# Patient Record
Sex: MALE | Race: WHITE | NOT HISPANIC OR LATINO | Employment: OTHER | URBAN - METROPOLITAN AREA
[De-identification: names, ages, dates, MRNs, and addresses within clinical notes are randomized per-mention and may not be internally consistent; named-entity substitution may affect disease eponyms.]

---

## 2017-08-18 ENCOUNTER — ANTI-COAG VISIT (OUTPATIENT)
Dept: CARDIOLOGY | Facility: CLINIC | Age: 62
End: 2017-08-18

## 2017-08-18 DIAGNOSIS — Z79.01 ANTICOAGULATED ON COUMADIN: ICD-10-CM

## 2017-08-18 DIAGNOSIS — I48.20 CHRONIC ATRIAL FIBRILLATION: ICD-10-CM

## 2017-08-18 NOTE — PROGRESS NOTES
Spoke with pt gave dosing and next inr check date pt voiced understanding.   pts wife states pt has level checked at Dr mckeon office and will have it done on 09/13 she states they have it done once a month

## 2017-08-18 NOTE — PROGRESS NOTES
It is the recommendation of the CC that the INR been checked as ordered;  The INR increased a significant amount in the last week.

## 2017-08-29 ENCOUNTER — ANTI-COAG VISIT (OUTPATIENT)
Dept: CARDIOLOGY | Facility: CLINIC | Age: 62
End: 2017-08-29

## 2017-08-29 DIAGNOSIS — Z79.01 ANTICOAGULATED ON COUMADIN: ICD-10-CM

## 2017-08-29 DIAGNOSIS — I48.20 CHRONIC ATRIAL FIBRILLATION: ICD-10-CM

## 2017-08-29 NOTE — PROGRESS NOTES
Spoke with pts wife  gave dosing and next inr check date pts wife voiced understanding.   Getting checked again at Drs office on 09/13

## 2017-09-13 ENCOUNTER — ANTI-COAG VISIT (OUTPATIENT)
Dept: CARDIOLOGY | Facility: CLINIC | Age: 62
End: 2017-09-13

## 2017-09-13 DIAGNOSIS — Z79.01 ANTICOAGULATED ON COUMADIN: ICD-10-CM

## 2017-09-13 DIAGNOSIS — I48.20 CHRONIC ATRIAL FIBRILLATION: ICD-10-CM

## 2017-09-27 NOTE — PROGRESS NOTES
THAT'S FINE, I CHANGED THE COMMENTS TO REFLECT THE LAB LOCATION, PT DOES NOT HAVE HOME METER.  I WOULD PREFER THEY GO EARLIER IN THE WEEK, BUT IF [facility] IS BEST, THEN PLEASE GO TO LAB EARLY.

## 2017-10-13 NOTE — PROGRESS NOTES
Pt did not go to lab because he had been holding for endoscopy and just restarted the coumadin on Tuesday. Pt to go to lab on Monday;  Pt is having colonoscopy on 10/30 and is supposed to start holding on 10/25

## 2017-10-16 ENCOUNTER — ANTI-COAG VISIT (OUTPATIENT)
Dept: CARDIOLOGY | Facility: CLINIC | Age: 62
End: 2017-10-16

## 2017-10-16 DIAGNOSIS — I48.20 CHRONIC ATRIAL FIBRILLATION: ICD-10-CM

## 2017-10-16 DIAGNOSIS — Z79.01 ANTICOAGULATED ON COUMADIN: ICD-10-CM

## 2017-10-16 NOTE — PROGRESS NOTES
wife informed of dosing and next inr chk date; also advised of holding instructions; she expressed understanding

## 2017-11-20 ENCOUNTER — ANTI-COAG VISIT (OUTPATIENT)
Dept: CARDIOLOGY | Facility: CLINIC | Age: 62
End: 2017-11-20

## 2017-11-20 DIAGNOSIS — I48.20 CHRONIC ATRIAL FIBRILLATION: ICD-10-CM

## 2017-11-20 DIAGNOSIS — Z79.01 ANTICOAGULATED ON COUMADIN: ICD-10-CM

## 2017-12-15 ENCOUNTER — ANTI-COAG VISIT (OUTPATIENT)
Dept: CARDIOLOGY | Facility: CLINIC | Age: 62
End: 2017-12-15

## 2017-12-15 DIAGNOSIS — Z79.01 ANTICOAGULATED ON COUMADIN: Primary | ICD-10-CM

## 2017-12-15 DIAGNOSIS — I48.20 CHRONIC ATRIAL FIBRILLATION: ICD-10-CM

## 2017-12-15 NOTE — PROGRESS NOTES
CG informed of dosing and next inr chk date; she read back dosing correctly; need new pt/inr standing order put in

## 2017-12-26 ENCOUNTER — ANTI-COAG VISIT (OUTPATIENT)
Dept: CARDIOLOGY | Facility: CLINIC | Age: 62
End: 2017-12-26

## 2017-12-26 DIAGNOSIS — Z79.01 ANTICOAGULATED ON COUMADIN: ICD-10-CM

## 2017-12-26 DIAGNOSIS — I48.20 CHRONIC ATRIAL FIBRILLATION: ICD-10-CM

## 2018-01-16 ENCOUNTER — ANTI-COAG VISIT (OUTPATIENT)
Dept: CARDIOLOGY | Facility: CLINIC | Age: 63
End: 2018-01-16

## 2018-01-16 DIAGNOSIS — Z79.01 ANTICOAGULATED ON COUMADIN: ICD-10-CM

## 2018-01-16 DIAGNOSIS — I48.20 CHRONIC ATRIAL FIBRILLATION: ICD-10-CM

## 2018-01-23 NOTE — PROGRESS NOTES
Maintain dose, INR likely to drop due to DDI-check INR next week to see if INR drops with med change. Will watch close for a few weeks since dose may require an adjustment.

## 2018-01-23 NOTE — PROGRESS NOTES
Mignon called; pt will be starting Harvoni BID on Thursday, also will be changes ranitidine to pepcid

## 2018-01-30 ENCOUNTER — ANTI-COAG VISIT (OUTPATIENT)
Dept: CARDIOLOGY | Facility: CLINIC | Age: 63
End: 2018-01-30

## 2018-01-30 DIAGNOSIS — I48.20 CHRONIC ATRIAL FIBRILLATION: ICD-10-CM

## 2018-01-30 DIAGNOSIS — Z79.01 ANTICOAGULATED ON COUMADIN: ICD-10-CM

## 2018-02-12 ENCOUNTER — ANTI-COAG VISIT (OUTPATIENT)
Dept: CARDIOLOGY | Facility: CLINIC | Age: 63
End: 2018-02-12

## 2018-02-12 DIAGNOSIS — Z79.01 ANTICOAGULATED ON COUMADIN: ICD-10-CM

## 2018-02-12 DIAGNOSIS — I48.20 CHRONIC ATRIAL FIBRILLATION: ICD-10-CM

## 2018-02-12 NOTE — PROGRESS NOTES
Spoke with miguel angel gave dosing and next inr check date miguel angel voiced understanding. She states pt is going back on 03/13 to have level done again

## 2018-03-13 ENCOUNTER — ANTI-COAG VISIT (OUTPATIENT)
Dept: CARDIOLOGY | Facility: CLINIC | Age: 63
End: 2018-03-13

## 2018-03-13 DIAGNOSIS — Z79.01 ANTICOAGULATED ON COUMADIN: ICD-10-CM

## 2018-03-13 DIAGNOSIS — I48.20 CHRONIC ATRIAL FIBRILLATION: ICD-10-CM

## 2018-03-13 PROBLEM — B18.2 HEP C W/O COMA, CHRONIC: Status: ACTIVE | Noted: 2018-03-13

## 2018-04-13 ENCOUNTER — ANTI-COAG VISIT (OUTPATIENT)
Dept: CARDIOLOGY | Facility: CLINIC | Age: 63
End: 2018-04-13

## 2018-04-13 DIAGNOSIS — Z79.01 ANTICOAGULATED ON COUMADIN: ICD-10-CM

## 2018-04-13 DIAGNOSIS — I48.20 CHRONIC ATRIAL FIBRILLATION: ICD-10-CM

## 2018-05-16 ENCOUNTER — ANTI-COAG VISIT (OUTPATIENT)
Dept: CARDIOLOGY | Facility: CLINIC | Age: 63
End: 2018-05-16

## 2018-05-16 DIAGNOSIS — Z79.01 ANTICOAGULATED ON COUMADIN: ICD-10-CM

## 2018-05-16 DIAGNOSIS — I48.20 CHRONIC ATRIAL FIBRILLATION: ICD-10-CM

## 2018-05-16 NOTE — PROGRESS NOTES
5/16/18 - LVM with dosing instruction, recheck date & to call to coumadin clinic to confirm.   5/17/18 - Spoke to wife.  Confirms dose & compliance.  No changes or problems reported. INR slightly low.  Wife request to do extra dose on Friday. Increase dose 5/18/18 and recheck INR in 5 - 6 weeks at [facility] with Dr. Keith. Appt scheduled for 6/18/18 Wife able to restate instructions.

## 2018-06-19 ENCOUNTER — ANTI-COAG VISIT (OUTPATIENT)
Dept: CARDIOLOGY | Facility: CLINIC | Age: 63
End: 2018-06-19

## 2018-06-19 DIAGNOSIS — I48.20 CHRONIC ATRIAL FIBRILLATION: ICD-10-CM

## 2018-06-19 DIAGNOSIS — Z79.01 ANTICOAGULATED ON COUMADIN: ICD-10-CM

## 2018-06-19 NOTE — PROGRESS NOTES
Informed patient wife of dose and next inr check date. She voiced understanding.   Pt also wants to know if sudafed sinus will interact c coumadin?

## 2018-07-23 ENCOUNTER — ANTI-COAG VISIT (OUTPATIENT)
Dept: CARDIOLOGY | Facility: CLINIC | Age: 63
End: 2018-07-23

## 2018-07-23 DIAGNOSIS — Z79.01 ANTICOAGULATED ON COUMADIN: ICD-10-CM

## 2018-07-23 DIAGNOSIS — I48.20 CHRONIC ATRIAL FIBRILLATION: ICD-10-CM

## 2018-07-23 NOTE — PROGRESS NOTES
Attempted to call pt with dosing and next inr check no answer and no vm will attempt at a later time

## 2018-08-23 ENCOUNTER — ANTI-COAG VISIT (OUTPATIENT)
Dept: CARDIOLOGY | Facility: CLINIC | Age: 63
End: 2018-08-23

## 2018-08-23 DIAGNOSIS — Z79.01 ANTICOAGULATED ON COUMADIN: ICD-10-CM

## 2018-08-23 DIAGNOSIS — I48.20 CHRONIC ATRIAL FIBRILLATION: ICD-10-CM

## 2018-08-23 NOTE — PROGRESS NOTES
Dose confirmed & compliance. No changes or problems reported. INR in range and stable. Will maintain dose and recheck INR in 4 weeks.

## 2018-09-12 PROBLEM — K59.03 DRUG-INDUCED CONSTIPATION: Status: ACTIVE | Noted: 2018-09-12

## 2018-09-12 PROBLEM — G56.02 CARPAL TUNNEL SYNDROME OF LEFT WRIST: Status: ACTIVE | Noted: 2018-09-12

## 2019-04-24 PROBLEM — G89.29 CHRONIC LEFT SHOULDER PAIN: Status: ACTIVE | Noted: 2019-04-24

## 2019-04-24 PROBLEM — M25.512 CHRONIC LEFT SHOULDER PAIN: Status: ACTIVE | Noted: 2019-04-24

## 2019-05-14 ENCOUNTER — INITIAL CONSULT (OUTPATIENT)
Dept: NEUROSURGERY | Facility: CLINIC | Age: 64
End: 2019-05-14
Payer: MEDICARE

## 2019-05-14 VITALS
HEIGHT: 70 IN | BODY MASS INDEX: 34.04 KG/M2 | DIASTOLIC BLOOD PRESSURE: 94 MMHG | WEIGHT: 237.75 LBS | SYSTOLIC BLOOD PRESSURE: 149 MMHG | HEART RATE: 96 BPM

## 2019-05-14 DIAGNOSIS — M79.18 MYOFASCIAL PAIN: ICD-10-CM

## 2019-05-14 DIAGNOSIS — Z98.1 S/P CERVICAL SPINAL FUSION: Primary | ICD-10-CM

## 2019-05-14 PROCEDURE — 99999 PR PBB SHADOW E&M-EST. PATIENT-LVL III: CPT | Mod: PBBFAC,,, | Performed by: STUDENT IN AN ORGANIZED HEALTH CARE EDUCATION/TRAINING PROGRAM

## 2019-05-14 PROCEDURE — 99999 PR PBB SHADOW E&M-EST. PATIENT-LVL III: ICD-10-PCS | Mod: PBBFAC,,, | Performed by: STUDENT IN AN ORGANIZED HEALTH CARE EDUCATION/TRAINING PROGRAM

## 2019-05-14 PROCEDURE — 3008F PR BODY MASS INDEX (BMI) DOCUMENTED: ICD-10-PCS | Mod: CPTII,S$GLB,, | Performed by: STUDENT IN AN ORGANIZED HEALTH CARE EDUCATION/TRAINING PROGRAM

## 2019-05-14 PROCEDURE — 99204 PR OFFICE/OUTPT VISIT, NEW, LEVL IV, 45-59 MIN: ICD-10-PCS | Mod: S$GLB,,, | Performed by: STUDENT IN AN ORGANIZED HEALTH CARE EDUCATION/TRAINING PROGRAM

## 2019-05-14 PROCEDURE — 3077F PR MOST RECENT SYSTOLIC BLOOD PRESSURE >= 140 MM HG: ICD-10-PCS | Mod: CPTII,S$GLB,, | Performed by: STUDENT IN AN ORGANIZED HEALTH CARE EDUCATION/TRAINING PROGRAM

## 2019-05-14 PROCEDURE — 99204 OFFICE O/P NEW MOD 45 MIN: CPT | Mod: S$GLB,,, | Performed by: STUDENT IN AN ORGANIZED HEALTH CARE EDUCATION/TRAINING PROGRAM

## 2019-05-14 PROCEDURE — 3080F PR MOST RECENT DIASTOLIC BLOOD PRESSURE >= 90 MM HG: ICD-10-PCS | Mod: CPTII,S$GLB,, | Performed by: STUDENT IN AN ORGANIZED HEALTH CARE EDUCATION/TRAINING PROGRAM

## 2019-05-14 PROCEDURE — 3080F DIAST BP >= 90 MM HG: CPT | Mod: CPTII,S$GLB,, | Performed by: STUDENT IN AN ORGANIZED HEALTH CARE EDUCATION/TRAINING PROGRAM

## 2019-05-14 PROCEDURE — 3077F SYST BP >= 140 MM HG: CPT | Mod: CPTII,S$GLB,, | Performed by: STUDENT IN AN ORGANIZED HEALTH CARE EDUCATION/TRAINING PROGRAM

## 2019-05-14 PROCEDURE — 3008F BODY MASS INDEX DOCD: CPT | Mod: CPTII,S$GLB,, | Performed by: STUDENT IN AN ORGANIZED HEALTH CARE EDUCATION/TRAINING PROGRAM

## 2019-05-14 NOTE — PROGRESS NOTES
[facility] - Neurosurgery  Clinic Consult     Consult Requested By: Jorge Keith MD  PCP: Jorge Keith MD    SUBJECTIVE:     Chief Complaint:   Chief Complaint   Patient presents with    Cervical Spine Pain (C-spine)     patient has a history of cerivcal fusion; patient reports neck pain with left arm pain; arm pain resolved with oral steriods; pain 2/10       History of Present Illness:  Hudson Tavares is a 64 y.o. right handed male who presents for evaluation of neck pain. Patient is s/p C5-7 ACDF with Dr. Lacey in 2004 for disc herniation and left arm pain. Patient reports doing well since surgery. He lifting something heavy approximately 2 weeks ago with acute onset of left sided neck pain. Denies arm pain, numbness, weakness. He has not attended physical therapy or received injections recently with pain management. He reports stiffness in neck with increased pain with range of motion exercises.     VAS 2/10 neck   PHQ 2  Neck Disability Index 34%    Past Medical History:   Diagnosis Date    Adenomatous colon polyp     Alcohol abuse 7/9/2015    Anticoagulant long-term use     coumadin    Atrial fibrillation 7/9/2015    CAD (coronary artery disease), native coronary artery 5/3/2016    Degenerative joint disease 7/9/2015    Encounter for blood transfusion     Enlarged liver     Essential hypertension 7/9/2015    Hep C w/o coma, chronic     Hiatal hernia     Hx of bladder infections     finished cipro 4/29/16    S/P CABG (coronary artery bypass graft) 5/17/2016    LIMA to LAD, SVG to OM, SVG to diagonal, SVG to right PDA, Maze, left atrial appendage resection (5/16)    Spleen enlarged     Thrombocytopenia 5/5/2016    Tobacco abuse 7/9/2015     Past Surgical History:   Procedure Laterality Date    AORTOCORONARY BYPASS-CABG x 4 w/EVH N/A 5/4/2016    Per[facility] OR    CARDIAC SURGERY  05/04/2016    4 vessel CABG    CARDIOVERSION N/A 6/22/2016    [facility]    CARDIOVERSION N/A 11/10/2015    [facility]    CERVICAL FUSION  2004    COLONOSCOPY      HEART CATH-LEFT Left 8/8/2017    [facility] CATH    HEART CATH-LEFT Left 4/27/2016    [facility] CATH    JOINT REPLACEMENT  TOTAL RIGHT KNEE     MAZE PROCEDURE N/A 5/4/2016    [facility] OR    OPEN ANTERIOR SHOULDER RECONSTRUCTION Left     TIBIA FRACTURE SURGERY Right 1971    TRANSESOPHAGEAL ECHOCARDIOGRAM (SHELLIE) N/A 6/22/2016    [facility]     Family History   Problem Relation Age of Onset    Heart disease Mother         congestive heart failure    Diabetes Mother     Hypertension Mother     Heart disease Sister         heart murmur    Atrial fibrillation Sister     Cancer Sister         breast    Cancer Father 60        colon    Chronic back pain Brother     Atrial fibrillation Sister     Heart disease Sister     Collagen disease Neg Hx      Social History     Tobacco Use    Smoking status: Former Smoker     Packs/day: 0.25     Years: 30.00     Pack years: 7.50     Types: Cigarettes     Last attempt to quit: 5/1/2016     Years since quitting: 3.0    Smokeless tobacco: Never Used   Substance Use Topics    Alcohol use: No     Alcohol/week: 0.0 oz     Comment: last drink 8/2015    Drug use: No      Review of patient's allergies indicates:   Allergen Reactions    Cilostazol Other (See Comments)     Colon bleeding    Keflex [cephalexin] Other (See Comments)     Was using 500mg TID oral and had a strong metallic taste in his mouth and could not function.  Can use IV cephalosporin.    Morphine     Stadol [butorphanol tartrate]     Sulfa (sulfonamide antibiotics)        Current Outpatient Medications:     aspirin (ECOTRIN) 81 MG EC tablet, Take 81 mg by mouth once daily. Use prior to arrival for cardioversion, Disp: ,  Rfl:     clotrimazole-betamethasone 1-0.05% (LOTRISONE) cream, APPLY TO AFFECTED AREA TWICE A DAY, Disp: 45 g, Rfl: 3    famotidine (PEPCID) 20 MG tablet, Take 20 mg by mouth once daily., Disp: , Rfl:     methocarbamol (ROBAXIN) 750 MG Tab, Take 2 tablets (1,500 mg total) by mouth 3 (three) times daily., Disp: 60 tablet, Rfl: 2    metoprolol succinate (TOPROL-XL) 50 MG 24 hr tablet, TAKE 1 TABLET (50 MG TOTAL) BY MOUTH ONCE DAILY., Disp: 30 tablet, Rfl: 11    mometasone 0.1% (ELOCON) 0.1 % cream, Apply topically once daily., Disp: 45 g, Rfl: 11    mupirocin calcium 2% (BACTROBAN) 2 % cream, APPLY TO AFFECTED AREA 3 TIMES A DAY, Disp: 15 g, Rfl: 3    POLYETHYLENE GLYCOL 3350 (MIRALAX ORAL), Take by mouth as directed., Disp: , Rfl:     warfarin (COUMADIN) 1 MG tablet, This medication is managed by [facility]. Please contact [phone]. See most recent anticoag visit for current coumadin dosing. Current dose: Take 2mg Wed, 1mg all other days or as instructed by Coumadin Clinic, Disp: 45 tablet, Rfl: 3    warfarin (COUMADIN) 1 MG tablet, TAKE 1 TABLET BY MOUTH EVERY DAY EXCEPT TAKE TAKE 2 TABLETS ON TUESDAYS, Disp: 120 tablet, Rfl: 2    warfarin (COUMADIN) 2 MG tablet, Take 1 tablet (2 mg total) by mouth Daily. (Patient taking differently: Take 1-2 mg by mouth. This medication is managed by [facility]. Please contact [phone]. See most recent anticoag visit for current coumadin dosing. Current dose: Take 2 mg on Wed; 1 mg all other days or as instructed by coumadin clinic), Disp: 90 tablet, Rfl: 3    Review of Systems:   Constitutional: no fever, chills or night sweats. No changes in weight   Eyes: no visual changes   ENT: no nasal congestion or sore throat   Respiratory: no cough or shortness of breath   Cardiovascular: no chest pain or palpitations   Gastrointestinal: no nausea or vomiting   Genitourinary: no hematuria or dysuria   Integument/Breast: no rash or pruritis    Hematologic/Lymphatic: no easy bruising or lymphadenopathy   Musculoskeletal: +myalgias, neck pain   Neurological: no seizures or tremors   Behavioral/Psych: no auditory or visual hallucinations   Endocrine: no heat or cold intolerance         OBJECTIVE:     Vital Signs (Most Recent):  Pulse: 96 (05/14/19 1235)  BP: (!) 149/94 (05/14/19 1235)    Physical Exam:   General: well developed, well nourished, no distress.   Neurologic: Alert and oriented. Thought content appropriate. GCS 15.   Head: normocephalic, atraumatic  Eyes: EOMI.  Neck: trachea midline, no JVD   Cardiovascular: no LE edema  Pulmonary: normal respirations, no signs of respiratory distress  Abdomen: non-distended  Sensory: intact to light touch throughout  Skin: Skin is warm, dry and intact.    Motor Strength: Moves all extremities spontaneously with good tone. No abnormal movements seen.     Strength  Deltoids Triceps Biceps Wrist Extension Wrist Flexion Hand  Interossei   Upper: R 5/5 5/5 5/5 5/5 5/5 5/5 5/5    L 5/5 5/5 5/5 5/5 5/5 5/5 5/5     Iliopsoas Quadriceps Knee  Flexion Tibialis  anterior Gastro- cnemius EHL    Lower: R 5/5 5/5 5/5 5/5 5/5 5/5     L 5/5 5/5 5/5 5/5 5/5 5/5      DTR's: 2 + and symmetric in UE and LE, except 0 right patellar (s/p TKR)  Dunaway: absent  Clonus: absent    Gait: normal    Tandem Gait: No difficulty           Able to walk on heels & toes    Cervical Spine: decreased ROM secondary to previous fusion, TTP left trapezius        Diagnostic Results:  I have independently reviewed the following imaging:  X-Ray: Reviewed  MRI: Reviewed  MRI and x-rays reviewed patient has a previous C5-7 anterior cervical diskectomy and fusion without evidence of pseudoarthrosis; at C4-5 he has a very slight anterolisthesis without cord compression with left-sided foraminal stenosis, multilevel less significant spondylosis with neural foraminal narrowing    ASSESSMENT/PLAN:     S/P cervical spinal fusion  -     Ambulatory  Referral to Physical/Occupational Therapy    Myofascial pain        Hudson Tavares is a 64 y.o. male    Hand acute exacerbation of neck and proximal left arm pain consistent with a C5 radiculopathy  He has had improvement in the arm symptoms with a Medrol Dosepak  His residual neck pain  Is neurologically intact  His imaging correlates with moderate adjacent segment disease a very slight spondylolisthesis, non mobile with some left-sided foraminal stenosis  We discussed his imaging reviewed reviewed his treatment options with and will proceed with conservative care at this time  He be referred to physical therapy, we discussed activity modification  Reviewed treatment options including injections, he wishes to hold off for the moment    He will proceed if he wants to schedule injections will call us and we will coordinate if not, follow-up for 6 weeks    We discussed non-surgical treatment options.  We discussed medical management and referral options including anti-inflammatories, muscle relaxants, narcotics and neuropathic pain medications.  We discussed physical therapy for neck strengthening and flexibility and the possibility of cervical traction.  We talked about injection therapy for both treatment and diagnosis.            Patient verbalized understanding of plan. Encouraged to call with any questions or concerns.     This note was partially dictated using voice recognition software, so please excuse any errors that were not corrected.

## 2019-05-14 NOTE — LETTER
May 14, 2019      Jorge Keith MD  [id number] [address] [address]           [facility]  [address] [facility] [address] [place] [address]  Phone: [phone]  Fax: [phone]          Patient: Hudson Tavares   MR Number: [id number]   YOB: 1955   Date of Visit: 5/14/2019       Dear Dr. Jorge Keith:    Thank you for referring Hudson Tavares to me for evaluation. Attached you will find relevant portions of my assessment and plan of care.    If you have questions, please do not hesitate to call me. I look forward to following Hudson Tavares along with you.    Sincerely,    Matthew Wallace MD    Enclosure  CC:  No Recipients    If you would like to receive this communication electronically, please contact [email or web address] or [phone] to request more information on [employer] Link access.    For providers and/or their staff who would like to refer a patient to [facility], please contact us through our one-stop-shop provider referral line, [facility], at [phone].    If you feel you have received this communication in error or would no longer like to receive these types of communications, please e-mail [email or web address]

## 2019-06-25 ENCOUNTER — OFFICE VISIT (OUTPATIENT)
Dept: NEUROSURGERY | Facility: CLINIC | Age: 64
End: 2019-06-25
Payer: MEDICARE

## 2019-06-25 VITALS — SYSTOLIC BLOOD PRESSURE: 131 MMHG | DIASTOLIC BLOOD PRESSURE: 86 MMHG | HEART RATE: 69 BPM

## 2019-06-25 DIAGNOSIS — Z98.1 S/P CERVICAL SPINAL FUSION: Primary | ICD-10-CM

## 2019-06-25 PROCEDURE — 3079F DIAST BP 80-89 MM HG: CPT | Mod: CPTII,S$GLB,, | Performed by: STUDENT IN AN ORGANIZED HEALTH CARE EDUCATION/TRAINING PROGRAM

## 2019-06-25 PROCEDURE — 99999 PR PBB SHADOW E&M-EST. PATIENT-LVL III: ICD-10-PCS | Mod: PBBFAC,,, | Performed by: STUDENT IN AN ORGANIZED HEALTH CARE EDUCATION/TRAINING PROGRAM

## 2019-06-25 PROCEDURE — 3079F PR MOST RECENT DIASTOLIC BLOOD PRESSURE 80-89 MM HG: ICD-10-PCS | Mod: CPTII,S$GLB,, | Performed by: STUDENT IN AN ORGANIZED HEALTH CARE EDUCATION/TRAINING PROGRAM

## 2019-06-25 PROCEDURE — 99213 OFFICE O/P EST LOW 20 MIN: CPT | Mod: S$GLB,,, | Performed by: STUDENT IN AN ORGANIZED HEALTH CARE EDUCATION/TRAINING PROGRAM

## 2019-06-25 PROCEDURE — 99999 PR PBB SHADOW E&M-EST. PATIENT-LVL III: CPT | Mod: PBBFAC,,, | Performed by: STUDENT IN AN ORGANIZED HEALTH CARE EDUCATION/TRAINING PROGRAM

## 2019-06-25 PROCEDURE — 3075F PR MOST RECENT SYSTOLIC BLOOD PRESS GE 130-139MM HG: ICD-10-PCS | Mod: CPTII,S$GLB,, | Performed by: STUDENT IN AN ORGANIZED HEALTH CARE EDUCATION/TRAINING PROGRAM

## 2019-06-25 PROCEDURE — 99213 PR OFFICE/OUTPT VISIT, EST, LEVL III, 20-29 MIN: ICD-10-PCS | Mod: S$GLB,,, | Performed by: STUDENT IN AN ORGANIZED HEALTH CARE EDUCATION/TRAINING PROGRAM

## 2019-06-25 PROCEDURE — 3075F SYST BP GE 130 - 139MM HG: CPT | Mod: CPTII,S$GLB,, | Performed by: STUDENT IN AN ORGANIZED HEALTH CARE EDUCATION/TRAINING PROGRAM

## 2019-06-25 NOTE — PROGRESS NOTES
"REFERRING PHYSICIAN:    No ref. provider found  N/A    Return visit #2.    /"P cervical spinal fusion  -     Ambulatory Referral to Physical/Occupational Therapy     Myofascial pain           Hudson Tavares is a 64 y.o. male     Hand acute exacerbation of neck and proximal left arm pain consistent with a C5 radiculopathy  He has had improvement in the arm symptoms with a Medrol Dosepak  His residual neck pain  Is neurologically intact  His imaging correlates with moderate adjacent segment disease a very slight spondylolisthesis, non mobile with some left-sided foraminal stenosis  We discussed his imaging reviewed reviewed his treatment options with and will proceed with conservative care at this time  He be referred to physical therapy, we discussed activity modification  Reviewed treatment options including injections, he wishes to hold off for the moment     He will proceed if he wants to schedule injections will call us and we will coordinate if not, follow-up for 6 weeks"      CLINICAL PROGRESS:   Hudson Tavares is here for follow visit  He is doing much better he had improvement with physical therapy initially  He he states he saw 4 different therapist an exacerbation of his pain some.  However, overall he is doing better his return to the right his tractor mobilizing buildup a  He has some intermittent flare-up ended neck pain and left proximal arm, however this is less frequent  Overall is very functional feels better      MEDICATIONS:                   List reviewed, see chart for dosing details.    ALLERGIES:       Review of patient's allergies indicates:   Allergen Reactions    Cilostazol Other (See Comments)     Colon bleeding    Keflex [cephalexin] Other (See Comments)     Was using 500mg TID oral and had a strong metallic taste in his mouth and could not function.  Can use IV cephalosporin.    Morphine     Stadol [butorphanol tartrate]     Sulfa (sulfonamide antibiotics)        PHYSICAL " EXAMINATION:          VITAL SIGNS:   /86   Pulse 69     GENERAL:  Patient is well developed, well nourished, calm, collected, and in no apparent distress.      NEUROLOGICAL:                  No flowsheet data found.      ASSESSMENT/PLAN:  Return to clinic with adjacent segment disease moderate foraminal stenosis at C4-5  He had a accident flare-up of neck and left C5 radiculopathy  He has significant improvement with activity modification, attempted physical therapy some exacerbation  His return to his activities of daily living with only intermittent symptoms of pain without motor sensory deficit  He is well educated on his condition and treatment options  She had a flare-up of pain he referred for to interventional pain management for epidural steroid injection  He will return as needed    Advised to call the office Iif any questions or concerns arise.    This note was partially dictated using voice recognition software, so please excuse any errors that were not corrected.

## 2019-07-26 PROBLEM — D49.0 LIVER TUMOR: Status: ACTIVE | Noted: 2019-07-26

## 2019-08-05 ENCOUNTER — TELEPHONE (OUTPATIENT)
Dept: TRANSPLANT | Facility: CLINIC | Age: 64
End: 2019-08-05

## 2019-08-05 NOTE — TELEPHONE ENCOUNTER
----- Message from Michelle Cheney sent at 8/5/2019 12:49 PM CDT -----    We have the pt records and they are now pending review by the referral nurse. Sp to pt So and she has his Cd with his img on it, but she said that the pt does not to seeking txp, he would like to discuss other treatment options.

## 2019-08-07 ENCOUNTER — TELEPHONE (OUTPATIENT)
Dept: TRANSPLANT | Facility: CLINIC | Age: 64
End: 2019-08-07

## 2019-08-07 PROBLEM — K74.69 COMPENSATED HCV CIRRHOSIS: Status: ACTIVE | Noted: 2018-03-13

## 2019-08-07 PROBLEM — B19.20 COMPENSATED HCV CIRRHOSIS: Status: ACTIVE | Noted: 2018-03-13

## 2019-08-07 NOTE — TELEPHONE ENCOUNTER
Referral received from Dr Carlos Beebe    Patient with HCV cirrhosis with liver mass suspicious for HCC . Meld  8  ICD-10:  CM: D49.0  Referred for liver transplant for CONSULT .    Referral completed and forwarded to [facility].          Insurance: [employer]     ID:[id number]  Contact #

## 2019-08-16 DIAGNOSIS — B19.20 COMPENSATED HCV CIRRHOSIS: Primary | ICD-10-CM

## 2019-08-16 DIAGNOSIS — Z76.82 ORGAN TRANSPLANT CANDIDATE: ICD-10-CM

## 2019-08-16 DIAGNOSIS — K74.69 COMPENSATED HCV CIRRHOSIS: Primary | ICD-10-CM

## 2019-08-28 ENCOUNTER — LAB VISIT (OUTPATIENT)
Dept: LAB | Facility: HOSPITAL | Age: 64
End: 2019-08-28
Attending: INTERNAL MEDICINE
Payer: MEDICARE

## 2019-08-28 ENCOUNTER — OFFICE VISIT (OUTPATIENT)
Dept: TRANSPLANT | Facility: CLINIC | Age: 64
End: 2019-08-28
Payer: MEDICARE

## 2019-08-28 VITALS
WEIGHT: 236.75 LBS | HEART RATE: 81 BPM | BODY MASS INDEX: 33.89 KG/M2 | HEIGHT: 70 IN | TEMPERATURE: 98 F | RESPIRATION RATE: 16 BRPM | SYSTOLIC BLOOD PRESSURE: 157 MMHG | DIASTOLIC BLOOD PRESSURE: 90 MMHG | OXYGEN SATURATION: 97 %

## 2019-08-28 DIAGNOSIS — K74.69 COMPENSATED HCV CIRRHOSIS: ICD-10-CM

## 2019-08-28 DIAGNOSIS — C22.0 HCC (HEPATOCELLULAR CARCINOMA): Primary | ICD-10-CM

## 2019-08-28 DIAGNOSIS — Z76.82 ORGAN TRANSPLANT CANDIDATE: ICD-10-CM

## 2019-08-28 DIAGNOSIS — B19.20 COMPENSATED HCV CIRRHOSIS: ICD-10-CM

## 2019-08-28 LAB
ABO + RH BLD: NORMAL
AFP SERPL-MCNC: 12 NG/ML (ref 0–8.4)
ALBUMIN SERPL BCP-MCNC: 4.2 G/DL (ref 3.5–5.2)
ALP SERPL-CCNC: 91 U/L (ref 55–135)
ALT SERPL W/O P-5'-P-CCNC: 19 U/L (ref 10–44)
AMPHET+METHAMPHET UR QL: NEGATIVE
ANION GAP SERPL CALC-SCNC: 11 MMOL/L (ref 8–16)
AST SERPL-CCNC: 32 U/L (ref 10–40)
BARBITURATES UR QL SCN>200 NG/ML: NEGATIVE
BASOPHILS # BLD AUTO: 0.03 K/UL (ref 0–0.2)
BASOPHILS NFR BLD: 0.7 % (ref 0–1.9)
BENZODIAZ UR QL SCN>200 NG/ML: NEGATIVE
BILIRUB DIRECT SERPL-MCNC: 0.6 MG/DL (ref 0.1–0.3)
BILIRUB SERPL-MCNC: 1.6 MG/DL (ref 0.1–1)
BILIRUB UR QL STRIP: NEGATIVE
BLD GP AB SCN CELLS X3 SERPL QL: NORMAL
BUN SERPL-MCNC: 12 MG/DL (ref 8–23)
BZE UR QL SCN: NEGATIVE
CALCIUM SERPL-MCNC: 9.7 MG/DL (ref 8.7–10.5)
CANNABINOIDS UR QL SCN: NEGATIVE
CHLORIDE SERPL-SCNC: 107 MMOL/L (ref 95–110)
CLARITY UR REFRACT.AUTO: CLEAR
CO2 SERPL-SCNC: 21 MMOL/L (ref 23–29)
COLOR UR AUTO: YELLOW
CREAT SERPL-MCNC: 0.7 MG/DL (ref 0.5–1.4)
CREAT UR-MCNC: 55 MG/DL (ref 23–375)
DIFFERENTIAL METHOD: ABNORMAL
EOSINOPHIL # BLD AUTO: 0.3 K/UL (ref 0–0.5)
EOSINOPHIL NFR BLD: 6.5 % (ref 0–8)
ERYTHROCYTE [DISTWIDTH] IN BLOOD BY AUTOMATED COUNT: 13.1 % (ref 11.5–14.5)
EST. GFR  (AFRICAN AMERICAN): >60 ML/MIN/1.73 M^2
EST. GFR  (NON AFRICAN AMERICAN): >60 ML/MIN/1.73 M^2
ETHANOL UR-MCNC: <10 MG/DL
GGT SERPL-CCNC: 34 U/L (ref 8–55)
GLUCOSE SERPL-MCNC: 107 MG/DL (ref 70–110)
GLUCOSE UR QL STRIP: NEGATIVE
HCT VFR BLD AUTO: 43.9 % (ref 40–54)
HGB BLD-MCNC: 15.3 G/DL (ref 14–18)
HGB UR QL STRIP: NEGATIVE
IMM GRANULOCYTES # BLD AUTO: 0.01 K/UL (ref 0–0.04)
IMM GRANULOCYTES NFR BLD AUTO: 0.2 % (ref 0–0.5)
INR PPP: 2.2 (ref 0.8–1.2)
KETONES UR QL STRIP: NEGATIVE
LEUKOCYTE ESTERASE UR QL STRIP: NEGATIVE
LYMPHOCYTES # BLD AUTO: 1 K/UL (ref 1–4.8)
LYMPHOCYTES NFR BLD: 23.9 % (ref 18–48)
MCH RBC QN AUTO: 33.7 PG (ref 27–31)
MCHC RBC AUTO-ENTMCNC: 34.9 G/DL (ref 32–36)
MCV RBC AUTO: 97 FL (ref 82–98)
METHADONE UR QL SCN>300 NG/ML: NEGATIVE
MONOCYTES # BLD AUTO: 0.6 K/UL (ref 0.3–1)
MONOCYTES NFR BLD: 14 % (ref 4–15)
NEUTROPHILS # BLD AUTO: 2.3 K/UL (ref 1.8–7.7)
NEUTROPHILS NFR BLD: 54.7 % (ref 38–73)
NITRITE UR QL STRIP: NEGATIVE
NRBC BLD-RTO: 0 /100 WBC
OPIATES UR QL SCN: NEGATIVE
PCP UR QL SCN>25 NG/ML: NEGATIVE
PH UR STRIP: 6 [PH] (ref 5–8)
PLATELET # BLD AUTO: 66 K/UL (ref 150–350)
PMV BLD AUTO: 12 FL (ref 9.2–12.9)
POTASSIUM SERPL-SCNC: 4.3 MMOL/L (ref 3.5–5.1)
PROT SERPL-MCNC: 8.1 G/DL (ref 6–8.4)
PROT UR QL STRIP: NEGATIVE
PROTHROMBIN TIME: 21.8 SEC (ref 9–12.5)
RBC # BLD AUTO: 4.54 M/UL (ref 4.6–6.2)
SODIUM SERPL-SCNC: 139 MMOL/L (ref 136–145)
SP GR UR STRIP: 1.01 (ref 1–1.03)
TOXICOLOGY INFORMATION: NORMAL
URN SPEC COLLECT METH UR: NORMAL
WBC # BLD AUTO: 4.14 K/UL (ref 3.9–12.7)

## 2019-08-28 PROCEDURE — 3077F PR MOST RECENT SYSTOLIC BLOOD PRESSURE >= 140 MM HG: ICD-10-PCS | Mod: CPTII,S$GLB,TXP, | Performed by: INTERNAL MEDICINE

## 2019-08-28 PROCEDURE — 99999 PR PBB SHADOW E&M-EST. PATIENT-LVL IV: CPT | Mod: PBBFAC,TXP,, | Performed by: INTERNAL MEDICINE

## 2019-08-28 PROCEDURE — 3008F PR BODY MASS INDEX (BMI) DOCUMENTED: ICD-10-PCS | Mod: CPTII,S$GLB,TXP, | Performed by: INTERNAL MEDICINE

## 2019-08-28 PROCEDURE — 36415 COLL VENOUS BLD VENIPUNCTURE: CPT | Mod: TXP

## 2019-08-28 PROCEDURE — 82977 ASSAY OF GGT: CPT | Mod: TXP

## 2019-08-28 PROCEDURE — 85025 COMPLETE CBC W/AUTO DIFF WBC: CPT | Mod: TXP

## 2019-08-28 PROCEDURE — 82248 BILIRUBIN DIRECT: CPT | Mod: TXP

## 2019-08-28 PROCEDURE — 3080F DIAST BP >= 90 MM HG: CPT | Mod: CPTII,S$GLB,TXP, | Performed by: INTERNAL MEDICINE

## 2019-08-28 PROCEDURE — 99205 PR OFFICE/OUTPT VISIT, NEW, LEVL V, 60-74 MIN: ICD-10-PCS | Mod: S$GLB,TXP,, | Performed by: INTERNAL MEDICINE

## 2019-08-28 PROCEDURE — 82105 ALPHA-FETOPROTEIN SERUM: CPT | Mod: TXP

## 2019-08-28 PROCEDURE — 99205 OFFICE O/P NEW HI 60 MIN: CPT | Mod: S$GLB,TXP,, | Performed by: INTERNAL MEDICINE

## 2019-08-28 PROCEDURE — 81003 URINALYSIS AUTO W/O SCOPE: CPT | Mod: TXP

## 2019-08-28 PROCEDURE — 80321 ALCOHOLS BIOMARKERS 1OR 2: CPT | Mod: TXP

## 2019-08-28 PROCEDURE — 80053 COMPREHEN METABOLIC PANEL: CPT | Mod: TXP

## 2019-08-28 PROCEDURE — 85610 PROTHROMBIN TIME: CPT | Mod: NTX

## 2019-08-28 PROCEDURE — 99999 PR PBB SHADOW E&M-EST. PATIENT-LVL IV: ICD-10-PCS | Mod: PBBFAC,TXP,, | Performed by: INTERNAL MEDICINE

## 2019-08-28 PROCEDURE — 80307 DRUG TEST PRSMV CHEM ANLYZR: CPT | Mod: TXP

## 2019-08-28 PROCEDURE — 86901 BLOOD TYPING SEROLOGIC RH(D): CPT | Mod: NTX

## 2019-08-28 PROCEDURE — 3080F PR MOST RECENT DIASTOLIC BLOOD PRESSURE >= 90 MM HG: ICD-10-PCS | Mod: CPTII,S$GLB,TXP, | Performed by: INTERNAL MEDICINE

## 2019-08-28 PROCEDURE — 3008F BODY MASS INDEX DOCD: CPT | Mod: CPTII,S$GLB,TXP, | Performed by: INTERNAL MEDICINE

## 2019-08-28 PROCEDURE — 3077F SYST BP >= 140 MM HG: CPT | Mod: CPTII,S$GLB,TXP, | Performed by: INTERNAL MEDICINE

## 2019-08-28 NOTE — Clinical Note
Patient has declined to proceed with liver transplant evaluation based on patient preference.  Thanks, CB

## 2019-08-28 NOTE — PROGRESS NOTES
Transplant Hepatology  Liver Transplant Recipient Evaluation      Consultation started: 8/28/2019 at 9:33 AM     Original Referring Provider: STANTON Wilson  Current Corresponding Physician: STANTON GUERRERO Native Liver Diagnosis: Cirrhosis: Type C    Reason for Visit: evaluation for liver transplant     Subjective:     Hudson Tavares is a 64 y.o. 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  He is accompanied by his significant other.    The patient required blood transfusion in the early 1970s after a severe leg injury and was thought to acquire hepatitis C at that time.  He reports being diagnosed with hepatitis C 3-4 years ago on the basis of abnormal liver tests.  He was cured with 12 weeks of Harvoni in 2018 by patient report.  He has not experienced significant symptoms related to his liver disease but does report intermittent ascites for which he has taken diuretic therapy as needed.  He is not currently on fluid pills.  He has been following with Dr. Wilson regularly and during most recent HCC screening, noted to have a 2.2cm lesion seen on cross sectional imaging.  Not definitively diagnosed as HCC at this time but concern based on description.  He has undergone CT and MRI, bringing external imaging to clinic today.    Patient denies significant symptoms of his chronic liver disease.  Outside of intermittent ascites, he denies need for prior LVP or SBP.  He has not experienced jaundice, LE edema, HE or GI bleeding related to portal hypertension.  The patient is on anticoagulation with coumadin for atrial fibrillation.    PMH:   A fib with coumadin  4v CABG - 4/2016; reports daily chest pain but does strenuous labor without exacerbation of chest pain  Hep C cirrhosis     PSH:  Cervical fusion with prior stenosis  L shoulder repair  TKA   Ankle surgery  CABG    FH:  No liver disease    SH:   Resides with significant other  Retired, on disability -  [employer]   Quit tobacco in 2016  Quit alcohol in 2016; remote heavy use  Remote illicit drug use     Review of Systems   Constitutional: Negative for activity change, appetite change, chills, fatigue and unexpected weight change.   HENT: Negative for hearing loss and sore throat.    Eyes: Negative for visual disturbance.   Respiratory: Negative for chest tightness and shortness of breath.    Cardiovascular: Positive for chest pain.   Gastrointestinal: Negative for abdominal distention, abdominal pain, nausea and vomiting.   Musculoskeletal: Negative for gait problem.   Skin: Negative for rash.   Neurological: Negative for weakness and headaches.   Hematological: Negative for adenopathy. Does not bruise/bleed easily.   Psychiatric/Behavioral: Negative for confusion.       Objective:   Physical Exam   Constitutional: He is oriented to person, place, and time. He appears well-developed and well-nourished. No distress.   HENT:   Head: Normocephalic and atraumatic.   Mouth/Throat: Oropharynx is clear and moist. No oropharyngeal exudate.   Eyes: Pupils are equal, round, and reactive to light. Conjunctivae are normal. No scleral icterus.   Neck: Normal range of motion. Neck supple. No thyromegaly present.   Cardiovascular: Normal rate, regular rhythm and normal heart sounds. Exam reveals no gallop and no friction rub.   No murmur heard.  Sternotomy scar well healed   Pulmonary/Chest: Effort normal and breath sounds normal. No respiratory distress. He has no wheezes. He has no rales.   Abdominal: Soft. Bowel sounds are normal. He exhibits no distension. There is no tenderness.   Musculoskeletal: Normal range of motion. He exhibits no edema.   Lymphadenopathy:     He has no cervical adenopathy.   Neurological: He is alert and oriented to person, place, and time. No cranial nerve deficit.   Skin: Skin is warm and dry. No erythema.   Psychiatric: He has a normal mood and affect. His behavior is normal.   Vitals  reviewed.    MELD-Na score: 17 at 8/28/2019  8:40 AM  MELD score: 17 at 8/28/2019  8:40 AM  Calculated from:  Serum Creatinine: 0.7 mg/dL (Rounded to 1 mg/dL) at 8/28/2019  8:40 AM  Serum Sodium: 139 mmol/L (Rounded to 137 mmol/L) at 8/28/2019  8:40 AM  Total Bilirubin: 1.6 mg/dL at 8/28/2019  8:40 AM  INR(ratio): 2.2 at 8/28/2019  8:40 AM  Age: 64 years     Lab Results   Component Value Date    GLU 93 09/13/2017    BUN 12 05/02/2019    CREATININE 0.56 05/02/2019    CREATININE 0.70 10/08/2015    CALCIUM 9.1 09/13/2017     09/13/2017     10/08/2015    K 4.1 09/13/2017    K 4.0 10/08/2015     09/13/2017     10/08/2015    PROT 7.3 09/13/2017    CO2 26 09/13/2017    WBC 4.14 08/28/2019    RBC 4.54 (L) 08/28/2019    HGB 15.3 08/28/2019    HCT 43.9 08/28/2019    HCT 31.0 (L) 05/04/2016    PLT 66 (L) 08/28/2019     Lab Results   Component Value Date    CHOL 154 03/13/2018    TRIG 190 (H) 03/13/2018    HDL 50 03/13/2018    CHOLHDL 32.5 03/13/2018    TOTALCHOLEST 3.1 03/13/2018    ALBUMIN 3.5 09/13/2017    BILITOT 1.4 (H) 09/13/2017     (H) 09/13/2017    AST 99 (H) 05/08/2016     (H) 09/13/2017    ALKPHOS 135 09/13/2017    MG 1.7 06/16/2016    LABPROT 21.8 (H) 08/28/2019    INR 2.2 (H) 08/28/2019    PSA 0.2 07/14/2006       Diagnostics: EMR and external records reviewed    1. Compensated HCV cirrhosis    2. Organ transplant candidate        Transplant Hepatology - Candidacy   Assessment/Plan:     Transplant Candidacy: Hudson Tavares is a 64 y.o. male with ESLD secondary to alcohol abuse and hepatitis C here for evaluation for possible OLT.  In my opinion, he is a good candidate for liver transplant.  He will be presented to selection committee after all tests and evaluations are complete if he is agreeable to proceed with evaluation.    The patient appears to fairly well compensated hepatitis C cirrhosis.  It is unclear if he truly has ascites but not evidence of fluid overload at  this time.  Description of liver lesion is concerning for possible HCC and AFP is elevated at 12.  He provides discs with recent MRI and CT scan.  We will review in IR conference next Tuesday to determine if lesion meets LIRADs criteria for HCC.  If not, will likely recommend liver biopsy.  Patient understands and amenable to biopsy if required.    Discussed treatment options for early, small HCC which include transplant, resection, ablation and TACE.  He understands that he is not likely resection candidate based on presence of portal hypertension.  He has reservations regarding transplant and wants to talk with his family further to determine if he would be willing to proceed with evaluation if recommended.  We will discuss further after IR recommendations next week.    Patient has no contraindication to transplant     Addendum:  Patient contacted regarding IR recommendations.  He does not wish to provide with transplant evaluation at this time.  He will pursue locoregional therapy.    Malika Prescott MD         [facility] Patient Status  Functional Status: 100% - Normal, no complaints, no evidence of disease  Physical Capacity: No Limitations    Outside Records Request: none

## 2019-08-28 NOTE — PATIENT INSTRUCTIONS
You have a spot on the liver that is possible liver cancer.    We will review your recent MRI and CT scan next Tuesday.    I will call you with the recommendations from the conference.    This may include liver transplant and you should consider whether you want to proceed with evaluation.    Return to clinic based on transplant evaluation recommendation

## 2019-08-28 NOTE — LETTER
September 4, 2019        STANTON Wilson  [address]   [address]  [facility] [address]  Phone: [phone]  Fax: [phone]             [facility]  [address]  [facility] [address]  Phone: [phone]   Patient: Hudson Tavares   MR Number: [id number]   YOB: 1955   Date of Visit: 8/28/2019       Dear Dr. STANTON Wilson    Thank you for referring Hudson Tavares to me for evaluation. Attached you will find relevant portions of my assessment and plan of care.    If you have questions, please do not hesitate to call me. I look forward to following Hudson Tavares along with you.    Sincerely,    Malika Prescott MD    Enclosure    If you would like to receive this communication electronically, please contact [email or web address] or [phone] to request [employer] Link access.    [employer] [facility] is a tool which provides read-only access to select patient information with whom you have a relationship. Its easy to use and provides real time access to review your patients record including encounter summaries, notes, results, and demographic information.    If you feel you have received this communication in error or would no longer like to receive these types of communications, please e-mail [email or web address]

## 2019-08-29 ENCOUNTER — TELEPHONE (OUTPATIENT)
Dept: TRANSPLANT | Facility: CLINIC | Age: 64
End: 2019-08-29

## 2019-08-29 NOTE — TELEPHONE ENCOUNTER
Patient: Hudson Tavares       MRN: [id number]      : 1955     Age: 64 y.o.  [address] [facility] [place] [address]    Provider: Hepatologist Dora Prescott    Urgency of review: non-urgent    Patient Transplant Status: Other Indeterminate lesion, concern for HCC    Reason for presentation: Initial staging for transplant    Clinical Summary: 65yo male with compensated hepatitis C cirrhosis s/p SVR.  Noted to have new liver lesion on standard surveillance from 2019.  Has completed CT and MRI without confirmation of HCC at this time.  He is hesitant about transplant although still considering with family.  MELD 17 is driven primarily by coumadin for history of a fib.    Imaging to be reviewed: CT, MRI external 2019    HCC Treatment History: N/A    ABO: O POS    Platelets:   Lab Results   Component Value Date/Time    PLT 66 (L) 2019 08:40 AM     Creatinine:   Lab Results   Component Value Date/Time    CREATININE 0.7 2019 08:40 AM    CREATININE 0.70 10/08/2015 11:17 AM     Bilirubin:   Lab Results   Component Value Date/Time    BILITOT 1.6 (H) 2019 08:40 AM     AFP Last 3 each if available:   Lab Results   Component Value Date/Time    AFP 12 (H) 2019 08:40 AM    AFP 25 (H) 2016 02:52 PM       MELD: MELD-Na score: 17 at 2019  8:40 AM  MELD score: 17 at 2019  8:40 AM  Calculated from:  Serum Creatinine: 0.7 mg/dL (Rounded to 1 mg/dL) at 2019  8:40 AM  Serum Sodium: 139 mmol/L (Rounded to 137 mmol/L) at 2019  8:40 AM  Total Bilirubin: 1.6 mg/dL at 2019  8:40 AM  INR(ratio): 2.2 at 2019  8:40 AM  Age: 64 years    Plan:     Follow-up Provider:

## 2019-09-03 ENCOUNTER — TELEPHONE (OUTPATIENT)
Dept: HEPATOLOGY | Facility: CLINIC | Age: 64
End: 2019-09-03

## 2019-09-03 ENCOUNTER — CONFERENCE (OUTPATIENT)
Dept: TRANSPLANT | Facility: CLINIC | Age: 64
End: 2019-09-03

## 2019-09-03 LAB — PHOSPHATIDYLETHANOL (PETH): NEGATIVE NG/ML

## 2019-09-03 NOTE — TELEPHONE ENCOUNTER
Called patient to discuss IR recommendations.    He is recommended for ablation and he will be contacted with date and time for consultation.      He does not want to pursue transplant evaluation at this time as he feels that the time commitment is too extensive based on his current situation of owning animals and does not have anyone to take care of his property and livestock to undergo evaluation and possible transplant.

## 2019-09-04 ENCOUNTER — TELEPHONE (OUTPATIENT)
Dept: HEPATOLOGY | Facility: CLINIC | Age: 64
End: 2019-09-04

## 2019-09-04 NOTE — TELEPHONE ENCOUNTER
I spoke with patient's wife Mignon. She would like to have IR services on the [facility]. I told Mrs Tavares that we do not have IR service on the [facility]. She said that they will come here.      ----- Message from Nica Malagon MA sent at 9/4/2019  1:53 PM CDT -----  Contact: pts friend Mignon       ----- Message -----  From: Fannie Lomax  Sent: 9/4/2019  12:35 PM  To: [facility] Staff    Type:  Needs Medical Advice    Who Called: Mignon   Symptoms (please be specific): none they are calling to speak with the nurse about scheduling a radiation test of the liver    How long has patient had these symptoms: n/a   Pharmacy name and phone #:  N/a  Would the patient rather a call back or a response via [facility]? Call back   Best Call Back Number: can you please call Mignon at [phone]  Additional Information: pt is wanting to have the test close to home where they live     JASE

## 2019-09-04 NOTE — TELEPHONE ENCOUNTER
Patient: Hudson Tavares       MRN: [id number]      : 1955     Age: 64 y.o.  [address]  [place] [address]    Provider: Hepatologist Dora Prescott    Urgency of review: non-urgent    Patient Transplant Status: Other Indeterminate lesion, concern for HCC    Reason for presentation: Initial staging for transplant    Clinical Summary: 63yo male with compensated hepatitis C cirrhosis s/p SVR.  Noted to have new liver lesion on standard surveillance from 2019.  Has completed CT and MRI without confirmation of HCC at this time.  He is hesitant about transplant although still considering with family.  MELD 17 is driven primarily by coumadin for history of a fib.    Imaging to be reviewed: CT, MRI external 2019    HCC Treatment History: N/A    ABO: O POS    Platelets:   Lab Results   Component Value Date/Time    PLT 66 (L) 2019 08:40 AM     Creatinine:   Lab Results   Component Value Date/Time    CREATININE 0.7 2019 08:40 AM    CREATININE 0.70 10/08/2015 11:17 AM     Bilirubin:   Lab Results   Component Value Date/Time    BILITOT 1.6 (H) 2019 08:40 AM     AFP Last 3 each if available:   Lab Results   Component Value Date/Time    AFP 12 (H) 2019 08:40 AM    AFP 25 (H) 2016 02:52 PM       MELD: MELD-Na score: 17 at 2019  8:40 AM  MELD score: 17 at 2019  8:40 AM  Calculated from:  Serum Creatinine: 0.7 mg/dL (Rounded to 1 mg/dL) at 2019  8:40 AM  Serum Sodium: 139 mmol/L (Rounded to 137 mmol/L) at 2019  8:40 AM  Total Bilirubin: 1.6 mg/dL at 2019  8:40 AM  INR(ratio): 2.2 at 2019  8:40 AM  Age: 64 years    Plan:MRI with limited sequences.  CT demonstrates 2.4cm mas with subtle arterial enhancement as well as portal venous washout c/w HCC.  Would favor MWA as the locoregional treatment option and continued transplant workup.    Committee Discussion---2.4 cm enhancing lesion with washout c/w HCC.  Enlarged spleen.  IR for ablation.      Message forwarded  to IR schedulers requesting consult date     Follow-up Provider: Malika Prescott MD

## 2019-09-05 ENCOUNTER — TELEPHONE (OUTPATIENT)
Dept: HEPATOLOGY | Facility: CLINIC | Age: 64
End: 2019-09-05

## 2019-09-05 NOTE — TELEPHONE ENCOUNTER
Spoke with Mrs Tavares. She had some questions about her  appointment with IR. I answered her questions. She said, she will wait to hear from IR.    ----- Message from Kimberley Turner MA sent at 9/5/2019  1:11 PM CDT -----  Contact: Mignon      ----- Message -----  From: Courtney Jiménez MA  Sent: 9/5/2019   9:58 AM  To: [facility] Staff    Mignon spoke with Carmelina yesterday about Hudson having a procedure done.Mignon has a few more questions.      Mignon# [phone]

## 2019-09-12 ENCOUNTER — OFFICE VISIT (OUTPATIENT)
Dept: INTERVENTIONAL RADIOLOGY/VASCULAR | Facility: CLINIC | Age: 64
End: 2019-09-12
Payer: MEDICARE

## 2019-09-12 VITALS
WEIGHT: 233.44 LBS | SYSTOLIC BLOOD PRESSURE: 151 MMHG | HEIGHT: 70 IN | DIASTOLIC BLOOD PRESSURE: 86 MMHG | BODY MASS INDEX: 33.42 KG/M2 | HEART RATE: 80 BPM

## 2019-09-12 DIAGNOSIS — C22.0 HCC (HEPATOCELLULAR CARCINOMA): ICD-10-CM

## 2019-09-12 DIAGNOSIS — D49.0 LIVER TUMOR: Primary | ICD-10-CM

## 2019-09-12 PROCEDURE — 99204 PR OFFICE/OUTPT VISIT, NEW, LEVL IV, 45-59 MIN: ICD-10-PCS | Mod: S$GLB,,, | Performed by: FAMILY MEDICINE

## 2019-09-12 PROCEDURE — 99999 PR PBB SHADOW E&M-EST. PATIENT-LVL III: ICD-10-PCS | Mod: PBBFAC,,, | Performed by: FAMILY MEDICINE

## 2019-09-12 PROCEDURE — 3079F PR MOST RECENT DIASTOLIC BLOOD PRESSURE 80-89 MM HG: ICD-10-PCS | Mod: CPTII,S$GLB,, | Performed by: FAMILY MEDICINE

## 2019-09-12 PROCEDURE — 3077F SYST BP >= 140 MM HG: CPT | Mod: CPTII,S$GLB,, | Performed by: FAMILY MEDICINE

## 2019-09-12 PROCEDURE — 3079F DIAST BP 80-89 MM HG: CPT | Mod: CPTII,S$GLB,, | Performed by: FAMILY MEDICINE

## 2019-09-12 PROCEDURE — 3077F PR MOST RECENT SYSTOLIC BLOOD PRESSURE >= 140 MM HG: ICD-10-PCS | Mod: CPTII,S$GLB,, | Performed by: FAMILY MEDICINE

## 2019-09-12 PROCEDURE — 99204 OFFICE O/P NEW MOD 45 MIN: CPT | Mod: S$GLB,,, | Performed by: FAMILY MEDICINE

## 2019-09-12 PROCEDURE — 3008F PR BODY MASS INDEX (BMI) DOCUMENTED: ICD-10-PCS | Mod: CPTII,S$GLB,, | Performed by: FAMILY MEDICINE

## 2019-09-12 PROCEDURE — 99999 PR PBB SHADOW E&M-EST. PATIENT-LVL III: CPT | Mod: PBBFAC,,, | Performed by: FAMILY MEDICINE

## 2019-09-12 PROCEDURE — 3008F BODY MASS INDEX DOCD: CPT | Mod: CPTII,S$GLB,, | Performed by: FAMILY MEDICINE

## 2019-09-12 NOTE — PROGRESS NOTES
Subjective:       Patient ID: Hudson Tavares is a 64 y.o. male.    Chief Complaint: Hepatocellular Carcinoma    Patient here with complaints of a newly identified liver lesion. He tells me he was receiving ultrasounds every 6 months for routine hepatitis C surveillance. His last ultrasound led to a CT scan on July 11, 2019 which revealed a 2.4 cm enhancing lesion with washout consistent with hepatocellular carcinoma. He reports feeling well. He denies any abdominal pain or distention. He is accompanied by his significant other. He was reviewed in liver conference.     Review of Systems   Constitutional: Negative for activity change, appetite change, chills, fatigue and fever.   HENT: Negative for congestion, drooling, ear discharge, postnasal drip, sneezing and trouble swallowing.    Eyes: Negative for pain, discharge, redness and itching.   Respiratory: Negative for cough, shortness of breath, wheezing and stridor.    Cardiovascular: Negative for chest pain, palpitations and leg swelling.   Gastrointestinal: Positive for abdominal distention (due to diet). Negative for abdominal pain, constipation, diarrhea, nausea and vomiting.   Genitourinary: Negative for difficulty urinating, dysuria, frequency and urgency.   Musculoskeletal: Positive for arthralgias. Negative for back pain, gait problem, joint swelling, myalgias and neck pain.   Skin: Negative for color change, pallor and rash.   Neurological: Negative for dizziness, weakness and headaches.       Objective:      Physical Exam   Constitutional: He is oriented to person, place, and time. He appears well-developed and well-nourished. No distress.   Cardiovascular: Normal rate, regular rhythm and normal heart sounds. Exam reveals no gallop and no friction rub.   No murmur heard.  Pulmonary/Chest: Effort normal and breath sounds normal. No stridor. No respiratory distress. He has no wheezes. He has no rales.   Abdominal: Soft. Bowel sounds are normal. He  exhibits no distension and no mass. There is no tenderness. There is no guarding.   Neurological: He is alert and oriented to person, place, and time.   Skin: Skin is warm and dry. He is not diaphoretic.   Psychiatric: He has a normal mood and affect.   Vitals reviewed.      ECO  MELD-Na score: 17 at 2019  8:40 AM  MELD score: 17 at 2019  8:40 AM  Calculated from:  Serum Creatinine: 0.7 mg/dL (Rounded to 1 mg/dL) at 2019  8:40 AM  Serum Sodium: 139 mmol/L (Rounded to 137 mmol/L) at 2019  8:40 AM  Total Bilirubin: 1.6 mg/dL at 2019  8:40 AM  INR(ratio): 2.2 at 2019  8:40 AM  Age: 64 years  Child Faria: Class A  Transplant Status: in evaluation    Imaging:      Assessment:       1. Liver tumor    2. HCC (hepatocellular carcinoma)        Plan:         Reviewed images with patient and pointed out target lesion. Explained we can offer microwave ablation. Discussed how the procedure will be performed, risks (including, but not limited to, pain, bleeding, infection, damage to nearby structures, and the need for additional procedures), benefits, possible complications, pre-post procedure expectations, and alternatives. Discussed stopping ASA and coumadin with lovenox bridging, but defer to cardiology and the coumadin clinic for this management. The patient voices understanding and all questions have been answered.  The patient agrees to proceed as planned, and tells me they will contact their cardiologist and coumadin clinic. Dr. Sarah in room. Written informed consent obtained. Patient scheduled for 2019. Pre-procedure handout with clinic phone number provided.

## 2019-09-12 NOTE — LETTER
September 12, 2019    Hudson Tavares  [address] [facility] [place] [address]     [facility] Interventional Rad  [address]  [facility] [address]  Phone: [phone] PRE-PROCEDURE INSTRUCTIONS    Your procedure with Interventional Radiology is scheduled for September 24, 2019. Please arrive by 10:30am.    You must check-in and receive a wristband before going to your procedure. Your [facility].    DO NOT take coumadin for 5 days before your procedure.    **Do not eat or drink anything between midnight and the time of your procedure. This includes gum, mints, and candy lemon drops.    **Do not smoke or drink alcoholic beverages 24 hours prior to your procedure.    **If you wear contact lenses, dentures, hearing aids, or glasses, bring a container to put them in during the procedure and give them to a family member for safekeeping.    **If you have been diagnosed with sleep apnea please bring your CPAP machine.    **If your doctor has scheduled you for an overnight stay, bring a small overnight bag with any personal items that you may need.    **Make arrangements in advance for transportation home by a responsible adult. It is not safe to drive a vehicle during the 24 hours following the procedure.    **All [facility] facilities and properties are tobacco free. Smoking is NOT allowed.    PLEASE NOTE: The procedure schedule has many variables which affect the time of your procedure. Family members should be available if your surgery time changes.    If you have any questions about these instructions call Interventional Radiology at [phone] Monday - Friday between 8:00am and 4:00pm or [phone] for after hours.

## 2019-09-12 NOTE — LETTER
September 12, 2019    Hudson Tavares  [address] [facility] [place] [address]     [facility]  [address]  [facility] [address]  Phone: [phone] PRE-PROCEDURE INSTRUCTIONS    Your procedure with Interventional Radiology is scheduled for September 24, 2019. Please arrive by 10:00am.    You must check-in and receive a wristband before going to your procedure. Your check-in location is [facility] then [facility].    DO NOT take coumadin for 5 days before your procedure.    **Do not eat or drink anything between midnight and the time of your procedure. This includes gum, mints, and candy lemon drops.    **Do not smoke or drink alcoholic beverages 24 hours prior to your procedure.    **If you wear contact lenses, dentures, hearing aids, or glasses, bring a container to put them in during the procedure and give them to a family member for safekeeping.    **If you have been diagnosed with sleep apnea please bring your CPAP machine.    **If your doctor has scheduled you for an overnight stay, bring a small overnight bag with any personal items that you may need.    **Make arrangements in advance for transportation home by a responsible adult. It is not safe to drive a vehicle during the 24 hours following the procedure.    **All [facility] facilities and properties are tobacco free. Smoking is NOT allowed.    PLEASE NOTE: The procedure schedule has many variables which affect the time of your procedure. Family members should be available if your surgery time changes.    If you have any questions about these instructions call Interventional Radiology at [phone] Monday - Friday between 8:00am and 4:00pm or [phone] for after hours.

## 2019-09-13 DIAGNOSIS — D49.0 LIVER TUMOR: Primary | ICD-10-CM

## 2019-09-23 ENCOUNTER — TELEPHONE (OUTPATIENT)
Dept: INTERVENTIONAL RADIOLOGY/VASCULAR | Facility: HOSPITAL | Age: 64
End: 2019-09-23

## 2019-09-23 DIAGNOSIS — C22.0 HCC (HEPATOCELLULAR CARCINOMA): Primary | ICD-10-CM

## 2019-09-23 RX ORDER — FENTANYL CITRATE 50 UG/ML
100 INJECTION, SOLUTION INTRAMUSCULAR; INTRAVENOUS ONCE
Status: CANCELLED | OUTPATIENT
Start: 2019-09-23 | End: 2019-09-23

## 2019-09-23 RX ORDER — MIDAZOLAM HYDROCHLORIDE 1 MG/ML
2 INJECTION INTRAMUSCULAR; INTRAVENOUS ONCE
Status: CANCELLED | OUTPATIENT
Start: 2019-09-23 | End: 2019-09-23

## 2019-09-23 NOTE — PRE-PROCEDURE INSTRUCTIONS
Preop instructions:     NPO solids/ milk products after midnight or clears up to 2 hours before arrival (clear liquids are: water, apple juice, Gatorade & Jell-O, black coffee/no milk, cream or creamer), shower instructions, directions, leave all valuables at home, medication instructions for PM prior & am of procedure explained. Patient stated an understanding.      Patient denies any side effects or issues with anesthesia or sedation.

## 2019-09-24 ENCOUNTER — HOSPITAL ENCOUNTER (OUTPATIENT)
Facility: HOSPITAL | Age: 64
Discharge: HOME | End: 2019-09-24
Attending: RADIOLOGY | Admitting: RADIOLOGY
Payer: MEDICARE

## 2019-09-24 ENCOUNTER — ANESTHESIA EVENT (OUTPATIENT)
Dept: ENDOSCOPY | Facility: HOSPITAL | Age: 64
End: 2019-09-24
Payer: MEDICARE

## 2019-09-24 ENCOUNTER — RESEARCH ENCOUNTER (OUTPATIENT)
Dept: RESEARCH | Facility: HOSPITAL | Age: 64
End: 2019-09-24

## 2019-09-24 ENCOUNTER — ANESTHESIA (OUTPATIENT)
Dept: ENDOSCOPY | Facility: HOSPITAL | Age: 64
End: 2019-09-24
Payer: MEDICARE

## 2019-09-24 VITALS
TEMPERATURE: 97 F | BODY MASS INDEX: 33.36 KG/M2 | SYSTOLIC BLOOD PRESSURE: 164 MMHG | OXYGEN SATURATION: 97 % | HEART RATE: 79 BPM | RESPIRATION RATE: 16 BRPM | HEIGHT: 70 IN | DIASTOLIC BLOOD PRESSURE: 90 MMHG | WEIGHT: 233 LBS

## 2019-09-24 DIAGNOSIS — C22.0 HCC (HEPATOCELLULAR CARCINOMA): ICD-10-CM

## 2019-09-24 DIAGNOSIS — D49.0 LIVER TUMOR: ICD-10-CM

## 2019-09-24 PROCEDURE — D9220A PRA ANESTHESIA: Mod: ANES,,, | Performed by: ANESTHESIOLOGY

## 2019-09-24 PROCEDURE — 25000003 PHARM REV CODE 250: Performed by: NURSE ANESTHETIST, CERTIFIED REGISTERED

## 2019-09-24 PROCEDURE — 63600175 PHARM REV CODE 636 W HCPCS: Performed by: STUDENT IN AN ORGANIZED HEALTH CARE EDUCATION/TRAINING PROGRAM

## 2019-09-24 PROCEDURE — 71000039 HC RECOVERY, EACH ADD'L HOUR

## 2019-09-24 PROCEDURE — D9220A PRA ANESTHESIA: ICD-10-PCS | Mod: ANES,,, | Performed by: ANESTHESIOLOGY

## 2019-09-24 PROCEDURE — 25000003 PHARM REV CODE 250: Performed by: RADIOLOGY

## 2019-09-24 PROCEDURE — S0030 INJECTION, METRONIDAZOLE: HCPCS | Performed by: STUDENT IN AN ORGANIZED HEALTH CARE EDUCATION/TRAINING PROGRAM

## 2019-09-24 PROCEDURE — 63600175 PHARM REV CODE 636 W HCPCS: Performed by: NURSE ANESTHETIST, CERTIFIED REGISTERED

## 2019-09-24 PROCEDURE — 25000003 PHARM REV CODE 250: Performed by: STUDENT IN AN ORGANIZED HEALTH CARE EDUCATION/TRAINING PROGRAM

## 2019-09-24 PROCEDURE — 37000008 HC ANESTHESIA 1ST 15 MINUTES

## 2019-09-24 PROCEDURE — 71000033 HC RECOVERY, INTIAL HOUR

## 2019-09-24 PROCEDURE — D9220A PRA ANESTHESIA: Mod: CRNA,,, | Performed by: NURSE ANESTHETIST, CERTIFIED REGISTERED

## 2019-09-24 PROCEDURE — 37000009 HC ANESTHESIA EA ADD 15 MINS

## 2019-09-24 PROCEDURE — D9220A PRA ANESTHESIA: ICD-10-PCS | Mod: CRNA,,, | Performed by: NURSE ANESTHETIST, CERTIFIED REGISTERED

## 2019-09-24 PROCEDURE — 63600175 PHARM REV CODE 636 W HCPCS: Performed by: ANESTHESIOLOGY

## 2019-09-24 RX ORDER — ONDANSETRON 2 MG/ML
INJECTION INTRAMUSCULAR; INTRAVENOUS
Status: DISCONTINUED | OUTPATIENT
Start: 2019-09-24 | End: 2019-09-24

## 2019-09-24 RX ORDER — LIDOCAINE HCL/PF 100 MG/5ML
SYRINGE (ML) INTRAVENOUS
Status: DISCONTINUED | OUTPATIENT
Start: 2019-09-24 | End: 2019-09-24

## 2019-09-24 RX ORDER — PROPOFOL 10 MG/ML
VIAL (ML) INTRAVENOUS
Status: DISCONTINUED | OUTPATIENT
Start: 2019-09-24 | End: 2019-09-24

## 2019-09-24 RX ORDER — CIPROFLOXACIN 2 MG/ML
400 INJECTION, SOLUTION INTRAVENOUS ONCE
Status: COMPLETED | OUTPATIENT
Start: 2019-09-24 | End: 2019-09-24

## 2019-09-24 RX ORDER — HEPARIN SODIUM 1000 [USP'U]/ML
500 INJECTION, SOLUTION INTRAVENOUS; SUBCUTANEOUS CONTINUOUS
Status: DISCONTINUED | OUTPATIENT
Start: 2019-09-24 | End: 2019-09-24 | Stop reason: HOSPADM

## 2019-09-24 RX ORDER — SODIUM CHLORIDE 0.9 % (FLUSH) 0.9 %
3 SYRINGE (ML) INJECTION
Status: DISCONTINUED | OUTPATIENT
Start: 2019-09-24 | End: 2019-09-24 | Stop reason: HOSPADM

## 2019-09-24 RX ORDER — PHENYLEPHRINE HYDROCHLORIDE 10 MG/ML
INJECTION INTRAVENOUS
Status: DISCONTINUED | OUTPATIENT
Start: 2019-09-24 | End: 2019-09-24

## 2019-09-24 RX ORDER — CIPROFLOXACIN 500 MG/1
500 TABLET ORAL 2 TIMES DAILY
Qty: 14 TABLET | Refills: 0 | OUTPATIENT
Start: 2019-09-24 | End: 2019-09-24 | Stop reason: SDUPTHER

## 2019-09-24 RX ORDER — NEOSTIGMINE METHYLSULFATE 0.5 MG/ML
INJECTION, SOLUTION INTRAVENOUS
Status: DISCONTINUED | OUTPATIENT
Start: 2019-09-24 | End: 2019-09-24

## 2019-09-24 RX ORDER — SUCCINYLCHOLINE CHLORIDE 20 MG/ML
INJECTION INTRAMUSCULAR; INTRAVENOUS
Status: DISCONTINUED | OUTPATIENT
Start: 2019-09-24 | End: 2019-09-24

## 2019-09-24 RX ORDER — LIDOCAINE HYDROCHLORIDE 10 MG/ML
1 INJECTION INFILTRATION; PERINEURAL ONCE
Status: COMPLETED | OUTPATIENT
Start: 2019-09-24 | End: 2019-09-24

## 2019-09-24 RX ORDER — FENTANYL CITRATE 50 UG/ML
25 INJECTION, SOLUTION INTRAMUSCULAR; INTRAVENOUS EVERY 5 MIN PRN
Status: COMPLETED | OUTPATIENT
Start: 2019-09-24 | End: 2019-09-24

## 2019-09-24 RX ORDER — HYDRALAZINE HYDROCHLORIDE 20 MG/ML
5 INJECTION INTRAMUSCULAR; INTRAVENOUS EVERY 10 MIN PRN
Status: DISCONTINUED | OUTPATIENT
Start: 2019-09-24 | End: 2019-09-24 | Stop reason: HOSPADM

## 2019-09-24 RX ORDER — OXYCODONE HYDROCHLORIDE 5 MG/1
TABLET ORAL
Status: COMPLETED
Start: 2019-09-24 | End: 2019-09-24

## 2019-09-24 RX ORDER — WARFARIN 1 MG/1
1 TABLET ORAL DAILY
Status: DISCONTINUED | OUTPATIENT
Start: 2019-09-24 | End: 2019-09-24 | Stop reason: HOSPADM

## 2019-09-24 RX ORDER — ONDANSETRON 4 MG/1
4 TABLET, ORALLY DISINTEGRATING ORAL EVERY 6 HOURS PRN
Qty: 28 TABLET | Refills: 0 | Status: SHIPPED | OUTPATIENT
Start: 2019-09-24 | End: 2020-09-07 | Stop reason: CLARIF

## 2019-09-24 RX ORDER — GLYCOPYRROLATE 0.2 MG/ML
INJECTION INTRAMUSCULAR; INTRAVENOUS
Status: DISCONTINUED | OUTPATIENT
Start: 2019-09-24 | End: 2019-09-24

## 2019-09-24 RX ORDER — HYDRALAZINE HYDROCHLORIDE 20 MG/ML
INJECTION INTRAMUSCULAR; INTRAVENOUS
Status: COMPLETED
Start: 2019-09-24 | End: 2019-09-24

## 2019-09-24 RX ORDER — OXYCODONE HYDROCHLORIDE 5 MG/1
10 CAPSULE ORAL EVERY 6 HOURS PRN
Qty: 20 CAPSULE | Refills: 0 | Status: SHIPPED | OUTPATIENT
Start: 2019-09-24 | End: 2019-09-27 | Stop reason: SDUPTHER

## 2019-09-24 RX ORDER — OXYCODONE HYDROCHLORIDE 5 MG/1
5 TABLET ORAL ONCE
Status: COMPLETED | OUTPATIENT
Start: 2019-09-24 | End: 2019-09-24

## 2019-09-24 RX ORDER — SODIUM CHLORIDE 9 MG/ML
INJECTION, SOLUTION INTRAVENOUS CONTINUOUS
Status: DISCONTINUED | OUTPATIENT
Start: 2019-09-24 | End: 2019-09-24 | Stop reason: HOSPADM

## 2019-09-24 RX ORDER — EPHEDRINE SULFATE 50 MG/ML
INJECTION, SOLUTION INTRAVENOUS
Status: DISCONTINUED | OUTPATIENT
Start: 2019-09-24 | End: 2019-09-24

## 2019-09-24 RX ORDER — MIDAZOLAM HYDROCHLORIDE 1 MG/ML
INJECTION, SOLUTION INTRAMUSCULAR; INTRAVENOUS
Status: DISCONTINUED | OUTPATIENT
Start: 2019-09-24 | End: 2019-09-24

## 2019-09-24 RX ORDER — METRONIDAZOLE 500 MG/100ML
500 INJECTION, SOLUTION INTRAVENOUS ONCE
Status: COMPLETED | OUTPATIENT
Start: 2019-09-24 | End: 2019-09-24

## 2019-09-24 RX ORDER — ONDANSETRON 4 MG/1
4 TABLET, ORALLY DISINTEGRATING ORAL EVERY 6 HOURS PRN
Qty: 28 TABLET | Refills: 0 | OUTPATIENT
Start: 2019-09-24 | End: 2019-09-24 | Stop reason: SDUPTHER

## 2019-09-24 RX ORDER — FENTANYL CITRATE 50 UG/ML
INJECTION, SOLUTION INTRAMUSCULAR; INTRAVENOUS
Status: DISCONTINUED | OUTPATIENT
Start: 2019-09-24 | End: 2019-09-24

## 2019-09-24 RX ORDER — CIPROFLOXACIN 500 MG/1
500 TABLET ORAL 2 TIMES DAILY
Qty: 14 TABLET | Refills: 0 | Status: SHIPPED | OUTPATIENT
Start: 2019-09-24 | End: 2019-09-27 | Stop reason: SDUPTHER

## 2019-09-24 RX ORDER — ROCURONIUM BROMIDE 10 MG/ML
INJECTION, SOLUTION INTRAVENOUS
Status: DISCONTINUED | OUTPATIENT
Start: 2019-09-24 | End: 2019-09-24

## 2019-09-24 RX ADMIN — LIDOCAINE HYDROCHLORIDE 0.1 ML: 10 INJECTION, SOLUTION EPIDURAL; INFILTRATION; INTRACAUDAL; PERINEURAL at 11:09

## 2019-09-24 RX ADMIN — PHENYLEPHRINE HYDROCHLORIDE 100 MCG: 10 INJECTION INTRAVENOUS at 02:09

## 2019-09-24 RX ADMIN — GLYCOPYRROLATE 0.2 MG: 0.2 INJECTION, SOLUTION INTRAMUSCULAR; INTRAVENOUS at 03:09

## 2019-09-24 RX ADMIN — FENTANYL CITRATE 25 MCG: 50 INJECTION INTRAMUSCULAR; INTRAVENOUS at 05:09

## 2019-09-24 RX ADMIN — NEOSTIGMINE METHYLSULFATE 4 MG: 0.5 INJECTION INTRAVENOUS at 03:09

## 2019-09-24 RX ADMIN — SODIUM CHLORIDE, SODIUM GLUCONATE, SODIUM ACETATE, POTASSIUM CHLORIDE, MAGNESIUM CHLORIDE, SODIUM PHOSPHATE, DIBASIC, AND POTASSIUM PHOSPHATE: .53; .5; .37; .037; .03; .012; .00082 INJECTION, SOLUTION INTRAVENOUS at 02:09

## 2019-09-24 RX ADMIN — METRONIDAZOLE 500 MG: 500 SOLUTION INTRAVENOUS at 02:09

## 2019-09-24 RX ADMIN — EPHEDRINE SULFATE 10 MG: 50 INJECTION, SOLUTION INTRAMUSCULAR; INTRAVENOUS; SUBCUTANEOUS at 03:09

## 2019-09-24 RX ADMIN — PROPOFOL 150 MG: 10 INJECTION, EMULSION INTRAVENOUS at 02:09

## 2019-09-24 RX ADMIN — LIDOCAINE HYDROCHLORIDE 100 MG: 20 INJECTION, SOLUTION INTRAVENOUS at 02:09

## 2019-09-24 RX ADMIN — ROCURONIUM BROMIDE 5 MG: 10 INJECTION, SOLUTION INTRAVENOUS at 02:09

## 2019-09-24 RX ADMIN — OXYCODONE HYDROCHLORIDE 5 MG: 5 TABLET ORAL at 05:09

## 2019-09-24 RX ADMIN — ROCURONIUM BROMIDE 30 MG: 10 INJECTION, SOLUTION INTRAVENOUS at 02:09

## 2019-09-24 RX ADMIN — MIDAZOLAM HYDROCHLORIDE 2 MG: 1 INJECTION, SOLUTION INTRAMUSCULAR; INTRAVENOUS at 02:09

## 2019-09-24 RX ADMIN — CIPROFLOXACIN 400 MG: 2 INJECTION, SOLUTION INTRAVENOUS at 12:09

## 2019-09-24 RX ADMIN — FENTANYL CITRATE 100 MCG: 50 INJECTION, SOLUTION INTRAMUSCULAR; INTRAVENOUS at 02:09

## 2019-09-24 RX ADMIN — SODIUM CHLORIDE 500 ML: 0.9 INJECTION, SOLUTION INTRAVENOUS at 11:09

## 2019-09-24 RX ADMIN — ONDANSETRON 4 MG: 2 INJECTION INTRAMUSCULAR; INTRAVENOUS at 03:09

## 2019-09-24 RX ADMIN — WARFARIN SODIUM 1 MG: 1 TABLET ORAL at 07:09

## 2019-09-24 RX ADMIN — SUCCINYLCHOLINE CHLORIDE 120 MG: 20 INJECTION, SOLUTION INTRAMUSCULAR; INTRAVENOUS at 02:09

## 2019-09-24 NOTE — DISCHARGE INSTRUCTIONS
Understanding Radiofrequency Ablation (RFA) of Liver Tumors    Radiofrequency ablation (RFA) is a way to treat cancer that is growing in the liver. The treatment uses heat to kill cancer cells. A type of radio wave is sent into a liver tumor through wires (electrodes). This creates heat that kills the cells of the tumor without harming too many nearby healthy cells.  How to say it  RAY-malorie-oh-FREE-horace-see [id number]   Why RFA of liver tumors is done  Radiofrequency ablation may be done if you cannot have liver surgery. Or it may be done in addition to surgery. Its most often done if you have a small number of tumors in your liver that are not over a certain size.  How RFA of liver tumors is done  The treatment is done in a hospital, and takes 1 to 3 hours. During the procedure:  · You are given medicine to make you relax or sleep through the treatment. The area to be treated may be numbed with medicine.  · The healthcare provider makes a small cut (incision) in your skin. In some cases, the provider makes several cuts and a long, thin tube with a tiny light and camera on the end (laparoscope) is put through one of the cuts. This is to help the provider see the procedure. In other cases, the provider may make one large cut instead.  · The provider puts a metal probe through the cut and into your liver. The probe may have several wires called electrodes. Or the probe may be a single electrode the size of a needle. The provider then uses imaging such as ultrasound or CT to help guide the probe into the tumor.  · The provider connects the probe to a machine that sends radio waves through the electrodes. This creates heat that kills the cells in the tumor. This is done for each tumor that needs to be treated. The tools are then removed, and the cuts in the skin are closed with stitches (sutures).  · After the treatment, you will stay in the hospital for 1 or more nights. Or you may be able to go home the same  day.  Risks of RFA of liver tumors  · Bleeding from the liver  · Leaking of bile from the liver  · Infection  · Damage to nearby organs, such as the gallbladder, intestines, or lungs  · Aches, fever, and upset stomach (nausea) for several days to several weeks (postablation syndrome)  · Need to repeat the procedure  Date Last Reviewed: 6/1/2016  © 6001-9037 [employer]. [address] [place] [address], [place] [address]. All rights reserved. This information is not intended as a substitute for professional medical care. Always follow your healthcare professional's instructions.        PATIENT INSTRUCTIONS  POST-ANESTHESIA    IMMEDIATELY FOLLOWING SURGERY:  Do not drive or operate machinery for the first twenty four hours after surgery.  Do not make any important decisions for twenty four hours after surgery or while taking narcotic pain medications or sedatives.  If you develop intractable nausea and vomiting or a severe headache please notify your doctor immediately.    FOLLOW-UP:  Please make an appointment with your surgeon as instructed. You do not need to follow up with anesthesia unless specifically instructed to do so.    WOUND CARE INSTRUCTIONS (if applicable):  Keep a dry clean dressing on the anesthesia/puncture wound site if there is drainage.  Once the wound has quit draining you may leave it open to air.  Generally you should leave the bandage intact for twenty four hours unless there is drainage.  If the epidural site drains for more than 36-48 hours please call the anesthesia department.    QUESTIONS?:  Please feel free to call your physician or the hospital  if you have any questions, and they will be happy to assist you.       [facility] Anesthesia Department  [address] [facility]  [phone]

## 2019-09-24 NOTE — PROGRESS NOTES
Patient assigned to this writer by charge nurse. The patient is in the waiting room but, will be escorted to [facility] room 11. This writer is completing a chart review and reviewing MD orders.

## 2019-09-24 NOTE — PLAN OF CARE
Liver microwave ablation completed. Dressing applied, CDI.  Anesthesia at bedside caring for patient.  Pt to be transferred to PACU.

## 2019-09-24 NOTE — TRANSFER OF CARE
"Anesthesia Transfer of Care Note    Patient: Hudson Tavares    Procedure(s) Performed: Procedure(s) (LRB):  Radiofrequency Ablation (N/A)    Patient location: PACU    Anesthesia Type: general    Transport from OR: Transported from OR on 6-10 L/min O2 by face mask with adequate spontaneous ventilation    Post pain: adequate analgesia    Post assessment: no apparent anesthetic complications and tolerated procedure well    Post vital signs: stable    Level of consciousness: awake and alert    Nausea/Vomiting: no nausea/vomiting    Complications: none    Transfer of care protocol was followed      Last vitals:   Visit Vitals  BP (!) 184/98 (BP Location: Left arm, Patient Position: Lying)   Pulse 83   Temp 36.6 °C (97.9 °F) (Oral)   Resp 18   Ht 5' 10" (1.778 m)   Wt 105.7 kg (233 lb)   SpO2 98%   BMI 33.43 kg/m²     "

## 2019-09-24 NOTE — ANESTHESIA POSTPROCEDURE EVALUATION
Anesthesia Post Evaluation    Patient: Hudson Tavares    Procedure(s) Performed: Procedure(s) (LRB):  Radiofrequency Ablation (N/A)    Final Anesthesia Type: general  Patient location during evaluation: PACU  Patient participation: Yes- Able to Participate  Level of consciousness: awake and alert and oriented  Post-procedure vital signs: reviewed and stable  Pain management: adequate  Airway patency: patent  PONV status at discharge: No PONV  Anesthetic complications: no      Cardiovascular status: blood pressure returned to baseline  Respiratory status: unassisted, room air and spontaneous ventilation  Hydration status: euvolemic  Follow-up not needed.          Vitals Value Taken Time   /85 9/24/2019  4:16 PM   Temp 36.3 °C (97.3 °F) 9/24/2019  3:50 PM   Pulse 67 9/24/2019  4:17 PM   Resp 17 9/24/2019  4:17 PM   SpO2 97 % 9/24/2019  4:17 PM   Vitals shown include unvalidated device data.      No case tracking events are documented in the log.      Pain/Danis Score: Danis Score: 9 (9/24/2019  3:50 PM)

## 2019-09-24 NOTE — PLAN OF CARE
Pt arrived to CT room. NAD noted. Orders, labs and consents reviewed in chart.  Anesthesia at bedside performing care.

## 2019-09-24 NOTE — ANESTHESIA PREPROCEDURE EVALUATION
09/24/2019  Hudson Tavares is a 64 y.o., male.    Anesthesia Evaluation         Review of Systems  Social:  Former Smoker, No Alcohol Use   Hematology/Oncology:        Hematology Comments: Long-term (current) use of anticoagulants,  Thrombocytopenia Oncology Comments: HCC (hepatocellular carcinoma)    Cardiovascular:   Hypertension CAD  CABG/stent Dysrhythmias atrial fibrillation    Hepatic/GI:   Hiatal Hernia, Liver Disease, Hepatitis, C HCC (hepatocellular carcinoma)   Musculoskeletal:  Spine Disorders: cervical Disc disease        Physical Exam  General:  Obesity    Airway/Jaw/Neck:  Airway Findings: Mouth Opening: Normal Tongue: Normal  General Airway Assessment: Adult      Dental:  Dental Findings: Molar caps        Mental Status:  Mental Status Findings:  Alert and Oriented, Cooperative         Anesthesia Plan  Type of Anesthesia, risks & benefits discussed:  Anesthesia Type:  general  Patient's Preference:   Intra-op Monitoring Plan: standard ASA monitors  Intra-op Monitoring Plan Comments:   Post Op Pain Control Plan:   Post Op Pain Control Plan Comments:   Induction:   IV  Beta Blocker:  Patient is on a Beta-Blocker and has received one dose within the past 24 hours (No further documentation required).       Informed Consent: Patient understands risks and agrees with Anesthesia plan.  Questions answered. Anesthesia consent signed with patient.  ASA Score: 3     Day of Surgery Review of History & Physical:    H&P update referred to the provider.         Ready For Surgery From Anesthesia Perspective.

## 2019-09-24 NOTE — DISCHARGE SUMMARY
Radiology Discharge Summary      Hospital Course: No complications    Admit Date: 9/24/2019  Discharge Date: 09/24/2019     Instructions Given to Patient: Yes  Diet: Resume prior diet  Activity: activity as tolerated    Description of Condition on Discharge: Stable  Vital Signs (Most Recent): Temp: 97.3 °F (36.3 °C) (09/24/19 1745)  Pulse: 98 (09/24/19 1745)  Resp: 17 (09/24/19 1745)  BP: (!) 176/96 (09/24/19 1745)  SpO2: 98 % (09/24/19 1745)    Discharge Disposition: Home    Discharge Diagnosis: HCC s/p MWA     Follow-up: Will get f/u imaging in 1 month to assess treatment response    Wilber Yuen MD  Diagnostic and Interventional Radiologist  Department of Radiology  Pager: [phone]

## 2019-09-24 NOTE — H&P
Radiology History & Physical      SUBJECTIVE:     Chief Complaint: liver lesion    History of Present Illness:  Patient here with complaints of a newly identified liver lesion. He was receiving ultrasounds every 6 months for routine hepatitis C surveillance. His last ultrasound led to a CT scan on July 11, 2019 which revealed a 2.4 cm enhancing lesion with washout consistent with hepatocellular carcinoma. He reports feeling well. He denies any abdominal pain or distention. He is accompanied by his significant other. He was reviewed in liver conference.     Plan for microwave ablation    Past Medical History:   Diagnosis Date    Adenomatous colon polyp     Alcohol abuse 7/9/2015    Anticoagulant long-term use     coumadin    Atrial fibrillation 7/9/2015    CAD (coronary artery disease), native coronary artery 5/3/2016    Degenerative joint disease 7/9/2015    Encounter for blood transfusion     Enlarged liver     Essential hypertension 7/9/2015    Hep C w/o coma, chronic     treated with Harvoni    Hiatal hernia     patient states hernia has resolved    Hx of bladder infections     finished cipro 4/29/16    S/P CABG (coronary artery bypass graft) 5/17/2016    LIMA to LAD, SVG to OM, SVG to diagonal, SVG to right PDA, Maze, left atrial appendage resection (5/16)    Spleen enlarged     Thrombocytopenia 5/5/2016    Tobacco abuse 7/9/2015     Past Surgical History:   Procedure Laterality Date    CARDIAC SURGERY  05/04/2016    4 vessel CABG    CERVICAL FUSION  2004    COLONOSCOPY      JOINT REPLACEMENT  TOTAL RIGHT KNEE     OPEN ANTERIOR SHOULDER RECONSTRUCTION Left     TIBIA FRACTURE SURGERY Right 1971       Home Meds:   Prior to Admission medications    Medication Sig Start Date End Date Taking? Authorizing Provider   clotrimazole-betamethasone 1-0.05% (LOTRISONE) cream APPLY TO AFFECTED AREA TWICE A DAY  Patient taking differently: APPLY TO AFFECTED AREA TWICE A DAY AS NEEDED 2/26/19  Yes Jorge EID  "MD Sagar   famotidine (PEPCID) 20 MG tablet Take 20 mg by mouth nightly.    Yes Historical Provider, MD   metoprolol succinate (TOPROL-XL) 50 MG 24 hr tablet TAKE 1 TABLET (50 MG TOTAL) BY MOUTH ONCE DAILY. 12/14/18  Yes Jroge Keith MD   POLYETHYLENE GLYCOL 3350 (MIRALAX ORAL) Take by mouth every other day.    Yes Historical Provider, MD   aspirin (ECOTRIN) 81 MG EC tablet Take 81 mg by mouth once daily. Use prior to arrival for cardioversion    Historical Provider, MD   mometasone 0.1% (ELOCON) 0.1 % cream Apply topically once daily.  Patient taking differently: Apply topically daily as needed.  6/19/18   Jorge Keith MD   mupirocin calcium 2% (BACTROBAN) 2 % cream APPLY TO AFFECTED AREA 3 TIMES A DAY  Patient taking differently: APPLY TO AFFECTED AREA 3 TIMES A DAY AS NEEDED 12/30/18   Jorge Keith MD   warfarin (COUMADIN) 1 MG tablet This medication is managed by [facility]. Please contact [phone]. See most recent anticoag visit for current coumadin dosing. Current dose: Take 2mg Wed, 1mg all other days or as instructed by [facility] Clinic 6/19/18   Jorge Keith MD   warfarin (COUMADIN) 2 MG tablet Take 1 tablet (2 mg total) by mouth Daily.  Patient taking differently: Take 1-2 mg by mouth. This medication is managed by [facility]. Please contact [phone]. See most recent anticoag visit for current coumadin dosing. Current dose: Take 2 mg on Wed; 1 mg all other days or as instructed by coumadin clinic 6/19/18   Jorge Keith MD     Anticoagulants/Antiplatelets: off warfarin for 3 days, off ASA for 5 days.  No other anticoagulation.    Allergies:   Review of patient's allergies indicates:   Allergen Reactions    Cilostazol Other (See Comments)     Colon bleeding    Stadol [butorphanol tartrate] Hallucinations     "I saw pink elephants"    Keflex [cephalexin] Other (See Comments)     Was using 500mg TID oral and had a strong metallic taste in his mouth and could not " "function.  Can use IV cephalosporin.    Morphine Other (See Comments)     "makes him drunk", Altered Mental Status    Sulfa (sulfonamide antibiotics) Other (See Comments)     Since childhood; does not know reaction     Sedation History:  no adverse reactions    Review of Systems:   Hematological: no known coagulopathies  Respiratory: no shortness of breath  Cardiovascular: no chest pain  Gastrointestinal: no abdominal pain  Genito-Urinary: no dysuria  Musculoskeletal: negative  Neurological: no TIA or stroke symptoms         OBJECTIVE:     Vital Signs (Most Recent)  Temp: 97.9 °F (36.6 °C) (09/24/19 1058)  Pulse: 83 (09/24/19 1058)  Resp: 18 (09/24/19 1058)  BP: (!) 184/98 (09/24/19 1058)  SpO2: 98 % (09/24/19 1058)    Physical Exam:  ASA: 3  Mallampati: 2    General: no acute distress  Mental Status: alert and oriented to person, place and time  HEENT: normocephalic, atraumatic  Chest: unlabored breathing  Heart: regular heart rate  Abdomen: nondistended  Extremity: moves all extremities    Laboratory  Lab Results   Component Value Date    INR 1.6 (H) 09/24/2019       Lab Results   Component Value Date    WBC 4.67 09/24/2019    HGB 15.8 09/24/2019    HCT 47.1 09/24/2019    MCV 98 09/24/2019    PLT 73 (L) 09/24/2019      Lab Results   Component Value Date     09/24/2019     09/24/2019    K 4.4 09/24/2019     09/24/2019    CO2 21 (L) 09/24/2019    BUN 12 09/24/2019    CREATININE 0.7 09/24/2019    CALCIUM 9.4 09/24/2019    MG 1.7 06/16/2016    ALT 20 09/24/2019    AST 31 09/24/2019    ALBUMIN 4.2 09/24/2019    BILITOT 1.7 (H) 09/24/2019    BILIDIR 0.6 (H) 08/28/2019       ASSESSMENT/PLAN:     Sedation Plan: with  Anesthesia, general    Patient will undergo liver microwave ablation.      Zeus Moncada MD, MS  Radiology  PGY-2  Pager: [phone]      "

## 2019-09-24 NOTE — PROGRESS NOTES
RESEARCH STUDY CONSENT ENCOUNTER  ORGAN TRANSPLANT  [facility] [address]    Study: Role of Tumor-Induced Immune Tolerance in the Patient Response to Locoregional Therapy: Implications in Assessment Risk of Hepatocellular Carcinoma Recurrence Following Liver Transplantation    IRB Approval #: [id number]    : Nayan Caldera MD  Sub-investigator: Migel Handy, PhD    This study is an observational study to evaluate patients receiving transarterial chemoembolization (TACE) or transarterial radioembolization (TARE) therapy to define the link between tumor-elicited peripheral cell populations, and the risk of hepatocellular carcinoma (HCC) recurrence before orthotopic liver transplantation (OLT). [IRB 2016.131.B]      The study was explained and the Informed Consent was reviewed and discussed with Hudson Tavares.  All risks, benefits, and procedures were discussed.  Adequate time was given for him to ask questions. The patient confirmed that all the questions had been answered and voluntarily expressed a desire to participate in the study. Patient was provided with contact information for the investigator, physician, and research coordinator for future questions or concerns.  Patient was notified that he can withdraw at any time.  Privacy issues including HIPAA and withdrawal questions were discussed.  Hudson Tavares verbalized that all questions were satisfactorily answered and that he understood the protocol and its requirements.  Patient signed the informed consent document, and a signed copy was provided to him.  Family members were present. Specifically, patient requested that his partner was also present for the discussion as well. I discussed the study with the partner and answered her concerns regarding how samples would be collected and where they would be stored.     Adbias Nunez  Admin Research- Liver Transplant

## 2019-09-25 NOTE — PROGRESS NOTES
Patient states they are ready to be discharged. Instructions andprescription given to patient and family. Both verbalize understanding. Patient tolerating po liquids with no difficulty. Patient states pain is at a tolerable level for them. Anesthesia consent and surgical consent in chart upon patient's discharge from [facility].

## 2019-09-28 DIAGNOSIS — C22.0 HCC (HEPATOCELLULAR CARCINOMA): Primary | ICD-10-CM

## 2019-10-24 ENCOUNTER — OFFICE VISIT (OUTPATIENT)
Dept: INTERVENTIONAL RADIOLOGY/VASCULAR | Facility: CLINIC | Age: 64
End: 2019-10-24
Payer: MEDICARE

## 2019-10-24 ENCOUNTER — TELEPHONE (OUTPATIENT)
Dept: HEPATOLOGY | Facility: CLINIC | Age: 64
End: 2019-10-24

## 2019-10-24 ENCOUNTER — HOSPITAL ENCOUNTER (OUTPATIENT)
Dept: RADIOLOGY | Facility: HOSPITAL | Age: 64
Discharge: HOME | End: 2019-10-24
Attending: FAMILY MEDICINE
Payer: MEDICARE

## 2019-10-24 VITALS
SYSTOLIC BLOOD PRESSURE: 127 MMHG | WEIGHT: 227.31 LBS | HEART RATE: 88 BPM | DIASTOLIC BLOOD PRESSURE: 85 MMHG | BODY MASS INDEX: 32.54 KG/M2 | HEIGHT: 70 IN

## 2019-10-24 DIAGNOSIS — C22.0 HCC (HEPATOCELLULAR CARCINOMA): Primary | ICD-10-CM

## 2019-10-24 DIAGNOSIS — C22.0 HCC (HEPATOCELLULAR CARCINOMA): ICD-10-CM

## 2019-10-24 PROCEDURE — 99213 PR OFFICE/OUTPT VISIT, EST, LEVL III, 20-29 MIN: ICD-10-PCS | Mod: S$GLB,,, | Performed by: FAMILY MEDICINE

## 2019-10-24 PROCEDURE — 3008F BODY MASS INDEX DOCD: CPT | Mod: CPTII,S$GLB,, | Performed by: FAMILY MEDICINE

## 2019-10-24 PROCEDURE — 3079F DIAST BP 80-89 MM HG: CPT | Mod: CPTII,S$GLB,, | Performed by: FAMILY MEDICINE

## 2019-10-24 PROCEDURE — 3008F PR BODY MASS INDEX (BMI) DOCUMENTED: ICD-10-PCS | Mod: CPTII,S$GLB,, | Performed by: FAMILY MEDICINE

## 2019-10-24 PROCEDURE — 3079F PR MOST RECENT DIASTOLIC BLOOD PRESSURE 80-89 MM HG: ICD-10-PCS | Mod: CPTII,S$GLB,, | Performed by: FAMILY MEDICINE

## 2019-10-24 PROCEDURE — 99213 OFFICE O/P EST LOW 20 MIN: CPT | Mod: S$GLB,,, | Performed by: FAMILY MEDICINE

## 2019-10-24 PROCEDURE — 99999 PR PBB SHADOW E&M-EST. PATIENT-LVL III: CPT | Mod: PBBFAC,,, | Performed by: FAMILY MEDICINE

## 2019-10-24 PROCEDURE — 99999 PR PBB SHADOW E&M-EST. PATIENT-LVL III: ICD-10-PCS | Mod: PBBFAC,,, | Performed by: FAMILY MEDICINE

## 2019-10-24 PROCEDURE — 74183 MRI ABD W/O CNTR FLWD CNTR: CPT | Mod: 26,,, | Performed by: RADIOLOGY

## 2019-10-24 PROCEDURE — 74183 MRI ABD W/O CNTR FLWD CNTR: CPT | Mod: TC

## 2019-10-24 PROCEDURE — 3074F PR MOST RECENT SYSTOLIC BLOOD PRESSURE < 130 MM HG: ICD-10-PCS | Mod: CPTII,S$GLB,, | Performed by: FAMILY MEDICINE

## 2019-10-24 PROCEDURE — 3074F SYST BP LT 130 MM HG: CPT | Mod: CPTII,S$GLB,, | Performed by: FAMILY MEDICINE

## 2019-10-24 PROCEDURE — 25500020 PHARM REV CODE 255: Performed by: FAMILY MEDICINE

## 2019-10-24 PROCEDURE — A9585 GADOBUTROL INJECTION: HCPCS | Performed by: FAMILY MEDICINE

## 2019-10-24 PROCEDURE — 74183 MRI ABDOMEN W WO CONTRAST: ICD-10-PCS | Mod: 26,,, | Performed by: RADIOLOGY

## 2019-10-24 RX ORDER — GADOBUTROL 604.72 MG/ML
10 INJECTION INTRAVENOUS
Status: COMPLETED | OUTPATIENT
Start: 2019-10-24 | End: 2019-10-24

## 2019-10-24 RX ADMIN — GADOBUTROL 10 ML: 604.72 INJECTION INTRAVENOUS at 11:10

## 2019-10-24 NOTE — PROGRESS NOTES
Subjective:       Patient ID: Hudson Tavares is a 64 y.o. male.    Chief Complaint: Cancer    Patient here for follow up of hepatocellular carcinoma recently treated with microwave ablation on 2019. He has a history of hepatitis C. He tells me he had a difficult time after his treatment. He tells me he developed pneumonia, dislocated shoulders, and abdominal pain. He endorses much improvement, but still feels he not yet at baseline. He had an MRI this morning. He is accompanied by his wife.     Review of Systems   Constitutional: Negative for activity change, appetite change, chills, fatigue and fever.   Respiratory: Negative for cough, shortness of breath, wheezing and stridor.    Cardiovascular: Negative for chest pain, palpitations and leg swelling.   Gastrointestinal: Negative for abdominal distention, abdominal pain, constipation, diarrhea, nausea and vomiting.       Objective:      Physical Exam   Constitutional: He is oriented to person, place, and time. He appears well-developed and well-nourished. No distress.   Cardiovascular: Normal rate, regular rhythm and normal heart sounds. Exam reveals no gallop and no friction rub.   No murmur heard.  Pulmonary/Chest: Effort normal and breath sounds normal. No stridor. No respiratory distress. He has no wheezes. He has no rales.   Abdominal: Soft. Bowel sounds are normal. He exhibits no distension and no mass. There is no tenderness. There is no guarding.   Neurological: He is alert and oriented to person, place, and time.   Skin: Skin is warm and dry. He is not diaphoretic.   Psychiatric: He has a normal mood and affect.   Vitals reviewed.      ECO  MELD-Na score: 18 at 2019  5:55 PM  MELD score: 16 at 2019  5:55 PM  Calculated from:  Serum Creatinine: 0.56 mg/dL (Rounded to 1 mg/dL) at 2019  5:55 PM  Serum Sodium: 134 mmol/L at 2019  5:55 PM  Total Bilirubin: 3.5 mg/dL at 2019  5:55 PM  INR(ratio): 1.6 at 2019  5:55  "PM  Age: 64 years  Child Faria: Class B  Transplant Status: in evaluation    Imaging:  Impression       1. Segment 5 ablation cavity without evidence to suggest residual or recurrent disease.  2. Indeterminate 1.2 cm focus of enhancement within the periphery of segment 8 without definite washout.  Recommend attention on follow-up.  3. Findings of chronic liver disease and portal hypertension including splenomegaly and upper abdominal varices.      Assessment:       1. HCC (hepatocellular carcinoma)        Plan:         Explained MRI results to patient. Treated area shows good response, but there is an indeterminate lesion. Explained sometimes surveillance is recommended, and sometimes treatment is recommended. I have messaged Dr. Yuen for his recommendations. Patient tells me he would prefer surveillance so that he can continue to recover. Explained Mr. Tavares should have a repeat MRI with and without contrast in 3 months with hepatology. His wife tells me they would like follow up with their GI closer to home because "they are not going to transplant him." Reassured he may return should he require more treatment. Encouraged to forward images and report should more treatment be necessary. Patient and wife verbalize understanding and agreement.   "

## 2019-10-24 NOTE — LETTER
October 24, 2019      Malika Prescott MD  [address]  [facility] [address]           [facility] - Interventional Rad  [address]  [facility] [address]  Phone: [phone]          Patient: Hudson Tavares   MR Number: [id number]   YOB: 1955   Date of Visit: 10/24/2019       Dear Dr. Malika Prescott:    Thank you for referring Hudson Tavares to me for evaluation. Attached you will find relevant portions of my assessment and plan of care.    If you have questions, please do not hesitate to call me. I look forward to following Hudson Tavares along with you.    Sincerely,    Nataliya Ramirez, NP    Enclosure  CC:  No Recipients    If you would like to receive this communication electronically, please contact [email or web address] or [phone] to request more information on [employer] Link access.    For providers and/or their staff who would like to refer a patient to [facility], please contact us through our one-stop-shop provider referral line, Risa Huntley, at [phone].    If you feel you have received this communication in error or would no longer like to receive these types of communications, please e-mail [email or web address]

## 2019-10-24 NOTE — TELEPHONE ENCOUNTER
I called patient to schedule MRI.  His wife said that the GI MD in [place] is going to schedule the MRI.

## 2019-10-30 ENCOUNTER — TELEPHONE (OUTPATIENT)
Dept: HEPATOLOGY | Facility: CLINIC | Age: 64
End: 2019-10-30

## 2019-10-30 NOTE — TELEPHONE ENCOUNTER
I called patient and spoke with his wife Mignon.  She wants to see a provider on the [facility]. I told her Dr Velázquez goes to [place], but she does not want to take her  to [place] [facility]. She said she will keep his GI MD from [place] as his hepatologist. He will let her know when Mr Tavares needs to come back for more IR treatment.      ----- Message from Jenae Zabala sent at 10/29/2019  4:34 PM CDT -----  Contact: Jorge Cosme    This patient needs an appointment with Dr. Prescott is he an HCC patient?    Thanks,  Jenae  ----- Message -----  From: Gabriela Rondon  Sent: 10/29/2019   3:40 PM CDT  To: Hepatology Scheduling    Pt would like to make an appt to be seen on [facility].    Pt# [phone]

## 2020-01-27 ENCOUNTER — TELEPHONE (OUTPATIENT)
Dept: HEPATOLOGY | Facility: CLINIC | Age: 65
End: 2020-01-27

## 2020-01-27 NOTE — TELEPHONE ENCOUNTER
----- Message from Darin Daugherty MD sent at 1/26/2020  8:44 AM CST -----  Is this on for IR conference?

## 2020-01-27 NOTE — TELEPHONE ENCOUNTER
Patient: Hudson Tavares       MRN: [id number]      : 1955     Age: 64 y.o.  [address] [facility] [place] [address]    Provider: Dr JAY Daugherty    Urgency of review: Urgent    Patient Transplant Status: Referral(declined) Patient choice.    Reason for presentation: to review MRI    Clinical Summary: Hudson Tavares is a 64 y.o. 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     Imaging to be reviewed:  MRI 20    HCC Treatment History: Microwave ablation in 2019    ABO: O POS    Platelets:   Lab Results   Component Value Date/Time    PLT 72 (L) 2020 12:43 PM     Creatinine:   Lab Results   Component Value Date/Time    CREATININE 0.62 2020 12:43 PM    CREATININE 0.70 10/08/2015 11:17 AM     Bilirubin:   Lab Results   Component Value Date/Time    BILITOT 1.4 (H) 2020 12:43 PM     AFP Last 3 each if available:   Lab Results   Component Value Date/Time    AFP 6.5 2020 12:43 PM    AFP 12 (H) 2019 08:40 AM    AFP 25 (H) 2016 02:52 PM       MELD: MELD-Na score: 15 at 2020 12:43 PM  MELD score: 15 at 2020 12:43 PM  Calculated from:  Serum Creatinine: 0.62 mg/dL (Rounded to 1 mg/dL) at 2020 12:43 PM  Serum Sodium: 139 mmol/L (Rounded to 137 mmol/L) at 2020 12:43 PM  Total Bilirubin: 1.4 mg/dL at 2020 12:43 PM  INR(ratio): 2 at 2020 12:43 PM  Age: 64 years    Plan:     Follow-up Provider: Dr JAY Daugherty

## 2020-01-30 ENCOUNTER — TELEPHONE (OUTPATIENT)
Dept: HEPATOLOGY | Facility: CLINIC | Age: 65
End: 2020-01-30

## 2020-01-30 NOTE — TELEPHONE ENCOUNTER
Patient wife is calling to make appointment with Dr Velázquez in [place]. She does not want o come back to main campus.      ----- Message from Hilary Tsai RN sent at 1/30/2020  2:14 PM CST -----      ----- Message -----  From: Veronica Smith  Sent: 1/30/2020   1:51 PM CST  To: [facility] Staff    Pt wife is calling to speak to Carmelina regarding pt       Pt contact [phone]

## 2020-02-04 ENCOUNTER — CONFERENCE (OUTPATIENT)
Dept: TRANSPLANT | Facility: CLINIC | Age: 65
End: 2020-02-04

## 2020-02-04 NOTE — TELEPHONE ENCOUNTER
Patient: Hudson Tavares       MRN: [id number]      : 1955     Age: 64 y.o.  [address] [facility] [place] [address]    Provider: Dr JAY Daugherty    Urgency of review: Urgent    Patient Transplant Status: Referral(declined) Patient choice.    Reason for presentation: to review MRI    Clinical Summary: Hudson Tavares is a 64 y.o. 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     Imaging to be reviewed:  MRI 20    HCC Treatment History: Microwave ablation in 2019    ABO: O POS    Platelets:   Lab Results   Component Value Date/Time    PLT 72 (L) 2020 12:43 PM     Creatinine:   Lab Results   Component Value Date/Time    CREATININE 0.62 2020 12:43 PM    CREATININE 0.70 10/08/2015 11:17 AM     Bilirubin:   Lab Results   Component Value Date/Time    BILITOT 1.4 (H) 2020 12:43 PM     AFP Last 3 each if available:   Lab Results   Component Value Date/Time    AFP 6.5 2020 12:43 PM    AFP 12 (H) 2019 08:40 AM    AFP 25 (H) 2016 02:52 PM       MELD: MELD-Na score: 15 at 2020 12:43 PM  MELD score: 15 at 2020 12:43 PM  Calculated from:  Serum Creatinine: 0.62 mg/dL (Rounded to 1 mg/dL) at 2020 12:43 PM  Serum Sodium: 139 mmol/L (Rounded to 137 mmol/L) at 2020 12:43 PM  Total Bilirubin: 1.4 mg/dL at 2020 12:43 PM  INR(ratio): 2 at 2020 12:43 PM  Age: 64 years    Plan: 3 MONTHS F/U WITH MRI      Follow-up Provider: Dr JAY Daugherty

## 2020-02-10 DIAGNOSIS — R16.0 LIVER MASSES: ICD-10-CM

## 2020-02-10 DIAGNOSIS — C22.0 HCC (HEPATOCELLULAR CARCINOMA): Primary | ICD-10-CM

## 2020-02-10 NOTE — TELEPHONE ENCOUNTER
Patient notified of IR Plan: 3 MONTHS F/U WITH MRI.  Patient would like to see Dr Velázquez in [place].  Appointment made for MRI/Lab and Dr Velázquez in April.  Appointment mailed to patient.

## 2020-03-26 ENCOUNTER — TELEPHONE (OUTPATIENT)
Dept: HEPATOLOGY | Facility: CLINIC | Age: 65
End: 2020-03-26

## 2020-03-26 NOTE — TELEPHONE ENCOUNTER
MA called patient to convert clinic appointment to a VIDEO VISIT, for patient safety and less exposure to hospital settings we are now offering VIDEO VISIT. Patient unable to reached left voicemail to please give us a callback.

## 2020-03-27 ENCOUNTER — TELEPHONE (OUTPATIENT)
Dept: HEPATOLOGY | Facility: CLINIC | Age: 65
End: 2020-03-27

## 2020-03-27 NOTE — TELEPHONE ENCOUNTER
----- Message from Nica Malagon MA sent at 3/26/2020  3:29 PM CDT -----  Contact: pt      ----- Message -----  From: Lexy Morgan  Sent: 3/26/2020   3:21 PM CDT  To: [facility] Staff    Returning a call.  Speak with nurse        Call Back : [phone]

## 2020-03-27 NOTE — TELEPHONE ENCOUNTER
MA called patient back, spoke to patient wife, she does not want for patient to do all his test and follow up visit for now. She said that they are willing to push everything back till fall.

## 2020-10-23 DIAGNOSIS — D49.0 LIVER TUMOR: Primary | ICD-10-CM

## 2020-10-27 ENCOUNTER — TELEPHONE (OUTPATIENT)
Dept: HEPATOLOGY | Facility: CLINIC | Age: 65
End: 2020-10-27

## 2020-10-27 NOTE — TELEPHONE ENCOUNTER
----- Message from Jahaira Velázquez MD sent at 10/27/2020  8:47 AM CDT -----  Please inform patient results are OK.

## 2020-10-29 ENCOUNTER — TELEPHONE (OUTPATIENT)
Dept: HEPATOLOGY | Facility: CLINIC | Age: 65
End: 2020-10-29

## 2020-10-29 NOTE — TELEPHONE ENCOUNTER
----- Message from Jaahira Velázquez MD sent at 10/28/2020  7:22 PM CDT -----  Please inform patient results are OK.

## 2020-11-04 ENCOUNTER — TELEPHONE (OUTPATIENT)
Dept: HEPATOLOGY | Facility: CLINIC | Age: 65
End: 2020-11-04

## 2020-11-04 NOTE — TELEPHONE ENCOUNTER
Patient's wife called to get copy of lab result.  Patient will not f/u at [facility] anymore. Per his wife, he does not want a liver transplant.  Mrs Tavares said that patient is followed by his PCP in [place].  They will come back PRN if PCP refer back to Hepatology or IR.        ----- Message from Nica Malagon MA sent at 11/3/2020 12:15 PM CST -----  Regarding: FW: Speak with office  Contact: Mignon    ----- Message -----  From: Gabriela Rondon  Sent: 11/3/2020  11:38 AM CST  To: [facility] Staff  Subject: Speak with office                                Carmelina please give me a call.    [phone]

## 2021-04-26 ENCOUNTER — TELEPHONE (OUTPATIENT)
Dept: ORTHOPEDICS | Facility: CLINIC | Age: 66
End: 2021-04-26

## 2021-04-26 DIAGNOSIS — M25.571 CHRONIC PAIN OF RIGHT ANKLE: Primary | ICD-10-CM

## 2021-04-26 DIAGNOSIS — G89.29 CHRONIC PAIN OF RIGHT ANKLE: Primary | ICD-10-CM

## 2021-04-27 ENCOUNTER — HOSPITAL ENCOUNTER (OUTPATIENT)
Dept: RADIOLOGY | Facility: HOSPITAL | Age: 66
Discharge: HOME | End: 2021-04-27
Attending: ORTHOPAEDIC SURGERY
Payer: MEDICARE

## 2021-04-27 ENCOUNTER — OFFICE VISIT (OUTPATIENT)
Dept: ORTHOPEDICS | Facility: CLINIC | Age: 66
End: 2021-04-27
Payer: MEDICARE

## 2021-04-27 VITALS
BODY MASS INDEX: 33.93 KG/M2 | DIASTOLIC BLOOD PRESSURE: 88 MMHG | WEIGHT: 237 LBS | SYSTOLIC BLOOD PRESSURE: 145 MMHG | HEART RATE: 84 BPM | HEIGHT: 70 IN

## 2021-04-27 DIAGNOSIS — M19.071 ARTHRITIS OF RIGHT SUBTALAR JOINT: Primary | ICD-10-CM

## 2021-04-27 DIAGNOSIS — G89.29 CHRONIC PAIN OF RIGHT ANKLE: ICD-10-CM

## 2021-04-27 DIAGNOSIS — M25.571 CHRONIC PAIN OF RIGHT ANKLE: ICD-10-CM

## 2021-04-27 DIAGNOSIS — M19.071 ARTHRITIS OF RIGHT ANKLE: ICD-10-CM

## 2021-04-27 PROCEDURE — 99999 PR PBB SHADOW E&M-EST. PATIENT-LVL IV: ICD-10-PCS | Mod: PBBFAC,,, | Performed by: ORTHOPAEDIC SURGERY

## 2021-04-27 PROCEDURE — 3288F PR FALLS RISK ASSESSMENT DOCUMENTED: ICD-10-PCS | Mod: CPTII,S$GLB,, | Performed by: ORTHOPAEDIC SURGERY

## 2021-04-27 PROCEDURE — 99999 PR PBB SHADOW E&M-EST. PATIENT-LVL IV: CPT | Mod: PBBFAC,,, | Performed by: ORTHOPAEDIC SURGERY

## 2021-04-27 PROCEDURE — 1159F PR MEDICATION LIST DOCUMENTED IN MEDICAL RECORD: ICD-10-PCS | Mod: S$GLB,,, | Performed by: ORTHOPAEDIC SURGERY

## 2021-04-27 PROCEDURE — 20600 SMALL JOINT ASPIRATION/INJECTION: R SUBTALAR: ICD-10-PCS | Mod: 51,RT,S$GLB, | Performed by: ORTHOPAEDIC SURGERY

## 2021-04-27 PROCEDURE — 20605 DRAIN/INJ JOINT/BURSA W/O US: CPT | Mod: RT,S$GLB,, | Performed by: ORTHOPAEDIC SURGERY

## 2021-04-27 PROCEDURE — 99204 PR OFFICE/OUTPT VISIT, NEW, LEVL IV, 45-59 MIN: ICD-10-PCS | Mod: 25,S$GLB,, | Performed by: ORTHOPAEDIC SURGERY

## 2021-04-27 PROCEDURE — 3008F BODY MASS INDEX DOCD: CPT | Mod: CPTII,S$GLB,, | Performed by: ORTHOPAEDIC SURGERY

## 2021-04-27 PROCEDURE — 20600 DRAIN/INJ JOINT/BURSA W/O US: CPT | Mod: 51,RT,S$GLB, | Performed by: ORTHOPAEDIC SURGERY

## 2021-04-27 PROCEDURE — 1125F PR PAIN SEVERITY QUANTIFIED, PAIN PRESENT: ICD-10-PCS | Mod: S$GLB,,, | Performed by: ORTHOPAEDIC SURGERY

## 2021-04-27 PROCEDURE — 1159F MED LIST DOCD IN RCRD: CPT | Mod: S$GLB,,, | Performed by: ORTHOPAEDIC SURGERY

## 2021-04-27 PROCEDURE — 1101F PT FALLS ASSESS-DOCD LE1/YR: CPT | Mod: CPTII,S$GLB,, | Performed by: ORTHOPAEDIC SURGERY

## 2021-04-27 PROCEDURE — 1101F PR PT FALLS ASSESS DOC 0-1 FALLS W/OUT INJ PAST YR: ICD-10-PCS | Mod: CPTII,S$GLB,, | Performed by: ORTHOPAEDIC SURGERY

## 2021-04-27 PROCEDURE — 3008F PR BODY MASS INDEX (BMI) DOCUMENTED: ICD-10-PCS | Mod: CPTII,S$GLB,, | Performed by: ORTHOPAEDIC SURGERY

## 2021-04-27 PROCEDURE — 73610 X-RAY EXAM OF ANKLE: CPT | Mod: TC,PO,RT

## 2021-04-27 PROCEDURE — 73610 XR ANKLE COMPLETE 3 VIEW RIGHT: ICD-10-PCS | Mod: 26,RT,, | Performed by: RADIOLOGY

## 2021-04-27 PROCEDURE — 3288F FALL RISK ASSESSMENT DOCD: CPT | Mod: CPTII,S$GLB,, | Performed by: ORTHOPAEDIC SURGERY

## 2021-04-27 PROCEDURE — 1125F AMNT PAIN NOTED PAIN PRSNT: CPT | Mod: S$GLB,,, | Performed by: ORTHOPAEDIC SURGERY

## 2021-04-27 PROCEDURE — 73610 X-RAY EXAM OF ANKLE: CPT | Mod: 26,RT,, | Performed by: RADIOLOGY

## 2021-04-27 PROCEDURE — 99204 OFFICE O/P NEW MOD 45 MIN: CPT | Mod: 25,S$GLB,, | Performed by: ORTHOPAEDIC SURGERY

## 2021-04-27 PROCEDURE — 20605 INTERMEDIATE JOINT ASPIRATION/INJECTION: R ANKLE: ICD-10-PCS | Mod: RT,S$GLB,, | Performed by: ORTHOPAEDIC SURGERY

## 2021-04-27 RX ADMIN — TRIAMCINOLONE ACETONIDE 20 MG: 40 INJECTION, SUSPENSION INTRA-ARTICULAR; INTRAMUSCULAR at 10:04

## 2021-05-03 PROBLEM — M19.071 ARTHRITIS OF RIGHT ANKLE: Status: ACTIVE | Noted: 2021-05-03

## 2021-05-03 RX ORDER — TRIAMCINOLONE ACETONIDE 40 MG/ML
20 INJECTION, SUSPENSION INTRA-ARTICULAR; INTRAMUSCULAR
Status: DISCONTINUED | OUTPATIENT
Start: 2021-04-27 | End: 2021-05-03 | Stop reason: HOSPADM

## 2021-11-30 ENCOUNTER — OFFICE VISIT (OUTPATIENT)
Dept: ORTHOPEDICS | Facility: CLINIC | Age: 66
End: 2021-11-30
Payer: MEDICARE

## 2021-11-30 VITALS
DIASTOLIC BLOOD PRESSURE: 92 MMHG | WEIGHT: 240 LBS | SYSTOLIC BLOOD PRESSURE: 173 MMHG | BODY MASS INDEX: 34.36 KG/M2 | HEIGHT: 70 IN | HEART RATE: 81 BPM

## 2021-11-30 DIAGNOSIS — M19.071 ARTHRITIS OF RIGHT ANKLE: Primary | ICD-10-CM

## 2021-11-30 DIAGNOSIS — M19.071 ARTHRITIS OF RIGHT SUBTALAR JOINT: ICD-10-CM

## 2021-11-30 PROCEDURE — 20605 DRAIN/INJ JOINT/BURSA W/O US: CPT | Mod: RT,S$GLB,, | Performed by: ORTHOPAEDIC SURGERY

## 2021-11-30 PROCEDURE — 99999 PR PBB SHADOW E&M-EST. PATIENT-LVL III: CPT | Mod: PBBFAC,,, | Performed by: ORTHOPAEDIC SURGERY

## 2021-11-30 PROCEDURE — 20605 INTERMEDIATE JOINT ASPIRATION/INJECTION: ICD-10-PCS | Mod: RT,S$GLB,, | Performed by: ORTHOPAEDIC SURGERY

## 2021-11-30 PROCEDURE — 99213 PR OFFICE/OUTPT VISIT, EST, LEVL III, 20-29 MIN: ICD-10-PCS | Mod: 25,S$GLB,, | Performed by: ORTHOPAEDIC SURGERY

## 2021-11-30 PROCEDURE — 99213 OFFICE O/P EST LOW 20 MIN: CPT | Mod: 25,S$GLB,, | Performed by: ORTHOPAEDIC SURGERY

## 2021-11-30 PROCEDURE — 99999 PR PBB SHADOW E&M-EST. PATIENT-LVL III: ICD-10-PCS | Mod: PBBFAC,,, | Performed by: ORTHOPAEDIC SURGERY

## 2021-11-30 RX ORDER — TRIAMCINOLONE ACETONIDE 40 MG/ML
40 INJECTION, SUSPENSION INTRA-ARTICULAR; INTRAMUSCULAR
Status: DISCONTINUED | OUTPATIENT
Start: 2021-11-30 | End: 2021-11-30 | Stop reason: HOSPADM

## 2021-11-30 RX ADMIN — TRIAMCINOLONE ACETONIDE 40 MG: 40 INJECTION, SUSPENSION INTRA-ARTICULAR; INTRAMUSCULAR at 09:11

## 2022-05-20 ENCOUNTER — TELEPHONE (OUTPATIENT)
Dept: HEPATOLOGY | Facility: CLINIC | Age: 67
End: 2022-05-20
Payer: MEDICARE

## 2022-05-20 NOTE — TELEPHONE ENCOUNTER
Call placed to pt. pts wife answered    -Saw Dr. Prescott [facility]  -liver tumor ablation 9/27/2019 (she thinks)  -needs form for [employer] insurance 'attending physian   statement' 16 questions  -cant come down bc she has cancer  -mentioned pt refused liver txp  -pt on disability and transportation is an issue  -will be sending form Monday  -I advised that Dr. Velázquez may not be able to fill out the paperwork bc pt has never been seen by her

## 2022-05-20 NOTE — TELEPHONE ENCOUNTER
----- Message from Fannie Lomax sent at 5/20/2022  3:43 PM CDT -----  Regarding: pts wife  Pts wife is calling to speak with the nurse she will further discuss when the nurse calls them back can you please call pts wife at [phone].    JASE

## 2022-05-23 ENCOUNTER — TELEPHONE (OUTPATIENT)
Dept: HEPATOLOGY | Facility: CLINIC | Age: 67
End: 2022-05-23
Payer: MEDICARE

## 2022-05-23 NOTE — TELEPHONE ENCOUNTER
Pt's wife states pt was seen by Dr. Prescott [facility]. Pt had liver tumor ablation 9/27/2019 (she thinks).  Caller is asking for insurance paperwork to be filled out for a cancer policy she has.I advised caller Dr. Velázquez may not be able to fill out the paperwork due to pt has never been seen and he  declined a liver txp. Pt states the call was sent to the wrong provider. Pt given the number to the [facility] hospital to speak with someone from Dr. Merino's office or Dr. Prescott.

## 2022-05-23 NOTE — TELEPHONE ENCOUNTER
----- Message from Divya Patel sent at 5/23/2022  3:18 PM CDT -----  Regarding: pt called  Name of Who is Calling: AMBIKA SERVIN [[id number]]MIRNA ( wife)      What is the request in detail: pt is calling to speak to a nurse about her  paper work. Please advise       Can the clinic reply by [facility]: NO      What Number to [facility] Back [facility]: [phone]

## 2022-06-15 ENCOUNTER — OFFICE VISIT (OUTPATIENT)
Dept: CARDIOLOGY | Facility: CLINIC | Age: 67
End: 2022-06-15
Payer: MEDICARE

## 2022-06-15 VITALS
HEART RATE: 95 BPM | DIASTOLIC BLOOD PRESSURE: 87 MMHG | SYSTOLIC BLOOD PRESSURE: 153 MMHG | WEIGHT: 232.38 LBS | BODY MASS INDEX: 33.27 KG/M2 | HEIGHT: 70 IN

## 2022-06-15 DIAGNOSIS — I10 ESSENTIAL HYPERTENSION: Primary | ICD-10-CM

## 2022-06-15 DIAGNOSIS — I25.10 CORONARY ARTERY DISEASE INVOLVING NATIVE CORONARY ARTERY OF NATIVE HEART WITHOUT ANGINA PECTORIS: ICD-10-CM

## 2022-06-15 DIAGNOSIS — I48.11 LONGSTANDING PERSISTENT ATRIAL FIBRILLATION: ICD-10-CM

## 2022-06-15 DIAGNOSIS — Z95.1 S/P CABG X 4: ICD-10-CM

## 2022-06-15 PROCEDURE — 3288F FALL RISK ASSESSMENT DOCD: CPT | Mod: CPTII,S$GLB,, | Performed by: INTERNAL MEDICINE

## 2022-06-15 PROCEDURE — 93010 ELECTROCARDIOGRAM REPORT: CPT | Mod: S$GLB,,, | Performed by: INTERNAL MEDICINE

## 2022-06-15 PROCEDURE — 1160F RVW MEDS BY RX/DR IN RCRD: CPT | Mod: CPTII,S$GLB,, | Performed by: INTERNAL MEDICINE

## 2022-06-15 PROCEDURE — 99204 OFFICE O/P NEW MOD 45 MIN: CPT | Mod: S$GLB,,, | Performed by: INTERNAL MEDICINE

## 2022-06-15 PROCEDURE — 1126F AMNT PAIN NOTED NONE PRSNT: CPT | Mod: CPTII,S$GLB,, | Performed by: INTERNAL MEDICINE

## 2022-06-15 PROCEDURE — 1159F PR MEDICATION LIST DOCUMENTED IN MEDICAL RECORD: ICD-10-PCS | Mod: CPTII,S$GLB,, | Performed by: INTERNAL MEDICINE

## 2022-06-15 PROCEDURE — 93005 ELECTROCARDIOGRAM TRACING: CPT | Mod: PO

## 2022-06-15 PROCEDURE — 3077F PR MOST RECENT SYSTOLIC BLOOD PRESSURE >= 140 MM HG: ICD-10-PCS | Mod: CPTII,S$GLB,, | Performed by: INTERNAL MEDICINE

## 2022-06-15 PROCEDURE — 1126F PR PAIN SEVERITY QUANTIFIED, NO PAIN PRESENT: ICD-10-PCS | Mod: CPTII,S$GLB,, | Performed by: INTERNAL MEDICINE

## 2022-06-15 PROCEDURE — 93010 EKG 12-LEAD: ICD-10-PCS | Mod: S$GLB,,, | Performed by: INTERNAL MEDICINE

## 2022-06-15 PROCEDURE — 1159F MED LIST DOCD IN RCRD: CPT | Mod: CPTII,S$GLB,, | Performed by: INTERNAL MEDICINE

## 2022-06-15 PROCEDURE — 3288F PR FALLS RISK ASSESSMENT DOCUMENTED: ICD-10-PCS | Mod: CPTII,S$GLB,, | Performed by: INTERNAL MEDICINE

## 2022-06-15 PROCEDURE — 1101F PR PT FALLS ASSESS DOC 0-1 FALLS W/OUT INJ PAST YR: ICD-10-PCS | Mod: CPTII,S$GLB,, | Performed by: INTERNAL MEDICINE

## 2022-06-15 PROCEDURE — 1160F PR REVIEW ALL MEDS BY PRESCRIBER/CLIN PHARMACIST DOCUMENTED: ICD-10-PCS | Mod: CPTII,S$GLB,, | Performed by: INTERNAL MEDICINE

## 2022-06-15 PROCEDURE — 1101F PT FALLS ASSESS-DOCD LE1/YR: CPT | Mod: CPTII,S$GLB,, | Performed by: INTERNAL MEDICINE

## 2022-06-15 PROCEDURE — 99999 PR PBB SHADOW E&M-EST. PATIENT-LVL III: ICD-10-PCS | Mod: PBBFAC,,, | Performed by: INTERNAL MEDICINE

## 2022-06-15 PROCEDURE — 3008F PR BODY MASS INDEX (BMI) DOCUMENTED: ICD-10-PCS | Mod: CPTII,S$GLB,, | Performed by: INTERNAL MEDICINE

## 2022-06-15 PROCEDURE — 99999 PR PBB SHADOW E&M-EST. PATIENT-LVL III: CPT | Mod: PBBFAC,,, | Performed by: INTERNAL MEDICINE

## 2022-06-15 PROCEDURE — 3079F DIAST BP 80-89 MM HG: CPT | Mod: CPTII,S$GLB,, | Performed by: INTERNAL MEDICINE

## 2022-06-15 PROCEDURE — 3079F PR MOST RECENT DIASTOLIC BLOOD PRESSURE 80-89 MM HG: ICD-10-PCS | Mod: CPTII,S$GLB,, | Performed by: INTERNAL MEDICINE

## 2022-06-15 PROCEDURE — 3077F SYST BP >= 140 MM HG: CPT | Mod: CPTII,S$GLB,, | Performed by: INTERNAL MEDICINE

## 2022-06-15 PROCEDURE — 3008F BODY MASS INDEX DOCD: CPT | Mod: CPTII,S$GLB,, | Performed by: INTERNAL MEDICINE

## 2022-06-15 PROCEDURE — 99204 PR OFFICE/OUTPT VISIT, NEW, LEVL IV, 45-59 MIN: ICD-10-PCS | Mod: S$GLB,,, | Performed by: INTERNAL MEDICINE

## 2022-06-15 NOTE — PROGRESS NOTES
Subjective:    Patient ID:  Hudson Tavares is a 67 y.o. male who presents for evaluation of No chief complaint on file.      Problem List Items Addressed This Visit        Cardiac/Vascular    Coronary artery disease involving native coronary artery of native heart without angina pectoris    Essential hypertension - Primary    Longstanding persistent atrial fibrillation    S/P CABG x 4    Overview     LIMA to LAD, SVG to OM, SVG to diagonal, SVG to right PDA, Maze, left atrial appendage resection (5/16)                 HPI    Presents to establish care    The patient states that he feels poorly, just lost his wife 2 weeks ago.    No chest pain.  No shortness of breath.    Reason for taking Coumadin - aFib, had clots in the past  Had clot before that did not resolve with Eliquis, but ended up resolving on coumadin    Frequency of Lasix use - As needed    Wife passed away 2 weeks ago.    Personal history of heart attack or stroke - CABG as above     Past Medical History:   Diagnosis Date    Adenomatous colon polyp     Alcohol abuse 7/9/2015    Anticoagulant long-term use     coumadin    Atrial fibrillation 7/9/2015    CAD (coronary artery disease), native coronary artery 5/3/2016    Degenerative joint disease 7/9/2015    Encounter for blood transfusion     Enlarged liver     Essential hypertension 7/9/2015    Hep C w/o coma, chronic     treated with Harvoni    Hiatal hernia     patient states hernia has resolved    Hx of bladder infections     finished cipro 4/29/16    S/P CABG (coronary artery bypass graft) 5/17/2016    LIMA to LAD, SVG to OM, SVG to diagonal, SVG to right PDA, Maze, left atrial appendage resection (5/16)    Spleen enlarged     Thrombocytopenia 5/5/2016    Tobacco abuse 7/9/2015       Past Surgical History:   Procedure Laterality Date    CARDIAC SURGERY  05/04/2016    4 vessel CABG    CERVICAL FUSION  2004    COLONOSCOPY  12/03/2018    Steve Wells    JOINT REPLACEMENT  TOTAL  "RIGHT KNEE     OPEN ANTERIOR SHOULDER RECONSTRUCTION Left     RADIOFREQUENCY ABLATION N/A 2019    Procedure: Radiofrequency Ablation;  Surgeon: Anayeli Surgeon;  Location: [facility];  Service: Anesthesiology;  Laterality: N/A;  Room 173/[place]    TIBIA FRACTURE SURGERY Right 1971       Family History   Problem Relation Age of Onset    Heart disease Mother         congestive heart failure    Diabetes Mother     Hypertension Mother     Heart disease Sister         heart murmur    Atrial fibrillation Sister     Cancer Sister         breast    Cancer Father 60        colon    Chronic back pain Brother     Atrial fibrillation Sister     Heart disease Sister     Collagen disease Neg Hx        Social History     Socioeconomic History    Marital status:    Tobacco Use    Smoking status: Former Smoker     Packs/day: 0.25     Years: 30.00     Pack years: 7.50     Types: Cigarettes     Quit date: 2016     Years since quittin.1    Smokeless tobacco: Never Used   Substance and Sexual Activity    Alcohol use: No     Alcohol/week: 0.0 standard drinks     Comment: last drink 2015    Drug use: No    Sexual activity: Yes     Partners: Female       Review of patient's allergies indicates:   Allergen Reactions    Cilostazol Other (See Comments)     Colon bleeding    Stadol [butorphanol tartrate] Hallucinations     "I saw pink elephants"    Keflex [cephalexin] Other (See Comments)     Was using 500mg TID oral and had a strong metallic taste in his mouth and could not function.  Can use IV cephalosporin.    Morphine Other (See Comments)     "makes him drunk", Altered Mental Status    Sulfa (sulfonamide antibiotics) Other (See Comments)     Since childhood; does not know reaction       Review of Systems   Constitutional: Negative for decreased appetite, fever and malaise/fatigue.   Eyes: Negative for blurred vision.   Cardiovascular: Negative for chest pain, dyspnea on exertion, irregular " "heartbeat and leg swelling.   Respiratory: Negative for cough, hemoptysis, shortness of breath and wheezing.    Endocrine: Negative for cold intolerance and heat intolerance.   Hematologic/Lymphatic: Negative for bleeding problem.   Musculoskeletal: Negative for muscle weakness and myalgias.   Gastrointestinal: Negative for abdominal pain, constipation and diarrhea.   Genitourinary: Negative for bladder incontinence.   Neurological: Negative for dizziness and weakness.   Psychiatric/Behavioral: Negative for depression.        Objective:     Vitals:    06/15/22 1112   BP: (!) 153/87   BP Location: Left arm   Patient Position: Sitting   BP Method: Large (Automatic)   Pulse: 95   Weight: 105.4 kg (232 lb 5.8 oz)   Height: 5' 10" (1.778 m)        Physical Exam  Constitutional:       Appearance: He is well-developed.   HENT:      Head: Normocephalic and atraumatic.   Neck:      Vascular: No JVD.   Cardiovascular:      Rate and Rhythm: Normal rate. Rhythm irregular.      Heart sounds: Normal heart sounds. No murmur heard.    No friction rub. No gallop.   Pulmonary:      Effort: Pulmonary effort is normal. No respiratory distress.      Breath sounds: Normal breath sounds. No wheezing or rales.   Abdominal:      General: Bowel sounds are normal.      Palpations: Abdomen is soft.      Tenderness: There is no abdominal tenderness. There is no guarding or rebound.   Musculoskeletal:      Cervical back: Normal range of motion and neck supple.   Skin:     General: Skin is warm and dry.   Neurological:      Mental Status: He is alert and oriented to person, place, and time.   Psychiatric:         Behavior: Behavior normal.             Current Outpatient Medications on File Prior to Visit   Medication Sig    aspirin (ECOTRIN) 81 MG EC tablet Take 81 mg by mouth once daily. Use prior to arrival for cardioversion    clotrimazole-betamethasone 1-0.05% (LOTRISONE) cream APPLY TO AFFECTED AREA TWICE A DAY    famotidine (PEPCID) 20 " MG tablet Take 20 mg by mouth nightly.     furosemide (LASIX) 20 MG tablet TAKE 1 TABLET BY MOUTH EVERY DAY    metoprolol succinate (TOPROL-XL) 50 MG 24 hr tablet Take 1 tablet (50 mg total) by mouth once daily.    mometasone 0.1% (ELOCON) 0.1 % cream Apply topically once daily.    mupirocin calcium 2% (BACTROBAN) 2 % cream APPLY TO AFFECTED AREA 3 TIMES A DAY    POLYETHYLENE GLYCOL 3350 (MIRALAX ORAL) Take 17 g by mouth daily as needed (constipation).     pramoxine-hydrocortisone (PROCTOCREAM-HC) 1-1 % rectal cream Place rectally 3 (three) times daily.    warfarin (COUMADIN) 1 MG tablet Take 1 tablet (1 mg total) by mouth Daily.    warfarin (COUMADIN) 2 MG tablet TAKE 1MG-2MG DAILY AS DIRECTED     No current facility-administered medications on file prior to visit.       Lipid Panel:   Lab Results   Component Value Date    CHOL 192 04/08/2021    HDL 59 04/08/2021    LDLCALC 106.8 04/08/2021    TRIG 131 04/08/2021    CHOLHDL 30.7 04/08/2021         The 10-year ASCVD risk score (Hodgenchaya RYAN Jr., et al., 2013) is: 20.4%    Values used to calculate the score:      Age: 67 years      Sex: Male      Is Non- : No      Diabetic: No      Tobacco smoker: No      Systolic Blood Pressure: 153 mmHg      Is BP treated: Yes      HDL Cholesterol: 59 mg/dL      Total Cholesterol: 192 mg/dL    All pertinent labs, imaging, and EKGs reviewed.  Patient's most recent EKG tracing was personally interpreted by this provider.    Assessment:       1. Essential hypertension    2. S/P CABG x 4    3. Coronary artery disease involving native coronary artery of native heart without angina pectoris    4. Longstanding persistent atrial fibrillation         Plan:     Symptoms OK other than stress from loss of his wife  BP borderline, but under a lot of stress from the loss of his wife  Most recent echocardiogram reviewed personally   Rate control strategy for aFib    Continue aspirin 81 mg PO Daily indefinitely  Continue  Lasix 20 mg PO Daily PRN volume  Continue metoprolol succinate 50 mg PO Daily  Continue Coumadin per [facility], has had blood clots in the past that failed Eliquis, would continue coumadin    Continue other cardiac medications  Mediterranean Diet/Cardiovascular Exercise Program    Patient queried and all questions were answered.    F/u in 6-9 months to reassess with echo prior      Signed:    Jonathan Gonzalez MD  6/15/2022 9:20 PM

## 2022-12-15 ENCOUNTER — CLINICAL SUPPORT (OUTPATIENT)
Dept: CARDIOLOGY | Facility: HOSPITAL | Age: 67
End: 2022-12-15
Attending: INTERNAL MEDICINE
Payer: MEDICARE

## 2022-12-15 VITALS — BODY MASS INDEX: 33.21 KG/M2 | HEIGHT: 70 IN | WEIGHT: 232 LBS

## 2022-12-15 DIAGNOSIS — I48.11 LONGSTANDING PERSISTENT ATRIAL FIBRILLATION: ICD-10-CM

## 2022-12-15 DIAGNOSIS — Z95.1 S/P CABG X 4: ICD-10-CM

## 2022-12-15 LAB
ASCENDING AORTA: 2.62 CM
AV INDEX (PROSTH): 0.64
AV MEAN GRADIENT: 8 MMHG
AV PEAK GRADIENT: 12 MMHG
AV VALVE AREA: 2.18 CM2
AV VELOCITY RATIO: 0.69
BSA FOR ECHO PROCEDURE: 2.28 M2
CV ECHO LV RWT: 0.52 CM
DOP CALC AO PEAK VEL: 1.76 M/S
DOP CALC AO VTI: 39.1 CM
DOP CALC LVOT AREA: 3.4 CM2
DOP CALC LVOT DIAMETER: 2.09 CM
DOP CALC LVOT PEAK VEL: 1.22 M/S
DOP CALC LVOT STROKE VOLUME: 85.38 CM3
DOP CALCLVOT PEAK VEL VTI: 24.9 CM
E/E' RATIO: 10.5 M/S
ECHO LV POSTERIOR WALL: 1.05 CM (ref 0.6–1.1)
EJECTION FRACTION: 60 %
FRACTIONAL SHORTENING: 26 % (ref 28–44)
INTERVENTRICULAR SEPTUM: 1.04 CM (ref 0.6–1.1)
LA MAJOR: 6.13 CM
LA MINOR: 6.07 CM
LA WIDTH: 4.7 CM
LEFT ATRIUM SIZE: 4.6 CM
LEFT ATRIUM VOLUME INDEX: 50.5 ML/M2
LEFT ATRIUM VOLUME: 112.1 CM3
LEFT INTERNAL DIMENSION IN SYSTOLE: 2.98 CM (ref 2.1–4)
LEFT VENTRICLE DIASTOLIC VOLUME INDEX: 32.04 ML/M2
LEFT VENTRICLE DIASTOLIC VOLUME: 71.13 ML
LEFT VENTRICLE MASS INDEX: 62 G/M2
LEFT VENTRICLE SYSTOLIC VOLUME INDEX: 15.5 ML/M2
LEFT VENTRICLE SYSTOLIC VOLUME: 34.48 ML
LEFT VENTRICULAR INTERNAL DIMENSION IN DIASTOLE: 4.03 CM (ref 3.5–6)
LEFT VENTRICULAR MASS: 136.86 G
LV LATERAL E/E' RATIO: 9.55 M/S
LV SEPTAL E/E' RATIO: 11.67 M/S
LVOT MG: 3.75 MMHG
LVOT MV: 0.94 CM/S
MV PEAK E VEL: 1.05 M/S
PISA TR MAX VEL: 2.81 M/S
RA MAJOR: 5.71 CM
RA PRESSURE: 3 MMHG
RIGHT VENTRICULAR END-DIASTOLIC DIMENSION: 4.3 CM
RIGHT VENTRICULAR LENGTH IN DIASTOLE (APICAL 4-CHAMBER VIEW): 7.04 CM
RV MID DIAMA: 21.7 CM
RV TISSUE DOPPLER FREE WALL SYSTOLIC VELOCITY 1 (APICAL 4 CHAMBER VIEW): 0.01 CM/S
SINUS: 3.11 CM
STJ: 2.63 CM
TDI LATERAL: 0.11 M/S
TDI SEPTAL: 0.09 M/S
TDI: 0.1 M/S
TR MAX PG: 32 MMHG
TRICUSPID ANNULAR PLANE SYSTOLIC EXCURSION: 2.14 CM
TV REST PULMONARY ARTERY PRESSURE: 35 MMHG

## 2022-12-15 PROCEDURE — 93306 ECHO (CUPID ONLY): ICD-10-PCS | Mod: 26,,, | Performed by: INTERNAL MEDICINE

## 2022-12-15 PROCEDURE — 93306 TTE W/DOPPLER COMPLETE: CPT | Mod: 26,,, | Performed by: INTERNAL MEDICINE

## 2022-12-15 PROCEDURE — 93306 TTE W/DOPPLER COMPLETE: CPT | Mod: PO

## 2022-12-16 ENCOUNTER — TELEPHONE (OUTPATIENT)
Dept: CARDIOLOGY | Facility: CLINIC | Age: 67
End: 2022-12-16
Payer: MEDICARE

## 2022-12-16 NOTE — TELEPHONE ENCOUNTER
Informed patient that Dr. Gonzalez reviewed his Echo and states it is ok. Patient has OV on 12/22/22.

## 2023-01-19 ENCOUNTER — TELEPHONE (OUTPATIENT)
Dept: CARDIOLOGY | Facility: CLINIC | Age: 68
End: 2023-01-19
Payer: MEDICARE

## 2023-01-19 NOTE — TELEPHONE ENCOUNTER
----- Message from Eneida Decker sent at 1/19/2023  9:13 AM CST -----  Contact: self  Type: Needs Medical Advice  Who Called:  Patient     Best Call Back Number: [phone]    Additional Information: Pt states he got a text stating to [place] appt on 1/24 to 2pm need to joseph. Please call back   .

## 2023-01-24 ENCOUNTER — OFFICE VISIT (OUTPATIENT)
Dept: CARDIOLOGY | Facility: CLINIC | Age: 68
End: 2023-01-24
Attending: INTERNAL MEDICINE
Payer: MEDICARE

## 2023-01-24 VITALS
DIASTOLIC BLOOD PRESSURE: 89 MMHG | BODY MASS INDEX: 31.09 KG/M2 | WEIGHT: 217.13 LBS | HEART RATE: 91 BPM | SYSTOLIC BLOOD PRESSURE: 160 MMHG | HEIGHT: 70 IN

## 2023-01-24 DIAGNOSIS — I10 ESSENTIAL HYPERTENSION: ICD-10-CM

## 2023-01-24 DIAGNOSIS — I25.10 CORONARY ARTERY DISEASE INVOLVING NATIVE CORONARY ARTERY OF NATIVE HEART WITHOUT ANGINA PECTORIS: ICD-10-CM

## 2023-01-24 DIAGNOSIS — Z95.1 S/P CABG X 4: Primary | ICD-10-CM

## 2023-01-24 DIAGNOSIS — I48.11 LONGSTANDING PERSISTENT ATRIAL FIBRILLATION: ICD-10-CM

## 2023-01-24 PROCEDURE — 1126F PR PAIN SEVERITY QUANTIFIED, NO PAIN PRESENT: ICD-10-PCS | Mod: CPTII,S$GLB,, | Performed by: INTERNAL MEDICINE

## 2023-01-24 PROCEDURE — 3288F PR FALLS RISK ASSESSMENT DOCUMENTED: ICD-10-PCS | Mod: CPTII,S$GLB,, | Performed by: INTERNAL MEDICINE

## 2023-01-24 PROCEDURE — 99999 PR PBB SHADOW E&M-EST. PATIENT-LVL III: ICD-10-PCS | Mod: PBBFAC,,, | Performed by: INTERNAL MEDICINE

## 2023-01-24 PROCEDURE — 1101F PR PT FALLS ASSESS DOC 0-1 FALLS W/OUT INJ PAST YR: ICD-10-PCS | Mod: CPTII,S$GLB,, | Performed by: INTERNAL MEDICINE

## 2023-01-24 PROCEDURE — 1159F PR MEDICATION LIST DOCUMENTED IN MEDICAL RECORD: ICD-10-PCS | Mod: CPTII,S$GLB,, | Performed by: INTERNAL MEDICINE

## 2023-01-24 PROCEDURE — 99214 PR OFFICE/OUTPT VISIT, EST, LEVL IV, 30-39 MIN: ICD-10-PCS | Mod: S$GLB,,, | Performed by: INTERNAL MEDICINE

## 2023-01-24 PROCEDURE — 3288F FALL RISK ASSESSMENT DOCD: CPT | Mod: CPTII,S$GLB,, | Performed by: INTERNAL MEDICINE

## 2023-01-24 PROCEDURE — 1101F PT FALLS ASSESS-DOCD LE1/YR: CPT | Mod: CPTII,S$GLB,, | Performed by: INTERNAL MEDICINE

## 2023-01-24 PROCEDURE — 99214 OFFICE O/P EST MOD 30 MIN: CPT | Mod: S$GLB,,, | Performed by: INTERNAL MEDICINE

## 2023-01-24 PROCEDURE — 1160F PR REVIEW ALL MEDS BY PRESCRIBER/CLIN PHARMACIST DOCUMENTED: ICD-10-PCS | Mod: CPTII,S$GLB,, | Performed by: INTERNAL MEDICINE

## 2023-01-24 PROCEDURE — 3077F PR MOST RECENT SYSTOLIC BLOOD PRESSURE >= 140 MM HG: ICD-10-PCS | Mod: CPTII,S$GLB,, | Performed by: INTERNAL MEDICINE

## 2023-01-24 PROCEDURE — 99999 PR PBB SHADOW E&M-EST. PATIENT-LVL III: CPT | Mod: PBBFAC,,, | Performed by: INTERNAL MEDICINE

## 2023-01-24 PROCEDURE — 3077F SYST BP >= 140 MM HG: CPT | Mod: CPTII,S$GLB,, | Performed by: INTERNAL MEDICINE

## 2023-01-24 PROCEDURE — 1126F AMNT PAIN NOTED NONE PRSNT: CPT | Mod: CPTII,S$GLB,, | Performed by: INTERNAL MEDICINE

## 2023-01-24 PROCEDURE — 1159F MED LIST DOCD IN RCRD: CPT | Mod: CPTII,S$GLB,, | Performed by: INTERNAL MEDICINE

## 2023-01-24 PROCEDURE — 3008F BODY MASS INDEX DOCD: CPT | Mod: CPTII,S$GLB,, | Performed by: INTERNAL MEDICINE

## 2023-01-24 PROCEDURE — 3079F PR MOST RECENT DIASTOLIC BLOOD PRESSURE 80-89 MM HG: ICD-10-PCS | Mod: CPTII,S$GLB,, | Performed by: INTERNAL MEDICINE

## 2023-01-24 PROCEDURE — 3008F PR BODY MASS INDEX (BMI) DOCUMENTED: ICD-10-PCS | Mod: CPTII,S$GLB,, | Performed by: INTERNAL MEDICINE

## 2023-01-24 PROCEDURE — 1160F RVW MEDS BY RX/DR IN RCRD: CPT | Mod: CPTII,S$GLB,, | Performed by: INTERNAL MEDICINE

## 2023-01-24 PROCEDURE — 3079F DIAST BP 80-89 MM HG: CPT | Mod: CPTII,S$GLB,, | Performed by: INTERNAL MEDICINE

## 2023-01-24 NOTE — PROGRESS NOTES
"Subjective:    Patient ID:  Hudson Tavares is a 67 y.o. male who presents for follow-up of Hypertension      Problem List Items Addressed This Visit          Cardiac/Vascular    Coronary artery disease involving native coronary artery of native heart without angina pectoris    Essential hypertension    Longstanding persistent atrial fibrillation    S/P CABG x 4 - Primary    Overview     LIMA to LAD, SVG to OM, SVG to diagonal, SVG to right PDA, Maze, left atrial appendage resection (5/16)            HPI    Patient was last seen on 06/15/2022 at which time he was dealing with the recent loss of his wife.  Echocardiogram performed prior to this visit shows preserved ejection fraction without acute abnormalities.    On assessment today, the patient states that he feel reasonable    No angina    Frequency of Lasix use - Not needed        Objective:     Vitals:    01/24/23 1111   BP: (!) 160/89   BP Location: Left arm   Patient Position: Sitting   BP Method: Large (Automatic)   Pulse: 91   Weight: 98.5 kg (217 lb 2.5 oz)   Height: 5' 10" (1.778 m)        Physical Exam  Constitutional:       Appearance: He is well-developed.   HENT:      Head: Normocephalic and atraumatic.   Neck:      Vascular: No JVD.   Cardiovascular:      Rate and Rhythm: Normal rate and regular rhythm.      Heart sounds: Normal heart sounds. No murmur heard.    No friction rub. No gallop.   Pulmonary:      Effort: Pulmonary effort is normal. No respiratory distress.      Breath sounds: Normal breath sounds. No wheezing or rales.   Abdominal:      General: Bowel sounds are normal.      Palpations: Abdomen is soft.      Tenderness: There is no abdominal tenderness. There is no guarding or rebound.   Musculoskeletal:      Cervical back: Normal range of motion and neck supple.   Skin:     General: Skin is warm and dry.   Neurological:      Mental Status: He is alert and oriented to person, place, and time.   Psychiatric:         Behavior: Behavior " normal.           Current Outpatient Medications   Medication Instructions    amLODIPine (NORVASC) 5 mg, Oral, Daily    aspirin (ECOTRIN) 81 mg, Oral, Daily, Use prior to arrival for cardioversion    clotrimazole-betamethasone 1-0.05% (LOTRISONE) cream APPLY TO AFFECTED AREA TWICE A DAY    famotidine (PEPCID) 20 mg, Oral, Nightly    furosemide (LASIX) 20 MG tablet TAKE 1 TABLET BY MOUTH EVERY DAY    metoprolol succinate (TOPROL-XL) 50 mg, Oral, Daily    mometasone 0.1% (ELOCON) 0.1 % cream Topical (Top), Daily    mupirocin calcium 2% (BACTROBAN) 2 % cream APPLY TO AFFECTED AREA 3 TIMES A DAY    POLYETHYLENE GLYCOL 3350 (MIRALAX ORAL) 17 g, Oral, Daily PRN    pramoxine-hydrocortisone (PROCTOCREAM-HC) 1-1 % rectal cream Rectal, 3 times daily    warfarin (COUMADIN) 2 MG tablet TAKE 1MG-2MG DAILY AS DIRECTED    warfarin (COUMADIN) 1 mg, Oral, Daily       Lipid Panel:   Lab Results   Component Value Date    CHOL 192 04/08/2021    HDL 59 04/08/2021    LDLCALC 106.8 04/08/2021    TRIG 131 04/08/2021    CHOLHDL 30.7 04/08/2021       The 10-year ASCVD risk score (Jaren HOFFMAN, et al., 2019) is: 22%    Values used to calculate the score:      Age: 67 years      Sex: Male      Is Non- : No      Diabetic: No      Tobacco smoker: No      Systolic Blood Pressure: 160 mmHg      Is BP treated: Yes      HDL Cholesterol: 59 mg/dL      Total Cholesterol: 192 mg/dL    All pertinent labs, imaging, and EKGs reviewed.  Patient's most recent EKG tracing was personally interpreted by this provider.    Most Recent Echocardiogram Results  Results for orders placed in visit on 12/15/22    Echo    Interpretation Summary  · Atrial fibrillation observed.  · The left ventricle is normal in size with concentric remodeling and  · The estimated ejection fraction is 60%.  · There is abnormal septal wall motion consistent with post-operative status.  · Normal right ventricular size with normal right ventricular systolic  function.  · Severe left atrial enlargement.  · Moderate right atrial enlargement.  · Mild tricuspid regurgitation.  · Normal central venous pressure (3 mmHg).  · The estimated PA systolic pressure is 35 mmHg.        Most Recent EKG  Results for orders placed or performed in visit on 06/15/22   IN OFFICE EKG 12-LEAD (to Tracy)    Collection Time: 06/15/22 10:19 AM    Narrative    Test Reason : I10,Z95.1,I25.10,I48.11,    Vent. Rate : 091 BPM     Atrial Rate : 163 BPM     P-R Int : 000 ms          QRS Dur : 088 ms      QT Int : 382 ms       P-R-T Axes : 000 009 031 degrees     QTc Int : 469 ms    Atrial fibrillation with premature ventricular or aberrantly conducted  complexes  Abnormal ECG  When compared with ECG of 25-AUG-2004 14:04,  Atrial fibrillation has replaced Sinus rhythm  Confirmed by LISBET EMERSON MD ([id number]) on 6/16/2022 7:58:57 AM    Referred By: [email or web address]   SELF           Confirmed By:LISBET EMERSON MD       No valid procedures specified.   No results found for this or any previous visit.    No valid procedures specified.      Assessment:       1. S/P CABG x 4    2. Longstanding persistent atrial fibrillation    3. Essential hypertension    4. Coronary artery disease involving native coronary artery of native heart without angina pectoris         Plan:     Symptoms OK today  BP elevated today, under a lot of stress  Most recent echocardiogram reviewed personally     Continue aspirin 81 mg PO Daily indefinitely  Continue Lasix 20 mg PO Daily PRN volume  Continue metoprolol succinate 50 mg PO Daily  Continue Coumadin per [facility], has had blood clots in the past that failed Eliquis, would continue coumadin  Following for blood pressure with PCP    Continue other cardiac medications  Mediterranean Diet/Cardiovascular Exercise Program    Patient queried and all questions were answered.    F/u in 6-9 months to reassess      Signed:    Jonathan Gonzalez MD  1/24/2023 7:58 AM

## 2023-05-19 ENCOUNTER — TELEPHONE (OUTPATIENT)
Dept: ORTHOPEDICS | Facility: CLINIC | Age: 68
End: 2023-05-19
Payer: MEDICARE

## 2023-05-19 ENCOUNTER — OFFICE VISIT (OUTPATIENT)
Dept: CARDIOLOGY | Facility: CLINIC | Age: 68
End: 2023-05-19
Payer: MEDICARE

## 2023-05-19 VITALS
BODY MASS INDEX: 32.07 KG/M2 | WEIGHT: 224 LBS | DIASTOLIC BLOOD PRESSURE: 80 MMHG | HEIGHT: 70 IN | HEART RATE: 85 BPM | SYSTOLIC BLOOD PRESSURE: 147 MMHG

## 2023-05-19 DIAGNOSIS — Z95.1 S/P CABG X 4: ICD-10-CM

## 2023-05-19 DIAGNOSIS — I48.11 LONGSTANDING PERSISTENT ATRIAL FIBRILLATION: ICD-10-CM

## 2023-05-19 DIAGNOSIS — M25.471 RIGHT ANKLE SWELLING: ICD-10-CM

## 2023-05-19 DIAGNOSIS — I10 ESSENTIAL HYPERTENSION: ICD-10-CM

## 2023-05-19 DIAGNOSIS — M79.89 LOCALIZED SWELLING OF LOWER EXTREMITY: Primary | ICD-10-CM

## 2023-05-19 PROCEDURE — 93010 ELECTROCARDIOGRAM REPORT: CPT | Mod: ,,, | Performed by: INTERNAL MEDICINE

## 2023-05-19 PROCEDURE — 1125F AMNT PAIN NOTED PAIN PRSNT: CPT | Mod: CPTII,,,

## 2023-05-19 PROCEDURE — 3288F PR FALLS RISK ASSESSMENT DOCUMENTED: ICD-10-PCS | Mod: CPTII,,,

## 2023-05-19 PROCEDURE — 1101F PT FALLS ASSESS-DOCD LE1/YR: CPT | Mod: CPTII,,,

## 2023-05-19 PROCEDURE — 99214 OFFICE O/P EST MOD 30 MIN: CPT | Mod: ,,,

## 2023-05-19 PROCEDURE — 1159F MED LIST DOCD IN RCRD: CPT | Mod: CPTII,,,

## 2023-05-19 PROCEDURE — 3288F FALL RISK ASSESSMENT DOCD: CPT | Mod: CPTII,,,

## 2023-05-19 PROCEDURE — 99214 PR OFFICE/OUTPT VISIT, EST, LEVL IV, 30-39 MIN: ICD-10-PCS | Mod: ,,,

## 2023-05-19 PROCEDURE — 99213 OFFICE O/P EST LOW 20 MIN: CPT | Mod: PO

## 2023-05-19 PROCEDURE — 3077F SYST BP >= 140 MM HG: CPT | Mod: CPTII,,,

## 2023-05-19 PROCEDURE — 3079F PR MOST RECENT DIASTOLIC BLOOD PRESSURE 80-89 MM HG: ICD-10-PCS | Mod: CPTII,,,

## 2023-05-19 PROCEDURE — 3008F PR BODY MASS INDEX (BMI) DOCUMENTED: ICD-10-PCS | Mod: CPTII,,,

## 2023-05-19 PROCEDURE — 1125F PR PAIN SEVERITY QUANTIFIED, PAIN PRESENT: ICD-10-PCS | Mod: CPTII,,,

## 2023-05-19 PROCEDURE — 3077F PR MOST RECENT SYSTOLIC BLOOD PRESSURE >= 140 MM HG: ICD-10-PCS | Mod: CPTII,,,

## 2023-05-19 PROCEDURE — 99999 PR PBB SHADOW E&M-EST. PATIENT-LVL III: CPT | Mod: PBBFAC,,,

## 2023-05-19 PROCEDURE — 99999 PR PBB SHADOW E&M-EST. PATIENT-LVL III: ICD-10-PCS | Mod: PBBFAC,,,

## 2023-05-19 PROCEDURE — 93005 ELECTROCARDIOGRAM TRACING: CPT | Mod: PO

## 2023-05-19 PROCEDURE — 93010 EKG 12-LEAD: ICD-10-PCS | Mod: ,,, | Performed by: INTERNAL MEDICINE

## 2023-05-19 PROCEDURE — 3008F BODY MASS INDEX DOCD: CPT | Mod: CPTII,,,

## 2023-05-19 PROCEDURE — 3079F DIAST BP 80-89 MM HG: CPT | Mod: CPTII,,,

## 2023-05-19 PROCEDURE — 1159F PR MEDICATION LIST DOCUMENTED IN MEDICAL RECORD: ICD-10-PCS | Mod: CPTII,,,

## 2023-05-19 PROCEDURE — 1101F PR PT FALLS ASSESS DOC 0-1 FALLS W/OUT INJ PAST YR: ICD-10-PCS | Mod: CPTII,,,

## 2023-05-19 RX ORDER — AMOXICILLIN 500 MG/1
CAPSULE ORAL
Status: ON HOLD | COMMUNITY
End: 2024-02-27 | Stop reason: HOSPADM

## 2023-05-19 NOTE — PROGRESS NOTES
Subjective:    Patient ID:  Hudson Tavares is a 68 y.o. male patient here for evaluation Foot Swelling    History of Present Illness:     Mr. Treviño is a 68 year old M who follows with Dr. Gonzalez here today because his right ankle has been swelling on and off the past several months.   In March he had a right venous and arterial ultrasound done-- negative for DVT, venous insuff, questionable insig stenosis of R SFA. No ABIs obtained. No claudication. He did have a pretty traumatic right ankle injury, was stepped on by a bull, several years ago that required plates and screws.   No other complaints today.       Lasix use - not often, feels like it dehydrates him when he does use it         Most Recent Echocardiogram Results  Results for orders placed in visit on 12/15/22    Echo    Interpretation Summary  · Atrial fibrillation observed.  · The left ventricle is normal in size with concentric remodeling and  · The estimated ejection fraction is 60%.  · There is abnormal septal wall motion consistent with post-operative status.  · Normal right ventricular size with normal right ventricular systolic function.  · Severe left atrial enlargement.  · Moderate right atrial enlargement.  · Mild tricuspid regurgitation.  · Normal central venous pressure (3 mmHg).  · The estimated PA systolic pressure is 35 mmHg.    REVIEW OF SYSTEMS: As noted in HPI   CARDIOVASCULAR: No recent chest pain, palpitations, arm, neck, or jaw pain.  RESPIRATORY: No recent fever, cough chills, SOB.  : No blood in the urine  GI: No nausea, vomiting, or blood in stool.   MUSCULOSKELETAL: + right ankle edema. No myalgias or falls.   NEURO: No syncope, lightheadedness, or dizziness.  EYES: No sudden changes in vision.     Past Medical History:   Diagnosis Date    Adenomatous colon polyp     Alcohol abuse 7/9/2015    Anticoagulant long-term use     coumadin    Atrial fibrillation 7/9/2015    CAD (coronary artery disease), native coronary artery  5/3/2016    Degenerative joint disease 2015    Encounter for blood transfusion     Enlarged liver     Essential hypertension 2015    Hep C w/o coma, chronic     treated with Harvoni    Hiatal hernia     patient states hernia has resolved    Hx of bladder infections     finished cipro 16    S/P CABG (coronary artery bypass graft) 2016    LIMA to LAD, SVG to OM, SVG to diagonal, SVG to right PDA, Maze, left atrial appendage resection ()    Spleen enlarged     Thrombocytopenia 2016    Tobacco abuse 2015     Past Surgical History:   Procedure Laterality Date    CARDIAC SURGERY  2016    4 vessel CABG    CERVICAL FUSION  2004    COLONOSCOPY  2018    [facility]    JOINT REPLACEMENT  TOTAL RIGHT KNEE     OPEN ANTERIOR SHOULDER RECONSTRUCTION Left     RADIOFREQUENCY ABLATION N/A 2019    Procedure: Radiofrequency Ablation;  Surgeon: Anayeli Surgeon;  Location: [facility];  Service: Anesthesiology;  Laterality: N/A;  Room 173/[facility]    TIBIA FRACTURE SURGERY Right      Social History     Tobacco Use    Smoking status: Former     Packs/day: 0.25     Years: 30.00     Pack years: 7.50     Types: Cigarettes     Quit date: 2016     Years since quittin.0    Smokeless tobacco: Never   Substance Use Topics    Alcohol use: No     Alcohol/week: 0.0 standard drinks     Comment: last drink 2015    Drug use: No         Objective      Vitals:    23 1312   BP: (!) 147/80   Pulse: 85       LAST EKG  Results for orders placed or performed in visit on 06/15/22   IN OFFICE EKG 12-LEAD (to Nicolaus)    Collection Time: 06/15/22 10:19 AM    Narrative    Test Reason : I10,Z95.1,I25.10,I48.11,    Vent. Rate : 091 BPM     Atrial Rate : 163 BPM     P-R Int : 000 ms          QRS Dur : 088 ms      QT Int : 382 ms       P-R-T Axes : 000 009 031 degrees     QTc Int : 469 ms    Atrial fibrillation with premature ventricular or aberrantly conducted  complexes  Abnormal ECG  When compared with ECG  of 25-AUG-2004 14:04,  Atrial fibrillation has replaced Sinus rhythm  Confirmed by LISBET EMERSON MD ([id number]) on 6/16/2022 7:58:57 AM    Referred By: [email or web address]   SELF           Confirmed By:LISBET EMERSON MD     LIPIDS - LAST 2   Lab Results   Component Value Date    CHOL 192 04/08/2021    CHOL 154 03/13/2018    HDL 59 04/08/2021    HDL 50 03/13/2018    LDLCALC 106.8 04/08/2021    LDLCALC 66.0 03/13/2018    TRIG 131 04/08/2021    TRIG 190 (H) 03/13/2018    CHOLHDL 30.7 04/08/2021    CHOLHDL 32.5 03/13/2018     CARDIAC PROFILE - LAST 2  No results found for: BNP, CPK, CPKMB, LDH, TROPONINI   CBC - LAST 2  Lab Results   Component Value Date    WBC 5.29 04/08/2021    WBC 4.13 10/26/2020    RBC 4.80 04/08/2021    RBC 4.58 (L) 10/26/2020    HGB 15.4 04/08/2021    HGB 14.8 10/26/2020    HCT 45.9 04/08/2021    HCT 43.7 10/26/2020    PLT 79 (L) 04/08/2021    PLT 76 (L) 10/26/2020     Lab Results   Component Value Date    LABPT 20.5 (H) 11/16/2021    LABPT 20.9 (H) 09/23/2021    INR 2.1 (A) 04/26/2023    INR 1.6 (A) 04/05/2023    APTT 42.6 (H) 09/26/2019    APTT 47.8 (H) 04/20/2016     CHEMISTRY - LAST 2  Lab Results   Component Value Date     12/07/2021     10/01/2021    K 3.8 12/07/2021    K 3.9 10/01/2021     12/07/2021     10/01/2021    CO2 26 12/07/2021    CO2 26 10/01/2021    ANIONGAP 11 12/07/2021    ANIONGAP 9 10/01/2021    BUN 15 12/07/2021    BUN 12 10/01/2021    CREATININE 0.65 12/07/2021    CREATININE 0.68 10/01/2021    GLU 99 12/07/2021    GLU 95 10/01/2021    CALCIUM 9.7 12/07/2021    CALCIUM 9.1 10/01/2021    PH 7.39 05/04/2016    PH 7.37 05/04/2016    MG 1.7 06/16/2016    MG 1.7 05/04/2016    ALBUMIN 4.3 10/01/2021    ALBUMIN 4.4 04/08/2021    PROT 7.6 10/01/2021    PROT 7.6 04/08/2021    ALKPHOS 105 10/01/2021    ALKPHOS 88 04/08/2021    ALT 27 10/01/2021    ALT 27 04/08/2021    AST 45 10/01/2021    AST 44 04/08/2021    BILITOT 1.3 10/01/2021    BILITOT 1.8 (H) 04/08/2021       ENDOCRINE - LAST 2  Lab Results   Component Value Date    HGBA1C 5.3 08/15/2016    TSH 1.330 03/13/2018    TSH 2.400 07/09/2015        PHYSICAL EXAM  CONSTITUTIONAL: Well built, well nourished in no apparent distress  NECK: no carotid bruit, no JVD  LUNGS: CTA  CHEST WALL: no tenderness  HEART: regular rate and rhythm, S1, S2 normal, no murmur, click, rub or gallop   ABDOMEN: soft, non-tender; bowel sounds normal; no masses,  no organomegaly  EXTREMITIES: Extremities normal, no edema, no calf tenderness noted  NEURO: AAO X 3    I HAVE REVIEWED :    The vital signs, most recent cardiac testing, and most recent pertinent non-cardiology provider notes.    Current Outpatient Medications   Medication Instructions    amLODIPine (NORVASC) 5 mg, Oral, Daily    amoxicillin (AMOXIL) 500 MG capsule amoxicillin 500 mg capsule   TAKE 1 CAPSULE BY MOUTH THREE TIMES DAILY UNTIL GONE    aspirin (ECOTRIN) 81 mg, Oral, Daily, Use prior to arrival for cardioversion    clotrimazole-betamethasone 1-0.05% (LOTRISONE) cream APPLY TO AFFECTED AREA TWICE A DAY    famotidine (PEPCID) 20 mg, Oral, Nightly    furosemide (LASIX) 20 MG tablet TAKE 1 TABLET BY MOUTH EVERY DAY    metoprolol succinate (TOPROL-XL) 50 mg, Oral, Daily    mometasone 0.1% (ELOCON) 0.1 % cream Topical (Top), Daily    mupirocin calcium 2% (BACTROBAN) 2 % cream APPLY TO AFFECTED AREA 3 TIMES A DAY    POLYETHYLENE GLYCOL 3350 (MIRALAX ORAL) 17 g, Oral, Daily PRN    pramoxine-hydrocortisone (PROCTOCREAM-HC) 1-1 % rectal cream Rectal, 3 times daily    warfarin (COUMADIN) 2 MG tablet TAKE 1MG-2MG DAILY AS DIRECTED    warfarin (COUMADIN) 1 mg, Oral, Daily      Assessment & Plan     Essential hypertension  BP ok   Continue amlodipine 5 mg in the AM   Continue lasix 20 mg daily   Low Na diet     Longstanding persistent atrial fibrillation  Remains in a fib today   Continue toprol 50 mg daily   Continue coumadin clinic care     S/P CABG x 4  No anginal equivalents noted    Continue asa     Right ankle swelling  Will obtain JAMMIE as external report unclear   Likely due to only injury that required plates and screws, but concerning his foot is turning blue from time to time   Compression socks   Conservative PRN lasix use           Follow up in about 6 months (around 11/19/2023).     [facility]   Office: [phone]

## 2023-05-19 NOTE — TELEPHONE ENCOUNTER
Per Pt his foot turned black and blue a month ago. He stated he saw his cardiologist and he has a small blockage. Pt states his foot is normal color now. Pt calling to get in today to be seen.   Informed pt Dr Stuart is not in today. Also to call his cardiologist if his foot becomes discolored again.

## 2023-05-19 NOTE — ASSESSMENT & PLAN NOTE
Will obtain JAMMIE as external report unclear   Likely due to only injury that required plates and screws, but concerning his foot is turning blue from time to time   Compression socks   Conservative PRN lasix use

## 2023-05-19 NOTE — TELEPHONE ENCOUNTER
----- Message from Leonardo Nath sent at 5/19/2023  8:17 AM CDT -----  Regarding: Curtis Cuellar pt , foot is turning blue, call pt   Contact: pt   Curtis Cuellar pt , foot is turning blue, call pt

## 2023-05-24 DIAGNOSIS — M79.671 RIGHT FOOT PAIN: Primary | ICD-10-CM

## 2023-05-29 ENCOUNTER — OFFICE VISIT (OUTPATIENT)
Dept: UROLOGY | Facility: CLINIC | Age: 68
End: 2023-05-29
Payer: MEDICARE

## 2023-05-29 VITALS — WEIGHT: 220 LBS | HEIGHT: 69 IN | BODY MASS INDEX: 32.58 KG/M2

## 2023-05-29 DIAGNOSIS — N40.1 BENIGN PROSTATIC HYPERPLASIA WITH NOCTURIA: Primary | ICD-10-CM

## 2023-05-29 DIAGNOSIS — N41.0 ACUTE PROSTATITIS: ICD-10-CM

## 2023-05-29 DIAGNOSIS — R35.1 BENIGN PROSTATIC HYPERPLASIA WITH NOCTURIA: Primary | ICD-10-CM

## 2023-05-29 DIAGNOSIS — Z12.5 PROSTATE CANCER SCREENING: ICD-10-CM

## 2023-05-29 LAB
BILIRUBIN, UA POC OHS: NEGATIVE
BLOOD, UA POC OHS: NEGATIVE
CLARITY, UA POC OHS: CLEAR
COLOR, UA POC OHS: YELLOW
GLUCOSE, UA POC OHS: NEGATIVE
KETONES, UA POC OHS: NEGATIVE
LEUKOCYTES, UA POC OHS: NEGATIVE
NITRITE, UA POC OHS: NEGATIVE
PH, UA POC OHS: 6
PROTEIN, UA POC OHS: NEGATIVE
SPECIFIC GRAVITY, UA POC OHS: 1.01
UROBILINOGEN, UA POC OHS: 0.2

## 2023-05-29 PROCEDURE — 1101F PR PT FALLS ASSESS DOC 0-1 FALLS W/OUT INJ PAST YR: ICD-10-PCS | Mod: CPTII,S$GLB,,

## 2023-05-29 PROCEDURE — 3288F FALL RISK ASSESSMENT DOCD: CPT | Mod: CPTII,S$GLB,,

## 2023-05-29 PROCEDURE — 1159F PR MEDICATION LIST DOCUMENTED IN MEDICAL RECORD: ICD-10-PCS | Mod: CPTII,S$GLB,,

## 2023-05-29 PROCEDURE — 1101F PT FALLS ASSESS-DOCD LE1/YR: CPT | Mod: CPTII,S$GLB,,

## 2023-05-29 PROCEDURE — 1160F PR REVIEW ALL MEDS BY PRESCRIBER/CLIN PHARMACIST DOCUMENTED: ICD-10-PCS | Mod: CPTII,S$GLB,,

## 2023-05-29 PROCEDURE — 1126F PR PAIN SEVERITY QUANTIFIED, NO PAIN PRESENT: ICD-10-PCS | Mod: CPTII,S$GLB,,

## 2023-05-29 PROCEDURE — 1160F RVW MEDS BY RX/DR IN RCRD: CPT | Mod: CPTII,S$GLB,,

## 2023-05-29 PROCEDURE — 1159F MED LIST DOCD IN RCRD: CPT | Mod: CPTII,S$GLB,,

## 2023-05-29 PROCEDURE — 81003 URINALYSIS AUTO W/O SCOPE: CPT | Mod: QW,S$GLB,,

## 2023-05-29 PROCEDURE — 3008F BODY MASS INDEX DOCD: CPT | Mod: CPTII,S$GLB,,

## 2023-05-29 PROCEDURE — 99204 OFFICE O/P NEW MOD 45 MIN: CPT | Mod: S$GLB,,,

## 2023-05-29 PROCEDURE — 99999 PR PBB SHADOW E&M-EST. PATIENT-LVL IV: CPT | Mod: PBBFAC,,,

## 2023-05-29 PROCEDURE — 3288F PR FALLS RISK ASSESSMENT DOCUMENTED: ICD-10-PCS | Mod: CPTII,S$GLB,,

## 2023-05-29 PROCEDURE — 81003 POCT URINALYSIS(INSTRUMENT): ICD-10-PCS | Mod: QW,S$GLB,,

## 2023-05-29 PROCEDURE — 99999 PR PBB SHADOW E&M-EST. PATIENT-LVL IV: ICD-10-PCS | Mod: PBBFAC,,,

## 2023-05-29 PROCEDURE — 1126F AMNT PAIN NOTED NONE PRSNT: CPT | Mod: CPTII,S$GLB,,

## 2023-05-29 PROCEDURE — 3008F PR BODY MASS INDEX (BMI) DOCUMENTED: ICD-10-PCS | Mod: CPTII,S$GLB,,

## 2023-05-29 PROCEDURE — 99204 PR OFFICE/OUTPT VISIT, NEW, LEVL IV, 45-59 MIN: ICD-10-PCS | Mod: S$GLB,,,

## 2023-05-29 RX ORDER — TRIAMCINOLONE ACETONIDE 1 MG/G
CREAM TOPICAL 2 TIMES DAILY
Status: ON HOLD | COMMUNITY
Start: 2023-04-17 | End: 2024-02-27 | Stop reason: HOSPADM

## 2023-05-29 RX ORDER — TALC
POWDER (GRAM) TOPICAL
Status: ON HOLD | COMMUNITY
End: 2024-02-27 | Stop reason: HOSPADM

## 2023-05-29 RX ORDER — LACTULOSE 10 G/15ML
20 SOLUTION ORAL; RECTAL
Status: ON HOLD | COMMUNITY
Start: 2023-01-31 | End: 2024-02-27 | Stop reason: HOSPADM

## 2023-05-29 RX ORDER — CETIRIZINE HYDROCHLORIDE 10 MG/1
TABLET ORAL
Status: ON HOLD | COMMUNITY
End: 2024-02-27 | Stop reason: HOSPADM

## 2023-05-29 RX ORDER — FINASTERIDE 5 MG/1
5 TABLET, FILM COATED ORAL DAILY
Qty: 30 TABLET | Refills: 11 | Status: SHIPPED | OUTPATIENT
Start: 2023-05-29 | End: 2024-01-29

## 2023-05-29 RX ORDER — TRIAMCINOLONE ACETONIDE 1 MG/G
CREAM TOPICAL
Status: ON HOLD | COMMUNITY
Start: 2023-04-17 | End: 2024-02-27 | Stop reason: HOSPADM

## 2023-05-29 RX ORDER — DOXYCYCLINE HYCLATE 100 MG
100 TABLET ORAL 2 TIMES DAILY
Status: ON HOLD | COMMUNITY
Start: 2023-05-17 | End: 2024-02-27 | Stop reason: HOSPADM

## 2023-05-29 RX ORDER — ASCORBIC ACID 500 MG
TABLET ORAL
Status: ON HOLD | COMMUNITY
End: 2024-02-27 | Stop reason: HOSPADM

## 2023-05-29 RX ORDER — DOXAZOSIN 1 MG/1
TABLET ORAL
Status: ON HOLD | COMMUNITY
Start: 2023-02-27 | End: 2024-02-27 | Stop reason: HOSPADM

## 2023-05-29 RX ORDER — UREA 10 %
LOTION (ML) TOPICAL
Status: ON HOLD | COMMUNITY
End: 2024-02-27 | Stop reason: HOSPADM

## 2023-05-29 RX ORDER — CIPROFLOXACIN 500 MG/1
500 TABLET ORAL 2 TIMES DAILY
Qty: 42 TABLET | Refills: 1 | Status: SHIPPED | OUTPATIENT
Start: 2023-05-29 | End: 2023-07-10

## 2023-05-29 NOTE — PROGRESS NOTES
[facility] Note  Staff: GENA Mack    PCP: MD Shara    Chief Complaint: BPH with LUTS    Subjective:        HPI: Hudson Tavares is a 68 y.o. male NEW PATIENT presents today for evaluation of BPH with LUTS. He states he has seen urology in the past for BPH and prostatitis. He is currently taking a 10 day course of doxycycline. He states he has dysuria. Last week he was experiencing gross hematuria but states this has since resolved. He states he has taken flomax and similar medications in the past but suffered from lightheadedness and dizziness.He is unsure if he has taken finasteride and he is interested in taking this. He denies a history of kidney stones.He denies a family history of prostate cancer.His most recent PSA from 4/8/2021 was <0.06. He is currently not taking any bladder or prostate medications.     Questions asked the pt during ov today:  Urgency: No, urge incontinence? No  NTF: 3x night  Dysuria: Yes   Gross Hematuria:No  Straining:No, Hesistancy:No, Intermittency:No, Weak stream:No    Last PSA Screening:   Lab Results   Component Value Date    PSA <0.06 04/08/2021    PSA 0.22 01/24/2020    PSA 0.2 07/14/2006       History of Kidney Stones?:  No    Constipation issues?:  No    REVIEW OF SYSTEMS:  Review of Systems   Constitutional: Negative.  Negative for chills and fever.   HENT: Negative.     Eyes: Negative.    Respiratory: Negative.     Cardiovascular: Negative.    Gastrointestinal: Negative.  Negative for abdominal pain, constipation, diarrhea, nausea and vomiting.   Genitourinary:  Positive for dysuria. Negative for flank pain, frequency, hematuria and urgency.   Musculoskeletal: Negative.  Negative for back pain.   Skin: Negative.    Neurological: Negative.    Endo/Heme/Allergies: Negative.    Psychiatric/Behavioral: Negative.       PMHx:  Past Medical History:   Diagnosis Date    Adenomatous colon polyp     Alcohol abuse 7/9/2015    Anticoagulant  long-term use     coumadin    Atrial fibrillation 7/9/2015    CAD (coronary artery disease), native coronary artery 5/3/2016    Degenerative joint disease 7/9/2015    Encounter for blood transfusion     Enlarged liver     Essential hypertension 7/9/2015    Hep C w/o coma, chronic     treated with Harvoni    Hiatal hernia     patient states hernia has resolved    Hx of bladder infections     finished cipro 4/29/16    S/P CABG (coronary artery bypass graft) 5/17/2016    LIMA to LAD, SVG to OM, SVG to diagonal, SVG to right PDA, Maze, left atrial appendage resection (5/16)    Spleen enlarged     Thrombocytopenia 5/5/2016    Tobacco abuse 7/9/2015       PSHx:  Past Surgical History:   Procedure Laterality Date    CARDIAC SURGERY  05/04/2016    4 vessel CABG    CERVICAL FUSION  2004    COLONOSCOPY  12/03/2018    [facility]    JOINT REPLACEMENT  TOTAL RIGHT KNEE     OPEN ANTERIOR SHOULDER RECONSTRUCTION Left     RADIOFREQUENCY ABLATION N/A 9/24/2019    Procedure: Radiofrequency Ablation;  Surgeon: Anayeli Surgeon;  Location: [facility];  Service: Anesthesiology;  Laterality: N/A;  Room 173/[facility]    TIBIA FRACTURE SURGERY Right 1971       Fam Hx:   malignancies: No    kidney stones: Yes - father     Soc Hx:  Lives in [place], [place]    Allergies:  Cilostazol, Stadol [butorphanol tartrate], Keflex [cephalexin], Morphine, and Sulfa (sulfonamide antibiotics)    Medications: reviewed     Objective:   There were no vitals filed for this visit.    Physical Exam  Constitutional:       Appearance: Normal appearance.   HENT:      Head: Normocephalic.      Mouth/Throat:      Mouth: Mucous membranes are moist.   Eyes:      Conjunctiva/sclera: Conjunctivae normal.   Pulmonary:      Effort: Pulmonary effort is normal.   Abdominal:      General: There is no distension.      Palpations: Abdomen is soft.      Tenderness: There is no abdominal tenderness. There is no right CVA tenderness or left CVA tenderness.   Musculoskeletal:          General: Normal range of motion.      Cervical back: Normal range of motion.   Skin:     General: Skin is warm.   Neurological:      Mental Status: He is alert and oriented to person, place, and time.   Psychiatric:         Mood and Affect: Mood normal.         Behavior: Behavior normal.       LABS REVIEW:  UA today:  Color:Clear, Yellow  Spec. Grav.  1.010  PH  6.0  Negative for leukocytes, nitrates, protein, glucose, ketones, urobili, bili, and blood.    Assessment:       1. Benign prostatic hyperplasia with nocturia    2. Acute prostatitis    3. Prostate cancer screening          Plan:     Finasteride 5mg prescribed to pt today as trial to see if med improves pt's current LUTS.  Benefits, risks and side affects were thoroughly explained to pt today in office with all questions answered.  Cipro 500mg BID x 21 days prescribed    F/u 6 months    [facility]: Pending    Lashawn Tapia, LISAP-C

## 2023-05-30 ENCOUNTER — HOSPITAL ENCOUNTER (OUTPATIENT)
Dept: RADIOLOGY | Facility: HOSPITAL | Age: 68
Discharge: HOME | End: 2023-05-30
Attending: ORTHOPAEDIC SURGERY
Payer: MEDICARE

## 2023-05-30 ENCOUNTER — TELEPHONE (OUTPATIENT)
Dept: CARDIOLOGY | Facility: CLINIC | Age: 68
End: 2023-05-30
Payer: MEDICARE

## 2023-05-30 ENCOUNTER — CLINICAL SUPPORT (OUTPATIENT)
Dept: CARDIOLOGY | Facility: HOSPITAL | Age: 68
End: 2023-05-30
Payer: MEDICARE

## 2023-05-30 ENCOUNTER — OFFICE VISIT (OUTPATIENT)
Dept: ORTHOPEDICS | Facility: CLINIC | Age: 68
End: 2023-05-30
Payer: MEDICARE

## 2023-05-30 VITALS — HEIGHT: 69 IN | BODY MASS INDEX: 32.58 KG/M2 | WEIGHT: 220 LBS

## 2023-05-30 DIAGNOSIS — M79.671 RIGHT FOOT PAIN: ICD-10-CM

## 2023-05-30 DIAGNOSIS — M19.071 LOCALIZED OSTEOARTHRITIS OF RIGHT ANKLE: Primary | ICD-10-CM

## 2023-05-30 DIAGNOSIS — M79.89 LOCALIZED SWELLING OF LOWER EXTREMITY: ICD-10-CM

## 2023-05-30 LAB
LEFT ABI: 1.14
LEFT ARM BP: 162 MMHG
LEFT DORSALIS PEDIS: 184 MMHG
LEFT POSTERIOR TIBIAL: 168 MMHG
RIGHT ABI: 1.09
RIGHT ARM BP: 160 MMHG
RIGHT DORSALIS PEDIS: 176 MMHG
RIGHT POSTERIOR TIBIAL: 150 MMHG

## 2023-05-30 PROCEDURE — 1159F MED LIST DOCD IN RCRD: CPT | Mod: CPTII,S$GLB,, | Performed by: ORTHOPAEDIC SURGERY

## 2023-05-30 PROCEDURE — 3008F BODY MASS INDEX DOCD: CPT | Mod: CPTII,S$GLB,, | Performed by: ORTHOPAEDIC SURGERY

## 2023-05-30 PROCEDURE — 73630 XR FOOT COMPLETE 3 VIEW RIGHT: ICD-10-PCS | Mod: 26,RT,, | Performed by: RADIOLOGY

## 2023-05-30 PROCEDURE — 73610 X-RAY EXAM OF ANKLE: CPT | Mod: TC,PO,RT

## 2023-05-30 PROCEDURE — 99999 PR PBB SHADOW E&M-EST. PATIENT-LVL III: CPT | Mod: PBBFAC,,, | Performed by: ORTHOPAEDIC SURGERY

## 2023-05-30 PROCEDURE — 1101F PR PT FALLS ASSESS DOC 0-1 FALLS W/OUT INJ PAST YR: ICD-10-PCS | Mod: CPTII,S$GLB,, | Performed by: ORTHOPAEDIC SURGERY

## 2023-05-30 PROCEDURE — 1126F AMNT PAIN NOTED NONE PRSNT: CPT | Mod: CPTII,S$GLB,, | Performed by: ORTHOPAEDIC SURGERY

## 2023-05-30 PROCEDURE — 99214 OFFICE O/P EST MOD 30 MIN: CPT | Mod: 25,S$GLB,, | Performed by: ORTHOPAEDIC SURGERY

## 2023-05-30 PROCEDURE — 99214 PR OFFICE/OUTPT VISIT, EST, LEVL IV, 30-39 MIN: ICD-10-PCS | Mod: 25,S$GLB,, | Performed by: ORTHOPAEDIC SURGERY

## 2023-05-30 PROCEDURE — 1101F PT FALLS ASSESS-DOCD LE1/YR: CPT | Mod: CPTII,S$GLB,, | Performed by: ORTHOPAEDIC SURGERY

## 2023-05-30 PROCEDURE — 3288F FALL RISK ASSESSMENT DOCD: CPT | Mod: CPTII,S$GLB,, | Performed by: ORTHOPAEDIC SURGERY

## 2023-05-30 PROCEDURE — 20605 INTERMEDIATE JOINT ASPIRATION/INJECTION: R ANKLE: ICD-10-PCS | Mod: RT,S$GLB,, | Performed by: ORTHOPAEDIC SURGERY

## 2023-05-30 PROCEDURE — 93922 UPR/L XTREMITY ART 2 LEVELS: CPT | Mod: 26,,, | Performed by: INTERNAL MEDICINE

## 2023-05-30 PROCEDURE — 73610 XR ANKLE COMPLETE 3 VIEW RIGHT: ICD-10-PCS | Mod: 26,RT,, | Performed by: RADIOLOGY

## 2023-05-30 PROCEDURE — 73630 X-RAY EXAM OF FOOT: CPT | Mod: TC,PO,RT

## 2023-05-30 PROCEDURE — 93922 ANKLE BRACHIAL INDICES (ABI): ICD-10-PCS | Mod: 26,,, | Performed by: INTERNAL MEDICINE

## 2023-05-30 PROCEDURE — 3288F PR FALLS RISK ASSESSMENT DOCUMENTED: ICD-10-PCS | Mod: CPTII,S$GLB,, | Performed by: ORTHOPAEDIC SURGERY

## 2023-05-30 PROCEDURE — 1159F PR MEDICATION LIST DOCUMENTED IN MEDICAL RECORD: ICD-10-PCS | Mod: CPTII,S$GLB,, | Performed by: ORTHOPAEDIC SURGERY

## 2023-05-30 PROCEDURE — 93922 UPR/L XTREMITY ART 2 LEVELS: CPT | Mod: PO

## 2023-05-30 PROCEDURE — 3008F PR BODY MASS INDEX (BMI) DOCUMENTED: ICD-10-PCS | Mod: CPTII,S$GLB,, | Performed by: ORTHOPAEDIC SURGERY

## 2023-05-30 PROCEDURE — 1160F RVW MEDS BY RX/DR IN RCRD: CPT | Mod: CPTII,S$GLB,, | Performed by: ORTHOPAEDIC SURGERY

## 2023-05-30 PROCEDURE — 20605 DRAIN/INJ JOINT/BURSA W/O US: CPT | Mod: RT,S$GLB,, | Performed by: ORTHOPAEDIC SURGERY

## 2023-05-30 PROCEDURE — 1126F PR PAIN SEVERITY QUANTIFIED, NO PAIN PRESENT: ICD-10-PCS | Mod: CPTII,S$GLB,, | Performed by: ORTHOPAEDIC SURGERY

## 2023-05-30 PROCEDURE — 99999 PR PBB SHADOW E&M-EST. PATIENT-LVL III: ICD-10-PCS | Mod: PBBFAC,,, | Performed by: ORTHOPAEDIC SURGERY

## 2023-05-30 PROCEDURE — 1160F PR REVIEW ALL MEDS BY PRESCRIBER/CLIN PHARMACIST DOCUMENTED: ICD-10-PCS | Mod: CPTII,S$GLB,, | Performed by: ORTHOPAEDIC SURGERY

## 2023-05-30 PROCEDURE — 73610 X-RAY EXAM OF ANKLE: CPT | Mod: 26,RT,, | Performed by: RADIOLOGY

## 2023-05-30 PROCEDURE — 73630 X-RAY EXAM OF FOOT: CPT | Mod: 26,RT,, | Performed by: RADIOLOGY

## 2023-05-30 RX ADMIN — TRIAMCINOLONE ACETONIDE 40 MG: 40 INJECTION, SUSPENSION INTRA-ARTICULAR; INTRAMUSCULAR at 09:05

## 2023-05-30 NOTE — PROGRESS NOTES
Status/Diagnosis: Right distal tib-fib malunion; Right ankle osteoarthritis.  Date of Surgery: none  Date of Injury: 1971  Return visit: PRN  X-rays on Return: pending patient complaint    Chief Complaint:   Chief Complaint   Patient presents with    Right Foot - Injury, Pain     Previous Crush injury - 1971     Present History:  Hudson Tavares is a 68 y.o. male who presents today for new patient evaluation.  Of note, patient sustained a severe right lower leg crush injury after a bull landed on the affected extremity.  This was treated nonoperatively.  Healed uneventfully.  Documented malunion navigation has done well over the last 50 years.  Most recently seen by Dr. Edmond in November 2021.  Underwent right ankle intra-articular corticosteroid injection at that time.  Patient endorses significant symptomatic relief for 6+ months.  Currently with several day history of new onset calf swelling and tenderness to palpation.  Reports he does have an appointment with his cardiologist checked for DVT today.  Denies any numbness or tingling.  No recent history of injury or other inciting event.  Minimal pain at rest, increased with weight-bearing.  Past medical history significant for atrial fibrillation on Coumadin; hypertension; hepatitis-C; and CAD status post CABG.      Past Medical History:   Diagnosis Date    Adenomatous colon polyp     Alcohol abuse 7/9/2015    Anticoagulant long-term use     coumadin    Atrial fibrillation 7/9/2015    CAD (coronary artery disease), native coronary artery 5/3/2016    Degenerative joint disease 7/9/2015    Encounter for blood transfusion     Enlarged liver     Essential hypertension 7/9/2015    Hep C w/o coma, chronic     treated with Shaggy    Hiatal hernia     patient states hernia has resolved    Hx of bladder infections     finished cipro 4/29/16    S/P CABG (coronary artery bypass graft) 5/17/2016    LIMA to LAD, SVG to OM, SVG to diagonal, SVG to right PDA, Maze, left  atrial appendage resection (5/16)    Spleen enlarged     Thrombocytopenia 5/5/2016    Tobacco abuse 7/9/2015       Past Surgical History:   Procedure Laterality Date    CARDIAC SURGERY  05/04/2016    4 vessel CABG    CERVICAL FUSION  2004    COLONOSCOPY  12/03/2018    [facility]    JOINT REPLACEMENT  TOTAL RIGHT KNEE     OPEN ANTERIOR SHOULDER RECONSTRUCTION Left     RADIOFREQUENCY ABLATION N/A 9/24/2019    Procedure: Radiofrequency Ablation;  Surgeon: Anayeli Surgeon;  Location: [facility];  Service: Anesthesiology;  Laterality: N/A;  Room 173/[facility]    TIBIA FRACTURE SURGERY Right 1971       Current Outpatient Medications   Medication Sig    amLODIPine (NORVASC) 5 MG tablet Take 5 mg by mouth Daily.    amoxicillin (AMOXIL) 500 MG capsule amoxicillin 500 mg capsule   TAKE 1 CAPSULE BY MOUTH THREE TIMES DAILY UNTIL GONE    ascorbic acid, vitamin C, (VITAMIN C) 500 MG tablet Vitamin C 500 mg tablet   Take 1 tablet twice a day by oral route for 14 days.    aspirin (ECOTRIN) 81 MG EC tablet Take 81 mg by mouth once daily. Use prior to arrival for cardioversion    brompheniramin-phenylephrin-DM 1-2.5-5 mg/5 mL Soln 15 mLs.    cetirizine (ZYRTEC) 10 MG tablet Zyrtec 10 mg tablet   Take 1 tablet every day by oral route for 10 days.    cholecalciferol, vitamin D3, 100 mcg (4,000 unit) Cap capsule Vitamin D3 100 mcg (4,000 unit) capsule   Take 1 capsule every day by oral route for 7 days.    ciprofloxacin HCl (CIPRO) 500 MG tablet Take 1 tablet (500 mg total) by mouth 2 (two) times daily.    clotrimazole-betamethasone 1-0.05% (LOTRISONE) cream APPLY TO AFFECTED AREA TWICE A DAY    doxazosin (CARDURA) 1 MG tablet     doxycycline (VIBRA-TABS) 100 MG tablet Take 100 mg by mouth 2 (two) times daily.    famotidine (PEPCID) 20 MG tablet Take 20 mg by mouth nightly.     finasteride (PROSCAR) 5 mg tablet Take 1 tablet (5 mg total) by mouth once daily.    furosemide (LASIX) 20 MG tablet TAKE 1 TABLET BY MOUTH EVERY DAY    lactulose  "(CHRONULAC) 10 gram/15 mL solution Take 20 g by mouth.    melatonin (MELATIN) 3 mg tablet melatonin 3 mg tablet   Take 1 tablet every day by oral route at bedtime for 14 days.    metoprolol succinate (TOPROL-XL) 50 MG 24 hr tablet Take 1 tablet (50 mg total) by mouth once daily.    mometasone 0.1% (ELOCON) 0.1 % cream Apply topically once daily.    mupirocin calcium 2% (BACTROBAN) 2 % cream APPLY TO AFFECTED AREA 3 TIMES A DAY    POLYETHYLENE GLYCOL 3350 (MIRALAX ORAL) Take 17 g by mouth daily as needed (constipation).     pramoxine-hydrocortisone (PROCTOCREAM-HC) 1-1 % rectal cream Place rectally 3 (three) times daily.    triamcinolone acetonide 0.1% (KENALOG) 0.1 % cream Apply topically 2 (two) times daily.    triamcinolone acetonide 0.1% (KENALOG) 0.1 % cream     warfarin (COUMADIN) 1 MG tablet Take 1 tablet (1 mg total) by mouth Daily.    warfarin (COUMADIN) 2 MG tablet TAKE 1MG-2MG DAILY AS DIRECTED    zinc sulfate 50 mg zinc (220 mg) Tab tablet zinc sulfate 50 mg zinc (220 mg) tablet   Take 1 tablet every day by oral route.     No current facility-administered medications for this visit.       Review of patient's allergies indicates:   Allergen Reactions    Cilostazol Other (See Comments)     Colon bleeding    Stadol [butorphanol tartrate] Hallucinations     "I saw pink elephants"    Keflex [cephalexin] Other (See Comments)     Was using 500mg TID oral and had a strong metallic taste in his mouth and could not function.  Can use IV cephalosporin.    Morphine Other (See Comments)     "makes him drunk", Altered Mental Status    Sulfa (sulfonamide antibiotics) Other (See Comments), Hives and Nausea And Vomiting     Since childhood; does not know reaction  Since childhood; does not know reaction       Family History   Problem Relation Age of Onset    Heart disease Mother         congestive heart failure    Diabetes Mother     Hypertension Mother     Heart disease Sister         heart murmur    Atrial fibrillation " Sister     Cancer Sister         breast    Cancer Father 60        colon    Chronic back pain Brother     Atrial fibrillation Sister     Heart disease Sister     Collagen disease Neg Hx        Social History     Socioeconomic History    Marital status:    Tobacco Use    Smoking status: Former     Packs/day: 0.25     Years: 30.00     Pack years: 7.50     Types: Cigarettes     Quit date: 2016     Years since quittin.0    Smokeless tobacco: Never   Substance and Sexual Activity    Alcohol use: No     Alcohol/week: 0.0 standard drinks     Comment: last drink 2015    Drug use: No    Sexual activity: Yes     Partners: Female       Physical exam:  There were no vitals filed for this visit.  Body mass index is 32.49 kg/m².  General: In no apparent distress; well developed and well nourished.  HEENT: normocephalic; atraumatic.  Cardiovascular: regular rate.  Respiratory: no increased work of breathing.  Musculoskeletal:   Gait:  Mild antalgic.  Inspection:  Not assess leg lengths today.  Patient localizes pain along the anteromedial greater than anterolateral joint line.  Moderate pes planovalgus with associated hindfoot valgus.  No significant forefoot abduction.  Did not attempt single limb heel rise today.    Also with pain localized to the posterior calf musculature.  No significant swelling but moderate to severe tenderness on deep palpation with positive Homans sign.  No pain referable to the foot.  No laxity with anterior drawer testing.  Silfverskiold: negative  Alignment:  Knee: neutral               Ankle: neutral              Hindfoot: valgus              Forefoot: neutral   Strength:              Dorsiflexion 5/5  Plantar flexion 5/5  Inversion 5/5   Eversion 5/5   Sensation:              SILT distally   ROM:              Ankle: Full with pain at extremes              Subtalar: Painless inversion and eversion   Pulses: 2+ DP/PT pulses.                   Imaging Studies/Outside documentation:  I  have ordered/reviewed/interpreted the following images/outside documentation:  1. Weight bearing 3-views of Right foot and ankle:  No acute bony abnormality noted.  Patient with history of distal tibia and fibula shaft fractures, now well healed.  There is moderate valgus and recurvatum deformity with malunion.  Ankle mortise remains congruent with moderate joint space narrowing.  Unable to visualize the proximal tibia to assess mechanical axis.  Calcaneal pitch and talonavicular uncoverage both within normal limits.  Moderate 1st MTP and subtalar joint DJD.  Lateral tilt of the calcaneus relative to the talus involving the posterior facet seen on the ankle mortise view.  Calcific changes involving the posterior tibial artery consistent with peripheral arterial disease.        Assessment:  Hudson Tavares is a 68 y.o. male with Right distal tib-fib malunion; Right ankle osteoarthritis.     Plan:   Clinical and radiographic findings were discussed.  Recommend conservative management to include right ankle intra-articular corticosteroid injection as this provided significant symptomatic relief in the past.  This was performed without complication.  See procedure note for details.  Patient tolerated well.    Reinforced importance of keeping cardiology appointment to rule out DVT of the right lower extremity.  Patient voiced understanding.  All questions were answered.  May repeat injection every 3-6 months as needed. He will call regarding future follow-up.    This note was created using voice recognition software and may contain grammatical errors.

## 2023-05-31 RX ORDER — TRIAMCINOLONE ACETONIDE 40 MG/ML
40 INJECTION, SUSPENSION INTRA-ARTICULAR; INTRAMUSCULAR
Status: DISCONTINUED | OUTPATIENT
Start: 2023-05-30 | End: 2023-05-31 | Stop reason: HOSPADM

## 2023-06-01 NOTE — PROCEDURES
Intermediate Joint Aspiration/Injection: R ankle    Date/Time: 5/30/2023 9:00 AM  Performed by: Vinicio Stuart MD  Authorized by: Vinicio Stuart MD     Consent Done?:  Yes (Verbal)  Indications:  Arthritis  Site marked: The procedure site was marked    Timeout: Prior to procedure the correct patient, procedure, and site was verified      Location:  Ankle  Site:  R ankle  Prep: Patient was prepped and draped in usual sterile fashion    Ultrasonic Guidance for needle placement: No  Needle size:  25 G  Approach:  Anteromedial  Medications:  40 mg triamcinolone acetonide 40 mg/mL  Patient tolerance:  Patient tolerated the procedure well with no immediate complications

## 2023-11-29 ENCOUNTER — OFFICE VISIT (OUTPATIENT)
Dept: UROLOGY | Facility: CLINIC | Age: 68
End: 2023-11-29
Payer: MEDICARE

## 2023-11-29 ENCOUNTER — HOSPITAL ENCOUNTER (OUTPATIENT)
Dept: RADIOLOGY | Facility: HOSPITAL | Age: 68
Discharge: HOME | End: 2023-11-29
Payer: MEDICARE

## 2023-11-29 ENCOUNTER — TELEPHONE (OUTPATIENT)
Dept: UROLOGY | Facility: CLINIC | Age: 68
End: 2023-11-29
Payer: MEDICARE

## 2023-11-29 VITALS — HEIGHT: 69 IN | BODY MASS INDEX: 33.67 KG/M2 | WEIGHT: 227.31 LBS

## 2023-11-29 DIAGNOSIS — N50.812 PAIN IN BOTH TESTICLES: Primary | ICD-10-CM

## 2023-11-29 DIAGNOSIS — N40.1 BENIGN PROSTATIC HYPERPLASIA WITH WEAK URINARY STREAM: ICD-10-CM

## 2023-11-29 DIAGNOSIS — R39.12 BENIGN PROSTATIC HYPERPLASIA WITH WEAK URINARY STREAM: ICD-10-CM

## 2023-11-29 DIAGNOSIS — N50.811 PAIN IN BOTH TESTICLES: Primary | ICD-10-CM

## 2023-11-29 DIAGNOSIS — N50.812 PAIN IN BOTH TESTICLES: ICD-10-CM

## 2023-11-29 DIAGNOSIS — N50.811 PAIN IN BOTH TESTICLES: ICD-10-CM

## 2023-11-29 PROCEDURE — 1126F PR PAIN SEVERITY QUANTIFIED, NO PAIN PRESENT: ICD-10-PCS | Mod: CPTII,S$GLB,,

## 2023-11-29 PROCEDURE — 76870 US EXAM SCROTUM: CPT | Mod: 26,,, | Performed by: RADIOLOGY

## 2023-11-29 PROCEDURE — 1160F PR REVIEW ALL MEDS BY PRESCRIBER/CLIN PHARMACIST DOCUMENTED: ICD-10-PCS | Mod: CPTII,S$GLB,,

## 2023-11-29 PROCEDURE — 3288F PR FALLS RISK ASSESSMENT DOCUMENTED: ICD-10-PCS | Mod: CPTII,S$GLB,,

## 2023-11-29 PROCEDURE — 1159F PR MEDICATION LIST DOCUMENTED IN MEDICAL RECORD: ICD-10-PCS | Mod: CPTII,S$GLB,,

## 2023-11-29 PROCEDURE — 1101F PR PT FALLS ASSESS DOC 0-1 FALLS W/OUT INJ PAST YR: ICD-10-PCS | Mod: CPTII,S$GLB,,

## 2023-11-29 PROCEDURE — 99999 PR PBB SHADOW E&M-EST. PATIENT-LVL II: ICD-10-PCS | Mod: PBBFAC,,,

## 2023-11-29 PROCEDURE — 3008F PR BODY MASS INDEX (BMI) DOCUMENTED: ICD-10-PCS | Mod: CPTII,S$GLB,,

## 2023-11-29 PROCEDURE — 99214 PR OFFICE/OUTPT VISIT, EST, LEVL IV, 30-39 MIN: ICD-10-PCS | Mod: S$GLB,,,

## 2023-11-29 PROCEDURE — 99999 PR PBB SHADOW E&M-EST. PATIENT-LVL II: CPT | Mod: PBBFAC,,,

## 2023-11-29 PROCEDURE — 3008F BODY MASS INDEX DOCD: CPT | Mod: CPTII,S$GLB,,

## 2023-11-29 PROCEDURE — 76870 US SCROTUM AND TESTICLES: ICD-10-PCS | Mod: 26,,, | Performed by: RADIOLOGY

## 2023-11-29 PROCEDURE — 1159F MED LIST DOCD IN RCRD: CPT | Mod: CPTII,S$GLB,,

## 2023-11-29 PROCEDURE — 1101F PT FALLS ASSESS-DOCD LE1/YR: CPT | Mod: CPTII,S$GLB,,

## 2023-11-29 PROCEDURE — 76870 US EXAM SCROTUM: CPT | Mod: TC,PO

## 2023-11-29 PROCEDURE — 99214 OFFICE O/P EST MOD 30 MIN: CPT | Mod: S$GLB,,,

## 2023-11-29 PROCEDURE — 1126F AMNT PAIN NOTED NONE PRSNT: CPT | Mod: CPTII,S$GLB,,

## 2023-11-29 PROCEDURE — 3288F FALL RISK ASSESSMENT DOCD: CPT | Mod: CPTII,S$GLB,,

## 2023-11-29 PROCEDURE — 1160F RVW MEDS BY RX/DR IN RCRD: CPT | Mod: CPTII,S$GLB,,

## 2023-11-29 RX ORDER — DOXYCYCLINE HYCLATE 100 MG
100 TABLET ORAL 2 TIMES DAILY
Qty: 42 TABLET | Refills: 0 | Status: SHIPPED | OUTPATIENT
Start: 2023-11-29 | End: 2023-12-20

## 2023-11-29 RX ORDER — ALBUTEROL SULFATE 90 UG/1
AEROSOL, METERED RESPIRATORY (INHALATION)
COMMUNITY
Start: 2023-10-24

## 2023-11-29 RX ORDER — AZELASTINE 1 MG/ML
SPRAY, METERED NASAL
COMMUNITY
Start: 2023-11-28

## 2023-11-29 RX ORDER — ALPRAZOLAM 0.5 MG/1
TABLET ORAL
COMMUNITY
Start: 2023-09-28

## 2023-11-29 RX ORDER — SPIRONOLACTONE 25 MG/1
25 TABLET ORAL
COMMUNITY
Start: 2023-11-27

## 2023-11-29 NOTE — PROGRESS NOTES
[facility] Note  Staff: GENA Mack    PCP: MD Shara    Chief Complaint: Testicular Pain    Subjective:        HPI: Hudson Tavares is a 68 y.o. male presents today for evaluation of bilateral testicular pain. He is established to our office. He has a history of BPH and prostatitis. He states he has taken flomax and similar medications in the past but suffered from lightheadedness and dizziness. He denies a history of kidney stones.He denies a family history of prostate cancer.His most recent PSA from 11/16/2021 was 0.03. He was to update his PSA at last OV but he has not. He is currently taking finasteride 5mg daily and cardura 1mg daily. He denies hematuria, fever, flank pain, abd pain, incontinence, and difficulty urinating.      Questions asked the pt during ov today:  Urgency: No, urge incontinence? No  NTF: 1-2x night  Dysuria: No  Gross Hematuria:No  Straining:No, Hesistancy:No, Intermittency:No}, Weak stream:No    Last PSA Screening:   Lab Results   Component Value Date    PSA <0.06 04/08/2021    PSA 0.22 01/24/2020    PSA 0.2 07/14/2006       History of Kidney Stones?:  No    Constipation issues?:  No    REVIEW OF SYSTEMS:  Review of Systems   Constitutional: Negative.  Negative for chills and fever.   HENT: Negative.     Eyes: Negative.    Respiratory: Negative.     Cardiovascular: Negative.    Gastrointestinal: Negative.  Negative for abdominal pain, constipation, diarrhea, nausea and vomiting.   Genitourinary: Negative.  Negative for dysuria, flank pain, frequency, hematuria and urgency.   Musculoskeletal: Negative.  Negative for back pain.   Skin: Negative.    Neurological: Negative.    Endo/Heme/Allergies: Negative.    Psychiatric/Behavioral: Negative.         PMHx:  Past Medical History:   Diagnosis Date    Adenomatous colon polyp     Alcohol abuse 7/9/2015    Anticoagulant long-term use     coumadin    Atrial fibrillation 7/9/2015    CAD (coronary artery  disease), native coronary artery 5/3/2016    Degenerative joint disease 7/9/2015    Encounter for blood transfusion     Enlarged liver     Essential hypertension 7/9/2015    Hep C w/o coma, chronic     treated with Harvoni    Hiatal hernia     patient states hernia has resolved    Hx of bladder infections     finished cipro 4/29/16    S/P CABG (coronary artery bypass graft) 5/17/2016    LIMA to LAD, SVG to OM, SVG to diagonal, SVG to right PDA, Maze, left atrial appendage resection (5/16)    Spleen enlarged     Thrombocytopenia 5/5/2016    Tobacco abuse 7/9/2015       PSHx:  Past Surgical History:   Procedure Laterality Date    CARDIAC SURGERY  05/04/2016    4 vessel CABG    CERVICAL FUSION  2004    COLONOSCOPY  12/03/2018    Steve Wells    JOINT REPLACEMENT  TOTAL RIGHT KNEE     OPEN ANTERIOR SHOULDER RECONSTRUCTION Left     RADIOFREQUENCY ABLATION N/A 9/24/2019    Procedure: Radiofrequency Ablation;  Surgeon: Anayeli Surgeon;  Location: [facility];  Service: Anesthesiology;  Laterality: N/A;  Room 173/Sandow    TIBIA FRACTURE SURGERY Right 1971       Fam Hx:   malignancies: No    kidney stones: Yes - father     Soc Hx:  Lives in [place], [place]    Allergies:  Cilostazol, Stadol [butorphanol tartrate], Keflex [cephalexin], Morphine, and Sulfa (sulfonamide antibiotics)    Medications: reviewed     Objective:   There were no vitals filed for this visit.    Physical Exam  Constitutional:       Appearance: Normal appearance.   HENT:      Head: Normocephalic.      Mouth/Throat:      Mouth: Mucous membranes are moist.   Eyes:      Conjunctiva/sclera: Conjunctivae normal.   Pulmonary:      Effort: Pulmonary effort is normal.   Abdominal:      General: There is no distension.      Palpations: Abdomen is soft.      Tenderness: There is no abdominal tenderness. There is no right CVA tenderness or left CVA tenderness.   Musculoskeletal:         General: Normal range of motion.      Cervical back: Normal range of motion.   Skin:      General: Skin is warm.   Neurological:      Mental Status: He is alert and oriented to person, place, and time.   Psychiatric:         Mood and Affect: Mood normal.         Behavior: Behavior normal.          EXAM performed by me in office today:  debby testes tender to palpation  No scrotal rashes, cysts or lesions  Epididymis normal in size, no tenderness  Testes normal and size, equal size bilaterally, no masses  Urethral meatus normal without discharge      Assessment:       1. Pain in both testicles    2. Benign prostatic hyperplasia with weak urinary stream          Plan:     Doxycycline 100mg BID x 21 days prescribed empirically  US Scrotum and Testicles ordered and scheduled  Continue finasteride 5mg and cardura 1mg daily as prescribed    F/u As Needed    [facility]: Pending    GENA Mack

## 2023-11-29 NOTE — TELEPHONE ENCOUNTER
----- Message from Lashawn Tapia NP sent at 11/29/2023  2:50 PM CST -----  No abnormalities seen on ultrasound- continue all abx as prescribed

## 2023-12-26 ENCOUNTER — HOSPITAL ENCOUNTER (OUTPATIENT)
Dept: RADIOLOGY | Facility: HOSPITAL | Age: 68
Discharge: HOME | End: 2023-12-26
Attending: ORTHOPAEDIC SURGERY
Payer: MEDICARE

## 2023-12-26 ENCOUNTER — OFFICE VISIT (OUTPATIENT)
Dept: ORTHOPEDICS | Facility: CLINIC | Age: 68
End: 2023-12-26
Payer: MEDICARE

## 2023-12-26 ENCOUNTER — TELEPHONE (OUTPATIENT)
Dept: CARDIOLOGY | Facility: CLINIC | Age: 68
End: 2023-12-26
Payer: MEDICARE

## 2023-12-26 DIAGNOSIS — I25.10 CORONARY ARTERY DISEASE INVOLVING NATIVE CORONARY ARTERY OF NATIVE HEART WITHOUT ANGINA PECTORIS: Primary | ICD-10-CM

## 2023-12-26 DIAGNOSIS — M19.071 LOCALIZED OSTEOARTHRITIS OF RIGHT ANKLE: Primary | ICD-10-CM

## 2023-12-26 DIAGNOSIS — Z51.81 ENCOUNTER FOR THERAPEUTIC DRUG MONITORING: ICD-10-CM

## 2023-12-26 DIAGNOSIS — M19.071 LOCALIZED OSTEOARTHRITIS OF RIGHT ANKLE: ICD-10-CM

## 2023-12-26 PROCEDURE — 99213 OFFICE O/P EST LOW 20 MIN: CPT | Mod: 25,S$GLB,, | Performed by: ORTHOPAEDIC SURGERY

## 2023-12-26 PROCEDURE — 1101F PT FALLS ASSESS-DOCD LE1/YR: CPT | Mod: CPTII,S$GLB,, | Performed by: ORTHOPAEDIC SURGERY

## 2023-12-26 PROCEDURE — 1160F RVW MEDS BY RX/DR IN RCRD: CPT | Mod: CPTII,S$GLB,, | Performed by: ORTHOPAEDIC SURGERY

## 2023-12-26 PROCEDURE — 20605 INTERMEDIATE JOINT ASPIRATION/INJECTION: R ANKLE: ICD-10-PCS | Mod: RT,S$GLB,, | Performed by: ORTHOPAEDIC SURGERY

## 2023-12-26 PROCEDURE — 99999 PR PBB SHADOW E&M-EST. PATIENT-LVL I: CPT | Mod: PBBFAC,,, | Performed by: ORTHOPAEDIC SURGERY

## 2023-12-26 PROCEDURE — 73610 X-RAY EXAM OF ANKLE: CPT | Mod: TC,PO,RT

## 2023-12-26 PROCEDURE — 3288F PR FALLS RISK ASSESSMENT DOCUMENTED: ICD-10-PCS | Mod: CPTII,S$GLB,, | Performed by: ORTHOPAEDIC SURGERY

## 2023-12-26 PROCEDURE — 1125F AMNT PAIN NOTED PAIN PRSNT: CPT | Mod: CPTII,S$GLB,, | Performed by: ORTHOPAEDIC SURGERY

## 2023-12-26 PROCEDURE — 73630 X-RAY EXAM OF FOOT: CPT | Mod: 26,RT,, | Performed by: RADIOLOGY

## 2023-12-26 PROCEDURE — 20605 DRAIN/INJ JOINT/BURSA W/O US: CPT | Mod: RT,S$GLB,, | Performed by: ORTHOPAEDIC SURGERY

## 2023-12-26 PROCEDURE — 99999 PR PBB SHADOW E&M-EST. PATIENT-LVL I: ICD-10-PCS | Mod: PBBFAC,,, | Performed by: ORTHOPAEDIC SURGERY

## 2023-12-26 PROCEDURE — 99213 PR OFFICE/OUTPT VISIT, EST, LEVL III, 20-29 MIN: ICD-10-PCS | Mod: 25,S$GLB,, | Performed by: ORTHOPAEDIC SURGERY

## 2023-12-26 PROCEDURE — 73610 XR ANKLE COMPLETE 3 VIEW RIGHT: ICD-10-PCS | Mod: 26,RT,, | Performed by: RADIOLOGY

## 2023-12-26 PROCEDURE — 1159F PR MEDICATION LIST DOCUMENTED IN MEDICAL RECORD: ICD-10-PCS | Mod: CPTII,S$GLB,, | Performed by: ORTHOPAEDIC SURGERY

## 2023-12-26 PROCEDURE — 1125F PR PAIN SEVERITY QUANTIFIED, PAIN PRESENT: ICD-10-PCS | Mod: CPTII,S$GLB,, | Performed by: ORTHOPAEDIC SURGERY

## 2023-12-26 PROCEDURE — 1160F PR REVIEW ALL MEDS BY PRESCRIBER/CLIN PHARMACIST DOCUMENTED: ICD-10-PCS | Mod: CPTII,S$GLB,, | Performed by: ORTHOPAEDIC SURGERY

## 2023-12-26 PROCEDURE — 3288F FALL RISK ASSESSMENT DOCD: CPT | Mod: CPTII,S$GLB,, | Performed by: ORTHOPAEDIC SURGERY

## 2023-12-26 PROCEDURE — 1159F MED LIST DOCD IN RCRD: CPT | Mod: CPTII,S$GLB,, | Performed by: ORTHOPAEDIC SURGERY

## 2023-12-26 PROCEDURE — 73610 X-RAY EXAM OF ANKLE: CPT | Mod: 26,RT,, | Performed by: RADIOLOGY

## 2023-12-26 PROCEDURE — 73630 XR FOOT COMPLETE 3 VIEW RIGHT: ICD-10-PCS | Mod: 26,RT,, | Performed by: RADIOLOGY

## 2023-12-26 PROCEDURE — 73630 X-RAY EXAM OF FOOT: CPT | Mod: TC,PO,RT

## 2023-12-26 PROCEDURE — 1101F PR PT FALLS ASSESS DOC 0-1 FALLS W/OUT INJ PAST YR: ICD-10-PCS | Mod: CPTII,S$GLB,, | Performed by: ORTHOPAEDIC SURGERY

## 2023-12-26 RX ORDER — TRIAMCINOLONE ACETONIDE 40 MG/ML
40 INJECTION, SUSPENSION INTRA-ARTICULAR; INTRAMUSCULAR
Status: DISCONTINUED | OUTPATIENT
Start: 2023-12-26 | End: 2023-12-26 | Stop reason: HOSPADM

## 2023-12-26 RX ADMIN — TRIAMCINOLONE ACETONIDE 40 MG: 40 INJECTION, SUSPENSION INTRA-ARTICULAR; INTRAMUSCULAR at 11:12

## 2023-12-26 NOTE — PROGRESS NOTES
Status/Diagnosis: Right distal tib-fib malunion; Right ankle osteoarthritis.  Date of Surgery: none  Date of Injury: 1971  Return visit: PRN  X-rays on Return: pending patient complaint    Chief Complaint:   Chief Complaint   Patient presents with    Right Ankle - Pain     Present History:  Hudson Tavares is a 68 y.o. male who presents today for new patient evaluation.  Of note, patient sustained a severe right lower leg crush injury after a bull landed on the affected extremity.  This was treated nonoperatively.  Healed uneventfully.  Documented malunion navigation has done well over the last 50 years.  Most recently seen by Dr. Edmond in November 2021.  Underwent right ankle intra-articular corticosteroid injection at that time.  Patient endorses significant symptomatic relief for 6+ months.  Currently with several day history of new onset calf swelling and tenderness to palpation.  Reports he does have an appointment with his cardiologist checked for DVT today.  Denies any numbness or tingling.  No recent history of injury or other inciting event.  Minimal pain at rest, increased with weight-bearing.  Past medical history significant for atrial fibrillation on Coumadin; hypertension; hepatitis-C; and CAD status post CABG.    12/26/2023:  Patient returns today for repeat clinical evaluation and possible repeat injection.    Patient last seen in May 2023.  At that time underwent right ankle intra-articular corticosteroid injection.  Patient endorses near-complete symptomatic relief over the last 5-6 months.  The injection only recently began to wear off.  Currently endorses 10/10 pain.  No new injuries.  Does endorse some persistent nerve pain, at baseline.  Patient has tried boot and brace immobilization in the past but with minimal symptomatic relief.    Past Medical History:   Diagnosis Date    Adenomatous colon polyp     Alcohol abuse 7/9/2015    Anticoagulant long-term use     coumadin    Atrial  fibrillation 7/9/2015    CAD (coronary artery disease), native coronary artery 5/3/2016    Degenerative joint disease 7/9/2015    Encounter for blood transfusion     Enlarged liver     Essential hypertension 7/9/2015    Hep C w/o coma, chronic     treated with Harvoni    Hiatal hernia     patient states hernia has resolved    Hx of bladder infections     finished cipro 4/29/16    S/P CABG (coronary artery bypass graft) 5/17/2016    LIMA to LAD, SVG to OM, SVG to diagonal, SVG to right PDA, Maze, left atrial appendage resection (5/16)    Spleen enlarged     Thrombocytopenia 5/5/2016    Tobacco abuse 7/9/2015       Past Surgical History:   Procedure Laterality Date    CARDIAC SURGERY  05/04/2016    4 vessel CABG    CERVICAL FUSION  2004    COLONOSCOPY  12/03/2018    Steve Wells    JOINT REPLACEMENT  TOTAL RIGHT KNEE     OPEN ANTERIOR SHOULDER RECONSTRUCTION Left     RADIOFREQUENCY ABLATION N/A 9/24/2019    Procedure: Radiofrequency Ablation;  Surgeon: Anayeli Surgeon;  Location: [facility];  Service: Anesthesiology;  Laterality: N/A;  Room 173/Sandow    TIBIA FRACTURE SURGERY Right 1971       Current Outpatient Medications   Medication Sig    albuterol (PROVENTIL/VENTOLIN HFA) 90 mcg/actuation inhaler     ALPRAZolam (XANAX) 0.5 MG tablet     amLODIPine (NORVASC) 5 MG tablet Take 5 mg by mouth Daily.    amoxicillin (AMOXIL) 500 MG capsule amoxicillin 500 mg capsule   TAKE 1 CAPSULE BY MOUTH THREE TIMES DAILY UNTIL GONE    ascorbic acid, vitamin C, (VITAMIN C) 500 MG tablet Vitamin C 500 mg tablet   Take 1 tablet twice a day by oral route for 14 days.    aspirin (ECOTRIN) 81 MG EC tablet Take 81 mg by mouth once daily. Use prior to arrival for cardioversion    azelastine (ASTELIN) 137 mcg (0.1 %) nasal spray SMARTSIG:Both Nares    brompheniramin-phenylephrin-DM 1-2.5-5 mg/5 mL Soln 15 mLs.    cetirizine (ZYRTEC) 10 MG tablet Zyrtec 10 mg tablet   Take 1 tablet every day by oral route for 10 days.    cholecalciferol, vitamin  "D3, 100 mcg (4,000 unit) Cap capsule Vitamin D3 100 mcg (4,000 unit) capsule   Take 1 capsule every day by oral route for 7 days.    clotrimazole-betamethasone 1-0.05% (LOTRISONE) cream APPLY TO AFFECTED AREA TWICE A DAY    doxazosin (CARDURA) 1 MG tablet     doxycycline (VIBRA-TABS) 100 MG tablet Take 100 mg by mouth 2 (two) times daily.    famotidine (PEPCID) 20 MG tablet Take 20 mg by mouth nightly.     finasteride (PROSCAR) 5 mg tablet Take 1 tablet (5 mg total) by mouth once daily.    lactulose (CHRONULAC) 10 gram/15 mL solution Take 20 g by mouth.    melatonin (MELATIN) 3 mg tablet melatonin 3 mg tablet   Take 1 tablet every day by oral route at bedtime for 14 days.    metoprolol succinate (TOPROL-XL) 50 MG 24 hr tablet Take 1 tablet (50 mg total) by mouth once daily.    mometasone 0.1% (ELOCON) 0.1 % cream Apply topically once daily.    mupirocin calcium 2% (BACTROBAN) 2 % cream APPLY TO AFFECTED AREA 3 TIMES A DAY    POLYETHYLENE GLYCOL 3350 (MIRALAX ORAL) Take 17 g by mouth daily as needed (constipation).     pramoxine-hydrocortisone (PROCTOCREAM-HC) 1-1 % rectal cream Place rectally 3 (three) times daily.    spironolactone (ALDACTONE) 25 MG tablet Take 25 mg by mouth.    triamcinolone acetonide 0.1% (KENALOG) 0.1 % cream Apply topically 2 (two) times daily.    triamcinolone acetonide 0.1% (KENALOG) 0.1 % cream     warfarin (COUMADIN) 1 MG tablet Take 1 tablet (1 mg total) by mouth Daily.    warfarin (COUMADIN) 2 MG tablet TAKE 1MG-2MG DAILY AS DIRECTED    zinc sulfate 50 mg zinc (220 mg) Tab tablet zinc sulfate 50 mg zinc (220 mg) tablet   Take 1 tablet every day by oral route.     No current facility-administered medications for this visit.       Review of patient's allergies indicates:   Allergen Reactions    Cilostazol Other (See Comments)     Colon bleeding    Stadol [butorphanol tartrate] Hallucinations     "I saw pink elephants"    Keflex [cephalexin] Other (See Comments)     Was using 500mg TID oral " "and had a strong metallic taste in his mouth and could not function.  Can use IV cephalosporin.    Morphine Other (See Comments)     "makes him drunk", Altered Mental Status    Sulfa (sulfonamide antibiotics) Other (See Comments), Hives and Nausea And Vomiting     Since childhood; does not know reaction  Since childhood; does not know reaction       Family History   Problem Relation Age of Onset    Heart disease Mother         congestive heart failure    Diabetes Mother     Hypertension Mother     Heart disease Sister         heart murmur    Atrial fibrillation Sister     Cancer Sister         breast    Cancer Father 60        colon    Chronic back pain Brother     Atrial fibrillation Sister     Heart disease Sister     Collagen disease Neg Hx        Social History     Socioeconomic History    Marital status:    Tobacco Use    Smoking status: Former     Current packs/day: 0.00     Average packs/day: 0.3 packs/day for 30.0 years (7.5 ttl pk-yrs)     Types: Cigarettes     Start date: 1986     Quit date: 2016     Years since quittin.6    Smokeless tobacco: Never   Substance and Sexual Activity    Alcohol use: No     Alcohol/week: 0.0 standard drinks of alcohol     Comment: last drink 2015    Drug use: No    Sexual activity: Yes     Partners: Female       Physical exam:  There were no vitals filed for this visit.  There is no height or weight on file to calculate BMI.  General: In no apparent distress; well developed and well nourished.  HEENT: normocephalic; atraumatic.  Cardiovascular: regular rate.  Respiratory: no increased work of breathing.  Musculoskeletal:   Gait:  Mild antalgic.  Inspection:  Approximately 1.5cm right shorter than left leg length discrepancy.  Patient localizes pain along the anteromedial greater than anterolateral joint line.  Moderate pes planovalgus with associated hindfoot valgus.  No significant forefoot abduction.  Did not attempt single limb heel rise today.    No " longer with posterior calf pain.  No pain referable to the foot.  No laxity with anterior drawer testing.  Silfverskiold: negative  Alignment:  Knee: neutral               Ankle: neutral              Hindfoot: valgus              Forefoot: neutral   Strength:              Dorsiflexion 5/5  Plantar flexion 5/5  Inversion 5/5   Eversion 5/5   Sensation:              SILT distally   ROM:              Ankle: Full with pain at extremes              Subtalar: Painless inversion and eversion   Pulses: 2+ DP/PT pulses.                   Imaging Studies/Outside documentation:  I have ordered/reviewed/interpreted the following images/outside documentation:  1. Weight bearing 3-views of Right foot and ankle:  No acute bony abnormality noted.  As previously noted, patient with history of distal tibia and fibula shaft fractures, now well healed.  There is moderate valgus and recurvatum deformity with malunion.  Ankle mortise remains congruent with moderate joint space narrowing.  Unable to visualize the proximal tibia to assess mechanical axis.  Calcaneal pitch and talonavicular uncoverage both within normal limits.  Moderate 1st MTP and subtalar joint DJD.  Lateral tilt of the calcaneus relative to the talus involving the posterior facet seen on the ankle mortise view.  Calcific changes involving the posterior tibial artery consistent with peripheral arterial disease.        Assessment:  Hudson Tavares is a 68 y.o. male with Right distal tib-fib malunion; Right ankle osteoarthritis.     Plan:   Clinical and radiographic findings were discussed.    Patient reports previous concern for right lower extremity DVT was found to be negative.    We did perform another right ankle intra-articular corticosteroid injection today.  Patient tolerated this well.  See procedure note for details.  As noted in HPI, patient has tried boot and brace immobilization in the past but with little to no symptomatic relief.  We will continue with  conservative management going forward.    Patient voiced understanding.  All questions were answered.  Return to clinic in 4-6 months as needed for possible repeat injection.      This note was created using voice recognition software and may contain grammatical errors.

## 2023-12-26 NOTE — PROCEDURES
Intermediate Joint Aspiration/Injection: R ankle    Date/Time: 12/26/2023 11:15 AM    Performed by: Vinicio Stuart MD  Authorized by: Vinicio Stuart MD    Consent Done?:  Yes (Verbal)  Indications:  Arthritis  Site marked: The procedure site was marked    Timeout: Prior to procedure the correct patient, procedure, and site was verified      Location:  Ankle  Site:  R ankle  Prep: Patient was prepped and draped in usual sterile fashion    Ultrasonic Guidance for needle placement: No  Needle size:  25 G  Approach:  Anteromedial  Medications:  40 mg triamcinolone acetonide 40 mg/mL  Patient tolerance:  Patient tolerated the procedure well with no immediate complications

## 2023-12-26 NOTE — TELEPHONE ENCOUNTER
----- Message from Jonathan Gonzalez MD sent at 12/26/2023 11:38 AM CST -----  CBC, CMP, magnesium, lipid panel.    -Jonathan  ----- Message -----  From: Blu Joy LPN  Sent: 12/26/2023   9:23 AM CST  To: Jonathan Gonzalez MD    Any blood work before his next appt?

## 2024-01-04 ENCOUNTER — TELEPHONE (OUTPATIENT)
Dept: CARDIOLOGY | Facility: CLINIC | Age: 69
End: 2024-01-04
Payer: MEDICARE

## 2024-01-04 NOTE — TELEPHONE ENCOUNTER
----- Message from Maisha Meyers sent at 1/4/2024  8:08 AM CST -----  Regarding: lab order  Contact: pt  Type:  Needs Medical Advice    Who Called: pt  Would the patient rather a call back or a response via [facility]? Call back  Best Call Back Number: [phone]    Additional Information: sts he would like to speak to the nurse regarding his lab order

## 2024-01-16 ENCOUNTER — TELEPHONE (OUTPATIENT)
Dept: CARDIOLOGY | Facility: CLINIC | Age: 69
End: 2024-01-16
Payer: MEDICARE

## 2024-01-16 NOTE — TELEPHONE ENCOUNTER
Talked with pt about inability to push time back for appt. Pt verbalized understanding and wants to keep her appt.

## 2024-01-16 NOTE — TELEPHONE ENCOUNTER
----- Message from Angy Nguyen sent at 1/16/2024  4:23 PM CST -----  Contact: Self  Patient is calling in regards to his appt on 01/29, he is calling in regards to the appt time. Stated it is for 8:30 am and is calling to see if we can push it back to a later time, since he has to come from [place]. Can we please check on this and call pt back to advise at [phone]. Thank You.

## 2024-01-28 PROBLEM — I50.32 CHRONIC DIASTOLIC CONGESTIVE HEART FAILURE: Status: ACTIVE | Noted: 2024-01-28

## 2024-01-28 PROBLEM — I73.9 PERIPHERAL ARTERY DISEASE: Status: ACTIVE | Noted: 2024-01-28

## 2024-01-28 NOTE — PROGRESS NOTES
"Subjective:    Patient ID:  Hudson Tavares is a 68 y.o. male who presents for follow-up of Hypertension and Coronary Artery Disease      Problem List Items Addressed This Visit          Cardiac/Vascular    Chronic diastolic congestive heart failure - Primary    Coronary artery disease involving native coronary artery of native heart without angina pectoris    Essential hypertension    Longstanding persistent atrial fibrillation    Peripheral artery disease    S/P CABG x 4    Overview     LIMA to LAD, SVG to OM, SVG to diagonal, SVG to right PDA, Maze, left atrial appendage resection (5/16)            HPI    Patient was last seen on 01/24/2023 at which time he was doing okay from a cardiac standpoint.    On assessment today, the patient states that he is doing OK today, just got over COVID.    Started finasteride, but had reaction to it, raised BP interestingly.    No chest pain.  No shortness of breath.  Home BP - 130s to low 140s systolic       Objective:     Vitals:    01/29/24 0818   BP: (!) 147/74   BP Location: Right arm   Patient Position: Sitting   BP Method: Large (Automatic)   Pulse: 91   Weight: 106.1 kg (233 lb 14.5 oz)   Height: 5' 9" (1.753 m)       BP Readings from Last 5 Encounters:   01/29/24 (!) 147/74   05/21/23 (!) 170/106   05/19/23 (!) 147/80   01/24/23 (!) 160/89   06/15/22 (!) 153/87        Physical Exam  Constitutional:       Appearance: He is well-developed.   HENT:      Head: Normocephalic and atraumatic.   Neck:      Vascular: No JVD.   Cardiovascular:      Rate and Rhythm: Normal rate and regular rhythm.      Heart sounds: Normal heart sounds. No murmur heard.     No friction rub. No gallop.   Pulmonary:      Effort: Pulmonary effort is normal. No respiratory distress.      Breath sounds: Normal breath sounds. No wheezing or rales.   Abdominal:      General: Bowel sounds are normal.      Palpations: Abdomen is soft.      Tenderness: There is no abdominal tenderness. There is no " guarding or rebound.   Musculoskeletal:      Cervical back: Normal range of motion and neck supple.   Skin:     General: Skin is warm and dry.   Neurological:      Mental Status: He is alert and oriented to person, place, and time.   Psychiatric:         Behavior: Behavior normal.             Current Outpatient Medications   Medication Instructions    albuterol (PROVENTIL/VENTOLIN HFA) 90 mcg/actuation inhaler     ALPRAZolam (XANAX) 0.5 MG tablet     amLODIPine (NORVASC) 5 mg, Oral, 2 times daily    amoxicillin (AMOXIL) 500 MG capsule amoxicillin 500 mg capsule   TAKE 1 CAPSULE BY MOUTH THREE TIMES DAILY UNTIL GONE    ascorbic acid, vitamin C, (VITAMIN C) 500 MG tablet Vitamin C 500 mg tablet   Take 1 tablet twice a day by oral route for 14 days.    aspirin (ECOTRIN) 81 mg, Oral, Daily, Use prior to arrival for cardioversion    azelastine (ASTELIN) 137 mcg (0.1 %) nasal spray SMARTSIG:Both Nares    brompheniramin-phenylephrin-DM 1-2.5-5 mg/5 mL Soln 15 mLs    cetirizine (ZYRTEC) 10 MG tablet Zyrtec 10 mg tablet   Take 1 tablet every day by oral route for 10 days.    cholecalciferol, vitamin D3, 100 mcg (4,000 unit) Cap capsule Vitamin D3 100 mcg (4,000 unit) capsule   Take 1 capsule every day by oral route for 7 days.    clotrimazole-betamethasone 1-0.05% (LOTRISONE) cream APPLY TO AFFECTED AREA TWICE A DAY    doxazosin (CARDURA) 1 MG tablet No dose, route, or frequency recorded.    doxycycline (VIBRA-TABS) 100 mg, Oral, 2 times daily    famotidine (PEPCID) 20 mg, Oral, Nightly    finasteride (PROSCAR) 5 mg, Oral, Daily    lactulose (CHRONULAC) 20 g, Oral    melatonin (MELATIN) 3 mg tablet melatonin 3 mg tablet   Take 1 tablet every day by oral route at bedtime for 14 days.    metoprolol succinate (TOPROL-XL) 50 mg, Oral, Daily    mometasone 0.1% (ELOCON) 0.1 % cream Topical (Top), Daily    mupirocin calcium 2% (BACTROBAN) 2 % cream APPLY TO AFFECTED AREA 3 TIMES A DAY    POLYETHYLENE GLYCOL 3350 (MIRALAX ORAL) 17  g, Oral, Daily PRN    pramoxine-hydrocortisone (PROCTOCREAM-HC) 1-1 % rectal cream Rectal, 3 times daily    spironolactone (ALDACTONE) 25 mg, Oral    triamcinolone acetonide 0.1% (KENALOG) 0.1 % cream Topical, 2 times daily    triamcinolone acetonide 0.1% (KENALOG) 0.1 % cream No dose, route, or frequency recorded.    warfarin (COUMADIN) 2 MG tablet TAKE 1MG-2MG DAILY AS DIRECTED    warfarin (COUMADIN) 1 mg, Oral, Daily    zinc sulfate 50 mg zinc (220 mg) Tab tablet zinc sulfate 50 mg zinc (220 mg) tablet   Take 1 tablet every day by oral route.       Lipid Panel:   Lab Results   Component Value Date    CHOL 192 04/08/2021    HDL 59 04/08/2021    LDLCALC 106.8 04/08/2021    TRIG 131 04/08/2021    CHOLHDL 30.7 04/08/2021       The 10-year ASCVD risk score (Jaren HOFFMAN, et al., 2019) is: 20.6%    Values used to calculate the score:      Age: 68 years      Sex: Male      Is Non- : No      Diabetic: No      Tobacco smoker: No      Systolic Blood Pressure: 147 mmHg      Is BP treated: Yes      HDL Cholesterol: 59 mg/dL      Total Cholesterol: 192 mg/dL    All pertinent labs, imaging, and EKGs reviewed.  Patient's most recent EKG tracing was personally interpreted by this provider.    Most Recent EKG Results  Results for orders placed or performed during the hospital encounter of 05/21/23   EKG 12-lead    Collection Time: 05/21/23 12:42 PM    Narrative    Test Reason : R07.9,    Vent. Rate : 093 BPM     Atrial Rate : 094 BPM     P-R Int : 000 ms          QRS Dur : 082 ms      QT Int : 382 ms       P-R-T Axes : 000 056 052 degrees     QTc Int : 474 ms    Atrial fibrillation  Nonspecific ST abnormality  Abnormal ECG      Confirmed by Gerald RHOADES, Estelle FIGUEROA ([id number]) on 5/25/2023 3:17:56 PM    Referred By: CONCHA   SELF           Confirmed By:Estelle Duarte MD       Most Recent Echocardiogram Results  Results for orders placed in visit on 12/15/22    Echo    Interpretation Summary  · Atrial fibrillation  observed.  · The left ventricle is normal in size with concentric remodeling and  · The estimated ejection fraction is 60%.  · There is abnormal septal wall motion consistent with post-operative status.  · Normal right ventricular size with normal right ventricular systolic function.  · Severe left atrial enlargement.  · Moderate right atrial enlargement.  · Mild tricuspid regurgitation.  · Normal central venous pressure (3 mmHg).  · The estimated PA systolic pressure is 35 mmHg.      Most Recent Nuclear Stress Test Results  No results found for this or any previous visit.      Most Recent Cardiac PET Stress Test Results  No results found for this or any previous visit.      Most Recent Cardiovascular Angiogram results  No results found for this or any previous visit.      Other Most Recent Cardiology Results  Results for orders placed in visit on 05/30/23    Ankle Brachial Indices (JAMMIE)    Narrative  · Normal resting ABIs bilaterally.        Assessment:       1. Chronic diastolic congestive heart failure    2. Peripheral artery disease    3. Longstanding persistent atrial fibrillation    4. Coronary artery disease involving native coronary artery of native heart without angina pectoris    5. Essential hypertension    6. S/P CABG x 4         Plan:     Symptoms OK today  BP elevated today  Most recent echocardiogram reviewed personally     Continue aspirin 81 mg PO Daily indefinitely  Continue amlodipine 5mg PO BID  Continue Lasix 20 mg PO Daily PRN volume  Continue metoprolol succinate 50 mg PO Daily  Continue Coumadin per [facility], has had blood clots in the past that failed Eliquis, would continue coumadin  Following for blood pressure with PCP  Minimize sodium    Continue other cardiac medications  Mediterranean Diet/Cardiovascular Exercise Program    Patient queried and all questions were answered.    F/u in 9-12 months to reassess      Signed:    Jonathan Gonzalez MD  1/29/2024 10:48 AM

## 2024-01-29 ENCOUNTER — OFFICE VISIT (OUTPATIENT)
Dept: CARDIOLOGY | Facility: CLINIC | Age: 69
End: 2024-01-29
Payer: MEDICARE

## 2024-01-29 VITALS
WEIGHT: 233.94 LBS | SYSTOLIC BLOOD PRESSURE: 147 MMHG | HEIGHT: 69 IN | DIASTOLIC BLOOD PRESSURE: 74 MMHG | HEART RATE: 91 BPM | BODY MASS INDEX: 34.65 KG/M2

## 2024-01-29 DIAGNOSIS — I10 ESSENTIAL HYPERTENSION: ICD-10-CM

## 2024-01-29 DIAGNOSIS — I48.11 LONGSTANDING PERSISTENT ATRIAL FIBRILLATION: ICD-10-CM

## 2024-01-29 DIAGNOSIS — I25.10 CORONARY ARTERY DISEASE INVOLVING NATIVE CORONARY ARTERY OF NATIVE HEART WITHOUT ANGINA PECTORIS: ICD-10-CM

## 2024-01-29 DIAGNOSIS — I73.9 PERIPHERAL ARTERY DISEASE: ICD-10-CM

## 2024-01-29 DIAGNOSIS — Z95.1 S/P CABG X 4: ICD-10-CM

## 2024-01-29 DIAGNOSIS — I50.32 CHRONIC DIASTOLIC CONGESTIVE HEART FAILURE: Primary | ICD-10-CM

## 2024-01-29 PROCEDURE — 99214 OFFICE O/P EST MOD 30 MIN: CPT | Mod: S$GLB,,, | Performed by: INTERNAL MEDICINE

## 2024-01-29 PROCEDURE — 99999 PR PBB SHADOW E&M-EST. PATIENT-LVL IV: CPT | Mod: PBBFAC,,, | Performed by: INTERNAL MEDICINE

## 2024-01-29 PROCEDURE — 3288F FALL RISK ASSESSMENT DOCD: CPT | Mod: CPTII,S$GLB,, | Performed by: INTERNAL MEDICINE

## 2024-01-29 PROCEDURE — 1126F AMNT PAIN NOTED NONE PRSNT: CPT | Mod: CPTII,S$GLB,, | Performed by: INTERNAL MEDICINE

## 2024-01-29 PROCEDURE — 1159F MED LIST DOCD IN RCRD: CPT | Mod: CPTII,S$GLB,, | Performed by: INTERNAL MEDICINE

## 2024-01-29 PROCEDURE — 1160F RVW MEDS BY RX/DR IN RCRD: CPT | Mod: CPTII,S$GLB,, | Performed by: INTERNAL MEDICINE

## 2024-01-29 PROCEDURE — 3008F BODY MASS INDEX DOCD: CPT | Mod: CPTII,S$GLB,, | Performed by: INTERNAL MEDICINE

## 2024-01-29 PROCEDURE — 1101F PT FALLS ASSESS-DOCD LE1/YR: CPT | Mod: CPTII,S$GLB,, | Performed by: INTERNAL MEDICINE

## 2024-01-29 PROCEDURE — 3078F DIAST BP <80 MM HG: CPT | Mod: CPTII,S$GLB,, | Performed by: INTERNAL MEDICINE

## 2024-01-29 PROCEDURE — 3077F SYST BP >= 140 MM HG: CPT | Mod: CPTII,S$GLB,, | Performed by: INTERNAL MEDICINE

## 2024-02-19 ENCOUNTER — E-CONSULT (OUTPATIENT)
Dept: CARDIOLOGY | Facility: CLINIC | Age: 69
End: 2024-02-19
Payer: MEDICARE

## 2024-02-19 ENCOUNTER — OFFICE VISIT (OUTPATIENT)
Dept: UROLOGY | Facility: CLINIC | Age: 69
End: 2024-02-19
Payer: MEDICARE

## 2024-02-19 VITALS — HEIGHT: 69 IN | WEIGHT: 226 LBS | BODY MASS INDEX: 33.47 KG/M2

## 2024-02-19 DIAGNOSIS — R31.0 GROSS HEMATURIA: Primary | ICD-10-CM

## 2024-02-19 DIAGNOSIS — Z01.810 PRE-OPERATIVE CARDIOVASCULAR EXAMINATION: Primary | ICD-10-CM

## 2024-02-19 DIAGNOSIS — R35.1 BENIGN PROSTATIC HYPERPLASIA WITH NOCTURIA: ICD-10-CM

## 2024-02-19 DIAGNOSIS — I48.11 LONGSTANDING PERSISTENT ATRIAL FIBRILLATION: ICD-10-CM

## 2024-02-19 DIAGNOSIS — Z87.442 HISTORY OF KIDNEY STONES: ICD-10-CM

## 2024-02-19 DIAGNOSIS — N40.1 BENIGN PROSTATIC HYPERPLASIA WITH NOCTURIA: ICD-10-CM

## 2024-02-19 LAB
AMORPH CRY URNS QL MICRO: ABNORMAL
BACTERIA #/AREA URNS HPF: ABNORMAL /HPF
BILIRUBIN, UA POC OHS: NEGATIVE
BLOOD, UA POC OHS: ABNORMAL
CLARITY, UA POC OHS: ABNORMAL
COLOR, UA POC OHS: YELLOW
GLUCOSE, UA POC OHS: NEGATIVE
KETONES, UA POC OHS: NEGATIVE
LEUKOCYTES, UA POC OHS: NEGATIVE
MICROSCOPIC COMMENT: ABNORMAL
NITRITE, UA POC OHS: NEGATIVE
PH, UA POC OHS: 5.5
PROTEIN, UA POC OHS: 100
RBC #/AREA URNS HPF: 30 /HPF (ref 0–4)
SPECIFIC GRAVITY, UA POC OHS: >=1.03
SQUAMOUS #/AREA URNS HPF: 1 /HPF
UROBILINOGEN, UA POC OHS: 2

## 2024-02-19 PROCEDURE — 99999 PR PBB SHADOW E&M-EST. PATIENT-LVL V: CPT | Mod: PBBFAC,,,

## 2024-02-19 PROCEDURE — 3288F FALL RISK ASSESSMENT DOCD: CPT | Mod: CPTII,S$GLB,,

## 2024-02-19 PROCEDURE — 1160F RVW MEDS BY RX/DR IN RCRD: CPT | Mod: CPTII,S$GLB,,

## 2024-02-19 PROCEDURE — 3008F BODY MASS INDEX DOCD: CPT | Mod: CPTII,S$GLB,,

## 2024-02-19 PROCEDURE — 99451 NTRPROF PH1/NTRNET/EHR 5/>: CPT | Mod: S$GLB,,, | Performed by: INTERNAL MEDICINE

## 2024-02-19 PROCEDURE — 99214 OFFICE O/P EST MOD 30 MIN: CPT | Mod: S$GLB,,,

## 2024-02-19 PROCEDURE — 1126F AMNT PAIN NOTED NONE PRSNT: CPT | Mod: CPTII,S$GLB,,

## 2024-02-19 PROCEDURE — 81003 URINALYSIS AUTO W/O SCOPE: CPT | Mod: QW,S$GLB,,

## 2024-02-19 PROCEDURE — 81000 URINALYSIS NONAUTO W/SCOPE: CPT | Mod: PO

## 2024-02-19 PROCEDURE — 87086 URINE CULTURE/COLONY COUNT: CPT

## 2024-02-19 PROCEDURE — 1159F MED LIST DOCD IN RCRD: CPT | Mod: CPTII,S$GLB,,

## 2024-02-19 PROCEDURE — 1101F PT FALLS ASSESS-DOCD LE1/YR: CPT | Mod: CPTII,S$GLB,,

## 2024-02-19 NOTE — PROGRESS NOTES
[facility] Note  Staff: GENA Mack    PCP: MD Shara    Chief Complaint:  Gross hematuria    Subjective:        HPI: Hudson Tavares is a 68 y.o. male presents today for evaluation of gross hematuria.  He states this started over the weekend and he presented to [place] ED. he states he was passing blood and clots on Saturday.  He had a CT abdomen and pelvis with contrast which showed a nonspecific filling defect in the left side of the lumen of the urinary bladder.  He brought the report with him as well as the disc from the ED. No stones for other masses were seen.  He states the blood has resolved at this time.  He denies dysuria, fever, flank pain, abdominal pain, incontinence, and difficulty urinating.  He is currently taking doxazosin 1 mg daily.  He refuses in office cystoscopy and prefers to have this done under anesthesia.  He does have a history of kidney stones.  We discussed ED precautions.    Questions asked the pt during ov today:  Urgency:  No, urge incontinence?  No  Dysuria: No  Gross Hematuria:Yes - has resolved  Straining:No, Hesistancy:No, Intermittency:No}, Weak stream:No    Last PSA Screening:   Lab Results   Component Value Date    PSA <0.06 04/08/2021    PSA 0.22 01/24/2020    PSA 0.2 07/14/2006       History of Kidney Stones?:  Yes    Constipation issues?:  No      REVIEW OF SYSTEMS:  Review of Systems   Constitutional: Negative.  Negative for chills and fever.   HENT: Negative.     Eyes: Negative.    Respiratory: Negative.     Cardiovascular: Negative.    Gastrointestinal: Negative.  Negative for abdominal pain, constipation, diarrhea, nausea and vomiting.   Genitourinary:  Positive for hematuria. Negative for dysuria, flank pain, frequency and urgency.   Musculoskeletal: Negative.  Negative for back pain.   Skin: Negative.    Neurological: Negative.    Endo/Heme/Allergies: Negative.    Psychiatric/Behavioral: Negative.         PMHx:  Past Medical  History:   Diagnosis Date    Adenomatous colon polyp     Alcohol abuse 7/9/2015    Anticoagulant long-term use     coumadin    Atrial fibrillation 7/9/2015    CAD (coronary artery disease), native coronary artery 5/3/2016    Degenerative joint disease 7/9/2015    Encounter for blood transfusion     Enlarged liver     Essential hypertension 7/9/2015    Hep C w/o coma, chronic     treated with Harvoni    Hiatal hernia     patient states hernia has resolved    Hx of bladder infections     finished cipro 4/29/16    S/P CABG (coronary artery bypass graft) 5/17/2016    LIMA to LAD, SVG to OM, SVG to diagonal, SVG to right PDA, Maze, left atrial appendage resection (5/16)    Spleen enlarged     Thrombocytopenia 5/5/2016    Tobacco abuse 7/9/2015       PSHx:  Past Surgical History:   Procedure Laterality Date    CARDIAC SURGERY  05/04/2016    4 vessel CABG    CERVICAL FUSION  2004    COLONOSCOPY  12/03/2018    [facility]    JOINT REPLACEMENT  TOTAL RIGHT KNEE     OPEN ANTERIOR SHOULDER RECONSTRUCTION Left     RADIOFREQUENCY ABLATION N/A 9/24/2019    Procedure: Radiofrequency Ablation;  Surgeon: Anayeli Surgeon;  Location: [facility];  Service: Anesthesiology;  Laterality: N/A;  Room 173/Sandow    TIBIA FRACTURE SURGERY Right 1971       Fam Hx:   malignancies: No    kidney stones: No     Soc Hx:  Lives in [place]    Allergies:  Cilostazol, Stadol [butorphanol tartrate], Keflex [cephalexin], Morphine, Sulfa (sulfonamide antibiotics), and Finasteride    Medications: reviewed     Objective:   There were no vitals filed for this visit.    Physical Exam  Constitutional:       Appearance: Normal appearance.   HENT:      Head: Normocephalic.      Mouth/Throat:      Mouth: Mucous membranes are moist.   Eyes:      Conjunctiva/sclera: Conjunctivae normal.   Pulmonary:      Effort: Pulmonary effort is normal.   Abdominal:      General: There is no distension.      Palpations: Abdomen is soft.      Tenderness: There is no abdominal  tenderness. There is no right CVA tenderness or left CVA tenderness.   Musculoskeletal:         General: Normal range of motion.      Cervical back: Normal range of motion.   Skin:     General: Skin is warm.   Neurological:      Mental Status: He is alert and oriented to person, place, and time.   Psychiatric:         Mood and Affect: Mood normal.         Behavior: Behavior normal.           LABS REVIEW:  UA today:  Color:Clear, Yellow  Spec. Grav.  >1.030  PH  5.5  Negative for leukocytes, nitrates, glucose, ketones, bili  Positive protein and urobili  Large blood    Assessment:       1. Gross hematuria    2. History of kidney stones    3. Benign prostatic hyperplasia with nocturia          Plan:     Urine sent for micro UA and culture  Patient will be scheduled for cystoscopy with possible clot evacuation, possible fulguration, possible bladder biopsy with Dr. Bray- we will need cardiac clearance and hold Coumadin and aspirin    F/u as needed per treatment plan    [facility]:  Active    GENA Mack

## 2024-02-19 NOTE — CONSULTS
[facility] - Cardiology  Response for E-Consult     Patient Name: Hudson Tavares  MRN: [id number]  Primary Care Provider: Juan Olmedo MD   Requesting Provider: Lashawn Tapia NP  E-Consult to Cardiology  Consult performed by: Jonathan Gonzalez MD  Consult ordered by: Lashawn Tapia NP          Chart reviewed personally.    As long as no significant chest pain or shortness of breath with exertion, patient is at acceptable risk to proceed from a Cardiology standpoint.    Okay to hold aspirin 5-7 days prior to procedure, restart as soon as safe postprocedure.    Okay to hold Coumadin preprocedure without bridge from an AFib standpoint, restart as soon as safe postprocedure, instructions per [facility].    Total time of Consultation: 10 minute    I did not speak to the requesting provider verbally about this.     *This eConsult is based on the clinical data available to me and is furnished without benefit of a physical examination. The eConsult will need to be interpreted in light of any clinical issues or changes in patient status not available to me at the time of filing this eConsults. Significant changes in patient condition or level of acuity should result in immediate formal consultation and reevaluation. Please alert me if you have further questions.    Thank you for this eConsult referral.     Jonathan Gonzalez MD  [facility] - [facility]iology

## 2024-02-19 NOTE — H&P (VIEW-ONLY)
[facility] Note  Staff: GENA Mack    PCP: MD Shara    Chief Complaint:  Gross hematuria    Subjective:        HPI: Hudson Tavares is a 68 y.o. male presents today for evaluation of gross hematuria.  He states this started over the weekend and he presented to [place] ED. he states he was passing blood and clots on Saturday.  He had a CT abdomen and pelvis with contrast which showed a nonspecific filling defect in the left side of the lumen of the urinary bladder.  He brought the report with him as well as the disc from the ED. No stones for other masses were seen.  He states the blood has resolved at this time.  He denies dysuria, fever, flank pain, abdominal pain, incontinence, and difficulty urinating.  He is currently taking doxazosin 1 mg daily.  He refuses in office cystoscopy and prefers to have this done under anesthesia.  He does have a history of kidney stones.  We discussed ED precautions.    Questions asked the pt during ov today:  Urgency:  No, urge incontinence?  No  Dysuria: No  Gross Hematuria:Yes - has resolved  Straining:No, Hesistancy:No, Intermittency:No}, Weak stream:No    Last PSA Screening:   Lab Results   Component Value Date    PSA <0.06 04/08/2021    PSA 0.22 01/24/2020    PSA 0.2 07/14/2006       History of Kidney Stones?:  Yes    Constipation issues?:  No      REVIEW OF SYSTEMS:  Review of Systems   Constitutional: Negative.  Negative for chills and fever.   HENT: Negative.     Eyes: Negative.    Respiratory: Negative.     Cardiovascular: Negative.    Gastrointestinal: Negative.  Negative for abdominal pain, constipation, diarrhea, nausea and vomiting.   Genitourinary:  Positive for hematuria. Negative for dysuria, flank pain, frequency and urgency.   Musculoskeletal: Negative.  Negative for back pain.   Skin: Negative.    Neurological: Negative.    Endo/Heme/Allergies: Negative.    Psychiatric/Behavioral: Negative.         PMHx:  Past Medical  History:   Diagnosis Date    Adenomatous colon polyp     Alcohol abuse 7/9/2015    Anticoagulant long-term use     coumadin    Atrial fibrillation 7/9/2015    CAD (coronary artery disease), native coronary artery 5/3/2016    Degenerative joint disease 7/9/2015    Encounter for blood transfusion     Enlarged liver     Essential hypertension 7/9/2015    Hep C w/o coma, chronic     treated with Harvoni    Hiatal hernia     patient states hernia has resolved    Hx of bladder infections     finished cipro 4/29/16    S/P CABG (coronary artery bypass graft) 5/17/2016    LIMA to LAD, SVG to OM, SVG to diagonal, SVG to right PDA, Maze, left atrial appendage resection (5/16)    Spleen enlarged     Thrombocytopenia 5/5/2016    Tobacco abuse 7/9/2015       PSHx:  Past Surgical History:   Procedure Laterality Date    CARDIAC SURGERY  05/04/2016    4 vessel CABG    CERVICAL FUSION  2004    COLONOSCOPY  12/03/2018    [facility]    JOINT REPLACEMENT  TOTAL RIGHT KNEE     OPEN ANTERIOR SHOULDER RECONSTRUCTION Left     RADIOFREQUENCY ABLATION N/A 9/24/2019    Procedure: Radiofrequency Ablation;  Surgeon: Anayeli Surgeon;  Location: [facility];  Service: Anesthesiology;  Laterality: N/A;  Room 173/[facility]ow    TIBIA FRACTURE SURGERY Right 1971       Fam Hx:   malignancies: No    kidney stones: No     Soc Hx:  Lives in [place]    Allergies:  Cilostazol, Stadol [butorphanol tartrate], Keflex [cephalexin], Morphine, Sulfa (sulfonamide antibiotics), and Finasteride    Medications: reviewed     Objective:   There were no vitals filed for this visit.    Physical Exam  Constitutional:       Appearance: Normal appearance.   HENT:      Head: Normocephalic.      Mouth/Throat:      Mouth: Mucous membranes are moist.   Eyes:      Conjunctiva/sclera: Conjunctivae normal.   Pulmonary:      Effort: Pulmonary effort is normal.   Abdominal:      General: There is no distension.      Palpations: Abdomen is soft.      Tenderness: There is no abdominal  tenderness. There is no right CVA tenderness or left CVA tenderness.   Musculoskeletal:         General: Normal range of motion.      Cervical back: Normal range of motion.   Skin:     General: Skin is warm.   Neurological:      Mental Status: He is alert and oriented to person, place, and time.   Psychiatric:         Mood and Affect: Mood normal.         Behavior: Behavior normal.           LABS REVIEW:  UA today:  Color:Clear, Yellow  Spec. Grav.  >1.030  PH  5.5  Negative for leukocytes, nitrates, glucose, ketones, bili  Positive protein and urobili  Large blood    Assessment:       1. Gross hematuria    2. History of kidney stones    3. Benign prostatic hyperplasia with nocturia          Plan:     Urine sent for micro UA and culture  Patient will be scheduled for cystoscopy with possible clot evacuation, possible fulguration, possible bladder biopsy with Dr. Bray- we will need cardiac clearance and hold Coumadin and aspirin    F/u as needed per treatment plan    [facility]:  Active    GENA Mack       handoff received from KATEY Park for shift change

## 2024-02-20 LAB — BACTERIA UR CULT: NO GROWTH

## 2024-02-23 ENCOUNTER — ANESTHESIA EVENT (OUTPATIENT)
Dept: SURGERY | Facility: HOSPITAL | Age: 69
End: 2024-02-23
Payer: MEDICARE

## 2024-02-23 RX ORDER — TADALAFIL 5 MG/1
5 TABLET ORAL DAILY
COMMUNITY
End: 2024-05-09

## 2024-02-27 ENCOUNTER — HOSPITAL ENCOUNTER (OUTPATIENT)
Dept: RADIOLOGY | Facility: HOSPITAL | Age: 69
Discharge: HOME | End: 2024-02-27
Attending: UROLOGY | Admitting: UROLOGY
Payer: MEDICARE

## 2024-02-27 ENCOUNTER — ANESTHESIA (OUTPATIENT)
Dept: SURGERY | Facility: HOSPITAL | Age: 69
End: 2024-02-27
Payer: MEDICARE

## 2024-02-27 ENCOUNTER — HOSPITAL ENCOUNTER (OUTPATIENT)
Facility: HOSPITAL | Age: 69
Discharge: HOME | End: 2024-02-27
Attending: UROLOGY | Admitting: UROLOGY
Payer: MEDICARE

## 2024-02-27 VITALS
DIASTOLIC BLOOD PRESSURE: 65 MMHG | RESPIRATION RATE: 16 BRPM | HEIGHT: 69 IN | WEIGHT: 225 LBS | HEART RATE: 82 BPM | SYSTOLIC BLOOD PRESSURE: 131 MMHG | BODY MASS INDEX: 33.33 KG/M2 | OXYGEN SATURATION: 98 % | TEMPERATURE: 98 F

## 2024-02-27 DIAGNOSIS — R31.0 GROSS HEMATURIA: Primary | ICD-10-CM

## 2024-02-27 DIAGNOSIS — Z98.890 HX OF CYSTOSCOPY: ICD-10-CM

## 2024-02-27 PROCEDURE — 25500020 PHARM REV CODE 255: Mod: PO | Performed by: UROLOGY

## 2024-02-27 PROCEDURE — D9220A PRA ANESTHESIA: Mod: ANES,,, | Performed by: ANESTHESIOLOGY

## 2024-02-27 PROCEDURE — 27200651 HC AIRWAY, LMA: Mod: PO | Performed by: NURSE ANESTHETIST, CERTIFIED REGISTERED

## 2024-02-27 PROCEDURE — 71000015 HC POSTOP RECOV 1ST HR: Mod: PO | Performed by: UROLOGY

## 2024-02-27 PROCEDURE — 63600175 PHARM REV CODE 636 W HCPCS: Mod: PO | Performed by: UROLOGY

## 2024-02-27 PROCEDURE — 25000003 PHARM REV CODE 250: Mod: PO | Performed by: NURSE ANESTHETIST, CERTIFIED REGISTERED

## 2024-02-27 PROCEDURE — 63600175 PHARM REV CODE 636 W HCPCS: Mod: PO | Performed by: ANESTHESIOLOGY

## 2024-02-27 PROCEDURE — D9220A PRA ANESTHESIA: Mod: CRNA,,, | Performed by: NURSE ANESTHETIST, CERTIFIED REGISTERED

## 2024-02-27 PROCEDURE — 88307 TISSUE EXAM BY PATHOLOGIST: CPT | Mod: 26,,, | Performed by: PATHOLOGY

## 2024-02-27 PROCEDURE — 37000009 HC ANESTHESIA EA ADD 15 MINS: Mod: PO | Performed by: UROLOGY

## 2024-02-27 PROCEDURE — 52234 CYSTOSCOPY AND TREATMENT: CPT | Mod: ,,, | Performed by: UROLOGY

## 2024-02-27 PROCEDURE — 25000003 PHARM REV CODE 250: Mod: PO | Performed by: ANESTHESIOLOGY

## 2024-02-27 PROCEDURE — 74420 UROGRAPHY RTRGR +-KUB: CPT | Mod: 26,,, | Performed by: UROLOGY

## 2024-02-27 PROCEDURE — 71000033 HC RECOVERY, INTIAL HOUR: Mod: PO | Performed by: UROLOGY

## 2024-02-27 PROCEDURE — 88307 TISSUE EXAM BY PATHOLOGIST: CPT | Mod: PO | Performed by: PATHOLOGY

## 2024-02-27 PROCEDURE — C1758 CATHETER, URETERAL: HCPCS | Mod: PO | Performed by: UROLOGY

## 2024-02-27 PROCEDURE — 76000 FLUOROSCOPY <1 HR PHYS/QHP: CPT | Mod: TC,PO

## 2024-02-27 PROCEDURE — 36000706: Mod: PO | Performed by: UROLOGY

## 2024-02-27 PROCEDURE — 63600175 PHARM REV CODE 636 W HCPCS: Mod: PO | Performed by: NURSE ANESTHETIST, CERTIFIED REGISTERED

## 2024-02-27 PROCEDURE — 36000707: Mod: PO | Performed by: UROLOGY

## 2024-02-27 PROCEDURE — 52005 CYSTO W/URTRL CATHJ: CPT | Mod: 59,51,, | Performed by: UROLOGY

## 2024-02-27 PROCEDURE — 37000008 HC ANESTHESIA 1ST 15 MINUTES: Mod: PO | Performed by: UROLOGY

## 2024-02-27 RX ORDER — DEXAMETHASONE SODIUM PHOSPHATE 4 MG/ML
INJECTION, SOLUTION INTRA-ARTICULAR; INTRALESIONAL; INTRAMUSCULAR; INTRAVENOUS; SOFT TISSUE
Status: DISCONTINUED | OUTPATIENT
Start: 2024-02-27 | End: 2024-02-27

## 2024-02-27 RX ORDER — ONDANSETRON HYDROCHLORIDE 2 MG/ML
4 INJECTION, SOLUTION INTRAVENOUS DAILY PRN
Status: ACTIVE | OUTPATIENT
Start: 2024-02-27

## 2024-02-27 RX ORDER — DIPHENHYDRAMINE HYDROCHLORIDE 50 MG/ML
12.5 INJECTION INTRAMUSCULAR; INTRAVENOUS EVERY 6 HOURS PRN
Status: ACTIVE | OUTPATIENT
Start: 2024-02-27

## 2024-02-27 RX ORDER — SODIUM CHLORIDE, SODIUM LACTATE, POTASSIUM CHLORIDE, CALCIUM CHLORIDE 600; 310; 30; 20 MG/100ML; MG/100ML; MG/100ML; MG/100ML
INJECTION, SOLUTION INTRAVENOUS CONTINUOUS
Status: DISPENSED | OUTPATIENT
Start: 2024-02-27

## 2024-02-27 RX ORDER — CEFAZOLIN SODIUM 2 G/50ML
2 SOLUTION INTRAVENOUS
Status: COMPLETED | OUTPATIENT
Start: 2024-02-27 | End: 2024-02-27

## 2024-02-27 RX ORDER — PROPOFOL 10 MG/ML
VIAL (ML) INTRAVENOUS
Status: DISCONTINUED | OUTPATIENT
Start: 2024-02-27 | End: 2024-02-27

## 2024-02-27 RX ORDER — SODIUM CHLORIDE 9 MG/ML
INJECTION, SOLUTION INTRAVENOUS CONTINUOUS
Status: DISCONTINUED | OUTPATIENT
Start: 2024-02-27 | End: 2024-02-27 | Stop reason: HOSPADM

## 2024-02-27 RX ORDER — ONDANSETRON HYDROCHLORIDE 2 MG/ML
INJECTION, SOLUTION INTRAVENOUS
Status: DISCONTINUED | OUTPATIENT
Start: 2024-02-27 | End: 2024-02-27

## 2024-02-27 RX ORDER — ACETAMINOPHEN 10 MG/ML
INJECTION, SOLUTION INTRAVENOUS
Status: DISCONTINUED | OUTPATIENT
Start: 2024-02-27 | End: 2024-02-27

## 2024-02-27 RX ORDER — MIDAZOLAM HYDROCHLORIDE 1 MG/ML
INJECTION, SOLUTION INTRAMUSCULAR; INTRAVENOUS
Status: DISCONTINUED | OUTPATIENT
Start: 2024-02-27 | End: 2024-02-27

## 2024-02-27 RX ORDER — LIDOCAINE HYDROCHLORIDE 10 MG/ML
1 INJECTION, SOLUTION EPIDURAL; INFILTRATION; INTRACAUDAL; PERINEURAL ONCE
Status: ACTIVE | OUTPATIENT
Start: 2024-02-27

## 2024-02-27 RX ORDER — FENTANYL CITRATE 50 UG/ML
25 INJECTION, SOLUTION INTRAMUSCULAR; INTRAVENOUS EVERY 5 MIN PRN
Status: ACTIVE | OUTPATIENT
Start: 2024-02-27

## 2024-02-27 RX ORDER — MEPERIDINE HYDROCHLORIDE 50 MG/ML
12.5 INJECTION INTRAMUSCULAR; INTRAVENOUS; SUBCUTANEOUS ONCE AS NEEDED
Status: ACTIVE | OUTPATIENT
Start: 2024-02-27 | End: 2024-02-28

## 2024-02-27 RX ORDER — LIDOCAINE HYDROCHLORIDE 20 MG/ML
INJECTION INTRAVENOUS
Status: DISCONTINUED | OUTPATIENT
Start: 2024-02-27 | End: 2024-02-27

## 2024-02-27 RX ORDER — HYDROMORPHONE HYDROCHLORIDE 2 MG/ML
0.2 INJECTION, SOLUTION INTRAMUSCULAR; INTRAVENOUS; SUBCUTANEOUS EVERY 5 MIN PRN
Status: ACTIVE | OUTPATIENT
Start: 2024-02-27

## 2024-02-27 RX ORDER — OXYCODONE HYDROCHLORIDE 5 MG/1
5 TABLET ORAL
Status: ACTIVE | OUTPATIENT
Start: 2024-02-27

## 2024-02-27 RX ADMIN — OXYCODONE HYDROCHLORIDE 5 MG: 5 TABLET ORAL at 10:02

## 2024-02-27 RX ADMIN — ONDANSETRON 4 MG: 2 INJECTION, SOLUTION INTRAMUSCULAR; INTRAVENOUS at 09:02

## 2024-02-27 RX ADMIN — SODIUM CHLORIDE, POTASSIUM CHLORIDE, SODIUM LACTATE AND CALCIUM CHLORIDE: 600; 310; 30; 20 INJECTION, SOLUTION INTRAVENOUS at 08:02

## 2024-02-27 RX ADMIN — DEXAMETHASONE SODIUM PHOSPHATE 4 MG: 4 INJECTION, SOLUTION INTRAMUSCULAR; INTRAVENOUS at 09:02

## 2024-02-27 RX ADMIN — CEFAZOLIN SODIUM 2 G: 2 SOLUTION INTRAVENOUS at 09:02

## 2024-02-27 RX ADMIN — PROPOFOL 200 MG: 10 INJECTION, EMULSION INTRAVENOUS at 09:02

## 2024-02-27 RX ADMIN — LIDOCAINE HYDROCHLORIDE 100 MG: 20 INJECTION INTRAVENOUS at 09:02

## 2024-02-27 RX ADMIN — ACETAMINOPHEN 1000 MG: 10 INJECTION, SOLUTION INTRAVENOUS at 09:02

## 2024-02-27 RX ADMIN — MIDAZOLAM HYDROCHLORIDE 2 MG: 1 INJECTION, SOLUTION INTRAMUSCULAR; INTRAVENOUS at 09:02

## 2024-02-27 NOTE — TRANSFER OF CARE
"Anesthesia Transfer of Care Note    Patient: Hudson Tavares    Procedure(s) Performed: Procedure(s) (LRB):  CYSTOSCOPY, WITH BLADDER BIOPSY, WITH FULGURATION IF INDICATED (N/A)  CYSTOURETHROSCOPY, WITH THROMBUS REMOVAL (N/A)  CYSTOSCOPY, WITH RETROGRADE PYELOGRAM (N/A)    Patient location: PACU    Anesthesia Type: general    Transport from OR: Transported from OR on room air with adequate spontaneous ventilation    Post pain: adequate analgesia    Post assessment: no apparent anesthetic complications and tolerated procedure well    Post vital signs: stable    Level of consciousness: responds to stimulation    Nausea/Vomiting: no nausea/vomiting    Complications: none    Transfer of care protocol was followed      Last vitals: Visit Vitals  BP (!) 143/67 (BP Location: Right arm, Patient Position: Sitting)   Pulse 86   Temp 36.9 °C (98.4 °F) (Skin)   Resp 18   Ht 5' 9" (1.753 m)   Wt 102.1 kg (225 lb)   SpO2 95%   BMI 33.23 kg/m²     "

## 2024-02-27 NOTE — DISCHARGE INSTRUCTIONS
CYSTOSCOPE    DO:   Minimal activity for first 24 hours.   Eat light meals for the first 24 hours.   Drinks plenty of fluids for 7 days.   Burning and blood tinged urine is normal day of procedure.     DO NOT:   Do not drive for next 24 hours or while taking narcotic medication.   Do not soak in a tub or pool. Showers only for 7 days.  Do not take additional tylenol/acetaminophen while taking narcotic pain medication that contains tylenol/acetaminophen.       CALL PHYSICIAN FOR:   Inability to urinate within 6 hours after procedure.   Fever greater than 101.   Persistent pain not relieved by pain meds.   Blood in urine with clots lasting greater than 24 hours.    RETURN APPOINTMENT AS INSTRUCTED  CALL [phone] for doctor.

## 2024-02-27 NOTE — BRIEF OP NOTE
[facility] - Surgery  Brief Operative Note    Surgery Date: 2/27/2024     Surgeon(s) and Role:     * STORMY Bray MD - Primary    Assisting Surgeon: None    Pre-op Diagnosis:  Gross hematuria [R31.0]  History of kidney stones [Z87.442]  Benign prostatic hyperplasia with nocturia [N40.1, R35.1]    Post-op Diagnosis:  Post-Op Diagnosis Codes:     * Gross hematuria [R31.0]     * History of kidney stones [Z87.442]     * Benign prostatic hyperplasia with nocturia [N40.1, R35.1]    Procedure(s) (LRB):  CYSTOSCOPY, WITH BLADDER BIOPSY, WITH FULGURATION IF INDICATED (N/A)  CYSTOURETHROSCOPY, WITH THROMBUS REMOVAL (N/A)  CYSTOSCOPY, WITH RETROGRADE PYELOGRAM (N/A)    Anesthesia: General    Operative Findings:  Multiple small papillary tumors on the left lateral wall proximal to the UO    Estimated Blood Loss: * No values recorded between 2/27/2024  9:31 AM and 2/27/2024  9:56 AM *         Specimens:   Specimen (24h ago, onward)       Start     Ordered    02/27/24 0943  Specimen to Pathology, Surgery Urology  Once        Comments: Pre-op Diagnosis: Gross hematuria [R31.0]History of kidney stones [Z87.442]Benign prostatic hyperplasia with nocturia [N40.1, R35.1]Procedure(s):CYSTOSCOPY, WITH BLADDER BIOPSY, WITH FULGURATION IF INDICATEDCYSTOURETHROSCOPY, WITH THROMBUS REMOVALCYSTOSCOPY, WITH RETROGRADE PYELOGRAM Number of specimens: 1Name of specimens: 1. Bladder tumor     References:    Click here for ordering Quick Tip   Question Answer Comment   Procedure Type: Urology    Which provider would you like to cc? STORMY BRAY    Release to patient Immediate        02/27/24 0943                      Discharge Note    OUTCOME: Patient tolerated treatment/procedure well without complication and is now ready for discharge.    DISPOSITION: Home or Self Care    FINAL DIAGNOSIS:  Gross hematuria    FOLLOWUP: In clinic    DISCHARGE INSTRUCTIONS:    Discharge Procedure Orders   Diet Adult Regular     Activity as tolerated    Scheduling Instructions: Keep hydrated.  Take Tylenol or ibuprofen for pain.  Resume Coumadin and aspirin in 48 hours  Follow-up in 2 weeks

## 2024-02-27 NOTE — ANESTHESIA PROCEDURE NOTES
Intubation    Date/Time: 2/27/2024 9:23 AM    Performed by: Rach Palacio CRNA  Authorized by: Rach Palacio CRNA    Intubation:     Induction:  Intravenous    Intubated:  Postinduction    Mask Ventilation:  Easy mask    Attempts:  1    Attempted By:  CRNA    Difficult Airway Encountered?: No      Complications:  None    Airway Device:  Supraglottic airway/LMA    Airway Device Size:  4.0    Style/Cuff Inflation:  Cuffed (inflated to minimal occlusive pressure)    Secured at:  The lips    Placement Verified By:  Capnometry    Complicating Factors:  None    Findings Post-Intubation:  BS equal bilateral and atraumatic/condition of teeth unchanged

## 2024-02-27 NOTE — OP NOTE
Date of Procedure:  February 27, 2024    Procedure:  Cystoscopy, retrograde pyelogram, TURBT    Surgeon:   STORMY Bray MD     Assisting Surgeon: None    Pre-Operative Diagnosis:  Gross hematuria    Post-Operative Diagnosis:  Bladder tumor    Anesthesia: General    Technical Procedures Used: Endoscopy    Description of the Findings of the Procedure:  Multiple small papillary tumors on the left lateral wall    Complications: No    Estimated Blood Loss (EBL):  None      Specimens:  Bladder tumor    Indications:  68-year-old with gross hematuria.  He is scheduled for cystoscopy in the operating room.    Procedure in Detail:  After informed consent was obtained was brought to the operating room.  He was given preoperative antibiotics.  After adequate general anesthesia was achieved was placed in lithotomy position.  The genitals were prepped and draped sterilely.  Rigid cystoscopy was then performed.  The entire bladder mucosa was carefully visualized.  On the left lateral wall just proximal the UO there were multiple papillary tumors.  The largest tumor was approximately 2 cm in diameter.  Both UOs were visualized and both appeared to have clear efflux of urine.  I turned my attention to the left UO.  I intubated the UO.  On  images there were no calcification seen.  I injected contrast and opacify the entire ureter and collecting system.  The ureter was of normal course and caliber without filling defects.  The renal pelvis unremarkable without filling defects.  There was normal caliceal anatomy.  The system drained promptly.  I then turned my attention to the right UO.  I intubated the right UO for a retrograde pyelogram.  The ureter was of normal course and caliber without filling defects.  The renal pelvis was normal with normal caliceal anatomy and the system drained promptly.  I then placed cold cup forceps into the bladder and was able to completely resect the papillary tumors.  I took several biopsies  deep at the base of the tumors.  I then used cautery current completely fulgurate the biopsy sites and any remaining suspicious looking mucosa.  I completion of the procedure hemostasis appeared to be adequate.  There were no visible tumors remaining.  I then drained the bladder removed all instruments.  He was repositioned supine he was awakened transported to the PACU in stable condition.

## 2024-02-27 NOTE — ANESTHESIA PREPROCEDURE EVALUATION
02/27/2024  Hudson Tavares is a 68 y.o., male.    Anesthesia Evaluation    I have reviewed the Patient Summary Reports.     I have reviewed the Nursing Notes. I have reviewed the NPO Status.   I have reviewed the Medications.     Review of Systems  Social:  Former Smoker, No Alcohol Use       Hematology/Oncology:                   Hematology Comments: Long-term (current) use of anticoagulants,  Thrombocytopenia - 80k                Oncology Comments: HCC (hepatocellular carcinoma)     Cardiovascular:     Hypertension   CAD   CABG/stent Dysrhythmias atrial fibrillation  CHF       ECG has been reviewed. · Atrial fibrillation observed.  · The left ventricle is normal in size with concentric remodeling and  · The estimated ejection fraction is 60%.  · There is abnormal septal wall motion consistent with post-operative status.  · Normal right ventricular size with normal right ventricular systolic function.  · Severe left atrial enlargement.  · Moderate right atrial enlargement.  · Mild tricuspid regurgitation.  · Normal central venous pressure (3 mmHg).  · The estimated PA systolic pressure is 35 mmHg.                           Hepatic/GI:    Hiatal Hernia,  Liver Disease, Hepatitis, C HCC (hepatocellular carcinoma)          Musculoskeletal:         Spine Disorders: cervical Disc disease               Physical Exam  General:  Obesity       Airway/Jaw/Neck:  Airway Findings: Mouth Opening: Normal     Tongue: Normal      General Airway Assessment: Adult                     Dental:  Dental Findings: Molar caps           Mental Status:  Mental Status Findings:  Alert and Oriented, Cooperative         Anesthesia Plan  Type of Anesthesia, risks & benefits discussed:  Anesthesia Type:  general, MAC    Patient's Preference:   Plan Factors:          Intra-op Monitoring Plan: standard ASA monitors  Intra-op Monitoring  Plan Comments:   Post Op Pain Control Plan: multimodal analgesia and IV/PO Opioids PRN  Post Op Pain Control Plan Comments:     Induction:   IV  Beta Blocker:  Patient is on a Beta-Blocker and has received one dose within the past 24 hours (No further documentation required).       Informed Consent: Informed consent signed with the Patient and all parties understand the risks and agree with anesthesia plan.  All questions answered.  Anesthesia consent signed with patient.  ASA Score: 3     Day of Surgery Review of History & Physical: I have interviewed and examined the patient. I have reviewed the patient's H&P dated:              Ready For Surgery From Anesthesia Perspective.             Physical Exam  General: Obesity    Airway:  Mouth Opening: Normal  Tongue: Normal    Dental:  Molar caps        Anesthesia Plan  Type of Anesthesia, risks & benefits discussed:    Anesthesia Type: general, MAC  Intra-op Monitoring Plan: standard ASA monitors  Post Op Pain Control Plan: multimodal analgesia and IV/PO Opioids PRN  Induction:  IV  Informed Consent: Informed consent signed with the Patient and all parties understand the risks and agree with anesthesia plan.  All questions answered.   ASA Score: 3  Day of Surgery Review of History & Physical: I have interviewed and examined the patient. I have reviewed the patient's H&P dated:     Ready For Surgery From Anesthesia Perspective.     .

## 2024-02-27 NOTE — ANESTHESIA POSTPROCEDURE EVALUATION
Anesthesia Post Evaluation    Patient: Hudson Tavares    Procedure(s) Performed: Procedure(s) (LRB):  CYSTOSCOPY, WITH BLADDER BIOPSY, WITH FULGURATION IF INDICATED (N/A)  CYSTOURETHROSCOPY, WITH THROMBUS REMOVAL (N/A)  CYSTOSCOPY, WITH RETROGRADE PYELOGRAM (N/A)    Final Anesthesia Type: general      Patient location during evaluation: PACU  Patient participation: Yes- Able to Participate  Level of consciousness: sedated and awake  Post-procedure vital signs: reviewed and stable  Pain management: adequate  Airway patency: patent    PONV status at discharge: No PONV  Anesthetic complications: no      Cardiovascular status: blood pressure returned to baseline and hemodynamically stable  Respiratory status: spontaneous ventilation  Hydration status: euvolemic  Follow-up not needed.              Vitals Value Taken Time   /69 02/27/24 1012   Temp 36.8 °C (98.2 °F) 02/27/24 0959   Pulse 84 02/27/24 1012   Resp 15 02/27/24 1030   SpO2 98 % 02/27/24 1012         No case tracking events are documented in the log.      Pain/Danis Score: Pain Rating Prior to Med Admin: 10 (2/27/2024 10:30 AM)  Danis Score: 10 (2/27/2024 10:26 AM)

## 2024-02-29 ENCOUNTER — PATIENT MESSAGE (OUTPATIENT)
Dept: UROLOGY | Facility: CLINIC | Age: 69
End: 2024-02-29

## 2024-02-29 ENCOUNTER — CLINICAL SUPPORT (OUTPATIENT)
Dept: UROLOGY | Facility: CLINIC | Age: 69
End: 2024-02-29
Payer: MEDICARE

## 2024-02-29 DIAGNOSIS — R31.0 GROSS HEMATURIA: Primary | ICD-10-CM

## 2024-02-29 PROCEDURE — 51701 INSERT BLADDER CATHETER: CPT | Mod: S$GLB,,, | Performed by: UROLOGY

## 2024-02-29 PROCEDURE — 99999 PR PBB SHADOW E&M-EST. PATIENT-LVL II: CPT | Mod: PBBFAC,,,

## 2024-02-29 NOTE — PROGRESS NOTES
Patient to clinic due to reports of urinary retention following procedure on 2/27. In office bladder scanned performed and showed- 560mL in the bladder.   Consulted with provider and orders were placed to reinsert london catheter  Patient privacy provided, hand hygiene performed. 16FR catheter placed using sterile technique. Clear and yellow urine return. Met minimal resistance.   Approximately 700mL drained from the bladder.

## 2024-03-01 LAB
FINAL PATHOLOGIC DIAGNOSIS: NORMAL
GROSS: NORMAL
Lab: NORMAL

## 2024-03-07 ENCOUNTER — CLINICAL SUPPORT (OUTPATIENT)
Dept: UROLOGY | Facility: CLINIC | Age: 69
End: 2024-03-07
Payer: MEDICARE

## 2024-03-07 DIAGNOSIS — Z87.442 HISTORY OF KIDNEY STONES: Primary | ICD-10-CM

## 2024-03-07 DIAGNOSIS — R33.9 URINARY RETENTION: ICD-10-CM

## 2024-03-07 PROCEDURE — 99999 PR PBB SHADOW E&M-EST. PATIENT-LVL II: CPT | Mod: PBBFAC,,,

## 2024-03-07 NOTE — PROGRESS NOTES
Pt coming in for removal of london catheter. Withdrew 10  Cc from balloon and removed 16 Fr london catheter intact. Provided voiding trial instruction. Pt expressed understanding and tolerated well.

## 2024-03-14 ENCOUNTER — OFFICE VISIT (OUTPATIENT)
Dept: UROLOGY | Facility: CLINIC | Age: 69
End: 2024-03-14
Payer: MEDICARE

## 2024-03-14 VITALS — WEIGHT: 224 LBS | HEIGHT: 69 IN | BODY MASS INDEX: 33.18 KG/M2

## 2024-03-14 DIAGNOSIS — C67.9 MALIGNANT NEOPLASM OF URINARY BLADDER, UNSPECIFIED SITE: Primary | ICD-10-CM

## 2024-03-14 PROCEDURE — 99214 OFFICE O/P EST MOD 30 MIN: CPT | Mod: S$GLB,,, | Performed by: UROLOGY

## 2024-03-14 PROCEDURE — 3008F BODY MASS INDEX DOCD: CPT | Mod: CPTII,S$GLB,, | Performed by: UROLOGY

## 2024-03-14 PROCEDURE — 1159F MED LIST DOCD IN RCRD: CPT | Mod: CPTII,S$GLB,, | Performed by: UROLOGY

## 2024-03-14 PROCEDURE — 3288F FALL RISK ASSESSMENT DOCD: CPT | Mod: CPTII,S$GLB,, | Performed by: UROLOGY

## 2024-03-14 PROCEDURE — 1101F PT FALLS ASSESS-DOCD LE1/YR: CPT | Mod: CPTII,S$GLB,, | Performed by: UROLOGY

## 2024-03-14 PROCEDURE — 99999 PR PBB SHADOW E&M-EST. PATIENT-LVL III: CPT | Mod: PBBFAC,,, | Performed by: UROLOGY

## 2024-03-14 PROCEDURE — 1126F AMNT PAIN NOTED NONE PRSNT: CPT | Mod: CPTII,S$GLB,, | Performed by: UROLOGY

## 2024-03-14 RX ORDER — CYANOCOBALAMIN 1000 UG/ML
1000 INJECTION, SOLUTION INTRAMUSCULAR; SUBCUTANEOUS
COMMUNITY
Start: 2024-01-18

## 2024-03-14 NOTE — PROGRESS NOTES
Subjective:       Patient ID: Hudson Tavares is a 68 y.o. male.    Chief Complaint: Follow-up (F/u from surgery)    HPI    68-year-old with new diagnosis of bladder cancer.  He underwent TURBT several weeks ago.  He did have urinary retention postop.  He is seen today with his family.  We discussed the pathology results.  TA urothelial carcinoma.    BLADDER TUMOR, TRANSURETHRAL RESECTION:   Noninvasive low-grade papillary urothelial carcinoma.   The lamina propria is uninvolved.    Muscularis propria is present for evaluation.     Review of Systems   Constitutional:  Negative for fever.   Genitourinary:  Negative for dysuria and hematuria.       Objective:      Physical Exam  Vitals reviewed.   Constitutional:       Appearance: He is well-developed.   Pulmonary:      Effort: Pulmonary effort is normal.   Skin:     Findings: No rash.   Neurological:      Mental Status: He is alert and oriented to person, place, and time.         Assessment:       1. Malignant neoplasm of urinary bladder, unspecified site        Plan:       Malignant neoplasm of urinary bladder, unspecified site  -     Cystoscopy; Future      Follow-up 3 months for cystoscopy.

## 2024-04-03 ENCOUNTER — PATIENT MESSAGE (OUTPATIENT)
Dept: CARDIOLOGY | Facility: CLINIC | Age: 69
End: 2024-04-03
Payer: MEDICARE

## 2024-04-04 ENCOUNTER — CLINICAL SUPPORT (OUTPATIENT)
Dept: PULMONOLOGY | Facility: CLINIC | Age: 69
End: 2024-04-04
Payer: MEDICARE

## 2024-04-04 ENCOUNTER — OFFICE VISIT (OUTPATIENT)
Dept: PULMONOLOGY | Facility: CLINIC | Age: 69
End: 2024-04-04
Payer: MEDICARE

## 2024-04-04 ENCOUNTER — HOSPITAL ENCOUNTER (OUTPATIENT)
Dept: RADIOLOGY | Facility: HOSPITAL | Age: 69
Discharge: HOME | End: 2024-04-04
Attending: PHYSICIAN ASSISTANT
Payer: MEDICARE

## 2024-04-04 ENCOUNTER — TELEPHONE (OUTPATIENT)
Dept: HEMATOLOGY/ONCOLOGY | Facility: CLINIC | Age: 69
End: 2024-04-04
Payer: MEDICARE

## 2024-04-04 VITALS
SYSTOLIC BLOOD PRESSURE: 130 MMHG | HEART RATE: 97 BPM | DIASTOLIC BLOOD PRESSURE: 68 MMHG | OXYGEN SATURATION: 96 % | WEIGHT: 222.88 LBS | HEIGHT: 69 IN | RESPIRATION RATE: 18 BRPM | BODY MASS INDEX: 33.01 KG/M2

## 2024-04-04 VITALS — BODY MASS INDEX: 33.01 KG/M2 | HEIGHT: 69 IN | WEIGHT: 222.88 LBS

## 2024-04-04 DIAGNOSIS — R06.02 SOB (SHORTNESS OF BREATH): ICD-10-CM

## 2024-04-04 DIAGNOSIS — R06.02 SOB (SHORTNESS OF BREATH): Primary | ICD-10-CM

## 2024-04-04 DIAGNOSIS — J41.0 SIMPLE CHRONIC BRONCHITIS: ICD-10-CM

## 2024-04-04 DIAGNOSIS — I25.10 CORONARY ARTERY DISEASE INVOLVING NATIVE CORONARY ARTERY OF NATIVE HEART WITHOUT ANGINA PECTORIS: ICD-10-CM

## 2024-04-04 DIAGNOSIS — D61.818 PANCYTOPENIA: Primary | ICD-10-CM

## 2024-04-04 DIAGNOSIS — Z79.01 ANTICOAGULATED ON COUMADIN: ICD-10-CM

## 2024-04-04 DIAGNOSIS — I50.32 CHRONIC DIASTOLIC CONGESTIVE HEART FAILURE: ICD-10-CM

## 2024-04-04 LAB
BRPFT: NORMAL
DLCO ADJ PRE: 15.29 ML/(MIN*MMHG)
DLCO SINGLE BREATH LLN: 19.52
DLCO SINGLE BREATH PRE REF: 52.8 %
DLCO SINGLE BREATH REF: 26.45
DLCOC SBVA LLN: 2.64
DLCOC SBVA PRE REF: 109 %
DLCOC SBVA REF: 3.83
DLCOC SINGLE BREATH LLN: 19.52
DLCOC SINGLE BREATH PRE REF: 57.8 %
DLCOC SINGLE BREATH REF: 26.45
DLCOVA LLN: 2.64
DLCOVA PRE REF: 99.7 %
DLCOVA PRE: 3.82 ML/(MIN*MMHG*L)
DLCOVA REF: 3.83
DLVAADJ PRE: 4.18 ML/(MIN*MMHG*L)
ERV LLN: -16448.95
ERV PRE REF: 66.5 %
ERV REF: 1.05
FEF 25 75 LLN: 1.07
FEF 25 75 PRE REF: 117.4 %
FEF 25 75 REF: 2.43
FEV1 FVC LLN: 63
FEV1 FVC PRE REF: 105.5 %
FEV1 FVC REF: 76
FEV1 LLN: 2.28
FEV1 PRE REF: 74.5 %
FEV1 REF: 3.14
FRCPLETH LLN: 2.64
FRCPLETH PREREF: 103.1 %
FRCPLETH REF: 3.63
FVC LLN: 3.07
FVC PRE REF: 70.4 %
FVC REF: 4.13
IVC PRE: 2.38 L
IVC SINGLE BREATH LLN: 3.07
IVC SINGLE BREATH PRE REF: 57.6 %
IVC SINGLE BREATH REF: 4.13
MVV LLN: 103
MVV PRE REF: 82.4 %
MVV REF: 121
PEF LLN: 5.97
PEF PRE REF: 88.2 %
PEF REF: 8.21
PRE DLCO: 13.98 ML/(MIN*MMHG)
PRE ERV: 0.69 L
PRE FEF 25 75: 2.85 L/S
PRE FET 100: 9.65 SEC
PRE FEV1 FVC: 80.48 %
PRE FEV1: 2.34 L
PRE FRC PL: 3.74 L
PRE FVC: 2.9 L
PRE MVV: 100 L/MIN
PRE PEF: 7.24 L/S
PRE RV: 2.74 L
PRE TLC: 5.65 L
RAW LLN: 3.06
RAW PRE REF: 121 %
RAW PRE: 3.7 CMH2O*S/L
RAW REF: 3.06
RV LLN: 1.91
RV PRE REF: 106.3 %
RV REF: 2.58
RVTLC LLN: 32
RVTLC PRE REF: 118.9 %
RVTLC PRE: 48.57 %
RVTLC REF: 41
TLC LLN: 5.75
TLC PRE REF: 81.8 %
TLC REF: 6.9
VA PRE: 3.66 L
VA SINGLE BREATH LLN: 6.75
VA SINGLE BREATH PRE REF: 54.2 %
VA SINGLE BREATH REF: 6.75
VC LLN: 3.07
VC PRE REF: 70.4 %
VC PRE: 2.9 L
VC REF: 4.13
VTGRAWPRE: 2.87 L

## 2024-04-04 PROCEDURE — 3075F SYST BP GE 130 - 139MM HG: CPT | Mod: CPTII,S$GLB,, | Performed by: PHYSICIAN ASSISTANT

## 2024-04-04 PROCEDURE — 99999 PR PBB SHADOW E&M-EST. PATIENT-LVL I: CPT | Mod: PBBFAC,,,

## 2024-04-04 PROCEDURE — 99215 OFFICE O/P EST HI 40 MIN: CPT | Mod: 25,S$GLB,, | Performed by: PHYSICIAN ASSISTANT

## 2024-04-04 PROCEDURE — 71046 X-RAY EXAM CHEST 2 VIEWS: CPT | Mod: TC

## 2024-04-04 PROCEDURE — 99999 PR PBB SHADOW E&M-EST. PATIENT-LVL V: CPT | Mod: PBBFAC,,, | Performed by: PHYSICIAN ASSISTANT

## 2024-04-04 PROCEDURE — 94729 DIFFUSING CAPACITY: CPT | Mod: S$GLB,,, | Performed by: INTERNAL MEDICINE

## 2024-04-04 PROCEDURE — 3078F DIAST BP <80 MM HG: CPT | Mod: CPTII,S$GLB,, | Performed by: PHYSICIAN ASSISTANT

## 2024-04-04 PROCEDURE — 94618 PULMONARY STRESS TESTING: CPT | Mod: S$GLB,,, | Performed by: INTERNAL MEDICINE

## 2024-04-04 PROCEDURE — 3288F FALL RISK ASSESSMENT DOCD: CPT | Mod: CPTII,S$GLB,, | Performed by: PHYSICIAN ASSISTANT

## 2024-04-04 PROCEDURE — 94726 PLETHYSMOGRAPHY LUNG VOLUMES: CPT | Mod: S$GLB,,, | Performed by: INTERNAL MEDICINE

## 2024-04-04 PROCEDURE — 71046 X-RAY EXAM CHEST 2 VIEWS: CPT | Mod: 26,,, | Performed by: RADIOLOGY

## 2024-04-04 PROCEDURE — 3008F BODY MASS INDEX DOCD: CPT | Mod: CPTII,S$GLB,, | Performed by: PHYSICIAN ASSISTANT

## 2024-04-04 PROCEDURE — 1159F MED LIST DOCD IN RCRD: CPT | Mod: CPTII,S$GLB,, | Performed by: PHYSICIAN ASSISTANT

## 2024-04-04 PROCEDURE — 1101F PT FALLS ASSESS-DOCD LE1/YR: CPT | Mod: CPTII,S$GLB,, | Performed by: PHYSICIAN ASSISTANT

## 2024-04-04 PROCEDURE — 94010 BREATHING CAPACITY TEST: CPT | Mod: 59,S$GLB,, | Performed by: INTERNAL MEDICINE

## 2024-04-04 PROCEDURE — 1160F RVW MEDS BY RX/DR IN RCRD: CPT | Mod: CPTII,S$GLB,, | Performed by: PHYSICIAN ASSISTANT

## 2024-04-04 RX ORDER — IPRATROPIUM BROMIDE 0.5 MG/2.5ML
SOLUTION RESPIRATORY (INHALATION)
COMMUNITY
Start: 2024-04-03 | End: 2024-05-09

## 2024-04-04 RX ORDER — DEXAMETHASONE 4 MG/1
TABLET ORAL
COMMUNITY
Start: 2024-04-03 | End: 2024-05-09

## 2024-04-04 RX ORDER — DOXYCYCLINE HYCLATE 100 MG
100 TABLET ORAL 2 TIMES DAILY
COMMUNITY
Start: 2024-03-25 | End: 2024-04-04

## 2024-04-04 RX ORDER — LEVOFLOXACIN 500 MG/1
TABLET, FILM COATED ORAL
COMMUNITY
Start: 2024-04-03 | End: 2024-05-09

## 2024-04-04 NOTE — TELEPHONE ENCOUNTER
"LVM for daughter, Mara. Contact information given - asked to return call.      Chart reviewed. Added self to care team. Mr. Tavares has been under the care of Dr. Bray for dx noninvasive low-grade papillary urothelial carcinoma. S/p cystoscopy with bladder biopsy/fulguration on 2/27. Has f/u with Dr. Bray in June.     Spoke with Mr. Tavares. He states he was admitted at [facility] in [place] with a dx dyspnea. He states he has a f/u apt with a pulmonologist today.     Inquired about hematology referral. He states: "My iron and red blood cells are low." He asked for me to speak with daughter, Mara, regarding this. Informed him that I awaiting a return call from her.     Will schedule next new pt apt with benign heme upon return call.     ----- Message from Stacia Nguyen, Patient Care Assistant sent at 4/3/2024  2:18 PM CDT -----  Type: Needs Medical Advice  Who Called:  Mara ( daughter )   Best Call Back Number: [phone]   Additional Information:  Mara states her dad dustin is being discharged  from hospital and they say he needs as hematologist appointment ,  Mara states they will be in [place]  area 4/4 if available please call to further discuss , thank you .         "

## 2024-04-04 NOTE — NURSING
Oncology Navigation   Intake  Date of Diagnosis: 02/27/24  Cancer Type:   Initial Nurse Navigator Contact: 04/04/24  Date Worked: 04/04/24     Treatment  Current Status: Staging work-up    Surgical Oncologist: Dr. Modesto Bray  Type of Surgery: Cystoscopy, retrograde pyelogram, TURBT  Surgery Schedule Date: 02/27/24    Procedures: Biopsy; CT  Biopsy Schedule Date: 02/27/24  CT Schedule Date: 02/26/24 (A/P)    Support Systems: Children; Family members  Barriers of Care: Comorbidities  Comorbidities: Recent hospitalization d/t dyspnea, hx bladder cancer     Acuity  Surgical Procedure Complexity: 2  Treatment Tolerability: Has not started treatment yet/treatment fully completed and side effects resolved  Comorbidities in Medical History: 2  Hospitalization Within the Past Month: 1   Needed: 0  Support: 0  Verbalizes Financial Concerns: 0  Transportation: 0  History of noncompliance/frequent no shows and cancellations: 0  Verbalizes the need for more education: 1  Navigation Acuity: 8     Follow Up  No follow-ups on file.     Spoke with daughter, Faina. She is requesting a new patient apt for Mr. Tavares d/t recent dx anemia in the ER. She would like to self-refer.     Scheduled first new pt benign heme apt with Dr. Kirkland on 6/26. Informed Faina that Dr. Kirkland can follow Mr. Tavares for any future oncology needs r/t dx bladder cancer. Apt date/time/location reviewed. Encouraged her to contact me with any further needs.

## 2024-04-04 NOTE — PROCEDURES
"[facility] - Pulmonary Function  Six Minute Walk     SUMMARY     Ordering Provider: SHEKHAR Gaitan   Interpreting Provider: Dr. Jaeger  Performing nurse/tech/RT: ELINA Lake RRT  Diagnosis: Shortness of Breath  Height: 5' 9" (175.3 cm)  Weight: 101.1 kg (222 lb 14.2 oz)  BMI (Calculated): 32.9   Patient Race:             Phase Oxygen Assessment Supplemental O2 Heart   Rate Blood Pressure Flaco Dyspnea Scale Rating   Resting 96 % Room Air 100 bpm 136/64 0   Exercise        Minute        1 96 % Room Air 123 bpm     2 93 % Room Air 164 bpm     3 93 % Room Air 153 bpm     4 92 % Room Air 163 bpm     5 92 % Room Air 158 bpm     6  91 % Room Air 154 bpm 153/70 5-6   Recovery        Minute        1 92 % Room Air 115 bpm     2 97 % Room Air 111 bpm     3 98 % Room Air 107 bpm     4 99 % Room Air 99 bpm 145/67 3     Six Minute Walk Summary  6MWT Status: completed without stopping  Patient Reported: Dyspnea, Lightheadedness     Interpretation:  Did the patient stop or pause?: No            Total Time Walked (Calculated): 360 seconds  Final Partial Lap Distance (feet): 0 feet  Total Distance Meters (Calculated): 426.72 meters  Predicted Distance Meters (Calculated): 493.71 meters  Percentage of Predicted (Calculated): 86.43  Peak VO2 (Calculated): 16.78  Mets: 4.79  Has The Patient Had a Previous Six Minute Walk Test?: No       Previous 6MWT Results  Has The Patient Had a Previous Six Minute Walk Test?: No         CLINICAL INTERPRETATION:  Six minute walk distance is 426.72m (86.43 % predicted) with somewhat heavy dyspnea.  During exercise, there was no significant desaturation while breathing room air.  Blood pressure remained stable and Heart rate increased significantly with walking.  The patient reported non-pulmonary symptoms during exercise.  The patient did complete the study, walking 360 seconds of the 360 second test.  Based upon age and body mass index, exercise capacity is normal.      [] Mild exercise-induced " hypoxemia described as an arterial oxygen saturation of 93-95% (or 3-4% less than at rest)  [x]  Moderate exercise-induced hypoxemia as 89-93%  []  Severe exercise induced hypoxemia as < 89% O2 saturation.  Medicare Criteria for oxygen prescription comments:   []  When arterial oxygen saturation is at or below 88% during exercise (severe exercise induced hypoxemia) then the patient falls under Medicare Group 1 criteria for supplemental oxygen

## 2024-04-04 NOTE — PROGRESS NOTES
Subjective:       Patient ID: Hudson Tavares is a 69 y.o. male.    Chief Complaint: SOB      4/4/2024  New patient here for SOB  Went to ER yesterday for SOB in [place] where he lives  Brought discharge papers but no lab or imaging results  Diagnosed with plural effusion, pancytopenia  Has history of CAD, s/p CABG 12 years ago, Afib on coumadin  Follows with coumadin clinic  Started cialis 2 months ago; he says SOB and leg edema started after this-  SOB worsened over the past month  Had chest tightness, SOB with his usual exertion (walking 100 yards); PCP told him to go to ER  Started nebulizer machine, prednisone, albuterol - relief with nebulizer; feeling better today  No cough    Taking iron supplements  He says Iron and platelets were low in ER and told he needs to see hematology  Also discharged on levaquin in ER as his WBC was low    Has seasonal allgeries  Takes Trelegy daily  Last PFT was many years ago, no history of COPD  Quit smoking 2016  Smoked 30 years 2 packs a day    History of bladder cancer, follows every 3 months with urology  S/p TURBT a couple weeks ago  Noninvasive low-grade papillary urothelial carcinoma     Had CT at ER - says there was fluid in lungs, no nodules or pneumonia  Takes lasix 20mg once in morning  He has called his cardiologist to set up follow up    Occupational history of Construction, carpentry       Immunization History   Administered Date(s) Administered    COVID-19, MRNA, LN-S, PF (Pfizer) (Purple Cap) 03/05/2021, 03/26/2021, 10/19/2021    Influenza 11/23/2015    Influenza (FLUAD) - Quadrivalent - Adjuvanted - PF *Preferred* (65+) 10/26/2020, 10/07/2021    Influenza - Quadrivalent 10/27/2016    Influenza - Quadrivalent - PF *Preferred* (6 months and older) 09/13/2017, 10/24/2018, 10/28/2019    Pneumococcal Conjugate - 13 Valent 10/28/2019    Tdap 09/07/2020      Tobacco Use: Medium Risk (4/4/2024)    Patient History     Smoking Tobacco Use: Former     Smokeless Tobacco Use:  Never     Passive Exposure: Not on file      Past Medical History:   Diagnosis Date    Adenomatous colon polyp     Alcohol abuse 7/9/2015    Anticoagulant long-term use     coumadin    Atrial fibrillation 7/9/2015    CAD (coronary artery disease), native coronary artery 5/3/2016    Degenerative joint disease 7/9/2015    Encounter for blood transfusion     Enlarged liver     Essential hypertension 7/9/2015    Hep C w/o coma, chronic     treated with Shaggy    Hiatal hernia     patient states hernia has resolved    Hx of bladder infections     finished cipro 4/29/16    S/P CABG (coronary artery bypass graft) 5/17/2016    LIMA to LAD, SVG to OM, SVG to diagonal, SVG to right PDA, Maze, left atrial appendage resection (5/16)    Spleen enlarged     Thrombocytopenia 5/5/2016    Tobacco abuse 7/9/2015      Current Outpatient Medications on File Prior to Visit   Medication Sig Dispense Refill    albuterol (PROVENTIL/VENTOLIN HFA) 90 mcg/actuation inhaler       ALPRAZolam (XANAX) 0.5 MG tablet       amLODIPine (NORVASC) 5 MG tablet Take 5 mg by mouth 2 (two) times daily.      aspirin (ECOTRIN) 81 MG EC tablet Take 81 mg by mouth once daily. Use prior to arrival for cardioversion      cyanocobalamin 1,000 mcg/mL injection Inject 1,000 mcg into the muscle.      dexAMETHasone (DECADRON) 4 MG Tab       famotidine (PEPCID) 20 MG tablet Take 20 mg by mouth nightly.       ipratropium (ATROVENT) 0.02 % nebulizer solution Take by nebulization.      levoFLOXacin (LEVAQUIN) 500 MG tablet       metoprolol succinate (TOPROL-XL) 50 MG 24 hr tablet Take 1 tablet (50 mg total) by mouth once daily. 90 tablet 3    POLYETHYLENE GLYCOL 3350 (MIRALAX ORAL) Take 17 g by mouth daily as needed (constipation).       spironolactone (ALDACTONE) 25 MG tablet Take 25 mg by mouth.      tadalafiL (CIALIS) 5 MG tablet Take 5 mg by mouth once daily.      warfarin (COUMADIN) 1 MG tablet Take 1 tablet (1 mg total) by mouth Daily. 90 tablet 1    warfarin  "(COUMADIN) 2 MG tablet TAKE 1MG-2MG DAILY AS DIRECTED 90 tablet 3    azelastine (ASTELIN) 137 mcg (0.1 %) nasal spray SMARTSIG:Both Nares      doxycycline (VIBRA-TABS) 100 MG tablet Take 100 mg by mouth 2 (two) times daily.       Current Facility-Administered Medications on File Prior to Visit   Medication Dose Route Frequency Provider Last Rate Last Admin    diphenhydrAMINE injection 12.5 mg  12.5 mg Intravenous Q6H PRN Stefano Sherwood MD        electrolyte-S (ISOLYTE)   Intravenous Continuous Stefano Sherwood MD        fentaNYL 50 mcg/mL injection 25 mcg  25 mcg Intravenous Q5 Min PRN Stefano Sherwood MD        HYDROmorphone (PF) injection 0.2 mg  0.2 mg Intravenous Q5 Min PRN Stefano Sherwood MD        lactated ringers infusion   Intravenous Continuous Stefano Sherwood MD 10 mL/hr at 02/27/24 0824 New Bag at 02/27/24 0824    LIDOcaine (PF) 10 mg/ml (1%) injection 10 mg  1 mL Intradermal Once Stefano Sherwood MD        ondansetron injection 4 mg  4 mg Intravenous Daily PRN Stefano Sherwood MD        oxyCODONE immediate release tablet 5 mg  5 mg Oral Q3H PRN Stefano Sherwood MD   5 mg at 02/27/24 1030        Review of Systems   Constitutional:  Negative for fever, weight loss, appetite change, fatigue and weakness.   HENT:  Negative for postnasal drip, rhinorrhea, sinus pressure, trouble swallowing and congestion.    Respiratory:  Positive for dyspnea on extertion. Negative for cough, sputum production, choking, chest tightness, shortness of breath and wheezing.    Cardiovascular:  Positive for leg swelling. Negative for chest pain.   Musculoskeletal:  Negative for joint swelling.   Gastrointestinal:  Negative for nausea, vomiting and abdominal pain.   Neurological:  Negative for dizziness, weakness and headaches.   All other systems reviewed and are negative.      Objective:       Vitals:    04/04/24 1250   BP: 130/68   Pulse: 97   Resp: 18   SpO2: 96%   Weight: 101.1 kg (222 lb 14.2 oz)   Height: 5' 9" (1.753 m) "       Physical Exam   Constitutional: He is oriented to person, place, and time. He appears well-developed and well-nourished. No distress.   HENT:   Head: Normocephalic.   Mouth/Throat: Oropharynx is clear and moist.   Cardiovascular: Normal rate and regular rhythm.   Pulmonary/Chest: Effort normal. No respiratory distress. He has no wheezes. He has no rhonchi. He has no rales.   Musculoskeletal:         General: Edema (trace bilateral) present.      Cervical back: Normal range of motion and neck supple.   Neurological: He is alert and oriented to person, place, and time. Gait normal.   Skin: Skin is warm and dry.   Psychiatric: He has a normal mood and affect.   Vitals reviewed.    Personal Diagnostic Review    X-Ray Chest PA And Lateral  Narrative: EXAM:  XR CHEST PA AND LATERAL    CLINICAL HISTORY:  [R06.02]-Shortness of breath.    TECHNIQUE: PA and lateral chest x-ray performed.    FINDINGS: Comparison is made with the most recent prior chest x-ray available. Prior sternotomy is noted.  Stable aortic atherosclerosis.  The cardiomediastinal silhouette is within normal limits. The lungs appear clear of active disease. No acute infiltrate, lung mass, pleural effusion, pneumothorax or other acute pulmonary disease identified. No acute osseous abnormality is identified.  Stable ACDF and left shoulder fixation screw.  Impression:  No acute cardiopulmonary disease or interval change.    Finalized on: 4/4/2024 2:09 PM By:  Sagar Mendoza MD  [facility]# [id number]      2024-04-04 14:11:[id number]    [facility]    Chest X Ray stable today  PFT normal spirometry, mild restriction  6mwd No significant desaturations requiring oxygen        Assessment/Plan:       Chest X Ray stable today  PFT normal spirometry, mild restriction  6mwd No significant desaturations requiring oxygen  Continue prednisone and levaquin as prescribed  Albuterol neb prn  Continue Trelegy daily  Follow up hematology for pancytopenia  We will request records from ER in [place]  for lab work and imaging reports  Patient will also bring copy of CT to next visit  Also follow up cardiology outpatient this week    Problem List Items Addressed This Visit          Pulmonary    Simple chronic bronchitis       Cardiac/Vascular    Coronary artery disease involving native coronary artery of native heart without angina pectoris    Chronic diastolic congestive heart failure       Hematology    Anticoagulated on Coumadin     Other Visit Diagnoses       Pancytopenia    -  Primary    Relevant Orders    Ambulatory referral/consult to Hematology / Oncology    SOB (shortness of breath)        Relevant Orders    Complete PFT with bronchodilator (Completed)    Stress test, pulmonary (Completed)    Alpha-1-Antitrypsin    ALPHA 1 ANTITRYPSIN PHENOTYPE          Follow up 3 months.    Discussed diagnosis, its evaluation, treatment and usual course. All questions answered.    Patient verbalized understanding of plan and left in no acute distress    Thank you for the courtesy of participating in the care of this patient    Elizabeth G Blough, PA-C [facility]    Total time spent in the care of this patient     New  3  [] 30-44 min  4  [] 45-59 min  5  [x] 60-74 min     Established  3  [] 20-29 min  4  [] 30-39 min  5  [] 40-54 min        [x] preparing to see the patient (eg, review of tests)  [x] obtaining and/or reviewing separately obtained history  [x] performing a medically appropriate examination and/or evaluation  [x] counseling and educating the patient/family/caregiver  [x] ordering medications, tests, or procedures  [] referring and communicating with other health care professionals (when not separately reported)  [x] documenting clinical information in the electronic or other health record  [x] independently interpreting results (not separately reported) and communicating results to the  patient/ family/caregiver  [x] care coordination (not separately reported)

## 2024-04-08 ENCOUNTER — TELEPHONE (OUTPATIENT)
Dept: HEMATOLOGY/ONCOLOGY | Facility: CLINIC | Age: 69
End: 2024-04-08
Payer: MEDICARE

## 2024-06-20 ENCOUNTER — PROCEDURE VISIT (OUTPATIENT)
Dept: UROLOGY | Facility: CLINIC | Age: 69
End: 2024-06-20
Payer: MEDICARE

## 2024-06-20 VITALS — BODY MASS INDEX: 33.01 KG/M2 | HEIGHT: 69 IN | WEIGHT: 222.88 LBS

## 2024-06-20 DIAGNOSIS — Z85.51 HISTORY OF BLADDER CANCER: ICD-10-CM

## 2024-06-20 PROCEDURE — 52000 CYSTOURETHROSCOPY: CPT | Mod: S$GLB,,, | Performed by: UROLOGY

## 2024-06-20 NOTE — PROCEDURES
Cystoscopy    Date/Time: 6/20/2024 8:30 AM    Performed by: STORMY Bray MD  Authorized by: STORMY Bray MD    Consent Done?:  Yes (Written)  Timeout: prior to procedure the correct patient, procedure, and site was verified    Prep: patient was prepped and draped in usual sterile fashion    Anesthesia:  Lidocaine jelly  Indications: history bladder cancer    Position:  Supine  Anesthesia:  Lidocaine jelly  Patient sedated?: No    Preparation: Patient was prepped and draped in usual sterile fashion    Scope type:  Flexible cystoscope   patient tolerated the procedure well with no immediate complications    Blood Loss:  None    Indications:  69-year-old with a history of noninvasive low-grade urothelial carcinoma.  He underwent TURBT in February 20, 2024.  He is here for cystoscopy.    The flexible cystoscope was placed into the urethra and carefully advanced into the bladder.  A careful cystoscopic exam was then performed.  The entire bladder mucosa was systematically visualized.  Findings include moderate bladder wall trabeculation.  TUR scar was noted on the left side of the bladder wall.  Otherwise,  there were no lesions, masses foreign bodies or stones.   Each ureteral orifices were visualized and both had clear efflux of urine.  On retroflexion there was a small intravesical gland.  The cystoscope was then removed and I examined the entire length of the urethra.  There was moderate trilobar enlargement of the prostate otherwise the urethra appeared normal.  He tolerated the procedure well.  There were no complications    Impression:  Normal cystoscopy.  No evidence of recurrence.    Plan:  Follow-up 3 months for repeat cystoscopy

## 2024-06-24 NOTE — PROGRESS NOTES
[facility] - NEW PATIENT VISIT    Reason for visit: Establish Care     Best Contact Phone Number(s): There are no phone numbers on file.     HPI: Hudson Tavares is a 69 y.o. male with pancytopenia who presents to establish care. Patient has history of HCC sp microwave ablation and Hep C cirrhosis. Lost to follow up after wife's illness and recent death due to cancer. No abdominal pain or GI bleeding, does have epistaxis. [place]     Patient presents alone, children Mara and Roberto on the phone.  ECOG PS is 0.  History has been obtained by chart review and discussion with the patient.    ROS:   A complete 12-point review of systems was reviewed and is negative except as mentioned above.     Past Medical History:   Past Medical History:   Diagnosis Date    Adenomatous colon polyp     Alcohol abuse 7/9/2015    Anticoagulant long-term use     coumadin    Atrial fibrillation 7/9/2015    CAD (coronary artery disease), native coronary artery 5/3/2016    Degenerative joint disease 7/9/2015    Encounter for blood transfusion     Enlarged liver     Essential hypertension 7/9/2015    Hep C w/o coma, chronic     treated with Harvoni    Hiatal hernia     patient states hernia has resolved    Hx of bladder infections     finished cipro 4/29/16    S/P CABG (coronary artery bypass graft) 5/17/2016    LIMA to LAD, SVG to OM, SVG to diagonal, SVG to right PDA, Maze, left atrial appendage resection (5/16)    Spleen enlarged     Thrombocytopenia 5/5/2016    Tobacco abuse 7/9/2015        Past Surgical History:   Past Surgical History:   Procedure Laterality Date    CARDIAC SURGERY  05/04/2016    4 vessel CABG    CERVICAL FUSION  2004    COLONOSCOPY  12/03/2018    [facility]    CYSTOSCOPY W/ RETROGRADES N/A 2/27/2024    Procedure: CYSTOSCOPY, WITH RETROGRADE PYELOGRAM;  Surgeon: STORMY Bray MD;  Location: [facility] OR;  Service: Urology;  Laterality: N/A;    CYSTOSCOPY WITH BIOPSY  "OF BLADDER N/A 2024    Procedure: CYSTOSCOPY, WITH BLADDER BIOPSY, WITH FULGURATION IF INDICATED;  Surgeon: STORMY Bray MD;  Location: [facility] OR;  Service: Urology;  Laterality: N/A;    CYSTOURETHROSCOPY, WITH THROMBUS REMOVAL N/A 2024    Procedure: CYSTOURETHROSCOPY, WITH THROMBUS REMOVAL;  Surgeon: STORMY Bray MD;  Location: [facility] OR;  Service: Urology;  Laterality: N/A;    JOINT REPLACEMENT  TOTAL RIGHT KNEE     OPEN ANTERIOR SHOULDER RECONSTRUCTION Left     RADIOFREQUENCY ABLATION N/A 2019    Procedure: Radiofrequency Ablation;  Surgeon: Anayeli Surgeon;  Location: [facility];  Service: Anesthesiology;  Laterality: N/A;  Room 173/[place]    TIBIA FRACTURE SURGERY Right 1971        Family History:   Family History   Problem Relation Name Age of Onset    Heart disease Mother          congestive heart failure    Diabetes Mother      Hypertension Mother      Heart disease Sister          heart murmur    Atrial fibrillation Sister      Cancer Sister          breast    Cancer Father  60        colon    Chronic back pain Brother      Atrial fibrillation Sister      Heart disease Sister      Collagen disease Neg Hx          Social History:   Social History     Tobacco Use    Smoking status: Former     Current packs/day: 0.00     Average packs/day: 0.3 packs/day for 30.0 years (7.5 ttl pk-yrs)     Types: Cigarettes     Start date: 1986     Quit date: 2016     Years since quittin.1    Smokeless tobacco: Never   Substance Use Topics    Alcohol use: No     Alcohol/week: 0.0 standard drinks of alcohol     Comment: last drink 2015        I have reviewed and updated the patient's past medical, surgical, family and social histories.    Allergies:   Review of patient's allergies indicates:   Allergen Reactions    Cilostazol Other (See Comments)     Colon bleeding    Stadol [butorphanol tartrate] Hallucinations     "I saw pink elephants"    Keflex [cephalexin] Other (See Comments)     Was using " "500mg TID oral and had a strong metallic taste in his mouth and could not function.  Can use IV cephalosporin.    Morphine Other (See Comments)     "makes him drunk", Altered Mental Status    Sulfa (sulfonamide antibiotics) Other (See Comments), Hives and Nausea And Vomiting     Since childhood; does not know reaction  Since childhood; does not know reaction    Finasteride Other (See Comments)        Medications:   Current Outpatient Medications   Medication Sig Dispense Refill    ALPRAZolam (XANAX) 0.5 MG tablet       aspirin (ECOTRIN) 81 MG EC tablet Take 81 mg by mouth once daily. Use prior to arrival for cardioversion      famotidine (PEPCID) 20 MG tablet Take 20 mg by mouth nightly.       losartan (COZAAR) 50 MG tablet       metoprolol succinate (TOPROL-XL) 50 MG 24 hr tablet Take 1 tablet (50 mg total) by mouth once daily. 90 tablet 3    warfarin (COUMADIN) 1 MG tablet Take 1 tablet (1 mg total) by mouth Daily. 90 tablet 1    warfarin (COUMADIN) 2 MG tablet TAKE 1MG-2MG DAILY AS DIRECTED 90 tablet 3     No current facility-administered medications for this visit.     Facility-Administered Medications Ordered in Other Visits   Medication Dose Route Frequency Provider Last Rate Last Admin    diphenhydrAMINE injection 12.5 mg  12.5 mg Intravenous Q6H PRN Stefano Sherwood MD        electrolyte-S (ISOLYTE)   Intravenous Continuous Stefano Sherwood MD        fentaNYL 50 mcg/mL injection 25 mcg  25 mcg Intravenous Q5 Min PRN Stefano Sherwood MD        HYDROmorphone (PF) injection 0.2 mg  0.2 mg Intravenous Q5 Min PRN Stefano Sherwood MD        lactated ringers infusion   Intravenous Continuous Stefano Sherwood MD 10 mL/hr at 02/27/24 0824 New Bag at 02/27/24 0824    LIDOcaine (PF) 10 mg/ml (1%) injection 10 mg  1 mL Intradermal Once Stefano Sherwood MD        ondansetron injection 4 mg  4 mg Intravenous Daily PRN Stefano Sherwood MD        oxyCODONE immediate release tablet 5 mg  5 mg Oral Q3H PRN Stefano Sherwood MD " "  5 mg at 02/27/24 1030        Physical Exam:   BP (!) 156/76 (BP Location: Left arm, Patient Position: Sitting, BP Method: Medium (Automatic))   Pulse 87   Temp 97.8 °F (36.6 °C) (Temporal)   Resp 17   Ht 5' 9" (1.753 m)   Wt 98.8 kg (217 lb 13 oz)   SpO2 99%   BMI 32.17 kg/m²                Physical Exam  Constitutional:       Appearance: Normal appearance.   HENT:      Head: Normocephalic and atraumatic.      Mouth/Throat:      Mouth: Mucous membranes are moist.   Eyes:      Extraocular Movements: Extraocular movements intact.      Pupils: Pupils are equal, round, and reactive to light.   Cardiovascular:      Rate and Rhythm: Normal rate. Rhythm irregular.      Pulses: Normal pulses.      Heart sounds: No murmur heard.  Pulmonary:      Effort: Pulmonary effort is normal. No respiratory distress.      Breath sounds: Normal breath sounds.   Abdominal:      General: Abdomen is flat. There is no distension.      Palpations: Abdomen is soft.      Tenderness: There is no abdominal tenderness.   Musculoskeletal:         General: No swelling or tenderness. Normal range of motion.      Cervical back: Normal range of motion. No rigidity.   Skin:     General: Skin is warm and dry.      Coloration: Skin is not jaundiced.   Neurological:      General: No focal deficit present.      Mental Status: He is alert and oriented to person, place, and time.   Psychiatric:         Mood and Affect: Mood normal.         Behavior: Behavior normal.           Labs:   Lab Results   Component Value Date    WBC 4.99 05/21/2023    HGB 14.6 05/21/2023    HCT 42.5 05/21/2023    MCV 94 05/21/2023    PLT 80 (L) 05/21/2023       Lab Results   Component Value Date     05/21/2023    K 3.9 05/21/2023     05/21/2023    CO2 27 05/21/2023    BUN 11 05/21/2023    CREATININE 0.65 05/21/2023    ALBUMIN 5.0 05/21/2023    BILITOT 2.2 (H) 05/21/2023    ALKPHOS 75 05/21/2023    AST 43 05/21/2023    ALT 26 05/21/2023       Imaging:   X-Ray " Chest PA And Lateral  Narrative: EXAM:  XR CHEST PA AND LATERAL    CLINICAL HISTORY:  [R06.02]-Shortness of breath.    TECHNIQUE: PA and lateral chest x-ray performed.    FINDINGS: Comparison is made with the most recent prior chest x-ray available. Prior sternotomy is noted.  Stable aortic atherosclerosis.  The cardiomediastinal silhouette is within normal limits. The lungs appear clear of active disease. No acute infiltrate, lung mass, pleural effusion, pneumothorax or other acute pulmonary disease identified. No acute osseous abnormality is identified.  Stable ACDF and left shoulder fixation screw.  Impression:  No acute cardiopulmonary disease or interval change.    Finalized on: 4/4/2024 2:09 PM By:  Sagar Mendoza MD  [facility]# [id number]      2024-04-04 14:11:27.213    [facility]              Assessment:       1. Thrombocytopenia    2. Pancytopenia    3. Other abnormal tumor markers    4. HCC (hepatocellular carcinoma)    5. Malignant neoplasm of overlapping sites of bladder    6. Nutritional anemia    7. Compensated HCV cirrhosis    8. Longstanding persistent atrial fibrillation          Plan:             # Thrombocytopenia - Suspect due to cirrhosis. Work-up HCC as below    # Anemia - reticulocytes, iron, ferritin. Epistaxis on Coumadin. Patient has never had colonoscopy due to adverse reaction to prep    # HCC - Microwave ablation 9/2019. Patient needs MRI abdomen and follow-up with hepatology   - labs today: cbc, cmp, afp, inr    # Non-Muscle Invasive Bladder Cancer, Ta - Follows with Dr. Bray, TURBT 2/2024. Cystoscopy 6/20/24 negative for recurrence.     # Atrial fibrillation - On Coumadin, message cards to consider DOAC     # Hep C Cirrhosis - Treated with SVR, patient declines liver transplant in the past. Hepatology referral     Follow up: after MRI      The above information has been reviewed with the patient and all questions have been answered to their apparent satisfaction.  They understand that they can  call the clinic with any questions.    Aleyda Kirkland MD  Hematology/Oncology  [facility]      Med Onc Chart Routing      Follow up with physician . After MRI   Follow up with DARLING    Infusion scheduling note    Injection scheduling note    Labs   Scheduling:  Preferred lab:  Lab interval:  All labs ordered today   Imaging MRI   MRI abdomen next available   Pharmacy appointment    Other referrals         Hepatology in  or [facility]             Answers submitted by the patient for this visit:  Review of Systems Questionnaire (Submitted on 6/20/2024)  appetite change : No  unexpected weight change: No  mouth sores: No  visual disturbance: Yes  cough: No  shortness of breath: No  chest pain: No  abdominal pain: No  diarrhea: No  frequency: Yes  back pain: No  rash: No  headaches: No  adenopathy: No  nervous/ anxious: No

## 2024-06-25 ENCOUNTER — OFFICE VISIT (OUTPATIENT)
Dept: HEMATOLOGY/ONCOLOGY | Facility: CLINIC | Age: 69
End: 2024-06-25
Payer: MEDICARE

## 2024-06-25 ENCOUNTER — PATIENT MESSAGE (OUTPATIENT)
Dept: FAMILY MEDICINE | Facility: CLINIC | Age: 69
End: 2024-06-25
Payer: MEDICARE

## 2024-06-25 ENCOUNTER — TELEPHONE (OUTPATIENT)
Dept: NEPHROLOGY | Facility: CLINIC | Age: 69
End: 2024-06-25
Payer: MEDICARE

## 2024-06-25 ENCOUNTER — LAB VISIT (OUTPATIENT)
Dept: LAB | Facility: HOSPITAL | Age: 69
End: 2024-06-25
Payer: MEDICARE

## 2024-06-25 ENCOUNTER — TELEPHONE (OUTPATIENT)
Dept: HEMATOLOGY/ONCOLOGY | Facility: CLINIC | Age: 69
End: 2024-06-25
Payer: MEDICARE

## 2024-06-25 VITALS
WEIGHT: 217.81 LBS | TEMPERATURE: 98 F | HEART RATE: 87 BPM | DIASTOLIC BLOOD PRESSURE: 76 MMHG | RESPIRATION RATE: 17 BRPM | HEIGHT: 69 IN | BODY MASS INDEX: 32.26 KG/M2 | SYSTOLIC BLOOD PRESSURE: 156 MMHG | OXYGEN SATURATION: 99 %

## 2024-06-25 DIAGNOSIS — D61.818 PANCYTOPENIA: ICD-10-CM

## 2024-06-25 DIAGNOSIS — D53.9 NUTRITIONAL ANEMIA: ICD-10-CM

## 2024-06-25 DIAGNOSIS — D69.6 THROMBOCYTOPENIA: ICD-10-CM

## 2024-06-25 DIAGNOSIS — R97.8 OTHER ABNORMAL TUMOR MARKERS: ICD-10-CM

## 2024-06-25 DIAGNOSIS — K74.69 COMPENSATED HCV CIRRHOSIS: ICD-10-CM

## 2024-06-25 DIAGNOSIS — D69.6 THROMBOCYTOPENIA: Primary | ICD-10-CM

## 2024-06-25 DIAGNOSIS — C67.8 MALIGNANT NEOPLASM OF OVERLAPPING SITES OF BLADDER: ICD-10-CM

## 2024-06-25 DIAGNOSIS — I48.11 LONGSTANDING PERSISTENT ATRIAL FIBRILLATION: ICD-10-CM

## 2024-06-25 DIAGNOSIS — C22.0 HCC (HEPATOCELLULAR CARCINOMA): ICD-10-CM

## 2024-06-25 DIAGNOSIS — B19.20 COMPENSATED HCV CIRRHOSIS: ICD-10-CM

## 2024-06-25 PROBLEM — D49.0 LIVER TUMOR: Status: RESOLVED | Noted: 2019-07-26 | Resolved: 2024-06-25

## 2024-06-25 LAB
AFP SERPL-MCNC: 21 NG/ML (ref 0–8.4)
ALBUMIN SERPL BCP-MCNC: 3.9 G/DL (ref 3.5–5.2)
ALP SERPL-CCNC: 91 U/L (ref 55–135)
ALT SERPL W/O P-5'-P-CCNC: 26 U/L (ref 10–44)
ANION GAP SERPL CALC-SCNC: 11 MMOL/L (ref 8–16)
AST SERPL-CCNC: 81 U/L (ref 10–40)
BASOPHILS # BLD AUTO: 0.04 K/UL (ref 0–0.2)
BASOPHILS NFR BLD: 0.8 % (ref 0–1.9)
BILIRUB SERPL-MCNC: 0.9 MG/DL (ref 0.1–1)
BUN SERPL-MCNC: 11 MG/DL (ref 8–23)
CALCIUM SERPL-MCNC: 9.7 MG/DL (ref 8.7–10.5)
CHLORIDE SERPL-SCNC: 105 MMOL/L (ref 95–110)
CO2 SERPL-SCNC: 25 MMOL/L (ref 23–29)
CREAT SERPL-MCNC: 0.8 MG/DL (ref 0.5–1.4)
DIFFERENTIAL METHOD BLD: ABNORMAL
EOSINOPHIL # BLD AUTO: 0.6 K/UL (ref 0–0.5)
EOSINOPHIL NFR BLD: 12.2 % (ref 0–8)
ERYTHROCYTE [DISTWIDTH] IN BLOOD BY AUTOMATED COUNT: 14.5 % (ref 11.5–14.5)
EST. GFR  (NO RACE VARIABLE): >60 ML/MIN/1.73 M^2
FERRITIN SERPL-MCNC: 1442 NG/ML (ref 20–300)
GLUCOSE SERPL-MCNC: 88 MG/DL (ref 70–110)
HCT VFR BLD AUTO: 39.6 % (ref 40–54)
HGB BLD-MCNC: 13.5 G/DL (ref 14–18)
HIV 1+2 AB+HIV1 P24 AG SERPL QL IA: NORMAL
HIV 1+2 AB+HIV1 P24 AG SERPL QL IA: NORMAL
IMM GRANULOCYTES # BLD AUTO: 0.01 K/UL (ref 0–0.04)
IMM GRANULOCYTES NFR BLD AUTO: 0.2 % (ref 0–0.5)
INR PPP: 1.9 (ref 0.8–1.2)
IRON SERPL-MCNC: 63 UG/DL (ref 45–160)
LYMPHOCYTES # BLD AUTO: 0.8 K/UL (ref 1–4.8)
LYMPHOCYTES NFR BLD: 15 % (ref 18–48)
MCH RBC QN AUTO: 30.5 PG (ref 27–31)
MCHC RBC AUTO-ENTMCNC: 34.1 G/DL (ref 32–36)
MCV RBC AUTO: 89 FL (ref 82–98)
MONOCYTES # BLD AUTO: 0.5 K/UL (ref 0.3–1)
MONOCYTES NFR BLD: 9.4 % (ref 4–15)
NEUTROPHILS # BLD AUTO: 3.1 K/UL (ref 1.8–7.7)
NEUTROPHILS NFR BLD: 62.4 % (ref 38–73)
NRBC BLD-RTO: 0 /100 WBC
PLATELET # BLD AUTO: 104 K/UL (ref 150–450)
PMV BLD AUTO: 11.6 FL (ref 9.2–12.9)
POTASSIUM SERPL-SCNC: 4.2 MMOL/L (ref 3.5–5.1)
PROT SERPL-MCNC: 7.7 G/DL (ref 6–8.4)
PROTHROMBIN TIME: 20.4 SEC (ref 9–12.5)
RBC # BLD AUTO: 4.43 M/UL (ref 4.6–6.2)
RETICS/RBC NFR AUTO: 1.4 % (ref 0.4–2)
SATURATED IRON: 16 % (ref 20–50)
SODIUM SERPL-SCNC: 141 MMOL/L (ref 136–145)
TOTAL IRON BINDING CAPACITY: 403 UG/DL (ref 250–450)
TRANSFERRIN SERPL-MCNC: 272 MG/DL (ref 200–375)
WBC # BLD AUTO: 4.99 K/UL (ref 3.9–12.7)

## 2024-06-25 PROCEDURE — 3077F SYST BP >= 140 MM HG: CPT | Mod: CPTII,S$GLB,, | Performed by: STUDENT IN AN ORGANIZED HEALTH CARE EDUCATION/TRAINING PROGRAM

## 2024-06-25 PROCEDURE — 87389 HIV-1 AG W/HIV-1&-2 AB AG IA: CPT | Performed by: STUDENT IN AN ORGANIZED HEALTH CARE EDUCATION/TRAINING PROGRAM

## 2024-06-25 PROCEDURE — 85045 AUTOMATED RETICULOCYTE COUNT: CPT | Mod: PN | Performed by: STUDENT IN AN ORGANIZED HEALTH CARE EDUCATION/TRAINING PROGRAM

## 2024-06-25 PROCEDURE — 3288F FALL RISK ASSESSMENT DOCD: CPT | Mod: CPTII,S$GLB,, | Performed by: STUDENT IN AN ORGANIZED HEALTH CARE EDUCATION/TRAINING PROGRAM

## 2024-06-25 PROCEDURE — 99205 OFFICE O/P NEW HI 60 MIN: CPT | Mod: S$GLB,,, | Performed by: STUDENT IN AN ORGANIZED HEALTH CARE EDUCATION/TRAINING PROGRAM

## 2024-06-25 PROCEDURE — G2211 COMPLEX E/M VISIT ADD ON: HCPCS | Mod: S$GLB,,, | Performed by: STUDENT IN AN ORGANIZED HEALTH CARE EDUCATION/TRAINING PROGRAM

## 2024-06-25 PROCEDURE — 82105 ALPHA-FETOPROTEIN SERUM: CPT | Performed by: STUDENT IN AN ORGANIZED HEALTH CARE EDUCATION/TRAINING PROGRAM

## 2024-06-25 PROCEDURE — 4010F ACE/ARB THERAPY RXD/TAKEN: CPT | Mod: CPTII,S$GLB,, | Performed by: STUDENT IN AN ORGANIZED HEALTH CARE EDUCATION/TRAINING PROGRAM

## 2024-06-25 PROCEDURE — 1101F PT FALLS ASSESS-DOCD LE1/YR: CPT | Mod: CPTII,S$GLB,, | Performed by: STUDENT IN AN ORGANIZED HEALTH CARE EDUCATION/TRAINING PROGRAM

## 2024-06-25 PROCEDURE — 82728 ASSAY OF FERRITIN: CPT | Performed by: STUDENT IN AN ORGANIZED HEALTH CARE EDUCATION/TRAINING PROGRAM

## 2024-06-25 PROCEDURE — 85610 PROTHROMBIN TIME: CPT | Performed by: STUDENT IN AN ORGANIZED HEALTH CARE EDUCATION/TRAINING PROGRAM

## 2024-06-25 PROCEDURE — 80053 COMPREHEN METABOLIC PANEL: CPT | Mod: PN | Performed by: STUDENT IN AN ORGANIZED HEALTH CARE EDUCATION/TRAINING PROGRAM

## 2024-06-25 PROCEDURE — 36415 COLL VENOUS BLD VENIPUNCTURE: CPT | Mod: PN | Performed by: STUDENT IN AN ORGANIZED HEALTH CARE EDUCATION/TRAINING PROGRAM

## 2024-06-25 PROCEDURE — 99999 PR PBB SHADOW E&M-EST. PATIENT-LVL V: CPT | Mod: PBBFAC,,, | Performed by: STUDENT IN AN ORGANIZED HEALTH CARE EDUCATION/TRAINING PROGRAM

## 2024-06-25 PROCEDURE — 1159F MED LIST DOCD IN RCRD: CPT | Mod: CPTII,S$GLB,, | Performed by: STUDENT IN AN ORGANIZED HEALTH CARE EDUCATION/TRAINING PROGRAM

## 2024-06-25 PROCEDURE — 83540 ASSAY OF IRON: CPT | Performed by: STUDENT IN AN ORGANIZED HEALTH CARE EDUCATION/TRAINING PROGRAM

## 2024-06-25 PROCEDURE — 85025 COMPLETE CBC W/AUTO DIFF WBC: CPT | Mod: PN | Performed by: STUDENT IN AN ORGANIZED HEALTH CARE EDUCATION/TRAINING PROGRAM

## 2024-06-25 PROCEDURE — 3078F DIAST BP <80 MM HG: CPT | Mod: CPTII,S$GLB,, | Performed by: STUDENT IN AN ORGANIZED HEALTH CARE EDUCATION/TRAINING PROGRAM

## 2024-06-25 PROCEDURE — 3008F BODY MASS INDEX DOCD: CPT | Mod: CPTII,S$GLB,, | Performed by: STUDENT IN AN ORGANIZED HEALTH CARE EDUCATION/TRAINING PROGRAM

## 2024-06-25 PROCEDURE — 1125F AMNT PAIN NOTED PAIN PRSNT: CPT | Mod: CPTII,S$GLB,, | Performed by: STUDENT IN AN ORGANIZED HEALTH CARE EDUCATION/TRAINING PROGRAM

## 2024-06-25 NOTE — Clinical Note
Hi - I saw Mr. Tavares today, wondering if we would consider switching off of Warfarin -> Coumadin for a fib? I am working up for HCC now, hopefully we do not see recurrence on MRI

## 2024-06-25 NOTE — TELEPHONE ENCOUNTER
----- Message from Fannie An sent at 6/25/2024  9:46 AM CDT -----  Contact: self  Type:  Sooner Appointment Request    Caller is requesting a sooner appointment.  Caller declined first available appointment listed below.  Caller will not accept being placed on the waitlist and is requesting a message be sent to doctor.    Name of Caller:  pt  When is the first available appointment?  N/a  Symptoms: [id number].,[id number]  Would the patient rather a call back or a response via [facility]? call  Best Call Back Number:  [phone]   Additional Information:  please call pt has has a referral and it states urgent

## 2024-06-25 NOTE — TELEPHONE ENCOUNTER
Sent to neph by mistake.  Cannot schedule from referral.  Prefers [facility] but will go to [facility] or [facility] if necessary.  Lives in [place].     Sending to hepatology for scheduling.

## 2024-06-26 ENCOUNTER — TELEPHONE (OUTPATIENT)
Dept: HEMATOLOGY/ONCOLOGY | Facility: CLINIC | Age: 69
End: 2024-06-26
Payer: MEDICARE

## 2024-06-26 ENCOUNTER — HOSPITAL ENCOUNTER (OUTPATIENT)
Dept: RADIOLOGY | Facility: HOSPITAL | Age: 69
Discharge: HOME | End: 2024-06-26
Attending: STUDENT IN AN ORGANIZED HEALTH CARE EDUCATION/TRAINING PROGRAM
Payer: MEDICARE

## 2024-06-26 ENCOUNTER — TELEPHONE (OUTPATIENT)
Dept: INTERVENTIONAL RADIOLOGY/VASCULAR | Facility: CLINIC | Age: 69
End: 2024-06-26
Payer: MEDICARE

## 2024-06-26 DIAGNOSIS — I48.11 LONGSTANDING PERSISTENT ATRIAL FIBRILLATION: ICD-10-CM

## 2024-06-26 DIAGNOSIS — C22.0 HCC (HEPATOCELLULAR CARCINOMA): Primary | ICD-10-CM

## 2024-06-26 DIAGNOSIS — D61.818 PANCYTOPENIA: ICD-10-CM

## 2024-06-26 DIAGNOSIS — I48.11 LONGSTANDING PERSISTENT ATRIAL FIBRILLATION: Primary | ICD-10-CM

## 2024-06-26 DIAGNOSIS — D69.6 THROMBOCYTOPENIA: ICD-10-CM

## 2024-06-26 PROCEDURE — 74183 MRI ABD W/O CNTR FLWD CNTR: CPT | Mod: 26,,, | Performed by: STUDENT IN AN ORGANIZED HEALTH CARE EDUCATION/TRAINING PROGRAM

## 2024-06-26 PROCEDURE — 74183 MRI ABD W/O CNTR FLWD CNTR: CPT | Mod: TC,PO

## 2024-06-26 PROCEDURE — 25500020 PHARM REV CODE 255: Mod: PO | Performed by: STUDENT IN AN ORGANIZED HEALTH CARE EDUCATION/TRAINING PROGRAM

## 2024-06-26 PROCEDURE — A9585 GADOBUTROL INJECTION: HCPCS | Mod: PO | Performed by: STUDENT IN AN ORGANIZED HEALTH CARE EDUCATION/TRAINING PROGRAM

## 2024-06-26 RX ORDER — GADOBUTROL 604.72 MG/ML
10 INJECTION INTRAVENOUS
Status: COMPLETED | OUTPATIENT
Start: 2024-06-26 | End: 2024-06-26

## 2024-06-26 RX ORDER — RIVAROXABAN 15 MG-20MG
KIT ORAL
Qty: 51 TABLET | Refills: 0 | Status: SHIPPED | OUTPATIENT
Start: 2024-06-26

## 2024-06-26 RX ORDER — RIVAROXABAN 15 MG-20MG
KIT ORAL
Qty: 1 EACH | Refills: 0 | Status: SHIPPED | OUTPATIENT
Start: 2024-06-26 | End: 2024-06-26 | Stop reason: SDUPTHER

## 2024-06-26 RX ADMIN — GADOBUTROL 10 ML: 604.72 INJECTION INTRAVENOUS at 11:06

## 2024-06-26 NOTE — TELEPHONE ENCOUNTER
----- Message from Wilber Yuen MD sent at 6/26/2024  1:59 PM CDT -----  I would recommend Y90 and immunotherapy.  Are you ok with doing both simultaneously?  If only one, I'd do Y90 first.  We used to have these delayed recurrences with vascular invasion show up after ablation, which is why we switched away from ablation in a lot of patients.    But Y90 and immuno is the way to go here.  ----- Message -----  From: Aleyda Kirkland MD  Sent: 6/26/2024   1:49 PM CDT  To: Blayne Toscano MD; Wilber Yuen MD    Hi - I saw a consult yesterday in [place] for thrombocytopenia. Notably he had Hep C treated remotely and HCC in 2019 treated with microwave ablation and lost to follow-up.     I got an urgent MRI today, his Child Faria score isn't reliable since currently on Warfarin. AFP 21, t bili 0.9. Wondering your thoughts on next steps from HCC treatment standpoint?     Happy to discuss on phone - [phone].    Thanks,  Aleyda  ----- Message -----  From: Interface, Rad Results In  Sent: 6/26/2024   1:42 PM CDT  To: Aleyda Kirkland MD

## 2024-06-26 NOTE — TELEPHONE ENCOUNTER
Called the patient to schedule a hepatology consult from referral.  Refuse the offer appt in [place] location with Dr. Donato.  He stated that somebody called and offer sooner appt.  He is going to call us, if needed.  Left call back # [phone].

## 2024-06-27 ENCOUNTER — TELEPHONE (OUTPATIENT)
Dept: HEMATOLOGY/ONCOLOGY | Facility: CLINIC | Age: 69
End: 2024-06-27
Payer: MEDICARE

## 2024-06-27 NOTE — TELEPHONE ENCOUNTER
----- Message from Wilber Yuen MD sent at 6/26/2024  4:52 PM CDT -----  Tried to call him and expedite setting it up since there is vascular invasion that looks worse from CT in February.  He said that he has too much going on and he will talk to his doctor on the 10th.  Can you please call him and encourage him to let us get this ball rolling?  Thank you!    Wilber  ----- Message -----  From: Aleyda Kirkland MD  Sent: 6/26/2024   2:11 PM CDT  To: Blayne Toscano MD; Wilber Yuen MD    We can do them all together. Is he a [facility] candidate?     I will discuss immunotherapy with him in clinic next week and tell him to look out for a call from your team. Need me to place a consult?     Aleyda  ----- Message -----  From: Wilber Yuen MD  Sent: 6/26/2024   2:01 PM CDT  To: Blayne Toscano MD; Aleyda Kirkland MD    I would recommend Y90 and immunotherapy.  Are you ok with doing both simultaneously?  If only one, I'd do Y90 first.  We used to have these delayed recurrences with vascular invasion show up after ablation, which is why we switched away from ablation in a lot of patients.    But Y90 and immuno is the way to go here.  ----- Message -----  From: Aleyda Kirkland MD  Sent: 6/26/2024   1:49 PM CDT  To: Blayne Toscano MD; Wilber Yuen MD    Hi - I saw a consult yesterday in [place] for thrombocytopenia. Notably he had Hep C treated remotely and HCC in 2019 treated with microwave ablation and lost to follow-up.     I got an urgent MRI today, his Child Faria score isn't reliable since currently on Warfarin. AFP 21, t bili 0.9. Wondering your thoughts on next steps from HCC treatment standpoint?     Happy to discuss on phone - [phone].    Thanks,  Aleyda  ----- Message -----  From: Interface, Rad Results In  Sent: 6/26/2024   1:42 PM CDT  To: Aleyda Kirkland MD

## 2024-06-28 ENCOUNTER — PATIENT MESSAGE (OUTPATIENT)
Dept: HEMATOLOGY/ONCOLOGY | Facility: CLINIC | Age: 69
End: 2024-06-28
Payer: MEDICARE

## 2024-07-02 ENCOUNTER — OFFICE VISIT (OUTPATIENT)
Dept: HEPATOLOGY | Facility: CLINIC | Age: 69
End: 2024-07-02
Payer: MEDICARE

## 2024-07-02 ENCOUNTER — TELEPHONE (OUTPATIENT)
Dept: HEMATOLOGY/ONCOLOGY | Facility: CLINIC | Age: 69
End: 2024-07-02
Payer: MEDICARE

## 2024-07-02 ENCOUNTER — TELEPHONE (OUTPATIENT)
Dept: INTERVENTIONAL RADIOLOGY/VASCULAR | Facility: CLINIC | Age: 69
End: 2024-07-02
Payer: MEDICARE

## 2024-07-02 VITALS — BODY MASS INDEX: 31.06 KG/M2 | WEIGHT: 216.94 LBS | HEIGHT: 70 IN

## 2024-07-02 DIAGNOSIS — B19.20 COMPENSATED HCV CIRRHOSIS: ICD-10-CM

## 2024-07-02 DIAGNOSIS — K74.69 COMPENSATED HCV CIRRHOSIS: ICD-10-CM

## 2024-07-02 DIAGNOSIS — K76.6 PORTAL VENOUS HYPERTENSION: Primary | ICD-10-CM

## 2024-07-02 DIAGNOSIS — C22.0 HCC (HEPATOCELLULAR CARCINOMA): ICD-10-CM

## 2024-07-02 PROCEDURE — 4010F ACE/ARB THERAPY RXD/TAKEN: CPT | Mod: CPTII,S$GLB,, | Performed by: PHYSICIAN ASSISTANT

## 2024-07-02 PROCEDURE — 3008F BODY MASS INDEX DOCD: CPT | Mod: CPTII,S$GLB,, | Performed by: PHYSICIAN ASSISTANT

## 2024-07-02 PROCEDURE — 1160F RVW MEDS BY RX/DR IN RCRD: CPT | Mod: CPTII,S$GLB,, | Performed by: PHYSICIAN ASSISTANT

## 2024-07-02 PROCEDURE — 99204 OFFICE O/P NEW MOD 45 MIN: CPT | Mod: S$GLB,,, | Performed by: PHYSICIAN ASSISTANT

## 2024-07-02 PROCEDURE — 3288F FALL RISK ASSESSMENT DOCD: CPT | Mod: CPTII,S$GLB,, | Performed by: PHYSICIAN ASSISTANT

## 2024-07-02 PROCEDURE — 1101F PT FALLS ASSESS-DOCD LE1/YR: CPT | Mod: CPTII,S$GLB,, | Performed by: PHYSICIAN ASSISTANT

## 2024-07-02 PROCEDURE — 99999 PR PBB SHADOW E&M-EST. PATIENT-LVL III: CPT | Mod: PBBFAC,,, | Performed by: PHYSICIAN ASSISTANT

## 2024-07-02 PROCEDURE — 1159F MED LIST DOCD IN RCRD: CPT | Mod: CPTII,S$GLB,, | Performed by: PHYSICIAN ASSISTANT

## 2024-07-02 RX ORDER — TRAMADOL HYDROCHLORIDE 50 MG/1
50 TABLET ORAL EVERY 4 HOURS PRN
COMMUNITY
Start: 2024-05-06

## 2024-07-02 NOTE — PROGRESS NOTES
HEPATOLOGY CLINIC VISIT NOTE - HCV clinic  REFERRING PROVIDER: Emir Alanis NP  CHIEF COMPLAINT: Hepatitis C   (accompanied by: daughter)    HISTORY       This is a 69 y.o. White male w/ cirrhosis due to HCV (cured) who was found to have HCC in 2019. He was seen in 2019 by Dr Dr Malika Prescott in the Liver Transplant clinic. He underwent MWA w/ IR once, but ultimately declined transplant evaluation and was lost to Hepatology f/u. More recently he saw Dr Aleyda Kirkland (Hem/Onc) for pancytopenia; he was then referred here for hepatology care.     Recent w/u reveals HCC recurrence w/ 6/2024 AFP of 21 & MRI revealing  8cm x 4.2cm liver lesion c/w infiltrative HCC, add'l 1cm lesion c/w HCC, & tumor thrombus in RPV. Scan has already been reviewed by Dr Yuen who recommends Y90 (pt contacted about this but had just learned about HCC dx and was not ready to schedule) + immunotherapy (Onc appt scheduled tomorrow).        Cirrhosis history:  Well compensated  (+) portal HTN: SM 16.3cm, low plt 70s-100s  Varices: ?    Ascites - no  Diuretic use - no  TBili elevation - no  HE / confusion / memory problems - no  EV bleed / hematemesis / melena - no  Albumin - normal  Platelets - low  Coagulopathy - on coumadin for AFib. Recently switched to xarelto.    MELD 3.0: 12 at 6/25/2024 10:17 AM  MELD-Na: 14 at 6/25/2024 10:17 AM  Calculated from:  Serum Creatinine: 0.8 mg/dL (Using min of 1 mg/dL) at 6/25/2024 10:17 AM  Serum Sodium: 141 mmol/L (Using max of 137 mmol/L) at 6/25/2024 10:17 AM  Total Bilirubin: 0.9 mg/dL (Using min of 1 mg/dL) at 6/25/2024 10:17 AM  Serum Albumin: 3.9 g/dL (Using max of 3.5 g/dL) at 6/25/2024 10:17 AM  INR(ratio): 1.9 at 6/25/2024 10:17 AM  Age at listing (hypothetical): 69 years  Sex: Male at 6/25/2024 10:17 AM  *on coumadin still for these labs    Cirrhosis health maintenance:  - Varices screening:  EGD needed  - HAV status: Unknown  - HBV status: Unknown    HCV history:  Likely acquired through  blood transfusion in teens,  S/p Harvoni 2018 - cured    PMH, PSH, SOCIAL HX, FAMILY HX      Reviewed in Epic  Pertinent findings:  FAMILY HX: neg for liver diease  SOCIAL HX: Resides in [place], [place].  (wife  of cancer 2022)  Alcohol - no  Drugs - no      ROS: as per HPI, in addition:      PHYSICAL EXAM:  Friendly White male, in no acute distress; alert and oriented to person, place and time  HEENT: Sclerae anicteric.   NECK: Supple  LUNGS: Normal respiratory effort.   ABDOMEN: Flat, soft, nontender. No organomegaly or masses. No ascites.  SKIN: Warm and dry. No jaundice, No obvious rashes.   EXTREMITIES: trace bilat pretibial edema  NEURO/PSYCH: Normal gate. Memory intact. Thought and speech pattern appropriate. Behavior normal. No depression or anxiety noted.    PERTINENT DIAGNOSTIC RESULTS      Lab Results   Component Value Date    WBC 4.99 2024    HGB 13.5 (L) 2024     (L) 2024     Lab Results   Component Value Date    INR 1.9 (H) 2024     Lab Results   Component Value Date    AST 81 (H) 2024    ALT 26 2024    BILITOT 0.9 2024    ALBUMIN 3.9 2024    ALKPHOS 91 2024    CREATININE 0.8 2024    BUN 11 2024     2024    K 4.2 2024    AFP 21 (H) 2024     Results for orders placed during the hospital encounter of 24    MRI Abdomen W WO Contrast    Narrative  EXAMINATION:  MRI ABDOMEN W WO CONTRAST    CLINICAL HISTORY:  Metastatic disease evaluation;liver protocol;  Other pancytopenia    TECHNIQUE:  Multiplanar multisequence images of the abdomen before and after administration of 10 mL Gadavist intravenous contrast.  3D MRCP sequences and MIP images were obtained.    COMPARISON:  Outside hospital CT 2024, MRI abdomen 2020    FINDINGS:  Inferior Thorax: Unremarkable.    Liver: Cirrhotic morphology.  Post treatment changes of right hepatic lobe ablation.  Ill-defined heterogeneous arterial  enhancing lesion centered in hepatic segments 8 and 5 demonstrating washout compatible with infiltrative HCC, new compared to prior MRI.  Regions of the infiltrative lesion demonstrates pseudo capsule.  There is corresponding diffusion restriction.  Lesion measures approximately 8 x 4.2 cm in maximum transverse dimension (series 1303 image 46 and extends approximately 10 cm in cranial caudal dimension.  Lesion abuts the prior ablation cavity.  There is prominent associated tumor thrombus involving the right portal vein (series 1303, image 49; series 14, image 66).  Additional arterial enhancing lesion with washout in the posterior right hepatic lobe segment 6 measuring 1 cm (series 1303, image 44) consistent with HCC.  Main and left portal veins are patent.  Hepatic veins appear patent.    Gallbladder: Unremarkable.    Bile Ducts: No dilatation.    Pancreas: No mass or ductal dilatation.    Spleen: Enlarged measuring 16.3 cm.    Adrenals: Unremarkable.    Kidneys/Ureters: Simple cyst in the right superior renal pole measuring 4.1 cm.  No hydronephrosis.    GI Tract/Mesentery: No evidence of bowel obstruction or inflammation.    Peritoneal Space: No ascites.    Retroperitoneum: No pathologically enlarged nodes.    Abdominal wall: Unremarkable.    Vasculature: Abdominal aorta is normal caliber.  Infrarenal abdominal aortic ectasia measuring 2.4 cm.  Aortic atherosclerosis.    Bones: No suspicious marrow replacing lesion.  Susceptibility artifact related to sternotomy wires..    Impression  1. Large ill-defined arterial enhancing lesion centered in hepatic segments 8 and 5 demonstrating washout compatible with infiltrative HCC.  Prominent associated tumor thrombus of the right portal vein.  1 cm arterial enhancing lesion in the posterior right hepatic lobe concerning for additional HCC.  2. Cirrhotic liver.  Post treatment changes of right hepatic lobe ablation.  3. Splenomegaly.  4. Additional findings as above.  This  report was flagged in Epic as abnormal.      Electronically signed by: David Rodriguez  Date:    06/26/2024  Time:    13:39      ASSESSMENT        69 y.o. White male with:  1. Cirrhosis, well compensated  -- MELD score 12, skewed by coumadin    2. HCC, dx 2019 w/ recent recurrence  -- declined liver transplant eval  -- MWA 2019  -- current imaging w/ recurrence and RPV tumor thrombus, s/p review w/ Dr Yuen: needs Y90 + immunotherapy    3. Portal hypertension  -- EGD needed  -- SM  -- low plt 70s-100s    4. History of HCV, cured    5. AFib  -- recently switched from coumadin to xarelto    PLAN        Msg sent to IR clinic that pt ready to schedule appt so he can proceed w/ Y90  Keep oncology appt tomorrow to discuss immunotherapy  EGD for EV screen, orders entered. Pt prefers [facility] if possible  F/U in clinic, likely 6 months    __________________________________________________________________    Duration of encounter: 51min  This includes face-to-face time and non face-to-face time preparing to see the patient (eg, review of tests), obtaining and/or reviewing separately obtained history, documenting clinical information in the electronic or other health record, independently interpreting resultsand communicating results to the patient/family/caregiver, or care coordination.

## 2024-07-02 NOTE — Clinical Note
Hello - this pt was recently reviewed (outside of IR conference) and Y90 was recommended. When he was contacted to schedule appt in IR clinic he had only recently found out about HCC recurrence and was quite overwhelmed, declining to schedule at that time. I saw him today and he is now ready to schedule an appt. Please reach out to him. He prefers in person over virtual appts.  Thanks!

## 2024-07-02 NOTE — TELEPHONE ENCOUNTER
Appointment rescheduled back to original appt time.    ----- Message from Deja Gamboa sent at 7/2/2024 10:40 AM CDT -----  Regarding: appointment  Contact: patient  Type: Needs Medical Advice  Who Called:  patient  Symptoms (please be specific):    How long has patient had these symptoms:    Pharmacy name and phone #:    Best Call Back Number: [phone]    Additional Information: Patient accidentally canceled his 4 pm appointment.  He would like to be seen at 4.  I am unable to schedule that one and put him down for 2 pm.  Patient has family coming to appointment at 4 pm.  Please call patient to advise.  Thanks!

## 2024-07-03 ENCOUNTER — TELEPHONE (OUTPATIENT)
Dept: INTERVENTIONAL RADIOLOGY/VASCULAR | Facility: CLINIC | Age: 69
End: 2024-07-03
Payer: MEDICARE

## 2024-07-03 ENCOUNTER — OFFICE VISIT (OUTPATIENT)
Dept: HEMATOLOGY/ONCOLOGY | Facility: CLINIC | Age: 69
End: 2024-07-03
Payer: MEDICARE

## 2024-07-03 VITALS
HEIGHT: 70 IN | SYSTOLIC BLOOD PRESSURE: 156 MMHG | BODY MASS INDEX: 30.99 KG/M2 | RESPIRATION RATE: 18 BRPM | DIASTOLIC BLOOD PRESSURE: 76 MMHG | WEIGHT: 216.5 LBS | OXYGEN SATURATION: 98 % | TEMPERATURE: 98 F | HEART RATE: 91 BPM

## 2024-07-03 DIAGNOSIS — Z79.899 ON ANTINEOPLASTIC CHEMOTHERAPY: ICD-10-CM

## 2024-07-03 DIAGNOSIS — I48.11 LONGSTANDING PERSISTENT ATRIAL FIBRILLATION: ICD-10-CM

## 2024-07-03 DIAGNOSIS — C67.8 MALIGNANT NEOPLASM OF OVERLAPPING SITES OF BLADDER: ICD-10-CM

## 2024-07-03 DIAGNOSIS — B19.20 COMPENSATED HCV CIRRHOSIS: ICD-10-CM

## 2024-07-03 DIAGNOSIS — D69.6 THROMBOCYTOPENIA: ICD-10-CM

## 2024-07-03 DIAGNOSIS — K74.69 COMPENSATED HCV CIRRHOSIS: ICD-10-CM

## 2024-07-03 DIAGNOSIS — C22.0 HCC (HEPATOCELLULAR CARCINOMA): Primary | ICD-10-CM

## 2024-07-03 DIAGNOSIS — D61.818 PANCYTOPENIA: ICD-10-CM

## 2024-07-03 PROCEDURE — 99215 OFFICE O/P EST HI 40 MIN: CPT | Mod: S$GLB,,, | Performed by: STUDENT IN AN ORGANIZED HEALTH CARE EDUCATION/TRAINING PROGRAM

## 2024-07-03 PROCEDURE — 4010F ACE/ARB THERAPY RXD/TAKEN: CPT | Mod: CPTII,S$GLB,, | Performed by: STUDENT IN AN ORGANIZED HEALTH CARE EDUCATION/TRAINING PROGRAM

## 2024-07-03 PROCEDURE — 3077F SYST BP >= 140 MM HG: CPT | Mod: CPTII,S$GLB,, | Performed by: STUDENT IN AN ORGANIZED HEALTH CARE EDUCATION/TRAINING PROGRAM

## 2024-07-03 PROCEDURE — 3008F BODY MASS INDEX DOCD: CPT | Mod: CPTII,S$GLB,, | Performed by: STUDENT IN AN ORGANIZED HEALTH CARE EDUCATION/TRAINING PROGRAM

## 2024-07-03 PROCEDURE — G2211 COMPLEX E/M VISIT ADD ON: HCPCS | Mod: S$GLB,,, | Performed by: STUDENT IN AN ORGANIZED HEALTH CARE EDUCATION/TRAINING PROGRAM

## 2024-07-03 PROCEDURE — 1101F PT FALLS ASSESS-DOCD LE1/YR: CPT | Mod: CPTII,S$GLB,, | Performed by: STUDENT IN AN ORGANIZED HEALTH CARE EDUCATION/TRAINING PROGRAM

## 2024-07-03 PROCEDURE — 3078F DIAST BP <80 MM HG: CPT | Mod: CPTII,S$GLB,, | Performed by: STUDENT IN AN ORGANIZED HEALTH CARE EDUCATION/TRAINING PROGRAM

## 2024-07-03 PROCEDURE — 3288F FALL RISK ASSESSMENT DOCD: CPT | Mod: CPTII,S$GLB,, | Performed by: STUDENT IN AN ORGANIZED HEALTH CARE EDUCATION/TRAINING PROGRAM

## 2024-07-03 PROCEDURE — 1126F AMNT PAIN NOTED NONE PRSNT: CPT | Mod: CPTII,S$GLB,, | Performed by: STUDENT IN AN ORGANIZED HEALTH CARE EDUCATION/TRAINING PROGRAM

## 2024-07-03 PROCEDURE — 1159F MED LIST DOCD IN RCRD: CPT | Mod: CPTII,S$GLB,, | Performed by: STUDENT IN AN ORGANIZED HEALTH CARE EDUCATION/TRAINING PROGRAM

## 2024-07-03 PROCEDURE — 99999 PR PBB SHADOW E&M-EST. PATIENT-LVL IV: CPT | Mod: PBBFAC,,, | Performed by: STUDENT IN AN ORGANIZED HEALTH CARE EDUCATION/TRAINING PROGRAM

## 2024-07-03 NOTE — PROGRESS NOTES
[facility]     Reason for visit: Follow Up     Best Contact Phone Number(s): There are no phone numbers on file.     Oncology History   HCC (hepatocellular carcinoma)   9/24/2019 Initial Diagnosis    HCC (hepatocellular carcinoma) - treated with microwave ablation     6/26/2024 Relapse    Patient established with hematology for thrombocytopenia, HCC and cirrhosis history prompted further GI work-up    6/26/24: MRI Abdomen shows large lesions in segments 8 and 5 with washout compatible with HCC, measuring 8 x4.2 cm transverse and 10 cm caudal, prominent tumor thrombus in right portal vein, 1 cm lesion in posterior right hepatic lobe concerning for additional focus     Malignant neoplasm of overlapping sites of bladder   6/25/2024 Initial Diagnosis    Malignant neoplasm of overlapping sites of bladder     6/25/2024 Cancer Staged    Staging form: Urinary Bladder, AJCC 8th Edition  - Clinical: Stage 0a (cTa, cN0, cM0)       HPI: Hudson Tavares is a 69 y.o. male with HCC who presents for follow-up. Since last visit patient has undergone MRI abdomen confirming disease recurrence. Has established with hepatology and pending scheduling IR. Overall patient feels well with no systemic complaints. Has switched to Xarelto, and notes one episode of epistaxis attributed to anticoagulation.     Patient presents with daughter Mara and granddaughter, Anselmo. Ms. Venegas (Anselmo's mom) and son Roberto on the phone. ECOG PS is 0.  History has been obtained by chart review and discussion with the patient.    ROS:   A complete 12-point review of systems was reviewed and is negative except as mentioned above.     Past Medical History:   Past Medical History:   Diagnosis Date    Adenomatous colon polyp     Alcohol abuse 7/9/2015    Anticoagulant long-term use     coumadin    Atrial fibrillation 7/9/2015    CAD (coronary artery disease), native coronary artery 5/3/2016    Degenerative joint disease 7/9/2015     Encounter for blood transfusion     Enlarged liver     Essential hypertension 7/9/2015    Hep C w/o coma, chronic     treated with Harvoni    Hiatal hernia     patient states hernia has resolved    Hx of bladder infections     finished cipro 4/29/16    S/P CABG (coronary artery bypass graft) 5/17/2016    LIMA to LAD, SVG to OM, SVG to diagonal, SVG to right PDA, Maze, left atrial appendage resection (5/16)    Spleen enlarged     Thrombocytopenia 5/5/2016    Tobacco abuse 7/9/2015        Past Surgical History:   Past Surgical History:   Procedure Laterality Date    CARDIAC SURGERY  05/04/2016    4 vessel CABG    CERVICAL FUSION  2004    COLONOSCOPY  12/03/2018    Steve Wells    CYSTOSCOPY W/ RETROGRADES N/A 2/27/2024    Procedure: CYSTOSCOPY, WITH RETROGRADE PYELOGRAM;  Surgeon: STORMY Bray MD;  Location: [facility] OR;  Service: Urology;  Laterality: N/A;    CYSTOSCOPY WITH BIOPSY OF BLADDER N/A 2/27/2024    Procedure: CYSTOSCOPY, WITH BLADDER BIOPSY, WITH FULGURATION IF INDICATED;  Surgeon: STORMY Bray MD;  Location: [facility] OR;  Service: Urology;  Laterality: N/A;    CYSTOURETHROSCOPY, WITH THROMBUS REMOVAL N/A 2/27/2024    Procedure: CYSTOURETHROSCOPY, WITH THROMBUS REMOVAL;  Surgeon: STORMY Bray MD;  Location: [facility] OR;  Service: Urology;  Laterality: N/A;    JOINT REPLACEMENT  TOTAL RIGHT KNEE     OPEN ANTERIOR SHOULDER RECONSTRUCTION Left     RADIOFREQUENCY ABLATION N/A 9/24/2019    Procedure: Radiofrequency Ablation;  Surgeon: Anayeli Surgeon;  Location: [facility];  Service: Anesthesiology;  Laterality: N/A;  Room 173/[facility]    TIBIA FRACTURE SURGERY Right 1971        Family History:   Family History   Problem Relation Name Age of Onset    Heart disease Mother          congestive heart failure    Diabetes Mother      Hypertension Mother      Heart disease Sister          heart murmur    Atrial fibrillation Sister      Cancer Sister          breast    Cancer Father  60         "colon    Chronic back pain Brother      Atrial fibrillation Sister      Heart disease Sister      Collagen disease Neg Hx          Social History:   Social History     Tobacco Use    Smoking status: Former     Current packs/day: 0.00     Average packs/day: 0.3 packs/day for 30.0 years (7.5 ttl pk-yrs)     Types: Cigarettes     Start date: 1986     Quit date: 2016     Years since quittin.1    Smokeless tobacco: Never   Substance Use Topics    Alcohol use: No     Alcohol/week: 0.0 standard drinks of alcohol     Comment: last drink 2015        I have reviewed and updated the patient's past medical, surgical, family and social histories.    Allergies:   Review of patient's allergies indicates:   Allergen Reactions    Cilostazol Other (See Comments)     Colon bleeding    Stadol [butorphanol tartrate] Hallucinations     "I saw pink elephants"    Keflex [cephalexin] Other (See Comments)     Was using 500mg TID oral and had a strong metallic taste in his mouth and could not function.  Can use IV cephalosporin.    Morphine Other (See Comments)     "makes him drunk", Altered Mental Status    Sulfa (sulfonamide antibiotics) Other (See Comments), Hives and Nausea And Vomiting     Since childhood; does not know reaction  Since childhood; does not know reaction    Finasteride Other (See Comments)        Medications:   Current Outpatient Medications   Medication Sig Dispense Refill    ALPRAZolam (XANAX) 0.5 MG tablet Take 0.5 mg by mouth every evening.      aspirin (ECOTRIN) 81 MG EC tablet Take 81 mg by mouth once daily. Use prior to arrival for cardioversion      famotidine (PEPCID) 20 MG tablet Take 20 mg by mouth nightly.       losartan (COZAAR) 50 MG tablet Take 50 mg by mouth once daily.      metoprolol succinate (TOPROL-XL) 50 MG 24 hr tablet Take 1 tablet (50 mg total) by mouth once daily. 90 tablet 3    rivaroxaban (XARELTO DVT-PE TREAT 30D START) 15 mg (42)- 20 mg (9) tablet dose pack " "Take 1 tablet (15 mg) by mouth twice daily with food for 21 days followed by 1 tablet (20 mg) by mouth once daily with food. Follow package directions. 51 tablet 0    traMADoL (ULTRAM) 50 mg tablet Take 50 mg by mouth every 4 (four) hours as needed.       No current facility-administered medications for this visit.     Facility-Administered Medications Ordered in Other Visits   Medication Dose Route Frequency Provider Last Rate Last Admin    diphenhydrAMINE injection 12.5 mg  12.5 mg Intravenous Q6H PRN Stefano Sherwood MD        electrolyte-S (ISOLYTE)   Intravenous Continuous Stefano Sherwood MD        fentaNYL 50 mcg/mL injection 25 mcg  25 mcg Intravenous Q5 Min PRN Stefano Sherwood MD        HYDROmorphone (PF) injection 0.2 mg  0.2 mg Intravenous Q5 Min PRN Stefano Sherwood MD        lactated ringers infusion   Intravenous Continuous Stefano Sherwood MD 10 mL/hr at 02/27/24 0824 New Bag at 02/27/24 0824    LIDOcaine (PF) 10 mg/ml (1%) injection 10 mg  1 mL Intradermal Once Stefano Sherwood MD        ondansetron injection 4 mg  4 mg Intravenous Daily PRN Stefano Sherwood MD        oxyCODONE immediate release tablet 5 mg  5 mg Oral Q3H PRN Stefano Sherwood MD   5 mg at 02/27/24 1030        Physical Exam:   BP (!) 156/76 (BP Location: Left arm, Patient Position: Sitting, BP Method: Medium (Automatic))   Pulse 91   Temp 98.2 °F (36.8 °C) (Temporal)   Resp 18   Ht 5' 10" (1.778 m)   Wt 98.2 kg (216 lb 7.9 oz)   SpO2 98%   BMI 31.06 kg/m²                Physical Exam  Constitutional:       Appearance: Normal appearance.   HENT:      Head: Normocephalic and atraumatic.      Mouth/Throat:      Mouth: Mucous membranes are moist.   Eyes:      Extraocular Movements: Extraocular movements intact.      Pupils: Pupils are equal, round, and reactive to light.   Cardiovascular:      Rate and Rhythm: Normal rate. Rhythm irregular.      Pulses: Normal pulses.      Heart sounds: No murmur heard.  Pulmonary:      " Effort: Pulmonary effort is normal. No respiratory distress.      Breath sounds: Normal breath sounds.   Abdominal:      General: Abdomen is flat. There is no distension.      Palpations: Abdomen is soft.      Tenderness: There is no abdominal tenderness.   Musculoskeletal:         General: No swelling or tenderness. Normal range of motion.      Cervical back: Normal range of motion. No rigidity.   Skin:     General: Skin is warm and dry.      Coloration: Skin is not jaundiced.   Neurological:      General: No focal deficit present.      Mental Status: He is alert and oriented to person, place, and time.   Psychiatric:         Mood and Affect: Mood normal.         Behavior: Behavior normal.         Labs:   Lab Results   Component Value Date    WBC 4.99 06/25/2024    HGB 13.5 (L) 06/25/2024    HCT 39.6 (L) 06/25/2024    MCV 89 06/25/2024     (L) 06/25/2024       Lab Results   Component Value Date     06/25/2024    K 4.2 06/25/2024     06/25/2024    CO2 25 06/25/2024    BUN 11 06/25/2024    CREATININE 0.8 06/25/2024    ALBUMIN 3.9 06/25/2024    BILITOT 0.9 06/25/2024    ALKPHOS 91 06/25/2024    AST 81 (H) 06/25/2024    ALT 26 06/25/2024       Imaging:   MRI Abdomen W WO Contrast  Narrative: EXAMINATION:  MRI ABDOMEN W WO CONTRAST    CLINICAL HISTORY:  Metastatic disease evaluation;liver protocol;  Other pancytopenia    TECHNIQUE:  Multiplanar multisequence images of the abdomen before and after administration of 10 mL Gadavist intravenous contrast.  3D MRCP sequences and MIP images were obtained.    COMPARISON:  Outside hospital CT 02/17/2024, MRI abdomen 01/24/2020    FINDINGS:  Inferior Thorax: Unremarkable.    Liver: Cirrhotic morphology.  Post treatment changes of right hepatic lobe ablation.  Ill-defined heterogeneous arterial enhancing lesion centered in hepatic segments 8 and 5 demonstrating washout compatible with infiltrative HCC, new compared to prior MRI.  Regions of the infiltrative  lesion demonstrates pseudo capsule.  There is corresponding diffusion restriction.  Lesion measures approximately 8 x 4.2 cm in maximum transverse dimension (series 1303 image 46 and extends approximately 10 cm in cranial caudal dimension.  Lesion abuts the prior ablation cavity.  There is prominent associated tumor thrombus involving the right portal vein (series 1303, image 49; series 14, image 66).  Additional arterial enhancing lesion with washout in the posterior right hepatic lobe segment 6 measuring 1 cm (series 1303, image 44) consistent with HCC.  Main and left portal veins are patent.  Hepatic veins appear patent.    Gallbladder: Unremarkable.    Bile Ducts: No dilatation.    Pancreas: No mass or ductal dilatation.    Spleen: Enlarged measuring 16.3 cm.    Adrenals: Unremarkable.    Kidneys/Ureters: Simple cyst in the right superior renal pole measuring 4.1 cm.  No hydronephrosis.    GI Tract/Mesentery: No evidence of bowel obstruction or inflammation.    Peritoneal Space: No ascites.    Retroperitoneum: No pathologically enlarged nodes.    Abdominal wall: Unremarkable.    Vasculature: Abdominal aorta is normal caliber.  Infrarenal abdominal aortic ectasia measuring 2.4 cm.  Aortic atherosclerosis.    Bones: No suspicious marrow replacing lesion.  Susceptibility artifact related to sternotomy wires..  Impression: 1. Large ill-defined arterial enhancing lesion centered in hepatic segments 8 and 5 demonstrating washout compatible with infiltrative HCC.  Prominent associated tumor thrombus of the right portal vein.  1 cm arterial enhancing lesion in the posterior right hepatic lobe concerning for additional HCC.  2. Cirrhotic liver.  Post treatment changes of right hepatic lobe ablation.  3. Splenomegaly.  4. Additional findings as above.  This report was flagged in Epic as abnormal.    Electronically signed by: David Rodriguez  Date:    06/26/2024  Time:    13:39              Assessment:       1.  Thrombocytopenia    2. Pancytopenia    3. HCC (hepatocellular carcinoma)    4. Malignant neoplasm of overlapping sites of bladder    5. Longstanding persistent atrial fibrillation    6. Compensated HCV cirrhosis           Plan:           # HCC, Stage III - Microwave ablation 9/2019, MRI Abdomen shows recurrent disease. Discussed case with IR, recommend Y90 + immunotherapy for best disease control. We discussed the results of the [place] study and superiority of Durvalumab/Tremlimumab to Sorafenib for improved overall survival. Alternative therapy with be Bevacizumab/Atezolizumab per Impower 150 but would impact timing of Y90. Would recommend either approach over Sorafenib, patient and family understanding. AFP 21. We discussed alternative of no treatment whatsoever, and poor quality of life due to liver failure which patient wishes to avoid.   - labs today: cbc, cmp, afp, inr  - pharmacy to build STRIDE regimen, chemo school referral    # Thrombocytopenia - Attributed to cirrhosis, established with hepatology    # Anemia - reticulocytes, iron, ferritin. Epistaxis on Coumadin. Patient has never had colonoscopy due to adverse reaction to prep    # Non-Muscle Invasive Bladder Cancer, Ta - Follows with Dr. Bray, TURBT 2/2024. Cystoscopy 6/20/24 negative for recurrence.     # Atrial fibrillation - Coumadin -> Xarelto    # Hep C Cirrhosis - Treated with SVR, patient declines liver transplant in the past. Established with hepatology for compensated cirrhosis, EGD is scheduled     Follow up: prior to C1 systemic therapy     The above information has been reviewed with the patient and all questions have been answered to their apparent satisfaction.  They understand that they can call the clinic with any questions.    Aleyda Kirkland MD  Hematology/Oncology  [facility]      Med Onc Chart Routing      Follow up with physician . 2w after C2 STRIDE regimen   Follow up with [facility] . Chemo school   Infusion  scheduling note   C1 durva/tremi when authorized   Injection scheduling note    Labs CBC and CMP   Scheduling:  Preferred lab:  Lab interval:  lab apt prior to chemo school: cbc, cmp, afp, tsh   Imaging CT chest abdomen pelvis   CT CAP next available   Pharmacy appointment    Other referrals              Answers submitted by the patient for this visit:  Review of Systems Questionnaire (Submitted on 6/20/2024)  appetite change : No  unexpected weight change: No  mouth sores: No  visual disturbance: Yes  cough: No  shortness of breath: No  chest pain: No  abdominal pain: No  diarrhea: No  frequency: Yes  back pain: No  rash: No  headaches: No  adenopathy: No  nervous/ anxious: No

## 2024-07-05 ENCOUNTER — DOCUMENTATION ONLY (OUTPATIENT)
Dept: HEMATOLOGY/ONCOLOGY | Facility: CLINIC | Age: 69
End: 2024-07-05
Payer: MEDICARE

## 2024-07-05 ENCOUNTER — TELEPHONE (OUTPATIENT)
Dept: HEMATOLOGY/ONCOLOGY | Facility: CLINIC | Age: 69
End: 2024-07-05
Payer: MEDICARE

## 2024-07-05 DIAGNOSIS — C22.0 HCC (HEPATOCELLULAR CARCINOMA): Primary | ICD-10-CM

## 2024-07-05 NOTE — NURSING
Chart reviewed. Liam tool updated.     Spoke with Mr. Tavares and explained my role as oncology navigator. Scheduled first available chemo school on 7/12 with Tasia Brown NP. Referrals placed for LESLIE CORADO.     Reviewed date/time/location of all upcoming appointments. Mr. Tavares confirms he has access to his patient portal. My contact information provided. Encouraged him to contact me with any questions/concerns moving forward. He verbalized understanding and thanked me for my call.

## 2024-07-05 NOTE — PROGRESS NOTES
Per Dr. Kirkland she would like to f/u near C1D15 for toxicity check and again before C2D1. CBC/CMP ordered and scheduled prior to f/u visit. Tentative infusion start date 7/16 depending on insurance auth.

## 2024-07-11 ENCOUNTER — TELEPHONE (OUTPATIENT)
Dept: GASTROENTEROLOGY | Facility: CLINIC | Age: 69
End: 2024-07-11
Payer: MEDICARE

## 2024-07-11 NOTE — TELEPHONE ENCOUNTER
Spoke to pt in regards to scheduling ordered EGD. Pt asked that I contact his son, Roberto, to schedule. Roberto states he did not know an EGD was ordered and wants to speak to his father's MD before scheduling. Call back # provided to Roberto.

## 2024-07-12 ENCOUNTER — LAB VISIT (OUTPATIENT)
Dept: LAB | Facility: HOSPITAL | Age: 69
End: 2024-07-12
Attending: STUDENT IN AN ORGANIZED HEALTH CARE EDUCATION/TRAINING PROGRAM
Payer: MEDICARE

## 2024-07-12 ENCOUNTER — DOCUMENTATION ONLY (OUTPATIENT)
Dept: INFUSION THERAPY | Facility: HOSPITAL | Age: 69
End: 2024-07-12
Payer: MEDICARE

## 2024-07-12 ENCOUNTER — PATIENT MESSAGE (OUTPATIENT)
Dept: HEMATOLOGY/ONCOLOGY | Facility: CLINIC | Age: 69
End: 2024-07-12
Payer: MEDICARE

## 2024-07-12 ENCOUNTER — CLINICAL SUPPORT (OUTPATIENT)
Dept: HEMATOLOGY/ONCOLOGY | Facility: CLINIC | Age: 69
End: 2024-07-12
Payer: MEDICARE

## 2024-07-12 VITALS
TEMPERATURE: 97 F | WEIGHT: 219.56 LBS | HEIGHT: 70 IN | RESPIRATION RATE: 18 BRPM | HEART RATE: 78 BPM | DIASTOLIC BLOOD PRESSURE: 81 MMHG | BODY MASS INDEX: 31.43 KG/M2 | SYSTOLIC BLOOD PRESSURE: 147 MMHG | OXYGEN SATURATION: 98 %

## 2024-07-12 DIAGNOSIS — C22.0 HCC (HEPATOCELLULAR CARCINOMA): Primary | ICD-10-CM

## 2024-07-12 DIAGNOSIS — C22.0 HCC (HEPATOCELLULAR CARCINOMA): ICD-10-CM

## 2024-07-12 DIAGNOSIS — Z79.899 ON ANTINEOPLASTIC CHEMOTHERAPY: ICD-10-CM

## 2024-07-12 LAB
ALBUMIN SERPL BCP-MCNC: 4 G/DL (ref 3.5–5.2)
ALP SERPL-CCNC: 92 U/L (ref 55–135)
ALT SERPL W/O P-5'-P-CCNC: 22 U/L (ref 10–44)
ANION GAP SERPL CALC-SCNC: 12 MMOL/L (ref 8–16)
AST SERPL-CCNC: 80 U/L (ref 10–40)
BASOPHILS # BLD AUTO: 0.02 K/UL (ref 0–0.2)
BASOPHILS NFR BLD: 0.4 % (ref 0–1.9)
BILIRUB SERPL-MCNC: 1.1 MG/DL (ref 0.1–1)
BUN SERPL-MCNC: 15 MG/DL (ref 8–23)
CALCIUM SERPL-MCNC: 9.7 MG/DL (ref 8.7–10.5)
CHLORIDE SERPL-SCNC: 103 MMOL/L (ref 95–110)
CO2 SERPL-SCNC: 24 MMOL/L (ref 23–29)
CREAT SERPL-MCNC: 0.8 MG/DL (ref 0.5–1.4)
DIFFERENTIAL METHOD BLD: ABNORMAL
EOSINOPHIL # BLD AUTO: 0.4 K/UL (ref 0–0.5)
EOSINOPHIL NFR BLD: 7.6 % (ref 0–8)
ERYTHROCYTE [DISTWIDTH] IN BLOOD BY AUTOMATED COUNT: 14.6 % (ref 11.5–14.5)
EST. GFR  (NO RACE VARIABLE): >60 ML/MIN/1.73 M^2
GLUCOSE SERPL-MCNC: 143 MG/DL (ref 70–110)
HCT VFR BLD AUTO: 38 % (ref 40–54)
HGB BLD-MCNC: 12.9 G/DL (ref 14–18)
IMM GRANULOCYTES # BLD AUTO: 0.01 K/UL (ref 0–0.04)
IMM GRANULOCYTES NFR BLD AUTO: 0.2 % (ref 0–0.5)
LYMPHOCYTES # BLD AUTO: 0.8 K/UL (ref 1–4.8)
LYMPHOCYTES NFR BLD: 16.5 % (ref 18–48)
MCH RBC QN AUTO: 30.1 PG (ref 27–31)
MCHC RBC AUTO-ENTMCNC: 33.9 G/DL (ref 32–36)
MCV RBC AUTO: 89 FL (ref 82–98)
MONOCYTES # BLD AUTO: 0.4 K/UL (ref 0.3–1)
MONOCYTES NFR BLD: 7.8 % (ref 4–15)
NEUTROPHILS # BLD AUTO: 3.1 K/UL (ref 1.8–7.7)
NEUTROPHILS NFR BLD: 67.5 % (ref 38–73)
NRBC BLD-RTO: 0 /100 WBC
PLATELET # BLD AUTO: 87 K/UL (ref 150–450)
PMV BLD AUTO: 10.9 FL (ref 9.2–12.9)
POTASSIUM SERPL-SCNC: 4.2 MMOL/L (ref 3.5–5.1)
PROT SERPL-MCNC: 7.6 G/DL (ref 6–8.4)
RBC # BLD AUTO: 4.29 M/UL (ref 4.6–6.2)
SODIUM SERPL-SCNC: 139 MMOL/L (ref 136–145)
WBC # BLD AUTO: 4.6 K/UL (ref 3.9–12.7)

## 2024-07-12 PROCEDURE — 80053 COMPREHEN METABOLIC PANEL: CPT | Mod: PN | Performed by: STUDENT IN AN ORGANIZED HEALTH CARE EDUCATION/TRAINING PROGRAM

## 2024-07-12 PROCEDURE — 82105 ALPHA-FETOPROTEIN SERUM: CPT | Performed by: STUDENT IN AN ORGANIZED HEALTH CARE EDUCATION/TRAINING PROGRAM

## 2024-07-12 PROCEDURE — 99999 PR PBB SHADOW E&M-EST. PATIENT-LVL V: CPT | Mod: PBBFAC,,,

## 2024-07-12 PROCEDURE — 36415 COLL VENOUS BLD VENIPUNCTURE: CPT | Mod: PN | Performed by: STUDENT IN AN ORGANIZED HEALTH CARE EDUCATION/TRAINING PROGRAM

## 2024-07-12 PROCEDURE — 85025 COMPLETE CBC W/AUTO DIFF WBC: CPT | Mod: PN | Performed by: STUDENT IN AN ORGANIZED HEALTH CARE EDUCATION/TRAINING PROGRAM

## 2024-07-12 PROCEDURE — 84443 ASSAY THYROID STIM HORMONE: CPT | Performed by: STUDENT IN AN ORGANIZED HEALTH CARE EDUCATION/TRAINING PROGRAM

## 2024-07-12 RX ORDER — PROCHLORPERAZINE EDISYLATE 5 MG/ML
5 INJECTION INTRAMUSCULAR; INTRAVENOUS
Start: 2024-07-12

## 2024-07-12 RX ORDER — DIPHENHYDRAMINE HYDROCHLORIDE 50 MG/ML
50 INJECTION INTRAMUSCULAR; INTRAVENOUS ONCE AS NEEDED
OUTPATIENT
Start: 2024-07-12

## 2024-07-12 RX ORDER — EPINEPHRINE 0.3 MG/.3ML
0.3 INJECTION SUBCUTANEOUS ONCE AS NEEDED
OUTPATIENT
Start: 2024-07-12

## 2024-07-12 RX ORDER — SODIUM CHLORIDE 0.9 % (FLUSH) 0.9 %
10 SYRINGE (ML) INJECTION
OUTPATIENT
Start: 2024-07-12

## 2024-07-12 RX ORDER — HEPARIN 100 UNIT/ML
500 SYRINGE INTRAVENOUS
OUTPATIENT
Start: 2024-07-12

## 2024-07-12 NOTE — PROGRESS NOTES
Subjective:       Name: Hudson Tavares  : 1955  MRN: [id number]    Chief Complaint   Patient presents with    Patient Education     Chemo Ed/NP/RD/SW            HPI: Hudson Tavares is a 69 y.o. male presents for evaluation of Patient Education (Chemo Ed/NP/RD/SW/)        Oncology History   HCC (hepatocellular carcinoma)   2019 Initial Diagnosis    HCC (hepatocellular carcinoma) - treated with microwave ablation     2024 Relapse    Patient established with hematology for thrombocytopenia, HCC and cirrhosis history prompted further GI work-up    24: MRI Abdomen shows large lesions in segments 8 and 5 with washout compatible with HCC, measuring 8 x4.2 cm transverse and 10 cm caudal, prominent tumor thrombus in right portal vein, 1 cm lesion in posterior right hepatic lobe concerning for additional focus     7/3/2024 Cancer Staged    Staging form: Liver, AJCC 8th Edition  - Clinical: Stage IIIA (cT3, cN0, cM0)     2024 -  Chemotherapy    Treatment Summary   Plan Name: OP Tremelimumab + Durvalumab x1 followed by DURVALUMAB 1500 MG Q4W  Treatment Goal: Palliative  Status: Active  Start Date: 2024 (Planned)  End Date: 2025 (Planned)  Provider: Aleyda Kirkland MD  Chemotherapy: [No matching medication found in this treatment plan]     Malignant neoplasm of overlapping sites of bladder   2024 Initial Diagnosis    Malignant neoplasm of overlapping sites of bladder     2024 Cancer Staged    Staging form: Urinary Bladder, AJCC 8th Edition  - Clinical: Stage 0a (cTa, cN0, cM0)          Past Medical History:   Diagnosis Date    Adenomatous colon polyp     Alcohol abuse 2015    Anticoagulant long-term use     coumadin    Atrial fibrillation 2015    CAD (coronary artery disease), native coronary artery 5/3/2016    Degenerative joint disease 2015    Encounter for blood transfusion     Enlarged liver     Essential hypertension 2015    Hep C w/o coma, chronic      treated with Harvoni    Hiatal hernia     patient states hernia has resolved    Hx of bladder infections     finished cipro 4/29/16    S/P CABG (coronary artery bypass graft) 5/17/2016    LIMA to LAD, SVG to OM, SVG to diagonal, SVG to right PDA, Maze, left atrial appendage resection (5/16)    Spleen enlarged     Thrombocytopenia 5/5/2016    Tobacco abuse 7/9/2015       Past Surgical History:   Procedure Laterality Date    CARDIAC SURGERY  05/04/2016    4 vessel CABG    CERVICAL FUSION  2004    COLONOSCOPY  12/03/2018    Steve Wells    CYSTOSCOPY W/ RETROGRADES N/A 2/27/2024    Procedure: CYSTOSCOPY, WITH RETROGRADE PYELOGRAM;  Surgeon: STORMY Bray MD;  Location: [facility] OR;  Service: Urology;  Laterality: N/A;    CYSTOSCOPY WITH BIOPSY OF BLADDER N/A 2/27/2024    Procedure: CYSTOSCOPY, WITH BLADDER BIOPSY, WITH FULGURATION IF INDICATED;  Surgeon: STORMY Bray MD;  Location: [facility] OR;  Service: Urology;  Laterality: N/A;    CYSTOURETHROSCOPY, WITH THROMBUS REMOVAL N/A 2/27/2024    Procedure: CYSTOURETHROSCOPY, WITH THROMBUS REMOVAL;  Surgeon: STORMY Bray MD;  Location: [facility] OR;  Service: Urology;  Laterality: N/A;    JOINT REPLACEMENT  TOTAL RIGHT KNEE     OPEN ANTERIOR SHOULDER RECONSTRUCTION Left     RADIOFREQUENCY ABLATION N/A 9/24/2019    Procedure: Radiofrequency Ablation;  Surgeon: Anayeli Surgeon;  Location: [facility];  Service: Anesthesiology;  Laterality: N/A;  Room 173/[place]    TIBIA FRACTURE SURGERY Right 1971       Family History   Problem Relation Name Age of Onset    Heart disease Mother          congestive heart failure    Diabetes Mother      Hypertension Mother      Heart disease Sister          heart murmur    Atrial fibrillation Sister      Cancer Sister          breast    Cancer Father  60        colon    Chronic back pain Brother      Atrial fibrillation Sister      Heart disease Sister      Collagen disease Neg Hx         Social History     Socioeconomic History    Marital status:  "   Tobacco Use    Smoking status: Former     Current packs/day: 0.00     Average packs/day: 0.3 packs/day for 30.0 years (7.5 ttl pk-yrs)     Types: Cigarettes     Start date: 1986     Quit date: 2016     Years since quittin.2    Smokeless tobacco: Never   Substance and Sexual Activity    Alcohol use: No     Alcohol/week: 0.0 standard drinks of alcohol     Comment: last drink 2015    Drug use: No    Sexual activity: Yes     Partners: Female       Review of patient's allergies indicates:   Allergen Reactions    Cilostazol Other (See Comments)     Colon bleeding    Stadol [butorphanol tartrate] Hallucinations     "I saw pink elephants"    Keflex [cephalexin] Other (See Comments)     Was using 500mg TID oral and had a strong metallic taste in his mouth and could not function.  Can use IV cephalosporin.    Morphine Other (See Comments)     "makes him drunk", Altered Mental Status    Sulfa (sulfonamide antibiotics) Other (See Comments), Hives and Nausea And Vomiting     Since childhood; does not know reaction  Since childhood; does not know reaction    Finasteride Other (See Comments)       Review of Systems         Objective:     Vitals:    24 1406   BP: (!) 147/81   BP Location: Right arm   Patient Position: Sitting   BP Method: Medium (Automatic)   Pulse: 78   Resp: 18   Temp: 97.4 °F (36.3 °C)   TempSrc: Temporal   SpO2: 98%   Weight: 99.6 kg (219 lb 9.3 oz)   Height: 5' 10" (1.778 m)        Physical Exam           Current Outpatient Medications on File Prior to Visit   Medication Sig    ALPRAZolam (XANAX) 0.5 MG tablet Take 0.5 mg by mouth every evening.    famotidine (PEPCID) 20 MG tablet Take 20 mg by mouth nightly.     losartan (COZAAR) 50 MG tablet Take 50 mg by mouth once daily.    metoprolol succinate (TOPROL-XL) 50 MG 24 hr tablet Take 1 tablet (50 mg total) by mouth once daily.    rivaroxaban (XARELTO DVT-PE TREAT 30D START) 15 mg (42)- 20 mg (9) tablet dose pack Take 1 tablet (15 " mg) by mouth twice daily with food for 21 days followed by 1 tablet (20 mg) by mouth once daily with food. Follow package directions.    aspirin (ECOTRIN) 81 MG EC tablet Take 81 mg by mouth once daily. Use prior to arrival for cardioversion (Patient not taking: Reported on 7/12/2024)    traMADoL (ULTRAM) 50 mg tablet Take 50 mg by mouth every 4 (four) hours as needed. (Patient not taking: Reported on 7/12/2024)     Current Facility-Administered Medications on File Prior to Visit   Medication    diphenhydrAMINE injection 12.5 mg    electrolyte-S (ISOLYTE)    fentaNYL 50 mcg/mL injection 25 mcg    HYDROmorphone (PF) injection 0.2 mg    lactated ringers infusion    LIDOcaine (PF) 10 mg/ml (1%) injection 10 mg    ondansetron injection 4 mg    oxyCODONE immediate release tablet 5 mg       CBC:  Lab Results   Component Value Date    WBC 4.60 07/12/2024    HGB 12.9 (L) 07/12/2024    HCT 38.0 (L) 07/12/2024    MCV 89 07/12/2024    PLT 87 (L) 07/12/2024         CMP:  Sodium   Date Value Ref Range Status   07/12/2024 139 136 - 145 mmol/L Final   10/08/2015 137 137 - 145 MMOL/L Final     Potassium   Date Value Ref Range Status   07/12/2024 4.2 3.5 - 5.1 mmol/L Final   10/08/2015 4.0 3.5 - 5.1 MMOL/L Final     Chloride   Date Value Ref Range Status   07/12/2024 103 95 - 110 mmol/L Final   10/08/2015 100 98 - 107 MMOL/L Final     CO2   Date Value Ref Range Status   07/12/2024 24 23 - 29 mmol/L Final     Glucose   Date Value Ref Range Status   07/12/2024 143 (H) 70 - 110 mg/dL Final     BUN   Date Value Ref Range Status   07/12/2024 15 8 - 23 mg/dL Final     Creatinine   Date Value Ref Range Status   07/12/2024 0.8 0.5 - 1.4 mg/dL Final   10/08/2015 0.70 0.66 - 1.25 MG/DL Final     Calcium   Date Value Ref Range Status   07/12/2024 9.7 8.7 - 10.5 mg/dL Final     Total Protein   Date Value Ref Range Status   07/12/2024 7.6 6.0 - 8.4 g/dL Final     Albumin   Date Value Ref Range Status   07/12/2024 4.0 3.5 - 5.2 g/dL Final      Total Bilirubin   Date Value Ref Range Status   07/12/2024 1.1 (H) 0.1 - 1.0 mg/dL Final     Comment:     For infants and newborns, interpretation of results should be based  on gestational age, weight and in agreement with clinical  observations.    Premature Infant recommended reference ranges:  Up to 24 hours.............<8.0 mg/dL  Up to 48 hours............<12.0 mg/dL  3-5 days..................<15.0 mg/dL  6-29 days.................<15.0 mg/dL       Alkaline Phosphatase   Date Value Ref Range Status   07/12/2024 92 55 - 135 U/L Final     AST ([place])   Date Value Ref Range Status   05/08/2016 99 (H) 17 - 59 U/L Final     AST   Date Value Ref Range Status   07/12/2024 80 (H) 10 - 40 U/L Final     ALT   Date Value Ref Range Status   07/12/2024 22 10 - 44 U/L Final     Anion Gap   Date Value Ref Range Status   07/12/2024 12 8 - 16 mmol/L Final     eGFR if    Date Value Ref Range Status   12/07/2021 >60 >60 mL/min/1.73 m^2 Final     eGFR if non    Date Value Ref Range Status   12/07/2021 >60 >60 mL/min/1.73 m^2 Final     Comment:     Calculation used to obtain the estimated glomerular filtration  rate (eGFR) is the CKD-EPI equation.          MRI Abdomen W WO Contrast  Narrative: EXAMINATION:  MRI ABDOMEN W WO CONTRAST    CLINICAL HISTORY:  Metastatic disease evaluation;liver protocol;  Other pancytopenia    TECHNIQUE:  Multiplanar multisequence images of the abdomen before and after administration of 10 mL Gadavist intravenous contrast.  3D MRCP sequences and MIP images were obtained.    COMPARISON:  Outside hospital CT 02/17/2024, MRI abdomen 01/24/2020    FINDINGS:  Inferior Thorax: Unremarkable.    Liver: Cirrhotic morphology.  Post treatment changes of right hepatic lobe ablation.  Ill-defined heterogeneous arterial enhancing lesion centered in hepatic segments 8 and 5 demonstrating washout compatible with infiltrative HCC, new compared to prior MRI.  Regions of the  infiltrative lesion demonstrates pseudo capsule.  There is corresponding diffusion restriction.  Lesion measures approximately 8 x 4.2 cm in maximum transverse dimension (series 1303 image 46 and extends approximately 10 cm in cranial caudal dimension.  Lesion abuts the prior ablation cavity.  There is prominent associated tumor thrombus involving the right portal vein (series 1303, image 49; series 14, image 66).  Additional arterial enhancing lesion with washout in the posterior right hepatic lobe segment 6 measuring 1 cm (series 1303, image 44) consistent with HCC.  Main and left portal veins are patent.  Hepatic veins appear patent.    Gallbladder: Unremarkable.    Bile Ducts: No dilatation.    Pancreas: No mass or ductal dilatation.    Spleen: Enlarged measuring 16.3 cm.    Adrenals: Unremarkable.    Kidneys/Ureters: Simple cyst in the right superior renal pole measuring 4.1 cm.  No hydronephrosis.    GI Tract/Mesentery: No evidence of bowel obstruction or inflammation.    Peritoneal Space: No ascites.    Retroperitoneum: No pathologically enlarged nodes.    Abdominal wall: Unremarkable.    Vasculature: Abdominal aorta is normal caliber.  Infrarenal abdominal aortic ectasia measuring 2.4 cm.  Aortic atherosclerosis.    Bones: No suspicious marrow replacing lesion.  Susceptibility artifact related to sternotomy wires..  Impression: 1. Large ill-defined arterial enhancing lesion centered in hepatic segments 8 and 5 demonstrating washout compatible with infiltrative HCC.  Prominent associated tumor thrombus of the right portal vein.  1 cm arterial enhancing lesion in the posterior right hepatic lobe concerning for additional HCC.  2. Cirrhotic liver.  Post treatment changes of right hepatic lobe ablation.  3. Splenomegaly.  4. Additional findings as above.  This report was flagged in Epic as abnormal.    Electronically signed by: David Rodriguez  Date:    06/26/2024  Time:    13:39       ECOG SCORE                   Assessment:       1. HCC (hepatocellular carcinoma)         Plan:   1. HCC (hepatocellular carcinoma)  -     Ambulatory referral/consult to Chemo School  -     ONCOLOGY NAVIGATION REQUEST  -     Ambulatory referral/consult to Oncology Social Work  -     Ambulatory referral/consult to Hematology/Oncology/Nutrition           Med Onc Route Chart for Scheduling   Plan was discussed with the patient at length, and he verbalized understanding. Hudson was given an opportunity to ask questions that were answered to his satisfaction, and he was advised to call in the interval if any problems or questions arise.    Signed:  Tasia Brown NP   Hematology and Oncology  [facility] HEMATOLOGY/ONCOLOGY  [address] [facility] [address]

## 2024-07-12 NOTE — PROGRESS NOTES
POLLYW met with patient for chemo school. The patient had his son, Roberto, with him for support today. The patient also has two daughters and a granddaughter who will be with him for future treatments. One of his daughters is a dietitian, and the other daughter is a . He shared that his wife  only two years ago from cancer, and she had been treated at a nearby cancer center.  informed patient about our role as part of their health care team. The patient denied any transportation or food needs at this time. LESLIE and MICKIE took the patient on a tour of the infusion center, and the patient stated that he is ready to get this started. SW will follow the patient throughout his treatment.

## 2024-07-12 NOTE — PROGRESS NOTES
Subjective:       Name: Hudson Tavares  : 1955  MRN: [id number]    Chief Complaint   Patient presents with    Patient Education     Chemo Ed/NP/RD/SW            HPI: Hudson Tavares is a 69 y.o. male presents for evaluation of Patient Education (Chemo Ed/NP/RD/SW/)        Oncology History   HCC (hepatocellular carcinoma)   2019 Initial Diagnosis    HCC (hepatocellular carcinoma) - treated with microwave ablation     2024 Relapse    Patient established with hematology for thrombocytopenia, HCC and cirrhosis history prompted further GI work-up    24: MRI Abdomen shows large lesions in segments 8 and 5 with washout compatible with HCC, measuring 8 x4.2 cm transverse and 10 cm caudal, prominent tumor thrombus in right portal vein, 1 cm lesion in posterior right hepatic lobe concerning for additional focus     7/3/2024 Cancer Staged    Staging form: Liver, AJCC 8th Edition  - Clinical: Stage IIIA (cT3, cN0, cM0)     2024 -  Chemotherapy    Treatment Summary   Plan Name: OP Tremelimumab + Durvalumab x1 followed by DURVALUMAB 1500 MG Q4W  Treatment Goal: Palliative  Status: Active  Start Date: 2024 (Planned)  End Date: 2025 (Planned)  Provider: Aleyda Kirkland MD  Chemotherapy: [No matching medication found in this treatment plan]     Malignant neoplasm of overlapping sites of bladder   2024 Initial Diagnosis    Malignant neoplasm of overlapping sites of bladder     2024 Cancer Staged    Staging form: Urinary Bladder, AJCC 8th Edition  - Clinical: Stage 0a (cTa, cN0, cM0)          Past Medical History:   Diagnosis Date    Adenomatous colon polyp     Alcohol abuse 2015    Anticoagulant long-term use     coumadin    Atrial fibrillation 2015    CAD (coronary artery disease), native coronary artery 5/3/2016    Degenerative joint disease 2015    Encounter for blood transfusion     Enlarged liver     Essential hypertension 2015    Hep C w/o coma, chronic      treated with Harvoni    Hiatal hernia     patient states hernia has resolved    Hx of bladder infections     finished cipro 4/29/16    S/P CABG (coronary artery bypass graft) 5/17/2016    LIMA to LAD, SVG to OM, SVG to diagonal, SVG to right PDA, Maze, left atrial appendage resection (5/16)    Spleen enlarged     Thrombocytopenia 5/5/2016    Tobacco abuse 7/9/2015       Past Surgical History:   Procedure Laterality Date    CARDIAC SURGERY  05/04/2016    4 vessel CABG    CERVICAL FUSION  2004    COLONOSCOPY  12/03/2018    Steve Wells    CYSTOSCOPY W/ RETROGRADES N/A 2/27/2024    Procedure: CYSTOSCOPY, WITH RETROGRADE PYELOGRAM;  Surgeon: STORMY Bray MD;  Location: [facility] OR;  Service: Urology;  Laterality: N/A;    CYSTOSCOPY WITH BIOPSY OF BLADDER N/A 2/27/2024    Procedure: CYSTOSCOPY, WITH BLADDER BIOPSY, WITH FULGURATION IF INDICATED;  Surgeon: STORMY Bray MD;  Location: [facility] OR;  Service: Urology;  Laterality: N/A;    CYSTOURETHROSCOPY, WITH THROMBUS REMOVAL N/A 2/27/2024    Procedure: CYSTOURETHROSCOPY, WITH THROMBUS REMOVAL;  Surgeon: STORMY Bray MD;  Location: [facility] OR;  Service: Urology;  Laterality: N/A;    JOINT REPLACEMENT  TOTAL RIGHT KNEE     OPEN ANTERIOR SHOULDER RECONSTRUCTION Left     RADIOFREQUENCY ABLATION N/A 9/24/2019    Procedure: Radiofrequency Ablation;  Surgeon: Anayeli Surgeon;  Location: [facility];  Service: Anesthesiology;  Laterality: N/A;  Room 173/[place]    TIBIA FRACTURE SURGERY Right 1971       Family History   Problem Relation Name Age of Onset    Heart disease Mother          congestive heart failure    Diabetes Mother      Hypertension Mother      Heart disease Sister          heart murmur    Atrial fibrillation Sister      Cancer Sister          breast    Cancer Father  60        colon    Chronic back pain Brother      Atrial fibrillation Sister      Heart disease Sister      Collagen disease Neg Hx         Social History     Socioeconomic History    Marital status:  "   Tobacco Use    Smoking status: Former     Current packs/day: 0.00     Average packs/day: 0.3 packs/day for 30.0 years (7.5 ttl pk-yrs)     Types: Cigarettes     Start date: 1986     Quit date: 2016     Years since quittin.2    Smokeless tobacco: Never   Substance and Sexual Activity    Alcohol use: No     Alcohol/week: 0.0 standard drinks of alcohol     Comment: last drink 2015    Drug use: No    Sexual activity: Yes     Partners: Female       Review of patient's allergies indicates:   Allergen Reactions    Cilostazol Other (See Comments)     Colon bleeding    Stadol [butorphanol tartrate] Hallucinations     "I saw pink elephants"    Keflex [cephalexin] Other (See Comments)     Was using 500mg TID oral and had a strong metallic taste in his mouth and could not function.  Can use IV cephalosporin.    Morphine Other (See Comments)     "makes him drunk", Altered Mental Status    Sulfa (sulfonamide antibiotics) Other (See Comments), Hives and Nausea And Vomiting     Since childhood; does not know reaction  Since childhood; does not know reaction    Finasteride Other (See Comments)       Review of Systems   All other systems reviewed and are negative.           Objective:     Vitals:    24 1406   BP: (!) 147/81   BP Location: Right arm   Patient Position: Sitting   BP Method: Medium (Automatic)   Pulse: 78   Resp: 18   Temp: 97.4 °F (36.3 °C)   TempSrc: Temporal   SpO2: 98%   Weight: 99.6 kg (219 lb 9.3 oz)   Height: 5' 10" (1.778 m)        Physical Exam  HENT:      Head: Normocephalic and atraumatic.   Pulmonary:      Effort: Pulmonary effort is normal.   Skin:     General: Skin is warm and dry.   Neurological:      Mental Status: He is alert.                Current Outpatient Medications on File Prior to Visit   Medication Sig    ALPRAZolam (XANAX) 0.5 MG tablet Take 0.5 mg by mouth every evening.    famotidine (PEPCID) 20 MG tablet Take 20 mg by mouth nightly.     losartan (COZAAR) 50 MG " tablet Take 50 mg by mouth once daily.    metoprolol succinate (TOPROL-XL) 50 MG 24 hr tablet Take 1 tablet (50 mg total) by mouth once daily.    rivaroxaban (XARELTO DVT-PE TREAT 30D START) 15 mg (42)- 20 mg (9) tablet dose pack Take 1 tablet (15 mg) by mouth twice daily with food for 21 days followed by 1 tablet (20 mg) by mouth once daily with food. Follow package directions.    aspirin (ECOTRIN) 81 MG EC tablet Take 81 mg by mouth once daily. Use prior to arrival for cardioversion (Patient not taking: Reported on 7/12/2024)    traMADoL (ULTRAM) 50 mg tablet Take 50 mg by mouth every 4 (four) hours as needed. (Patient not taking: Reported on 7/12/2024)     Current Facility-Administered Medications on File Prior to Visit   Medication    diphenhydrAMINE injection 12.5 mg    electrolyte-S (ISOLYTE)    fentaNYL 50 mcg/mL injection 25 mcg    HYDROmorphone (PF) injection 0.2 mg    lactated ringers infusion    LIDOcaine (PF) 10 mg/ml (1%) injection 10 mg    ondansetron injection 4 mg    oxyCODONE immediate release tablet 5 mg       CBC:  Lab Results   Component Value Date    WBC 4.60 07/12/2024    HGB 12.9 (L) 07/12/2024    HCT 38.0 (L) 07/12/2024    MCV 89 07/12/2024    PLT 87 (L) 07/12/2024         CMP:  Sodium   Date Value Ref Range Status   07/12/2024 139 136 - 145 mmol/L Final   10/08/2015 137 137 - 145 MMOL/L Final     Potassium   Date Value Ref Range Status   07/12/2024 4.2 3.5 - 5.1 mmol/L Final   10/08/2015 4.0 3.5 - 5.1 MMOL/L Final     Chloride   Date Value Ref Range Status   07/12/2024 103 95 - 110 mmol/L Final   10/08/2015 100 98 - 107 MMOL/L Final     CO2   Date Value Ref Range Status   07/12/2024 24 23 - 29 mmol/L Final     Glucose   Date Value Ref Range Status   07/12/2024 143 (H) 70 - 110 mg/dL Final     BUN   Date Value Ref Range Status   07/12/2024 15 8 - 23 mg/dL Final     Creatinine   Date Value Ref Range Status   07/12/2024 0.8 0.5 - 1.4 mg/dL Final   10/08/2015 0.70 0.66 - 1.25 MG/DL Final      Calcium   Date Value Ref Range Status   07/12/2024 9.7 8.7 - 10.5 mg/dL Final     Total Protein   Date Value Ref Range Status   07/12/2024 7.6 6.0 - 8.4 g/dL Final     Albumin   Date Value Ref Range Status   07/12/2024 4.0 3.5 - 5.2 g/dL Final     Total Bilirubin   Date Value Ref Range Status   07/12/2024 1.1 (H) 0.1 - 1.0 mg/dL Final     Comment:     For infants and newborns, interpretation of results should be based  on gestational age, weight and in agreement with clinical  observations.    Premature Infant recommended reference ranges:  Up to 24 hours.............<8.0 mg/dL  Up to 48 hours............<12.0 mg/dL  3-5 days..................<15.0 mg/dL  6-29 days.................<15.0 mg/dL       Alkaline Phosphatase   Date Value Ref Range Status   07/12/2024 92 55 - 135 U/L Final     AST ([place])   Date Value Ref Range Status   05/08/2016 99 (H) 17 - 59 U/L Final     AST   Date Value Ref Range Status   07/12/2024 80 (H) 10 - 40 U/L Final     ALT   Date Value Ref Range Status   07/12/2024 22 10 - 44 U/L Final     Anion Gap   Date Value Ref Range Status   07/12/2024 12 8 - 16 mmol/L Final     eGFR if    Date Value Ref Range Status   12/07/2021 >60 >60 mL/min/1.73 m^2 Final     eGFR if non    Date Value Ref Range Status   12/07/2021 >60 >60 mL/min/1.73 m^2 Final     Comment:     Calculation used to obtain the estimated glomerular filtration  rate (eGFR) is the CKD-EPI equation.          MRI Abdomen W WO Contrast  Narrative: EXAMINATION:  MRI ABDOMEN W WO CONTRAST    CLINICAL HISTORY:  Metastatic disease evaluation;liver protocol;  Other pancytopenia    TECHNIQUE:  Multiplanar multisequence images of the abdomen before and after administration of 10 mL Gadavist intravenous contrast.  3D MRCP sequences and MIP images were obtained.    COMPARISON:  Outside hospital CT 02/17/2024, MRI abdomen 01/24/2020    FINDINGS:  Inferior Thorax: Unremarkable.    Liver: Cirrhotic  morphology.  Post treatment changes of right hepatic lobe ablation.  Ill-defined heterogeneous arterial enhancing lesion centered in hepatic segments 8 and 5 demonstrating washout compatible with infiltrative HCC, new compared to prior MRI.  Regions of the infiltrative lesion demonstrates pseudo capsule.  There is corresponding diffusion restriction.  Lesion measures approximately 8 x 4.2 cm in maximum transverse dimension (series 1303 image 46 and extends approximately 10 cm in cranial caudal dimension.  Lesion abuts the prior ablation cavity.  There is prominent associated tumor thrombus involving the right portal vein (series 1303, image 49; series 14, image 66).  Additional arterial enhancing lesion with washout in the posterior right hepatic lobe segment 6 measuring 1 cm (series 1303, image 44) consistent with HCC.  Main and left portal veins are patent.  Hepatic veins appear patent.    Gallbladder: Unremarkable.    Bile Ducts: No dilatation.    Pancreas: No mass or ductal dilatation.    Spleen: Enlarged measuring 16.3 cm.    Adrenals: Unremarkable.    Kidneys/Ureters: Simple cyst in the right superior renal pole measuring 4.1 cm.  No hydronephrosis.    GI Tract/Mesentery: No evidence of bowel obstruction or inflammation.    Peritoneal Space: No ascites.    Retroperitoneum: No pathologically enlarged nodes.    Abdominal wall: Unremarkable.    Vasculature: Abdominal aorta is normal caliber.  Infrarenal abdominal aortic ectasia measuring 2.4 cm.  Aortic atherosclerosis.    Bones: No suspicious marrow replacing lesion.  Susceptibility artifact related to sternotomy wires..  Impression: 1. Large ill-defined arterial enhancing lesion centered in hepatic segments 8 and 5 demonstrating washout compatible with infiltrative HCC.  Prominent associated tumor thrombus of the right portal vein.  1 cm arterial enhancing lesion in the posterior right hepatic lobe concerning for additional HCC.  2. Cirrhotic liver.  Post  treatment changes of right hepatic lobe ablation.  3. Splenomegaly.  4. Additional findings as above.  This report was flagged in Epic as abnormal.    Electronically signed by: David Rodriguez  Date:    06/26/2024  Time:    13:39       ECOG SCORE    0 - Fully active-able to carry on all pre-disease performance without restriction              Assessment:       1. HCC (hepatocellular carcinoma)         Plan:   1. HCC (hepatocellular carcinoma)  -     Ambulatory referral/consult to [facility]  -     ONCOLOGY NAVIGATION REQUEST  -     Ambulatory referral/consult to Oncology Social Work  -     Ambulatory referral/consult to Hematology/Oncology/Nutrition       - Ambulatory referral/consult to [facility]    1. Treatment plan of Imjudo and Imfinzi cycles discussed with patient.  2. Handouts provided on chemotherapy agents. Premeds, supportive meds explained.  3. Discussed the mechanism of action, potential side effects of this treatment as well as ways to manage them at home.   4. Dietary modifications discussed. Detail instructions to be provided by dietician.   5. Chemotherapy portfolio supplied with contact information.   6. Discussed follow-up with the physician for toxicity monitoring throughout treatment.    7. Scripts were escribed prior and explained for home use.   8. Consented the patient to the treatment plan and the patient was educated on the planned duration of the treatment and schedule of the treatment administration.  9. Cycle 1, Day 1 scheduled for awaiting clearance; patient has home prescriptions.  10. Encouraged to call office - phone number provided - to assist with any concerns.      Plan was discussed with the patient at length, and he verbalized understanding. Hudson was given an opportunity to ask questions that were answered to his satisfaction, and he was advised to call in the interval if any problems or questions arise.    Signed:  Tasia Brown NP   Hematology and Oncology

## 2024-07-12 NOTE — PROGRESS NOTES
Oncology Nutrition   New Patient Education  Hudson Tavares   1955    Nutrition Education   This is a 69 y.o.male with a medical diagnosis of HCC.     Met w/ pt and his son, Roberto, to discuss current nutritional status and nutrition as it relates to cancer and cancer treatment. Pt currently low nutrition risk. Pt states he is very familiar with cancer treatment as he went through it with his wife a few years ago. She unfortunately passed 2 years ago.    Wt Readings from Last 10 Encounters:   07/12/24 99.6 kg (219 lb 9.3 oz)   07/03/24 98.2 kg (216 lb 7.9 oz)   07/02/24 98.4 kg (216 lb 14.9 oz)   06/25/24 98.8 kg (217 lb 13 oz)   06/20/24 101.1 kg (222 lb 14.2 oz)   04/04/24 101.1 kg (222 lb 14.2 oz)   04/04/24 101.1 kg (222 lb 14.2 oz)   03/14/24 101.6 kg (223 lb 15.8 oz)   02/27/24 102.1 kg (225 lb)   02/19/24 102.5 kg (225 lb 15.5 oz)      [x] PMHx reviewed  [x] Labs reviewed    Educated on food safety and common nutrition impact symptoms associated with chemotherapy treatment. Reinforced the importance of good hydration. Handouts provided.    Answered all nutrition related questions.     Patient provided with dietitian contact number and advised to call with questions or make future appointment if further intervention is needed. RD to follow throughout tx PRN.    Mary Alberto, MS, RD, LDN  07/12/2024  3:16 PM

## 2024-07-13 LAB
AFP SERPL-MCNC: 18 NG/ML (ref 0–8.4)
TSH SERPL DL<=0.005 MIU/L-ACNC: 0.88 UIU/ML (ref 0.4–4)

## 2024-07-16 ENCOUNTER — TELEPHONE (OUTPATIENT)
Dept: HEMATOLOGY/ONCOLOGY | Facility: CLINIC | Age: 69
End: 2024-07-16
Payer: MEDICARE

## 2024-07-16 ENCOUNTER — DOCUMENTATION ONLY (OUTPATIENT)
Dept: HEMATOLOGY/ONCOLOGY | Facility: CLINIC | Age: 69
End: 2024-07-16
Payer: MEDICARE

## 2024-07-16 ENCOUNTER — DOCUMENTATION ONLY (OUTPATIENT)
Dept: INFUSION THERAPY | Facility: HOSPITAL | Age: 69
End: 2024-07-16
Payer: MEDICARE

## 2024-07-16 ENCOUNTER — INFUSION (OUTPATIENT)
Dept: INFUSION THERAPY | Facility: HOSPITAL | Age: 69
End: 2024-07-16
Attending: STUDENT IN AN ORGANIZED HEALTH CARE EDUCATION/TRAINING PROGRAM
Payer: MEDICARE

## 2024-07-16 VITALS
DIASTOLIC BLOOD PRESSURE: 71 MMHG | BODY MASS INDEX: 31.09 KG/M2 | RESPIRATION RATE: 17 BRPM | HEIGHT: 70 IN | WEIGHT: 217.13 LBS | SYSTOLIC BLOOD PRESSURE: 150 MMHG | TEMPERATURE: 98 F | OXYGEN SATURATION: 99 % | HEART RATE: 70 BPM

## 2024-07-16 DIAGNOSIS — C22.0 HCC (HEPATOCELLULAR CARCINOMA): ICD-10-CM

## 2024-07-16 DIAGNOSIS — D50.0 IRON DEFICIENCY ANEMIA DUE TO CHRONIC BLOOD LOSS: Primary | ICD-10-CM

## 2024-07-16 DIAGNOSIS — C67.8 MALIGNANT NEOPLASM OF OVERLAPPING SITES OF BLADDER: ICD-10-CM

## 2024-07-16 PROCEDURE — 63600175 PHARM REV CODE 636 W HCPCS: Mod: JZ,JG,PN | Performed by: STUDENT IN AN ORGANIZED HEALTH CARE EDUCATION/TRAINING PROGRAM

## 2024-07-16 PROCEDURE — 96413 CHEMO IV INFUSION 1 HR: CPT | Mod: PN

## 2024-07-16 PROCEDURE — A4216 STERILE WATER/SALINE, 10 ML: HCPCS | Mod: PN | Performed by: STUDENT IN AN ORGANIZED HEALTH CARE EDUCATION/TRAINING PROGRAM

## 2024-07-16 PROCEDURE — 96417 CHEMO IV INFUS EACH ADDL SEQ: CPT | Mod: PN

## 2024-07-16 PROCEDURE — 25000003 PHARM REV CODE 250: Mod: PN | Performed by: STUDENT IN AN ORGANIZED HEALTH CARE EDUCATION/TRAINING PROGRAM

## 2024-07-16 RX ORDER — SPIRONOLACTONE 25 MG/1
25 TABLET ORAL
COMMUNITY

## 2024-07-16 RX ORDER — PROCHLORPERAZINE EDISYLATE 5 MG/ML
5 INJECTION INTRAMUSCULAR; INTRAVENOUS
Status: DISCONTINUED | OUTPATIENT
Start: 2024-07-16 | End: 2024-07-22 | Stop reason: HOSPADM

## 2024-07-16 RX ORDER — SODIUM CHLORIDE 0.9 % (FLUSH) 0.9 %
10 SYRINGE (ML) INJECTION
Status: DISCONTINUED | OUTPATIENT
Start: 2024-07-16 | End: 2024-07-22 | Stop reason: HOSPADM

## 2024-07-16 RX ORDER — DIPHENHYDRAMINE HYDROCHLORIDE 50 MG/ML
50 INJECTION INTRAMUSCULAR; INTRAVENOUS ONCE AS NEEDED
Status: DISCONTINUED | OUTPATIENT
Start: 2024-07-16 | End: 2024-07-22 | Stop reason: HOSPADM

## 2024-07-16 RX ORDER — EPINEPHRINE 0.3 MG/.3ML
0.3 INJECTION SUBCUTANEOUS ONCE AS NEEDED
Status: DISCONTINUED | OUTPATIENT
Start: 2024-07-16 | End: 2024-07-22 | Stop reason: HOSPADM

## 2024-07-16 RX ORDER — HEPARIN 100 UNIT/ML
500 SYRINGE INTRAVENOUS
Status: DISCONTINUED | OUTPATIENT
Start: 2024-07-16 | End: 2024-07-22 | Stop reason: HOSPADM

## 2024-07-16 RX ORDER — HYDRALAZINE HYDROCHLORIDE 20 MG/ML
20 INJECTION INTRAMUSCULAR; INTRAVENOUS ONCE
Status: DISCONTINUED | OUTPATIENT
Start: 2024-07-16 | End: 2024-07-22 | Stop reason: HOSPADM

## 2024-07-16 RX ORDER — HYDRALAZINE HYDROCHLORIDE 20 MG/ML
25 INJECTION INTRAMUSCULAR; INTRAVENOUS ONCE
Status: DISCONTINUED | OUTPATIENT
Start: 2024-07-16 | End: 2024-07-16

## 2024-07-16 RX ADMIN — TREMELIMUMAB 300 MG: 300 INJECTION, SOLUTION INTRAVENOUS at 01:07

## 2024-07-16 RX ADMIN — Medication 10 ML: at 01:07

## 2024-07-16 RX ADMIN — SODIUM CHLORIDE: 9 INJECTION, SOLUTION INTRAVENOUS at 12:07

## 2024-07-16 RX ADMIN — SODIUM CHLORIDE 1500 MG: 9 INJECTION, SOLUTION INTRAVENOUS at 03:07

## 2024-07-16 NOTE — PLAN OF CARE
Pt here for IV infusion of Imfinzi/Imjudo. Pt tolerated well, no reactions noted. Education reviewed r/t medication. Pt questions answered at this time. Pt ambulates off unit with daughter, denies any needs before departure. Pt aware of follow up appointments.     Problem: Adult Inpatient Plan of Care  Goal: Plan of Care Review  Outcome: Progressing  Flowsheets (Taken 7/16/2024 1140)  Plan of Care Reviewed With:   patient   child  Goal: Patient-Specific Goal (Individualized)  Outcome: Progressing  Flowsheets (Taken 7/16/2024 1140)  Individualized Care Needs: recliner, daughter at bed side, conversation  Anxieties, Fears or Concerns: first treatment and a little anxious  Patient/Family-Specific Goals (Include Timeframe): no s/s of reaction     Problem: Oncology Care  Goal: Effective Coping  Outcome: Progressing  Intervention: Support and Enhance Coping Strategies  Flowsheets (Taken 7/16/2024 1140)  Supportive Measures:   relaxation techniques promoted   verbalization of feelings encouraged  Family/Support System Care:   presence promoted   involvement promoted  Environmental Support: rest periods encouraged  Goal: Improved Activity Tolerance  Outcome: Progressing  Intervention: Promote Improved Energy  Flowsheets (Taken 7/16/2024 1140)  Fatigue Management:   frequent rest breaks encouraged   paced activity encouraged   fatigue-related activity identified  Sleep/Rest Enhancement:   relaxation techniques promoted   regular sleep/rest pattern promoted   natural light exposure provided  Activity Management:   Ambulated -L4   Up in chair - L3  Environmental Support: rest periods encouraged

## 2024-07-16 NOTE — NURSING
Met with Mr. Tavares chairside in infusion clinic. Discussed today's treatment. He is accompanied by his daughter. Discussed available services such as IO, SW, RD. Contact information for [employer] provided for financial assistance needs. Discussed upcoming appointments.     Encouraged to reach out with any questions/concerns. My contact number provided. Mr. Tavares verbalized understanding and thanked me for my visit.

## 2024-07-16 NOTE — PROGRESS NOTES
LCSW met with patient and daughter at chairside. Patient reported feeling well. Apparently his blood pressure was somewhat high, but the nurses were able to rectify that so infusion could be started. Patient denied any needs at this time.  encouraged him to reach out if any needs arise.  will follow patient throughout treatment plan.

## 2024-07-16 NOTE — PROGRESS NOTES
Oncology Nutrition   Chemotherapy Infusion Visit    Nutrition Follow Up   RD met with patient and his daughter, Faina, at chairside during infusion treatment. Today is patient's first day. Pt denies any nutritional changes from new patient education. Pt asking about ONS and and Faina had a few f/u questions regarding food safety. All answered to their satisfaction. Pt very pleasant to speak with.     Wt Readings from Last 10 Encounters:   07/16/24 98.5 kg (217 lb 2.5 oz)   07/12/24 99.6 kg (219 lb 9.3 oz)   07/03/24 98.2 kg (216 lb 7.9 oz)   07/02/24 98.4 kg (216 lb 14.9 oz)   06/25/24 98.8 kg (217 lb 13 oz)   06/20/24 101.1 kg (222 lb 14.2 oz)   04/04/24 101.1 kg (222 lb 14.2 oz)   04/04/24 101.1 kg (222 lb 14.2 oz)   03/14/24 101.6 kg (223 lb 15.8 oz)   02/27/24 102.1 kg (225 lb)       All other nutrition questions/concerns addressed as appropriate. Will continue to follow and monitor throughout treatment PRN.     Mary Alberto, MS, RD, LDN  07/16/2024  3:12 PM

## 2024-07-17 ENCOUNTER — PATIENT MESSAGE (OUTPATIENT)
Dept: HEMATOLOGY/ONCOLOGY | Facility: CLINIC | Age: 69
End: 2024-07-17
Payer: MEDICARE

## 2024-07-18 ENCOUNTER — OFFICE VISIT (OUTPATIENT)
Dept: INTERVENTIONAL RADIOLOGY/VASCULAR | Facility: CLINIC | Age: 69
End: 2024-07-18
Payer: MEDICARE

## 2024-07-18 VITALS
DIASTOLIC BLOOD PRESSURE: 74 MMHG | SYSTOLIC BLOOD PRESSURE: 152 MMHG | WEIGHT: 219.94 LBS | HEART RATE: 74 BPM | BODY MASS INDEX: 31.49 KG/M2 | HEIGHT: 70 IN

## 2024-07-18 DIAGNOSIS — C22.0 HCC (HEPATOCELLULAR CARCINOMA): Primary | ICD-10-CM

## 2024-07-18 PROCEDURE — 1159F MED LIST DOCD IN RCRD: CPT | Mod: CPTII,S$GLB,, | Performed by: FAMILY MEDICINE

## 2024-07-18 PROCEDURE — 4010F ACE/ARB THERAPY RXD/TAKEN: CPT | Mod: CPTII,S$GLB,, | Performed by: FAMILY MEDICINE

## 2024-07-18 PROCEDURE — 99204 OFFICE O/P NEW MOD 45 MIN: CPT | Mod: S$GLB,,, | Performed by: FAMILY MEDICINE

## 2024-07-18 PROCEDURE — 1160F RVW MEDS BY RX/DR IN RCRD: CPT | Mod: CPTII,S$GLB,, | Performed by: FAMILY MEDICINE

## 2024-07-18 PROCEDURE — 3077F SYST BP >= 140 MM HG: CPT | Mod: CPTII,S$GLB,, | Performed by: FAMILY MEDICINE

## 2024-07-18 PROCEDURE — 3078F DIAST BP <80 MM HG: CPT | Mod: CPTII,S$GLB,, | Performed by: FAMILY MEDICINE

## 2024-07-18 PROCEDURE — 3008F BODY MASS INDEX DOCD: CPT | Mod: CPTII,S$GLB,, | Performed by: FAMILY MEDICINE

## 2024-07-18 PROCEDURE — 1126F AMNT PAIN NOTED NONE PRSNT: CPT | Mod: CPTII,S$GLB,, | Performed by: FAMILY MEDICINE

## 2024-07-18 PROCEDURE — 99999 PR PBB SHADOW E&M-EST. PATIENT-LVL V: CPT | Mod: PBBFAC,,, | Performed by: FAMILY MEDICINE

## 2024-07-18 NOTE — LETTER
July 18, 2024    Hudson Tavares  [address] [facility] [place] [address]     [facility] [address]  [address]  [facility] [address]  Phone: [phone] PRE-PROCEDURE INSTRUCTIONS    Your procedure with Interventional Radiology is scheduled for _______________________.     Please arrive by _______________________. Please note your appointment time in [facility] will be different.    You must check-in and receive a wristband before going to your procedure. We will call you the day before to let you know your check-in location.    **Do not eat or drink anything between midnight and the time of your procedure. This includes gum, mints, and candy lemon drops.    **Do not smoke or drink alcoholic beverages 24 hours prior to your procedure.    **If you wear contact lenses, dentures, hearing aids, or glasses, bring a container to put them in during the procedure and give them to a family member for safekeeping.    **If you have been diagnosed with sleep apnea please bring your CPAP machine.    **If your doctor has scheduled you for an overnight stay, bring a small overnight bag with any personal items that you may need.    **Make arrangements in advance for transportation home by a responsible adult. It is not safe to drive a vehicle during the 24 hours following the procedure.    **All [facility] facilities and properties are tobacco free. Smoking is NOT allowed.    PLEASE NOTE: The procedure schedule has many variables which affect the time of your procedure. Family members should be available if your surgery time changes.    If you have any questions about these instructions call Interventional Radiology at [phone] Monday - Friday between 8:00am and 4:00pm or [phone] (ask for interventional radiology physician) for after hours.

## 2024-07-18 NOTE — PROGRESS NOTES
Patient seen in IR clinic today.  Scheduled for upcoming IR procedure.  Pre-procedure instructions were reviewed with patient and son,Roberto.  Patient instructed not to eat or drink anything after midnight the night before procedure.  Pt aware will need someone to provide transport home and monitor pt 8 hours post procedure.  No driving for at least 24 hours after procedure.   Patient advised to take blood pressure, heart medications,  and  with a sip of water morning of procedure.  Patient verbalized aware of which medications to take.  Do not take  sleep medication (including OTC) and anxiety medication the night before procedure.  Pt verbalized understanding of all pre-procedure instructions.  Written instructions given at appointment today/sent to patient in Mychart/portal.

## 2024-07-18 NOTE — PROGRESS NOTES
"Subjective     Patient ID: Hudson Tavares is a 69 y.o. male.    Chief Complaint: Hepatocellular Carcinoma    Patient referred to Interventional Radiology by Jennifer Scheuermann, PA to discuss treatment of hepatocellular carcinoma. He was last seen in IR clinic on 10/24/2019.     Patient was receiving ultrasounds every 6 months for routine hepatitis C surveillance. An abnormality on ultrasound led to a CT scan on July 11, 2019 which revealed a 2.4 cm enhancing lesion with washout consistent with hepatocellular carcinoma.     He was treated with microwave ablation on 9/24/2019. Follow up MRI obtained on 10/24/2019 noted good response to treatment and an indeterminate lesion. Recommendation at that time was to continue surveillance.     MRI obtained on 6/26/2024 noted "Large ill-defined arterial enhancing lesion centered in hepatic segments 8 and 5 demonstrating washout compatible with infiltrative HCC.  Prominent associated tumor thrombus of the right portal vein.  1 cm arterial enhancing lesion in the posterior right hepatic lobe concerning for additional HCC."    Patient reports feeling well. He denies any abdominal pain or abdominal distention. He is accompanied by his son.      Review of Systems   Constitutional:  Negative for activity change, appetite change, chills, fatigue and fever.   Respiratory:  Positive for cough (due to allergies). Negative for shortness of breath, wheezing and stridor.    Cardiovascular:  Negative for chest pain, palpitations and leg swelling.   Gastrointestinal:  Positive for constipation. Negative for abdominal distention, abdominal pain, diarrhea, nausea and vomiting.   Neurological:         Night sweat - side effect of immunotherapy infusion          Objective     Physical Exam  Constitutional:       General: He is not in acute distress.     Appearance: He is well-developed. He is not diaphoretic.   HENT:      Head: Normocephalic and atraumatic.   Pulmonary:      Effort: " Pulmonary effort is normal. No respiratory distress.   Neurological:      Mental Status: He is alert and oriented to person, place, and time.   Psychiatric:         Behavior: Behavior normal.         Thought Content: Thought content normal.         Judgment: Judgment normal.       ECO  MELD 3.0: 12 at 2024 10:17 AM  MELD-Na: 14 at 2024 10:17 AM  Calculated from:  Serum Creatinine: 0.8 mg/dL (Using min of 1 mg/dL) at 2024 10:17 AM  Serum Sodium: 141 mmol/L (Using max of 137 mmol/L) at 2024 10:17 AM  Total Bilirubin: 0.9 mg/dL (Using min of 1 mg/dL) at 2024 10:17 AM  Serum Albumin: 3.9 g/dL (Using max of 3.5 g/dL) at 2024 10:17 AM  INR(ratio): 1.9 at 2024 10:17 AM  Age at listing (hypothetical): 69 years  Sex: Male at 2024 10:17 AM  Child Faria: Class A  Transplant Status: patient declined    Reviewed hepatology progress note    MRI 2024           Assessment and Plan     1. HCC (hepatocellular carcinoma)  -     IR Embolization for Tumor_Organ Ischemia; Future; Expected date: 2024  -     NM Liver Imaging Static PRE Y-90 Emboliz; Future; Expected date: 2024  -     NM Liver Imaging Static POST Y-90 Emboli; Future; Expected date: 2024  -     NM Spect/CT Single Area; Future; Expected date: 2024  -     NM Spect/CT Single Area; Future; Expected date: 2024  -     IR Embolization for Tumor_Organ Ischemia; Future; Expected date: 2024  -     Comprehensive Metabolic Panel; Future; Expected date: 2024  -     Bilirubin, Direct; Future; Expected date: 2024  -     Protime-INR; Future; Expected date: 2024  -     CBC Auto Differential; Future; Expected date: 2024        Discussed case with Dr. Yuen. Explained to patient can offer treatment with radioembolization. Yttrium 90 Radioembolization discussed in detail with the patient including risks (including, but not limited to, pain, bleeding, infection, damage to nearby  structures, and the need for additional procedures), benefits, potential complications, usual pre and post procedure course.  Discussed the need for initial Angiogram mapping study prior to scheduling the actual Radioembolization procedure. Utilized images from the patient's chart, the internet, and illustrations to help explain procedure. The patient voices understanding and all questions have been answered.  The patient agrees to proceed as planned. Patient scheduled for 7/24/2024. Pre-procedure handout with clinic phone number provided.

## 2024-07-19 ENCOUNTER — TELEPHONE (OUTPATIENT)
Dept: HEMATOLOGY/ONCOLOGY | Facility: CLINIC | Age: 69
End: 2024-07-19
Payer: MEDICARE

## 2024-07-19 ENCOUNTER — TELEPHONE (OUTPATIENT)
Dept: HEPATOLOGY | Facility: CLINIC | Age: 69
End: 2024-07-19
Payer: MEDICARE

## 2024-07-19 NOTE — TELEPHONE ENCOUNTER
Spk to pt and pts son about our services that we offer in [place]. They are not interested in our services right now. If they decide at a later time, they were given our contact number to get in touch with us. Pt and son thanked me for calling.

## 2024-07-23 ENCOUNTER — TELEPHONE (OUTPATIENT)
Dept: INTERVENTIONAL RADIOLOGY/VASCULAR | Facility: HOSPITAL | Age: 69
End: 2024-07-23
Payer: MEDICARE

## 2024-07-23 ENCOUNTER — LAB VISIT (OUTPATIENT)
Dept: LAB | Facility: HOSPITAL | Age: 69
End: 2024-07-23
Attending: FAMILY MEDICINE
Payer: MEDICARE

## 2024-07-23 DIAGNOSIS — C22.0 HCC (HEPATOCELLULAR CARCINOMA): ICD-10-CM

## 2024-07-23 LAB
ALBUMIN SERPL BCP-MCNC: 3.9 G/DL (ref 3.5–5.2)
ALP SERPL-CCNC: 83 U/L (ref 55–135)
ALT SERPL W/O P-5'-P-CCNC: 40 U/L (ref 10–44)
ANION GAP SERPL CALC-SCNC: 9 MMOL/L (ref 8–16)
AST SERPL-CCNC: 123 U/L (ref 10–40)
BASOPHILS # BLD AUTO: 0.04 K/UL (ref 0–0.2)
BASOPHILS NFR BLD: 0.7 % (ref 0–1.9)
BILIRUB DIRECT SERPL-MCNC: 0.4 MG/DL (ref 0.1–0.3)
BILIRUB SERPL-MCNC: 1.1 MG/DL (ref 0.1–1)
BUN SERPL-MCNC: 11 MG/DL (ref 8–23)
CALCIUM SERPL-MCNC: 9.6 MG/DL (ref 8.7–10.5)
CHLORIDE SERPL-SCNC: 104 MMOL/L (ref 95–110)
CO2 SERPL-SCNC: 24 MMOL/L (ref 23–29)
CREAT SERPL-MCNC: 0.7 MG/DL (ref 0.5–1.4)
DIFFERENTIAL METHOD BLD: ABNORMAL
EOSINOPHIL # BLD AUTO: 0.6 K/UL (ref 0–0.5)
EOSINOPHIL NFR BLD: 10.2 % (ref 0–8)
ERYTHROCYTE [DISTWIDTH] IN BLOOD BY AUTOMATED COUNT: 15.2 % (ref 11.5–14.5)
EST. GFR  (NO RACE VARIABLE): >60 ML/MIN/1.73 M^2
GLUCOSE SERPL-MCNC: 93 MG/DL (ref 70–110)
HCT VFR BLD AUTO: 39.2 % (ref 40–54)
HGB BLD-MCNC: 13.3 G/DL (ref 14–18)
IMM GRANULOCYTES # BLD AUTO: 0.02 K/UL (ref 0–0.04)
IMM GRANULOCYTES NFR BLD AUTO: 0.4 % (ref 0–0.5)
INR PPP: 1.2 (ref 0.8–1.2)
LYMPHOCYTES # BLD AUTO: 1 K/UL (ref 1–4.8)
LYMPHOCYTES NFR BLD: 17.2 % (ref 18–48)
MCH RBC QN AUTO: 30.2 PG (ref 27–31)
MCHC RBC AUTO-ENTMCNC: 33.9 G/DL (ref 32–36)
MCV RBC AUTO: 89 FL (ref 82–98)
MONOCYTES # BLD AUTO: 0.6 K/UL (ref 0.3–1)
MONOCYTES NFR BLD: 9.8 % (ref 4–15)
NEUTROPHILS # BLD AUTO: 3.5 K/UL (ref 1.8–7.7)
NEUTROPHILS NFR BLD: 61.7 % (ref 38–73)
NRBC BLD-RTO: 0 /100 WBC
PLATELET # BLD AUTO: 83 K/UL (ref 150–450)
PMV BLD AUTO: 11.9 FL (ref 9.2–12.9)
POTASSIUM SERPL-SCNC: 4.1 MMOL/L (ref 3.5–5.1)
PROT SERPL-MCNC: 7.7 G/DL (ref 6–8.4)
PROTHROMBIN TIME: 12.5 SEC (ref 9–12.5)
RBC # BLD AUTO: 4.4 M/UL (ref 4.6–6.2)
SODIUM SERPL-SCNC: 137 MMOL/L (ref 136–145)
WBC # BLD AUTO: 5.71 K/UL (ref 3.9–12.7)

## 2024-07-23 PROCEDURE — 82248 BILIRUBIN DIRECT: CPT | Mod: PN | Performed by: FAMILY MEDICINE

## 2024-07-23 PROCEDURE — 80053 COMPREHEN METABOLIC PANEL: CPT | Mod: PN | Performed by: FAMILY MEDICINE

## 2024-07-23 PROCEDURE — 85025 COMPLETE CBC W/AUTO DIFF WBC: CPT | Mod: PN | Performed by: FAMILY MEDICINE

## 2024-07-23 PROCEDURE — 85610 PROTHROMBIN TIME: CPT | Performed by: FAMILY MEDICINE

## 2024-07-23 PROCEDURE — 36415 COLL VENOUS BLD VENIPUNCTURE: CPT | Mod: PN | Performed by: FAMILY MEDICINE

## 2024-07-23 NOTE — NURSING
Advised to arrive at 9:00, where to check in, nothing eat/drink past midnight, have ride to/from procedure, take morning meds with a small sip of water, bring list of current home meds. Confirmed six allergies; last Xarelto 7/23/2024.

## 2024-07-24 ENCOUNTER — HOSPITAL ENCOUNTER (OUTPATIENT)
Dept: RADIOLOGY | Facility: HOSPITAL | Age: 69
Discharge: HOME | End: 2024-07-24
Attending: FAMILY MEDICINE
Payer: MEDICARE

## 2024-07-24 ENCOUNTER — HOSPITAL ENCOUNTER (OUTPATIENT)
Dept: INTERVENTIONAL RADIOLOGY/VASCULAR | Facility: HOSPITAL | Age: 69
Discharge: HOME | End: 2024-07-24
Attending: FAMILY MEDICINE
Payer: MEDICARE

## 2024-07-24 VITALS
OXYGEN SATURATION: 99 % | TEMPERATURE: 99 F | WEIGHT: 216 LBS | SYSTOLIC BLOOD PRESSURE: 194 MMHG | HEIGHT: 70 IN | RESPIRATION RATE: 25 BRPM | BODY MASS INDEX: 30.92 KG/M2 | DIASTOLIC BLOOD PRESSURE: 88 MMHG | HEART RATE: 75 BPM

## 2024-07-24 DIAGNOSIS — C22.0 HCC (HEPATOCELLULAR CARCINOMA): ICD-10-CM

## 2024-07-24 PROCEDURE — C1769 GUIDE WIRE: HCPCS

## 2024-07-24 PROCEDURE — C1894 INTRO/SHEATH, NON-LASER: HCPCS

## 2024-07-24 PROCEDURE — 99152 MOD SED SAME PHYS/QHP 5/>YRS: CPT

## 2024-07-24 PROCEDURE — 25500020 PHARM REV CODE 255: Performed by: RADIOLOGY

## 2024-07-24 PROCEDURE — 78201 LIVER IMAGING STATIC ONLY: CPT | Mod: 26,,, | Performed by: RADIOLOGY

## 2024-07-24 PROCEDURE — 25000003 PHARM REV CODE 250: Performed by: RADIOLOGY

## 2024-07-24 PROCEDURE — 78830 RP LOCLZJ TUM SPECT W/CT 1: CPT | Mod: TC

## 2024-07-24 PROCEDURE — 78201 LIVER IMAGING STATIC ONLY: CPT | Mod: TC,59

## 2024-07-24 PROCEDURE — 63600175 PHARM REV CODE 636 W HCPCS: Performed by: RADIOLOGY

## 2024-07-24 PROCEDURE — 99153 MOD SED SAME PHYS/QHP EA: CPT

## 2024-07-24 PROCEDURE — C1887 CATHETER, GUIDING: HCPCS

## 2024-07-24 PROCEDURE — 78830 RP LOCLZJ TUM SPECT W/CT 1: CPT | Mod: 26,,, | Performed by: RADIOLOGY

## 2024-07-24 PROCEDURE — A9540 TC99M MAA: HCPCS | Performed by: FAMILY MEDICINE

## 2024-07-24 PROCEDURE — C1760 CLOSURE DEV, VASC: HCPCS

## 2024-07-24 RX ORDER — FENTANYL CITRATE 50 UG/ML
INJECTION, SOLUTION INTRAMUSCULAR; INTRAVENOUS
Status: COMPLETED | OUTPATIENT
Start: 2024-07-24 | End: 2024-07-24

## 2024-07-24 RX ORDER — SODIUM CHLORIDE 9 MG/ML
INJECTION, SOLUTION INTRAVENOUS CONTINUOUS
Status: DISCONTINUED | OUTPATIENT
Start: 2024-07-24 | End: 2024-07-25 | Stop reason: HOSPADM

## 2024-07-24 RX ORDER — LIDOCAINE HYDROCHLORIDE 10 MG/ML
INJECTION INFILTRATION; PERINEURAL
Status: COMPLETED | OUTPATIENT
Start: 2024-07-24 | End: 2024-07-24

## 2024-07-24 RX ORDER — MIDAZOLAM HYDROCHLORIDE 1 MG/ML
INJECTION, SOLUTION INTRAMUSCULAR; INTRAVENOUS
Status: COMPLETED | OUTPATIENT
Start: 2024-07-24 | End: 2024-07-24

## 2024-07-24 RX ADMIN — IOHEXOL 130 ML: 300 INJECTION, SOLUTION INTRAVENOUS at 12:07

## 2024-07-24 RX ADMIN — FENTANYL CITRATE 50 MCG: 50 INJECTION INTRAMUSCULAR; INTRAVENOUS at 12:07

## 2024-07-24 RX ADMIN — KIT FOR THE PREPARATION OF TECHNETIUM TC 99M ALBUMIN AGGREGATED 8.27 MILLICURIE: 2 INJECTION, POWDER, LYOPHILIZED, FOR SUSPENSION INTRAPERITONEAL; INTRAVENOUS at 01:07

## 2024-07-24 RX ADMIN — FENTANYL CITRATE 25 MCG: 50 INJECTION INTRAMUSCULAR; INTRAVENOUS at 11:07

## 2024-07-24 RX ADMIN — MIDAZOLAM 0.5 MG: 1 INJECTION INTRAMUSCULAR; INTRAVENOUS at 11:07

## 2024-07-24 RX ADMIN — LIDOCAINE HYDROCHLORIDE 5 ML: 10 INJECTION, SOLUTION INFILTRATION; PERINEURAL at 11:07

## 2024-07-24 NOTE — NURSING
Pt arrived to IR recovery, VS stable, room air, denies pain, pt voided 600mL clear yellow urine, tolerating oral intake, R groin puncture site, dressing CDI, no hematoma, pedal pulses remain intact, continue to monitor

## 2024-07-24 NOTE — DISCHARGE INSTRUCTIONS
"  Discharge Instructions for Hepatic Angiography  (Y 90 Mapping)    You have had a procedure called hepatic angiography. This is an X-ray study of the blood vessels that supply your liver. This study is usually performed by a specially trained doctor called an interventional radiologist. The procedure helps the doctor "map out" where radiation treatment can be delivered. A catheter - a thin, flexible tube - was inserted into one of your blood vessels through a small incision in your groin.     Monitor the groin site for bleeding. Apply firm pressure to the groin site if you need to cough, during sneezes, and in the event you have to vomit. This reduces the risk of your site bleeding. If bleeding does occurs, apply firm, manual pressure and seek medical assistance immediately!  Do not shower/bathe tonight. Shower tomorrow, at least 24 hours post-procedure. After your shower, you may remove the groin dressing.   Carefully cleanse the groin site with gentle soap and water; do not pick at any scab formation.  Monitor the groin site for signs and symptoms of infection (See below).    Recovering at Home:    Follow your doctor's recommendations on when it is safe to drive - at least THREE days after your procedure.  Do not lift anything heavier than a gallon of milk for the next TEN days.  Avoid strenuous activity/exercise for the next TEN days - this includes climbing stairs.   Do not submerge in a bathtub, pool, or Jacuzzi until the groin site is fully closed - at least 14 days.  Rest often. You may be more tired than usual.  Take your medications exactly as directed. Do not skip doses. Note - some medications should not be resumed after this procedure to prevent any adverse interaction with the contrast dye, which may be harmful to your kidneys. Be sure to ask your healthcare team about any potential reactions and for guidance on what medications to hold.  Unless directed otherwise, drink six to eight glasses of water a " day for the next TWO days to prevent dehydration and to help flush your body of the contrast dye used during your procedure.  Take your temperature and check the groin site for signs of infection - redness, swelling, drainage, or warmth - every day for one week.    When to call your doctor:    Fever of 100.4°F (38°C) or higher, or as directed by your health care provider.  Sudden shortness breath or any bleeding, bruising, or a large area of swelling at the groin site.  Signs of an allergic reaction to the contrast dye: rash, nausea, vomiting, or trouble breathing.  Signs of infection at the groin site: increased pain, redness, swelling, warmth, or foul-smelling drainage.   Constant or increasing pain or numbness in your leg.  Your leg feels cold, looks blue; numbness and/or tingling in the leg, especially near the catheter insertion site.   Rapid, pounding, or irregular heartbeat.  Blood in your urine or black, tarry stools.    Your Y-90 Radiation dose will be delivered to the hospital within the next two to three weeks. You will be contacted to schedule the Y-90 delivery. The delivery will be very similar to today's mapping, including the Nuclear Medicine scanning and laying flat for two to four hours. Again, you are to have nothing to eat or drink after midnight the night before, you may take your morning medications with a small sip of water (except any oral diabetes medications), and you will need a ride to and from the hospital. You will likely have lab work scheduled the day before. It is very important to get that done!

## 2024-07-24 NOTE — NURSING
IR recovery completed, VS stable, room air, reviewed discharge instructions with pt, removed PIV, tip intact, pt brought to front of hospital in  into care of son, IR care complete

## 2024-07-24 NOTE — SEDATION DOCUMENTATION
Procedure completed. Patient tolerated well; VSS. Site CDI. Patient to be transported to nuclear medicine and then pre/post for two hour recovery; report to be given at the bedside.

## 2024-07-24 NOTE — SEDATION DOCUMENTATION
Pt arrived to IR suite for y90 mapping. Pt oriented to unit and staff, Pt safely transferred from stretcher to procedural table. Fall risk reviewed and comfort measures utilized with interventions. Safety strap applied, position pillows to minimize pressure points. Blankets applied. Pt prepped and draped utilizing standard sterile technique. Patient placed on continuous monitoring, as required by sedation policy. Timeouts implemented utilizing standard universal time-out per department and facility policy. Pt resting comfortably. Denies pain/discomfort. Will continue to monitor. See flow sheets for monitoring, medication administration, and updates. patient verbalizes understanding.

## 2024-07-24 NOTE — H&P
Radiology History & Physical      SUBJECTIVE:     Chief Complaint: HCC    History of Present Illness:  Hudson Tavares is a 69 y.o. male with HCC who presents for Y90 mapping.    Past Medical History:   Diagnosis Date    Adenomatous colon polyp     Alcohol abuse 07/09/2015    Anticoagulant long-term use     coumadin - discontinued    Atrial fibrillation 07/09/2015    CAD (coronary artery disease), native coronary artery 05/03/2016    Degenerative joint disease 07/09/2015    Encounter for blood transfusion     Enlarged liver     Essential hypertension 07/09/2015    Hep C w/o coma, chronic     treated with Harvoni    Hiatal hernia     patient states hernia has resolved    Hx of bladder infections     finished cipro 4/29/16    S/P CABG (coronary artery bypass graft) 05/17/2016    LIMA to LAD, SVG to OM, SVG to diagonal, SVG to right PDA, Maze, left atrial appendage resection (5/16)    Spleen enlarged     Thrombocytopenia 05/05/2016    Tobacco abuse 07/09/2015     Past Surgical History:   Procedure Laterality Date    CARDIAC SURGERY  05/04/2016    4 vessel CABG    CERVICAL FUSION  2004    COLONOSCOPY  12/03/2018    [facility]    CYSTOSCOPY W/ RETROGRADES N/A 2/27/2024    Procedure: CYSTOSCOPY, WITH RETROGRADE PYELOGRAM;  Surgeon: STORMY Bray MD;  Location: [facility] OR;  Service: Urology;  Laterality: N/A;    CYSTOSCOPY WITH BIOPSY OF BLADDER N/A 2/27/2024    Procedure: CYSTOSCOPY, WITH BLADDER BIOPSY, WITH FULGURATION IF INDICATED;  Surgeon: STORMY Bray MD;  Location: [facility] OR;  Service: Urology;  Laterality: N/A;    CYSTOURETHROSCOPY, WITH THROMBUS REMOVAL N/A 2/27/2024    Procedure: CYSTOURETHROSCOPY, WITH THROMBUS REMOVAL;  Surgeon: STORMY Bray MD;  Location: [facility] OR;  Service: Urology;  Laterality: N/A;    JOINT REPLACEMENT  TOTAL RIGHT KNEE     OPEN ANTERIOR SHOULDER RECONSTRUCTION Left     RADIOFREQUENCY ABLATION N/A 9/24/2019    Procedure: Radiofrequency Ablation;  Surgeon: Anayeli Seymour;   "Location: [facility];  Service: Anesthesiology;  Laterality: N/A;  Room 173/[place]    TIBIA FRACTURE SURGERY Right 1971       Home Meds:   Prior to Admission medications    Medication Sig Start Date End Date Taking? Authorizing Provider   ALPRAZolam (XANAX) 0.5 MG tablet Take 0.5 mg by mouth every evening. 9/28/23  Yes Provider, Historical   famotidine (PEPCID) 20 MG tablet Take 20 mg by mouth nightly.    Yes Provider, Historical   losartan (COZAAR) 50 MG tablet Take 50 mg by mouth once daily. 4/29/24  Yes Provider, Historical   metoprolol succinate (TOPROL-XL) 50 MG 24 hr tablet Take 1 tablet (50 mg total) by mouth once daily. 11/16/21  Yes Jorge Keith MD   rivaroxaban (XARELTO DVT-PE TREAT 30D START) 15 mg (42)- 20 mg (9) tablet dose pack Take 1 tablet (15 mg) by mouth twice daily with food for 21 days followed by 1 tablet (20 mg) by mouth once daily with food. Follow package directions. 6/26/24  Yes Aleyda Kirkland MD   spironolactone (ALDACTONE) 25 MG tablet Take 25 mg by mouth as needed.   Yes Provider, Historical     Anticoagulants/Antiplatelets: no anticoagulation    Allergies:   Review of patient's allergies indicates:   Allergen Reactions    Cilostazol Other (See Comments)     Colon bleeding    Stadol [butorphanol tartrate] Hallucinations     "I saw pink elephants"    Keflex [cephalexin] Other (See Comments)     Was using 500mg TID oral and had a strong metallic taste in his mouth and could not function.  Can use IV cephalosporin.    Morphine Other (See Comments)     "makes him drunk", Altered Mental Status    Sulfa (sulfonamide antibiotics) Other (See Comments), Hives and Nausea And Vomiting     Since childhood; does not know reaction  Since childhood; does not know reaction    Finasteride Other (See Comments)     Sedation History:  no adverse reactions    Review of Systems:   Hematological: no known coagulopathies  Respiratory: no shortness of breath  Cardiovascular: no chest pain  Gastrointestinal: " no abdominal pain  Genito-Urinary: no dysuria  Musculoskeletal: negative  Neurological: no TIA or stroke symptoms         OBJECTIVE:     Vital Signs (Most Recent)  Temp: 98.5 °F (36.9 °C) (07/24/24 0926)  Pulse: 82 (07/24/24 0926)  Resp: 16 (07/24/24 0926)  BP: (!) 158/77 (07/24/24 0926)  SpO2: 98 % (07/24/24 0926)    Physical Exam:  ASA: 3  Mallampati: 2    General: no acute distress  Mental Status: alert and oriented to person, place and time  HEENT: normocephalic, atraumatic  Chest: unlabored breathing  Heart: regular heart rate  Abdomen: nondistended  Extremity: moves all extremities    Laboratory  Lab Results   Component Value Date    INR 1.2 07/23/2024       Lab Results   Component Value Date    WBC 5.71 07/23/2024    HGB 13.3 (L) 07/23/2024    HCT 39.2 (L) 07/23/2024    MCV 89 07/23/2024    PLT 83 (L) 07/23/2024      Lab Results   Component Value Date    GLU 93 07/23/2024     07/23/2024    K 4.1 07/23/2024     07/23/2024    CO2 24 07/23/2024    BUN 11 07/23/2024    CREATININE 0.7 07/23/2024    CALCIUM 9.6 07/23/2024    MG 1.9 05/21/2023    ALT 40 07/23/2024     (H) 07/23/2024    ALBUMIN 3.9 07/23/2024    BILITOT 1.1 (H) 07/23/2024    BILIDIR 0.4 (H) 07/23/2024       ASSESSMENT/PLAN:     Sedation Plan: Moderate.  Patient will undergo Y90 mapping.    Wilber Yuen MD  Diagnostic and Interventional Radiologist  Department of Radiology  Pager: [phone]

## 2024-07-26 ENCOUNTER — PATIENT MESSAGE (OUTPATIENT)
Dept: HEMATOLOGY/ONCOLOGY | Facility: CLINIC | Age: 69
End: 2024-07-26
Payer: MEDICARE

## 2024-07-26 DIAGNOSIS — R21 RASH: Primary | ICD-10-CM

## 2024-07-26 RX ORDER — PREDNISONE 50 MG/1
50 TABLET ORAL DAILY
Qty: 7 TABLET | Refills: 0 | Status: SHIPPED | OUTPATIENT
Start: 2024-07-26 | End: 2024-08-02

## 2024-07-26 RX ORDER — TRIAMCINOLONE ACETONIDE 1 MG/G
CREAM TOPICAL 2 TIMES DAILY
Qty: 28.4 G | Refills: 1 | Status: SHIPPED | OUTPATIENT
Start: 2024-07-26

## 2024-07-29 ENCOUNTER — TELEPHONE (OUTPATIENT)
Dept: INTERVENTIONAL RADIOLOGY/VASCULAR | Facility: CLINIC | Age: 69
End: 2024-07-29
Payer: MEDICARE

## 2024-07-31 ENCOUNTER — HOSPITAL ENCOUNTER (OUTPATIENT)
Dept: CARDIOLOGY | Facility: HOSPITAL | Age: 69
Discharge: HOME | End: 2024-07-31
Attending: STUDENT IN AN ORGANIZED HEALTH CARE EDUCATION/TRAINING PROGRAM
Payer: MEDICARE

## 2024-07-31 ENCOUNTER — OFFICE VISIT (OUTPATIENT)
Dept: HEMATOLOGY/ONCOLOGY | Facility: CLINIC | Age: 69
End: 2024-07-31
Payer: MEDICARE

## 2024-07-31 VITALS
WEIGHT: 219.56 LBS | OXYGEN SATURATION: 97 % | TEMPERATURE: 98 F | HEART RATE: 99 BPM | BODY MASS INDEX: 31.43 KG/M2 | DIASTOLIC BLOOD PRESSURE: 85 MMHG | SYSTOLIC BLOOD PRESSURE: 145 MMHG | HEIGHT: 70 IN | RESPIRATION RATE: 17 BRPM

## 2024-07-31 DIAGNOSIS — D53.9 NUTRITIONAL ANEMIA: ICD-10-CM

## 2024-07-31 DIAGNOSIS — C22.0 HCC (HEPATOCELLULAR CARCINOMA): Primary | ICD-10-CM

## 2024-07-31 DIAGNOSIS — L27.0 DRUG RASH: ICD-10-CM

## 2024-07-31 DIAGNOSIS — Z79.899 ON ANTINEOPLASTIC CHEMOTHERAPY: ICD-10-CM

## 2024-07-31 DIAGNOSIS — I48.11 LONGSTANDING PERSISTENT ATRIAL FIBRILLATION: ICD-10-CM

## 2024-07-31 DIAGNOSIS — R12 HEARTBURN: ICD-10-CM

## 2024-07-31 DIAGNOSIS — C67.8 MALIGNANT NEOPLASM OF OVERLAPPING SITES OF BLADDER: ICD-10-CM

## 2024-07-31 DIAGNOSIS — K74.69 COMPENSATED HCV CIRRHOSIS: ICD-10-CM

## 2024-07-31 DIAGNOSIS — B19.20 COMPENSATED HCV CIRRHOSIS: ICD-10-CM

## 2024-07-31 DIAGNOSIS — Z29.89 IMMUNOTHERAPY ENCOUNTER: ICD-10-CM

## 2024-07-31 DIAGNOSIS — D69.6 THROMBOCYTOPENIA: ICD-10-CM

## 2024-07-31 LAB
OHS QRS DURATION: 86 MS
OHS QTC CALCULATION: 450 MS

## 2024-07-31 PROCEDURE — 1101F PT FALLS ASSESS-DOCD LE1/YR: CPT | Mod: CPTII,S$GLB,, | Performed by: STUDENT IN AN ORGANIZED HEALTH CARE EDUCATION/TRAINING PROGRAM

## 2024-07-31 PROCEDURE — 1159F MED LIST DOCD IN RCRD: CPT | Mod: CPTII,S$GLB,, | Performed by: STUDENT IN AN ORGANIZED HEALTH CARE EDUCATION/TRAINING PROGRAM

## 2024-07-31 PROCEDURE — 93005 ELECTROCARDIOGRAM TRACING: CPT | Mod: PO

## 2024-07-31 PROCEDURE — 1126F AMNT PAIN NOTED NONE PRSNT: CPT | Mod: CPTII,S$GLB,, | Performed by: STUDENT IN AN ORGANIZED HEALTH CARE EDUCATION/TRAINING PROGRAM

## 2024-07-31 PROCEDURE — 3008F BODY MASS INDEX DOCD: CPT | Mod: CPTII,S$GLB,, | Performed by: STUDENT IN AN ORGANIZED HEALTH CARE EDUCATION/TRAINING PROGRAM

## 2024-07-31 PROCEDURE — 99215 OFFICE O/P EST HI 40 MIN: CPT | Mod: S$GLB,,, | Performed by: STUDENT IN AN ORGANIZED HEALTH CARE EDUCATION/TRAINING PROGRAM

## 2024-07-31 PROCEDURE — 99999 PR PBB SHADOW E&M-EST. PATIENT-LVL III: CPT | Mod: PBBFAC,,, | Performed by: STUDENT IN AN ORGANIZED HEALTH CARE EDUCATION/TRAINING PROGRAM

## 2024-07-31 PROCEDURE — 3077F SYST BP >= 140 MM HG: CPT | Mod: CPTII,S$GLB,, | Performed by: STUDENT IN AN ORGANIZED HEALTH CARE EDUCATION/TRAINING PROGRAM

## 2024-07-31 PROCEDURE — 3079F DIAST BP 80-89 MM HG: CPT | Mod: CPTII,S$GLB,, | Performed by: STUDENT IN AN ORGANIZED HEALTH CARE EDUCATION/TRAINING PROGRAM

## 2024-07-31 PROCEDURE — 93010 ELECTROCARDIOGRAM REPORT: CPT | Mod: ,,, | Performed by: INTERNAL MEDICINE

## 2024-07-31 PROCEDURE — 3288F FALL RISK ASSESSMENT DOCD: CPT | Mod: CPTII,S$GLB,, | Performed by: STUDENT IN AN ORGANIZED HEALTH CARE EDUCATION/TRAINING PROGRAM

## 2024-07-31 PROCEDURE — 4010F ACE/ARB THERAPY RXD/TAKEN: CPT | Mod: CPTII,S$GLB,, | Performed by: STUDENT IN AN ORGANIZED HEALTH CARE EDUCATION/TRAINING PROGRAM

## 2024-07-31 PROCEDURE — G2211 COMPLEX E/M VISIT ADD ON: HCPCS | Mod: S$GLB,,, | Performed by: STUDENT IN AN ORGANIZED HEALTH CARE EDUCATION/TRAINING PROGRAM

## 2024-07-31 RX ORDER — TRIAMCINOLONE ACETONIDE 1 MG/G
CREAM TOPICAL 2 TIMES DAILY
Qty: 453.6 G | Refills: 1 | Status: SHIPPED | OUTPATIENT
Start: 2024-07-31

## 2024-07-31 NOTE — PROGRESS NOTES
[facility]     Reason for visit: Follow Up     Best Contact Phone Number(s): There are no phone numbers on file.     Oncology History   HCC (hepatocellular carcinoma)   9/24/2019 Initial Diagnosis    HCC (hepatocellular carcinoma) - treated with microwave ablation     6/26/2024 Relapse    Patient established with hematology for thrombocytopenia, HCC and cirrhosis history prompted further GI work-up    6/26/24: MRI Abdomen shows large lesions in segments 8 and 5 with washout compatible with HCC, measuring 8 x4.2 cm transverse and 10 cm caudal, prominent tumor thrombus in right portal vein, 1 cm lesion in posterior right hepatic lobe concerning for additional focus     7/3/2024 Cancer Staged    Staging form: Liver, AJCC 8th Edition  - Clinical: Stage IIIA (cT3, cN0, cM0)     7/16/2024 -  Chemotherapy    Treatment Summary   Plan Name: OP Tremelimumab + Durvalumab x1 followed by DURVALUMAB 1500 MG Q4W  Treatment Goal: Palliative  Status: Active  Start Date: 7/16/2024  End Date: 6/6/2025 (Planned)  Provider: Aleyda Kirkland MD  Chemotherapy: [No matching medication found in this treatment plan]     Malignant neoplasm of overlapping sites of bladder   6/25/2024 Initial Diagnosis    Malignant neoplasm of overlapping sites of bladder     6/25/2024 Cancer Staged    Staging form: Urinary Bladder, AJCC 8th Edition  - Clinical: Stage 0a (cTa, cN0, cM0)       HPI: Hudson Tavares is a 69 y.o. male with HCC who presents for toxicity check following C1 Durvalumab/tremi. He has some groin pain following Y90 mapping but no bruising over acces site. Following C1 patient developed rash 7/26 and lacrimation, improved with topical steroid. Took prednisone 50 mg 7/27 and 40 mg 7/28 and experienced severe insomnia so discontinued. Notes symptoms of reflux which improves with pepcid.      First Y90 scheduled 8/30.    Patient presents with daughter, Rubi. Has daughter Mara and granddaughter, Anselmo and son  Roebrto. ECOG PS is 0.  History has been obtained by chart review and discussion with the patient.    ROS:   A complete 12-point review of systems was reviewed and is negative except as mentioned above.     Past Medical History:   Past Medical History:   Diagnosis Date    Adenomatous colon polyp     Alcohol abuse 07/09/2015    Anticoagulant long-term use     coumadin - discontinued    Atrial fibrillation 07/09/2015    CAD (coronary artery disease), native coronary artery 05/03/2016    Degenerative joint disease 07/09/2015    Encounter for blood transfusion     Enlarged liver     Essential hypertension 07/09/2015    Hep C w/o coma, chronic     treated with Harvoni    Hiatal hernia     patient states hernia has resolved    Hx of bladder infections     finished cipro 4/29/16    S/P CABG (coronary artery bypass graft) 05/17/2016    LIMA to LAD, SVG to OM, SVG to diagonal, SVG to right PDA, Maze, left atrial appendage resection (5/16)    Spleen enlarged     Thrombocytopenia 05/05/2016    Tobacco abuse 07/09/2015        Past Surgical History:   Past Surgical History:   Procedure Laterality Date    CARDIAC SURGERY  05/04/2016    4 vessel CABG    CERVICAL FUSION  2004    COLONOSCOPY  12/03/2018    Steve Wells    CYSTOSCOPY W/ RETROGRADES N/A 2/27/2024    Procedure: CYSTOSCOPY, WITH RETROGRADE PYELOGRAM;  Surgeon: STORMY Bray MD;  Location: [facility] OR;  Service: Urology;  Laterality: N/A;    CYSTOSCOPY WITH BIOPSY OF BLADDER N/A 2/27/2024    Procedure: CYSTOSCOPY, WITH BLADDER BIOPSY, WITH FULGURATION IF INDICATED;  Surgeon: STORMY Bray MD;  Location: [facility] OR;  Service: Urology;  Laterality: N/A;    CYSTOURETHROSCOPY, WITH THROMBUS REMOVAL N/A 2/27/2024    Procedure: CYSTOURETHROSCOPY, WITH THROMBUS REMOVAL;  Surgeon: STORMY Bray MD;  Location: [facility] OR;  Service: Urology;  Laterality: N/A;    JOINT REPLACEMENT  TOTAL RIGHT KNEE     OPEN ANTERIOR SHOULDER RECONSTRUCTION Left     RADIOFREQUENCY ABLATION N/A  "2019    Procedure: Radiofrequency Ablation;  Surgeon: Anayeli Surgeon;  Location: [facility];  Service: Anesthesiology;  Laterality: N/A;  Room 173/[place]    TIBIA FRACTURE SURGERY Right 1971        Family History:   Family History   Problem Relation Name Age of Onset    Heart disease Mother          congestive heart failure    Diabetes Mother      Hypertension Mother      Heart disease Sister          heart murmur    Atrial fibrillation Sister      Cancer Sister          breast    Cancer Father  60        colon    Chronic back pain Brother      Atrial fibrillation Sister      Heart disease Sister      Collagen disease Neg Hx          Social History:   Social History     Tobacco Use    Smoking status: Former     Current packs/day: 0.00     Average packs/day: 0.3 packs/day for 30.0 years (7.5 ttl pk-yrs)     Types: Cigarettes     Start date: 1986     Quit date: 2016     Years since quittin.2    Smokeless tobacco: Never   Substance Use Topics    Alcohol use: No     Alcohol/week: 0.0 standard drinks of alcohol     Comment: last drink 2015        I have reviewed and updated the patient's past medical, surgical, family and social histories.    Allergies:   Review of patient's allergies indicates:   Allergen Reactions    Cilostazol Other (See Comments)     Colon bleeding    Stadol [butorphanol tartrate] Hallucinations     "I saw pink elephants"    Keflex [cephalexin] Other (See Comments)     Was using 500mg TID oral and had a strong metallic taste in his mouth and could not function.  Can use IV cephalosporin.    Morphine Other (See Comments)     "makes him drunk", Altered Mental Status    Sulfa (sulfonamide antibiotics) Other (See Comments), Hives and Nausea And Vomiting     Since childhood; does not know reaction  Since childhood; does not know reaction    Finasteride Other (See Comments)        Medications:   Current Outpatient Medications   Medication Sig Dispense Refill    ALPRAZolam (XANAX) 0.5 MG " "tablet Take 0.5 mg by mouth every evening.      famotidine (PEPCID) 20 MG tablet Take 20 mg by mouth nightly.       losartan (COZAAR) 50 MG tablet Take 50 mg by mouth once daily.      metoprolol succinate (TOPROL-XL) 50 MG 24 hr tablet Take 1 tablet (50 mg total) by mouth once daily. 90 tablet 3    predniSONE (DELTASONE) 50 MG Tab Take 1 tablet (50 mg total) by mouth once daily. for 7 days 7 tablet 0    spironolactone (ALDACTONE) 25 MG tablet Take 25 mg by mouth as needed.      rivaroxaban (XARELTO) 20 mg Tab Take 1 tablet (20 mg total) by mouth daily with dinner or evening meal. 30 tablet 3    triamcinolone acetonide 0.1% (KENALOG) 0.1 % cream Apply topically 2 (two) times daily. 453.6 g 1     No current facility-administered medications for this visit.     Facility-Administered Medications Ordered in Other Visits   Medication Dose Route Frequency Provider Last Rate Last Admin    diphenhydrAMINE injection 12.5 mg  12.5 mg Intravenous Q6H PRN Stefano Sherwood MD        electrolyte-S (ISOLYTE)   Intravenous Continuous Stefano Sherwood MD        fentaNYL 50 mcg/mL injection 25 mcg  25 mcg Intravenous Q5 Min PRN Stefano Sherwood MD        HYDROmorphone (PF) injection 0.2 mg  0.2 mg Intravenous Q5 Min PRN Stefano Sherwood MD        lactated ringers infusion   Intravenous Continuous Stefano Sherwood MD 10 mL/hr at 02/27/24 0824 New Bag at 02/27/24 0824    LIDOcaine (PF) 10 mg/ml (1%) injection 10 mg  1 mL Intradermal Once Stefano Sherwood MD        ondansetron injection 4 mg  4 mg Intravenous Daily PRN Stefano Sherwood MD        oxyCODONE immediate release tablet 5 mg  5 mg Oral Q3H PRN Stefano Sherwood MD   5 mg at 02/27/24 1030        Physical Exam:   BP (!) 145/85 (BP Location: Left arm, Patient Position: Sitting, BP Method: Medium (Automatic))   Pulse 99   Temp 97.7 °F (36.5 °C) (Temporal)   Resp 17   Ht 5' 10" (1.778 m)   Wt 99.6 kg (219 lb 9.3 oz)   SpO2 97%   BMI 31.51 kg/m²                Physical " Exam  Constitutional:       Appearance: Normal appearance.   HENT:      Head: Normocephalic and atraumatic.      Mouth/Throat:      Mouth: Mucous membranes are moist.   Eyes:      Extraocular Movements: Extraocular movements intact.      Pupils: Pupils are equal, round, and reactive to light.   Cardiovascular:      Rate and Rhythm: Normal rate. Rhythm irregular.      Pulses: Normal pulses.      Heart sounds: No murmur heard.  Pulmonary:      Effort: Pulmonary effort is normal. No respiratory distress.      Breath sounds: Normal breath sounds.   Abdominal:      General: Abdomen is flat. There is no distension.      Palpations: Abdomen is soft.      Tenderness: There is no abdominal tenderness.   Musculoskeletal:         General: No swelling or tenderness. Normal range of motion.      Cervical back: Normal range of motion. No rigidity.   Skin:     General: Skin is warm and dry.      Coloration: Skin is not jaundiced.   Neurological:      General: No focal deficit present.      Mental Status: He is alert and oriented to person, place, and time.   Psychiatric:         Mood and Affect: Mood normal.         Behavior: Behavior normal.           Labs:   Lab Results   Component Value Date    WBC 4.77 07/31/2024    HGB 13.2 (L) 07/31/2024    HCT 38.7 (L) 07/31/2024    MCV 90 07/31/2024    PLT 76 (L) 07/31/2024       Lab Results   Component Value Date     07/31/2024    K 3.9 07/31/2024     07/31/2024    CO2 22 (L) 07/31/2024    BUN 12 07/31/2024    CREATININE 0.8 07/31/2024    ALBUMIN 3.9 07/31/2024    BILITOT 1.6 (H) 07/31/2024    ALKPHOS 73 07/31/2024    AST 55 (H) 07/31/2024    ALT 54 (H) 07/31/2024       Imaging:   IR Embolization for Tumor_Organ Ischemia  Narrative: EXAMINATION:  Hepatic radioembolization preparatory angiogram and Tc99m-MAA administration    Procedural Personnel    Attending physician(s): Wilber Yuen    Fellow physician(s): None    Resident physician(s): None    Advanced practice provider(s):  None    Pre-procedure diagnosis: Hepatocellular carcinoma    Post-procedure diagnosis: Same    Indication: Mapping angiogram in preparation for radioembolization    Additional clinical history: None    Complications: No immediate complications.    TECHNIQUE:  - Arterial access with ultrasound guidance    - Aortography: None    - Visceral angiography: Celiac angiography    - Selective hepatic angiography: Common hepatic angiography    - Superselective angiography: Performed as described below    - Embolization and post-embolization angiography: Not performed    - Technetium-99m MAA administration as described below    - Additional procedures: Cone-beam CT and 3D rotational angiography    FINDINGS:  Pre-procedure    Consent: Informed consent for the procedure was obtained and time-out was performed prior to the procedure.    Preparation: The site was prepared and draped using maximal sterile barrier technique including cutaneous antisepsis.    Anesthesia/sedation    The IR procedural team has confirmed the patient ID and re-evaluated the patient and sedation plan confirming it is suitable for the patient's condition and procedure.    Level of anesthesia/sedation: Moderate sedation (conscious sedation)    Anesthesia/sedation administered by: Nurse or other independent trained observer    Total intra-service sedation time (minutes): 60    Access    Local anesthesia was administered. The vessel was sonographically evaluated and judged to be patent. Real time ultrasound was used to visualize needle entry into the vessel and a permanent image was stored. A 5 Greek sheath was placed.    Vessel accessed: Right common femoral artery    Access technique: Micropuncture set with 21 gauge needle    Angiography    Angiography was performed using 5 Greek Motarjame catheter and 2.8 Greek Progreat microcatheter.    Variant anatomy: None    Vessel catheterized: Celiac artery    An angiogram was performed, which demonstrates a  hypervascular mass in the liver, corresponding to the known tumor.  Delayed portal venogram demonstrates patent portal vein with hepatopetal flow.    Vessel catheterized: Common hepatic artery    An angiogram was performed, which demonstrates a hypervascular mass in the right hepatic lobe, corresponding to the area of known tumor.    Vessel catheterized: Left hepatic artery    An angiogram was performed, which demonstrates no significant hypervascularity suggest vascular supply the area of known tumor.  Cone beam CT and rotational 3D angiography was performed at this location.  Images were independently reviewed and interpreted at a separate workstation.  These images demonstrate no significant vascular supply to the area of known tumor.    Vessel catheterized: Middle hepatic artery    An angiogram was performed, which demonstrates subtle hypervascularity arising from a segmental branch of the middle hepatic artery.  Cone beam CT and rotational 3D angiography was performed at this location.  Images were independently reviewed and interpreted at a separate workstation.  These images demonstrate a small amount of vascular supply to the area of known tumor in the right hepatic lobe.    Vessel catheterized: Segment IV a branch of the middle hepatic artery    Angiogram was performed, which demonstrates vascular supply to the area of known tumor in the right hepatic lobe.  Cone beam CT and rotational 3D angiography is performed at this location.  Images were independently reviewed and interpreted at a separate workstation.  These images demonstrate vascular supply to the area of known tumor in the right hepatic lobe.    Vessel catheterized: Right hepatic artery    An angiogram was performed, which demonstrates large hypervascular mass in the right hepatic lobe, corresponding to area of known tumor.  Cone beam CT and rotational 3D angiography was performed at this location.  Images were independently reviewed and  interpreted at a separate workstation.  These images demonstrate vascular supply to the area of known tumor in the right hepatic lobe.    Vessel catheterized: Cystic artery    Angiogram was performed, which demonstrates no evidence of vascular supply to the area of known tumor in the liver.    Vessel catheterized: Medial subsegmental segment 5 branch of the right hepatic artery    Angiogram was performed, which demonstrates faint hypervascularity and in questionable supply to the area of portal vein tumor thrombus.  Cone beam CT and rotational 3D angiography was performed at this location.  Images were independently reviewed and interpreted at a separate workstation.  These images demonstrate vascular supply to the central area of portal vein tumor thrombus in the right hepatic lobe.    Vessel catheterized: Posterior subsegmental segment VIII branch of the right hepatic artery    Angiogram was performed, which demonstrates vascular supply to the area of known tumor in the right hepatic lobe.  Cone beam CT and rotational 3D angiography was performed at this location.  Images were independently reviewed and interpreted at a separate workstation.  These images demonstrate vascular supply to the area of known tumor in the right hepatic lobe.    Vessel catheterized: Lateral subsegmental segment V branch of the right hepatic artery    An angiogram was performed, which demonstrates vascular supply to the area of known tumor in the right hepatic lobe.  Cone beam CT and rotational 3D angiography was performed at this location.  Images were independently reviewed and interpreted at a separate workstation.  These images demonstrate vascular supply to the area of known tumor in the right hepatic lobe.    Vessel catheterized: Posterior division of the right hepatic artery    An angiogram was performed, which demonstrates hypervascular focus in the posterior aspect of the right hepatic lobe.    Vessel catheterized: Subsegmental  segment VI branch of the right hepatic artery    Angiogram was performed, which demonstrates vascular supply to the hypervascular mass in the posterior aspect of the right hepatic lobe.  Cone beam CT and rotational 3D angiography was performed at this location.  Images were independently reviewed and interpreted at a separate workstation.  These images demonstrate vascular supply to the area of known tumor in the posterior aspect of the right hepatic lobe.    Vessel catheterized: Anterior subsegmental segment VIII branch of the right hepatic artery    Angiogram was performed, which demonstrates vascular supply to the area of portal vein tumor thrombus in the right hepatic lobe.  Cone beam CT and rotational 3D angiography was performed at this location.  Images were independently reviewed and interpreted at a separate workstation.  These images demonstrate vascular supply to the area of known portal vein tumor thrombus in the right hepatic lobe.    Radioisotope administration    Radioisotope: Technetium-99m MAA    Catheter position for administration: Right hepatic artery    Radioisotope activity administered at this location (mCi): 5    Closure    Access site angiography performed: Yes    Patent vessel with appropriate access level    Arterial closure technique: Vascade    Hemostasis achieved from closure technique: Yes    Duration of manual compression (minutes): 5    Contrast    Contrast agent: Omnipaque 300    Contrast volume (mL): 100    Radiation Dose    Fluoroscopy time ( minutes): 17.3    Reference air kerma ( mGy ): 4042    Kerma area product ( uGy-m2 ): 53579    Additional Details    Additional description of procedure: None    Additional findings: None    Equipment details: None    Specimens removed: None    Estimated blood loss (mL): Less than 10    Standardized report: [id number]    Attestation    Signer name: Wilber Yuen    I attest that I was present for the entire procedure. I  reviewed the stored images and agree with the report as written.  Impression: Radioembolization preparatory angiography with administration of Tc99m-MAA.    Plan:    Patient to be sent to nuclear medicine for radioisotope imaging and calculation of lung shunt fraction.    _______________________________________________________________    Electronically signed by: Wilber Yuen  Date:    07/25/2024  Time:    09:47  NM Liver Imaging Static PRE Y-90 Emboliz  EXAMINATION:  NM LIVER IMAGING STATIC PRE Y-90 EMBOLIZATION; NM SPECT/CT SINGLE AREA    CLINICAL HISTORY:  Liver cell carcinoma    TECHNIQUE:  5 mCi Tc-99m MAA was injected intra-arterially by interventional radiology, and a static image of the chest and abdomen was acquired in the anterior view.  SPECT-CT images of the abdomen were also acquired.    COMPARISON:  None    FINDINGS:  There is expected uptake within the liver, and the liver lung shunt fraction is 10.8%.  There is no other significant extrahepatic activity by planar or SPECT-CT imaging.    IMPRESSION  Liver lung shunt fraction of 10.8 % without other extrahepatic activity.    Electronically signed by: Wilber Yuen  Date:    07/25/2024  Time:    09:08  NM Spect/CT Single Area  EXAMINATION:  NM LIVER IMAGING STATIC PRE Y-90 EMBOLIZATION; NM SPECT/CT SINGLE AREA    CLINICAL HISTORY:  Liver cell carcinoma    TECHNIQUE:  5 mCi Tc-99m MAA was injected intra-arterially by interventional radiology, and a static image of the chest and abdomen was acquired in the anterior view.  SPECT-CT images of the abdomen were also acquired.    COMPARISON:  None    FINDINGS:  There is expected uptake within the liver, and the liver lung shunt fraction is 10.8%.  There is no other significant extrahepatic activity by planar or SPECT-CT imaging.    IMPRESSION  Liver lung shunt fraction of 10.8 % without other extrahepatic activity.    Electronically signed by: Wilber Yuen  Date:    07/25/2024  Time:    09:08               Assessment:       1. HCC (hepatocellular carcinoma)    2. Thrombocytopenia    3. Nutritional anemia    4. Malignant neoplasm of overlapping sites of bladder    5. Longstanding persistent atrial fibrillation    6. Compensated HCV cirrhosis    7. On antineoplastic chemotherapy    8. Heartburn    9. Immunotherapy encounter    10. Drug rash           Plan:           # HCC, Stage III - Microwave ablation 9/2019, MRI Abdomen shows recurrent disease. Discussed case with IR, recommend Y90 + immunotherapy for best disease control. We discussed the results of the [facility] study and superiority of Durvalumab/Tremlimumab to Sorafenib for improved overall survival. Alternative therapy with be Bevacizumab/Atezolizumab per Impower 150 but would impact timing of Y90. Would recommend either approach over Sorafenib, patient and family understanding. AFP 21 -> 18.   - return to clinic prior to C2    # IO dermatitis - patient rash >80% BSA x2 days, improved with topical steroid. Took oral steroid, prednisone 50 mg x2 days, but discontinued due to insomnia. We discussed the chance of repeat IO toxicity upon drug rechallenge; since symptoms have resolved and patient will move to Durvalumab monotherapy for C2 believe benefit outweighs risk to continue therapy.     Continue topical steroid + oral steroids and notify if rash recurs.     # Thrombocytopenia - Attributed to cirrhosis, established with hepatology. Hold anticoagulation if plts <50k     # Anemia - Mild iron deficiency, encourage iron in diet    # Non-Muscle Invasive Bladder Cancer, Ta - Follows with Dr. Bray, TURBT 2/2024. Cystoscopy 6/20/24 negative for recurrence.     # Atrial fibrillation - Coumadin -> Xarelto. HOLD anticoagulation if plts fall <50k     # Hep C Cirrhosis - Treated with SVR, patient declines liver transplant in the past. Established with hepatology for compensated cirrhosis, EGD 7/2/24    # Heartburn - EKG shows a fib to rule out cardiac source,  continue tums + antacid    Follow up: prior to C2     The above information has been reviewed with the patient and all questions have been answered to their apparent satisfaction.  They understand that they can call the clinic with any questions.    Aleyda Kirkland MD  Hematology/Oncology  [facility]      Med Onc Chart Routing  Urgent    Follow up with physician 6 weeks. chinedu 9/11   Follow up with DARLING 2 weeks. NP 8/13 prior to treatment   Infusion scheduling note    Injection scheduling note    Labs   Scheduling:  Preferred lab:  Lab interval:  Labs prior to NP and MD 8/13: cbc, cmp, afp, tsh   Imaging   EKG 7/31 or 8/13   Pharmacy appointment    Other referrals                  Answers submitted by the patient for this visit:  Review of Systems Questionnaire (Submitted on 6/20/2024)  appetite change : No  unexpected weight change: No  mouth sores: No  visual disturbance: Yes  cough: No  shortness of breath: No  chest pain: No  abdominal pain: No  diarrhea: No  frequency: Yes  back pain: No  rash: No  headaches: No  adenopathy: No  nervous/ anxious: No

## 2024-08-01 ENCOUNTER — DOCUMENTATION ONLY (OUTPATIENT)
Dept: HEMATOLOGY/ONCOLOGY | Facility: CLINIC | Age: 69
End: 2024-08-01
Payer: MEDICARE

## 2024-08-01 DIAGNOSIS — C22.0 HCC (HEPATOCELLULAR CARCINOMA): Primary | ICD-10-CM

## 2024-08-05 ENCOUNTER — TELEPHONE (OUTPATIENT)
Dept: PSYCHIATRY | Facility: CLINIC | Age: 69
End: 2024-08-05
Payer: MEDICARE

## 2024-08-08 ENCOUNTER — LAB VISIT (OUTPATIENT)
Dept: LAB | Facility: HOSPITAL | Age: 69
End: 2024-08-08
Attending: STUDENT IN AN ORGANIZED HEALTH CARE EDUCATION/TRAINING PROGRAM
Payer: MEDICARE

## 2024-08-08 DIAGNOSIS — C22.0 HCC (HEPATOCELLULAR CARCINOMA): ICD-10-CM

## 2024-08-08 DIAGNOSIS — Z79.899 ON ANTINEOPLASTIC CHEMOTHERAPY: ICD-10-CM

## 2024-08-08 LAB
AFP SERPL-MCNC: 4.5 NG/ML (ref 0–8.4)
ALBUMIN SERPL BCP-MCNC: 3.9 G/DL (ref 3.5–5.2)
ALP SERPL-CCNC: 88 U/L (ref 55–135)
ALT SERPL W/O P-5'-P-CCNC: 122 U/L (ref 10–44)
ANION GAP SERPL CALC-SCNC: 7 MMOL/L (ref 8–16)
AST SERPL-CCNC: 118 U/L (ref 10–40)
BASOPHILS # BLD AUTO: 0.03 K/UL (ref 0–0.2)
BASOPHILS NFR BLD: 0.6 % (ref 0–1.9)
BILIRUB SERPL-MCNC: 1.3 MG/DL (ref 0.1–1)
BUN SERPL-MCNC: 9 MG/DL (ref 8–23)
CALCIUM SERPL-MCNC: 9.7 MG/DL (ref 8.7–10.5)
CHLORIDE SERPL-SCNC: 106 MMOL/L (ref 95–110)
CO2 SERPL-SCNC: 26 MMOL/L (ref 23–29)
CREAT SERPL-MCNC: 0.8 MG/DL (ref 0.5–1.4)
DIFFERENTIAL METHOD BLD: ABNORMAL
EOSINOPHIL # BLD AUTO: 0.6 K/UL (ref 0–0.5)
EOSINOPHIL NFR BLD: 13 % (ref 0–8)
ERYTHROCYTE [DISTWIDTH] IN BLOOD BY AUTOMATED COUNT: 15.5 % (ref 11.5–14.5)
EST. GFR  (NO RACE VARIABLE): >60 ML/MIN/1.73 M^2
GLUCOSE SERPL-MCNC: 102 MG/DL (ref 70–110)
HCT VFR BLD AUTO: 39.9 % (ref 40–54)
HGB BLD-MCNC: 13.6 G/DL (ref 14–18)
IMM GRANULOCYTES # BLD AUTO: 0.01 K/UL (ref 0–0.04)
IMM GRANULOCYTES NFR BLD AUTO: 0.2 % (ref 0–0.5)
LYMPHOCYTES # BLD AUTO: 0.7 K/UL (ref 1–4.8)
LYMPHOCYTES NFR BLD: 15.3 % (ref 18–48)
MCH RBC QN AUTO: 31 PG (ref 27–31)
MCHC RBC AUTO-ENTMCNC: 34.1 G/DL (ref 32–36)
MCV RBC AUTO: 91 FL (ref 82–98)
MONOCYTES # BLD AUTO: 0.6 K/UL (ref 0.3–1)
MONOCYTES NFR BLD: 12.3 % (ref 4–15)
NEUTROPHILS # BLD AUTO: 2.8 K/UL (ref 1.8–7.7)
NEUTROPHILS NFR BLD: 58.6 % (ref 38–73)
NRBC BLD-RTO: 0 /100 WBC
PLATELET # BLD AUTO: 73 K/UL (ref 150–450)
PMV BLD AUTO: 11.8 FL (ref 9.2–12.9)
POTASSIUM SERPL-SCNC: 4.3 MMOL/L (ref 3.5–5.1)
PROT SERPL-MCNC: 7.6 G/DL (ref 6–8.4)
RBC # BLD AUTO: 4.39 M/UL (ref 4.6–6.2)
SODIUM SERPL-SCNC: 139 MMOL/L (ref 136–145)
TSH SERPL DL<=0.005 MIU/L-ACNC: 0.95 UIU/ML (ref 0.4–4)
WBC # BLD AUTO: 4.78 K/UL (ref 3.9–12.7)

## 2024-08-08 PROCEDURE — 36415 COLL VENOUS BLD VENIPUNCTURE: CPT | Mod: PN | Performed by: STUDENT IN AN ORGANIZED HEALTH CARE EDUCATION/TRAINING PROGRAM

## 2024-08-08 PROCEDURE — 82105 ALPHA-FETOPROTEIN SERUM: CPT | Performed by: STUDENT IN AN ORGANIZED HEALTH CARE EDUCATION/TRAINING PROGRAM

## 2024-08-08 PROCEDURE — 84443 ASSAY THYROID STIM HORMONE: CPT | Performed by: STUDENT IN AN ORGANIZED HEALTH CARE EDUCATION/TRAINING PROGRAM

## 2024-08-08 PROCEDURE — 85025 COMPLETE CBC W/AUTO DIFF WBC: CPT | Mod: PN | Performed by: STUDENT IN AN ORGANIZED HEALTH CARE EDUCATION/TRAINING PROGRAM

## 2024-08-08 PROCEDURE — 80053 COMPREHEN METABOLIC PANEL: CPT | Mod: PO | Performed by: STUDENT IN AN ORGANIZED HEALTH CARE EDUCATION/TRAINING PROGRAM

## 2024-08-13 ENCOUNTER — DOCUMENTATION ONLY (OUTPATIENT)
Dept: INFUSION THERAPY | Facility: HOSPITAL | Age: 69
End: 2024-08-13
Payer: MEDICARE

## 2024-08-13 ENCOUNTER — INFUSION (OUTPATIENT)
Dept: INFUSION THERAPY | Facility: HOSPITAL | Age: 69
End: 2024-08-13
Attending: STUDENT IN AN ORGANIZED HEALTH CARE EDUCATION/TRAINING PROGRAM
Payer: MEDICARE

## 2024-08-13 ENCOUNTER — OFFICE VISIT (OUTPATIENT)
Dept: HEMATOLOGY/ONCOLOGY | Facility: CLINIC | Age: 69
End: 2024-08-13
Payer: MEDICARE

## 2024-08-13 VITALS
DIASTOLIC BLOOD PRESSURE: 67 MMHG | TEMPERATURE: 97 F | SYSTOLIC BLOOD PRESSURE: 131 MMHG | RESPIRATION RATE: 16 BRPM | HEART RATE: 77 BPM | HEIGHT: 70 IN | WEIGHT: 219.56 LBS | BODY MASS INDEX: 31.43 KG/M2 | OXYGEN SATURATION: 99 %

## 2024-08-13 VITALS
RESPIRATION RATE: 18 BRPM | SYSTOLIC BLOOD PRESSURE: 136 MMHG | OXYGEN SATURATION: 98 % | TEMPERATURE: 98 F | HEIGHT: 70 IN | WEIGHT: 219.56 LBS | BODY MASS INDEX: 31.43 KG/M2 | HEART RATE: 83 BPM | DIASTOLIC BLOOD PRESSURE: 75 MMHG

## 2024-08-13 DIAGNOSIS — L27.0 DRUG RASH: ICD-10-CM

## 2024-08-13 DIAGNOSIS — Z29.89 IMMUNOTHERAPY ENCOUNTER: ICD-10-CM

## 2024-08-13 DIAGNOSIS — D53.9 NUTRITIONAL ANEMIA: ICD-10-CM

## 2024-08-13 DIAGNOSIS — C67.8 MALIGNANT NEOPLASM OF OVERLAPPING SITES OF BLADDER: ICD-10-CM

## 2024-08-13 DIAGNOSIS — Z79.899 ON ANTINEOPLASTIC CHEMOTHERAPY: ICD-10-CM

## 2024-08-13 DIAGNOSIS — C22.0 HCC (HEPATOCELLULAR CARCINOMA): Primary | ICD-10-CM

## 2024-08-13 DIAGNOSIS — R12 HEARTBURN: ICD-10-CM

## 2024-08-13 DIAGNOSIS — D69.6 THROMBOCYTOPENIA: ICD-10-CM

## 2024-08-13 DIAGNOSIS — I48.11 LONGSTANDING PERSISTENT ATRIAL FIBRILLATION: ICD-10-CM

## 2024-08-13 PROCEDURE — 3075F SYST BP GE 130 - 139MM HG: CPT | Mod: CPTII,S$GLB,,

## 2024-08-13 PROCEDURE — 99999 PR PBB SHADOW E&M-EST. PATIENT-LVL III: CPT | Mod: PBBFAC,,,

## 2024-08-13 PROCEDURE — 4010F ACE/ARB THERAPY RXD/TAKEN: CPT | Mod: CPTII,S$GLB,,

## 2024-08-13 PROCEDURE — G2211 COMPLEX E/M VISIT ADD ON: HCPCS | Mod: S$GLB,,,

## 2024-08-13 PROCEDURE — 3008F BODY MASS INDEX DOCD: CPT | Mod: CPTII,S$GLB,,

## 2024-08-13 PROCEDURE — 96413 CHEMO IV INFUSION 1 HR: CPT | Mod: PN

## 2024-08-13 PROCEDURE — 3078F DIAST BP <80 MM HG: CPT | Mod: CPTII,S$GLB,,

## 2024-08-13 PROCEDURE — 25000003 PHARM REV CODE 250: Mod: PN

## 2024-08-13 PROCEDURE — 1159F MED LIST DOCD IN RCRD: CPT | Mod: CPTII,S$GLB,,

## 2024-08-13 PROCEDURE — 1160F RVW MEDS BY RX/DR IN RCRD: CPT | Mod: CPTII,S$GLB,,

## 2024-08-13 PROCEDURE — 63600175 PHARM REV CODE 636 W HCPCS: Mod: JZ,JG,PN

## 2024-08-13 PROCEDURE — 99214 OFFICE O/P EST MOD 30 MIN: CPT | Mod: S$GLB,,,

## 2024-08-13 PROCEDURE — 3288F FALL RISK ASSESSMENT DOCD: CPT | Mod: CPTII,S$GLB,,

## 2024-08-13 PROCEDURE — 1101F PT FALLS ASSESS-DOCD LE1/YR: CPT | Mod: CPTII,S$GLB,,

## 2024-08-13 PROCEDURE — 1126F AMNT PAIN NOTED NONE PRSNT: CPT | Mod: CPTII,S$GLB,,

## 2024-08-13 RX ORDER — PROCHLORPERAZINE EDISYLATE 5 MG/ML
5 INJECTION INTRAMUSCULAR; INTRAVENOUS
Status: DISCONTINUED | OUTPATIENT
Start: 2024-08-13 | End: 2024-08-13 | Stop reason: HOSPADM

## 2024-08-13 RX ORDER — SODIUM CHLORIDE 0.9 % (FLUSH) 0.9 %
10 SYRINGE (ML) INJECTION
Status: CANCELLED | OUTPATIENT
Start: 2024-08-13

## 2024-08-13 RX ORDER — EPINEPHRINE 0.3 MG/.3ML
0.3 INJECTION SUBCUTANEOUS ONCE AS NEEDED
Status: DISCONTINUED | OUTPATIENT
Start: 2024-08-13 | End: 2024-08-13 | Stop reason: HOSPADM

## 2024-08-13 RX ORDER — DIPHENHYDRAMINE HYDROCHLORIDE 50 MG/ML
50 INJECTION INTRAMUSCULAR; INTRAVENOUS ONCE AS NEEDED
Status: DISCONTINUED | OUTPATIENT
Start: 2024-08-13 | End: 2024-08-13 | Stop reason: HOSPADM

## 2024-08-13 RX ORDER — EPINEPHRINE 0.3 MG/.3ML
0.3 INJECTION SUBCUTANEOUS ONCE AS NEEDED
Status: CANCELLED | OUTPATIENT
Start: 2024-08-13

## 2024-08-13 RX ORDER — PROCHLORPERAZINE EDISYLATE 5 MG/ML
5 INJECTION INTRAMUSCULAR; INTRAVENOUS
Status: CANCELLED
Start: 2024-08-13

## 2024-08-13 RX ORDER — SODIUM CHLORIDE 0.9 % (FLUSH) 0.9 %
10 SYRINGE (ML) INJECTION
Status: DISCONTINUED | OUTPATIENT
Start: 2024-08-13 | End: 2024-08-13 | Stop reason: HOSPADM

## 2024-08-13 RX ORDER — HEPARIN 100 UNIT/ML
500 SYRINGE INTRAVENOUS
Status: DISCONTINUED | OUTPATIENT
Start: 2024-08-13 | End: 2024-08-13 | Stop reason: HOSPADM

## 2024-08-13 RX ORDER — DIPHENHYDRAMINE HYDROCHLORIDE 50 MG/ML
50 INJECTION INTRAMUSCULAR; INTRAVENOUS ONCE AS NEEDED
Status: CANCELLED | OUTPATIENT
Start: 2024-08-13

## 2024-08-13 RX ORDER — HEPARIN 100 UNIT/ML
500 SYRINGE INTRAVENOUS
Status: CANCELLED | OUTPATIENT
Start: 2024-08-13

## 2024-08-13 RX ADMIN — SODIUM CHLORIDE: 9 INJECTION, SOLUTION INTRAVENOUS at 03:08

## 2024-08-13 RX ADMIN — SODIUM CHLORIDE 1500 MG: 9 INJECTION, SOLUTION INTRAVENOUS at 03:08

## 2024-08-13 NOTE — PROGRESS NOTES
LCSW met with patient at the chairside during infusion. Patient denied any emotional or practical needs at this time. Mr. Tavares appears to be in good spirits. He had his daughter and granddaughter with him today for support.

## 2024-08-13 NOTE — PROGRESS NOTES
Oncology Nutrition   Chemotherapy Infusion Visit    Nutrition Follow Up   RD met with patient at chairside during infusion treatment. Pt reports continues to do well nutritionally- eating without difficulty, maintaining weight, and denies nutrition related side effects.      Wt Readings from Last 10 Encounters:   08/13/24 99.6 kg (219 lb 9.3 oz)   07/31/24 99.6 kg (219 lb 9.3 oz)   07/24/24 98 kg (216 lb)   07/18/24 99.7 kg (219 lb 14.5 oz)   07/16/24 98.5 kg (217 lb 2.5 oz)   07/12/24 99.6 kg (219 lb 9.3 oz)   07/03/24 98.2 kg (216 lb 7.9 oz)   07/02/24 98.4 kg (216 lb 14.9 oz)   06/25/24 98.8 kg (217 lb 13 oz)   06/20/24 101.1 kg (222 lb 14.2 oz)       All other nutrition questions/concerns addressed as appropriate. Will continue to follow and monitor throughout treatment PRN.     Mary Alberto, MS, RD, LDN  08/13/2024  3:23 PM

## 2024-08-13 NOTE — PROGRESS NOTES
PATIENT: Hudson Tavares  MRN: [id number]  DATE: 8/13/2024      Diagnosis:   1. HCC (hepatocellular carcinoma)    2. Thrombocytopenia    3. Nutritional anemia    4. Malignant neoplasm of overlapping sites of bladder    5. Longstanding persistent atrial fibrillation    6. On antineoplastic chemotherapy    7. Heartburn    8. Immunotherapy encounter    9. Drug rash        Chief Complaint: HCC (hepatocellular carcinoma)          Subjective:    Interval History: Mr. Tavares is a 69 y.o. male who returns for follow up for review of labs and treatment.     Oncologic History:   Per Record  69 y.o. male with HCC who presents for toxicity check following C1 Durvalumab/tremi. He has some groin pain following Y90 mapping but no bruising over acces site. Following C1 patient developed rash 7/26 and lacrimation, improved with topical steroid. Took prednisone 50 mg 7/27 and 40 mg 7/28 and experienced severe insomnia so discontinued. Notes symptoms of reflux which improves with pepcid.       First Y90 scheduled 8/30.  Oncology History   HCC (hepatocellular carcinoma)   9/24/2019 Initial Diagnosis    HCC (hepatocellular carcinoma) - treated with microwave ablation     6/26/2024 Relapse    Patient established with hematology for thrombocytopenia, HCC and cirrhosis history prompted further GI work-up    6/26/24: MRI Abdomen shows large lesions in segments 8 and 5 with washout compatible with HCC, measuring 8 x4.2 cm transverse and 10 cm caudal, prominent tumor thrombus in right portal vein, 1 cm lesion in posterior right hepatic lobe concerning for additional focus     7/3/2024 Cancer Staged    Staging form: Liver, AJCC 8th Edition  - Clinical: Stage IIIA (cT3, cN0, cM0)     7/16/2024 -  Chemotherapy    Treatment Summary   Plan Name: OP Tremelimumab + Durvalumab x1 followed by DURVALUMAB 1500 MG Q4W  Treatment Goal: Palliative  Status: Active  Start Date: 7/16/2024  End Date: 6/6/2025 (Planned)  Provider: Aleyda Kirkland,  MD  Chemotherapy: [No matching medication found in this treatment plan]     Malignant neoplasm of overlapping sites of bladder   6/25/2024 Initial Diagnosis    Malignant neoplasm of overlapping sites of bladder     6/25/2024 Cancer Staged    Staging form: Urinary Bladder, AJCC 8th Edition  - Clinical: Stage 0a (cTa, cN0, cM0)         Past Medical History:   Past Medical History:   Diagnosis Date    Adenomatous colon polyp     Alcohol abuse 07/09/2015    Anticoagulant long-term use     coumadin - discontinued    Atrial fibrillation 07/09/2015    CAD (coronary artery disease), native coronary artery 05/03/2016    Degenerative joint disease 07/09/2015    Encounter for blood transfusion     Enlarged liver     Essential hypertension 07/09/2015    Hep C w/o coma, chronic     treated with Harvoni    Hiatal hernia     patient states hernia has resolved    Hx of bladder infections     finished cipro 4/29/16    S/P CABG (coronary artery bypass graft) 05/17/2016    LIMA to LAD, SVG to OM, SVG to diagonal, SVG to right PDA, Maze, left atrial appendage resection (5/16)    Spleen enlarged     Thrombocytopenia 05/05/2016    Tobacco abuse 07/09/2015       Past Surgical HIstory:   Past Surgical History:   Procedure Laterality Date    CARDIAC SURGERY  05/04/2016    4 vessel CABG    CERVICAL FUSION  2004    COLONOSCOPY  12/03/2018    [facility] Wells    CYSTOSCOPY W/ RETROGRADES N/A 2/27/2024    Procedure: CYSTOSCOPY, WITH RETROGRADE PYELOGRAM;  Surgeon: STORMY Bray MD;  Location: [facility] OR;  Service: Urology;  Laterality: N/A;    CYSTOSCOPY WITH BIOPSY OF BLADDER N/A 2/27/2024    Procedure: CYSTOSCOPY, WITH BLADDER BIOPSY, WITH FULGURATION IF INDICATED;  Surgeon: STORMY Bray MD;  Location: [facility] OR;  Service: Urology;  Laterality: N/A;    CYSTOURETHROSCOPY, WITH THROMBUS REMOVAL N/A 2/27/2024    Procedure: CYSTOURETHROSCOPY, WITH THROMBUS REMOVAL;  Surgeon: STORMY Bray MD;  Location: [facility] OR;  Service: Urology;   "Laterality: N/A;    JOINT REPLACEMENT  TOTAL RIGHT KNEE     OPEN ANTERIOR SHOULDER RECONSTRUCTION Left     RADIOFREQUENCY ABLATION N/A 9/24/2019    Procedure: Radiofrequency Ablation;  Surgeon: Anayeli Surgeon;  Location: [facility];  Service: Anesthesiology;  Laterality: N/A;  Room 173/[place]    TIBIA FRACTURE SURGERY Right 1971       Family History:   Family History   Problem Relation Name Age of Onset    Heart disease Mother          congestive heart failure    Diabetes Mother      Hypertension Mother      Heart disease Sister          heart murmur    Atrial fibrillation Sister      Cancer Sister          breast    Cancer Father  60        colon    Chronic back pain Brother      Atrial fibrillation Sister      Heart disease Sister      Collagen disease Neg Hx         Social History:  reports that he quit smoking about 8 years ago. His smoking use included cigarettes. He started smoking about 38 years ago. He has a 7.5 pack-year smoking history. He has never used smokeless tobacco. He reports that he does not drink alcohol and does not use drugs.    Allergies:  Review of patient's allergies indicates:   Allergen Reactions    Cilostazol Other (See Comments)     Colon bleeding    Stadol [butorphanol tartrate] Hallucinations     "I saw pink elephants"    Keflex [cephalexin] Other (See Comments)     Was using 500mg TID oral and had a strong metallic taste in his mouth and could not function.  Can use IV cephalosporin.    Morphine Other (See Comments)     "makes him drunk", Altered Mental Status    Sulfa (sulfonamide antibiotics) Other (See Comments), Hives and Nausea And Vomiting     Since childhood; does not know reaction  Since childhood; does not know reaction    Finasteride Other (See Comments)       Medications:  Current Outpatient Medications   Medication Sig Dispense Refill    ALPRAZolam (XANAX) 0.5 MG tablet Take 0.5 mg by mouth every evening.      famotidine (PEPCID) 20 MG tablet Take 20 mg by mouth nightly.    "    losartan (COZAAR) 50 MG tablet Take 50 mg by mouth once daily.      metoprolol succinate (TOPROL-XL) 50 MG 24 hr tablet Take 1 tablet (50 mg total) by mouth once daily. 90 tablet 3    rivaroxaban (XARELTO) 20 mg Tab Take 1 tablet (20 mg total) by mouth daily with dinner or evening meal. 30 tablet 3    spironolactone (ALDACTONE) 25 MG tablet Take 25 mg by mouth as needed.      triamcinolone acetonide 0.1% (KENALOG) 0.1 % cream Apply topically 2 (two) times daily. 453.6 g 1     No current facility-administered medications for this visit.     Facility-Administered Medications Ordered in Other Visits   Medication Dose Route Frequency Provider Last Rate Last Admin    diphenhydrAMINE injection 12.5 mg  12.5 mg Intravenous Q6H PRN Stefano Sherwood MD        electrolyte-S (ISOLYTE)   Intravenous Continuous Stefano Sherwood MD        fentaNYL 50 mcg/mL injection 25 mcg  25 mcg Intravenous Q5 Min PRN Stefano Sherwood MD        HYDROmorphone (PF) injection 0.2 mg  0.2 mg Intravenous Q5 Min PRN Stefano Sherwood MD        lactated ringers infusion   Intravenous Continuous Stefano Sherwood MD 10 mL/hr at 02/27/24 0824 New Bag at 02/27/24 0824    LIDOcaine (PF) 10 mg/ml (1%) injection 10 mg  1 mL Intradermal Once Stefano Sherwood MD        ondansetron injection 4 mg  4 mg Intravenous Daily PRN Stefano Sherwood MD        oxyCODONE immediate release tablet 5 mg  5 mg Oral Q3H PRN Stefano Sherwood MD   5 mg at 02/27/24 1030       Review of Systems   Constitutional:  Positive for activity change. Negative for appetite change, diaphoresis, fatigue and fever.   HENT: Negative.     Respiratory:  Negative for chest tightness and shortness of breath.    Cardiovascular:  Negative for chest pain, palpitations and leg swelling.   Gastrointestinal: Negative.    Genitourinary: Negative.    Musculoskeletal: Negative.    Neurological: Negative.    Hematological: Negative.    Psychiatric/Behavioral: Negative.         ECOG Performance Status:  "  ECOG SCORE             Objective:      Vitals:   Vitals:    08/13/24 1430   BP: 136/75   Pulse: 83   Resp: 18   Temp: 98.1 °F (36.7 °C)   TempSrc: Temporal   SpO2: 98%   Weight: 99.6 kg (219 lb 9.3 oz)   Height: 5' 10" (1.778 m)     BMI: Body mass index is 31.51 kg/m².    Physical Exam  HENT:      Head: Normocephalic.      Nose: Nose normal.      Mouth/Throat:      Mouth: Mucous membranes are moist.      Pharynx: Oropharynx is clear.   Eyes:      Pupils: Pupils are equal, round, and reactive to light.   Cardiovascular:      Rate and Rhythm: Normal rate. Rhythm irregular.      Heart sounds: Normal heart sounds.   Pulmonary:      Effort: Pulmonary effort is normal.      Breath sounds: Normal breath sounds.   Abdominal:      General: Bowel sounds are normal.   Musculoskeletal:         General: Normal range of motion.      Cervical back: Normal range of motion.   Skin:     General: Skin is warm and dry.   Neurological:      Mental Status: He is alert and oriented to person, place, and time.   Psychiatric:         Mood and Affect: Mood normal.         Behavior: Behavior normal.         Laboratory Data:  Lab Visit on 08/08/2024   Component Date Value Ref Range Status    WBC 08/08/2024 4.78  3.90 - 12.70 K/uL Final    RBC 08/08/2024 4.39 (L)  4.60 - 6.20 M/uL Final    Hemoglobin 08/08/2024 13.6 (L)  14.0 - 18.0 g/dL Final    Hematocrit 08/08/2024 39.9 (L)  40.0 - 54.0 % Final    MCV 08/08/2024 91  82 - 98 fL Final    MCH 08/08/2024 31.0  27.0 - 31.0 pg Final    MCHC 08/08/2024 34.1  32.0 - 36.0 g/dL Final    RDW 08/08/2024 15.5 (H)  11.5 - 14.5 % Final    Platelets 08/08/2024 73 (L)  150 - 450 K/uL Final    MPV 08/08/2024 11.8  9.2 - 12.9 fL Final    Immature Granulocytes 08/08/2024 0.2  0.0 - 0.5 % Final    Gran # (ANC) 08/08/2024 2.8  1.8 - 7.7 K/uL Final    Immature Grans (Abs) 08/08/2024 0.01  0.00 - 0.04 K/uL Final    Lymph # 08/08/2024 0.7 (L)  1.0 - 4.8 K/uL Final    Mono # 08/08/2024 0.6  0.3 - 1.0 K/uL Final    " Eos # 08/08/2024 0.6 (H)  0.0 - 0.5 K/uL Final    Baso # 08/08/2024 0.03  0.00 - 0.20 K/uL Final    nRBC 08/08/2024 0  0 /100 WBC Final    Gran % 08/08/2024 58.6  38.0 - 73.0 % Final    Lymph % 08/08/2024 15.3 (L)  18.0 - 48.0 % Final    Mono % 08/08/2024 12.3  4.0 - 15.0 % Final    Eosinophil % 08/08/2024 13.0 (H)  0.0 - 8.0 % Final    Basophil % 08/08/2024 0.6  0.0 - 1.9 % Final    Differential Method 08/08/2024 Automated   Final    Sodium 08/08/2024 139  136 - 145 mmol/L Final    Potassium 08/08/2024 4.3  3.5 - 5.1 mmol/L Final    Chloride 08/08/2024 106  95 - 110 mmol/L Final    CO2 08/08/2024 26  23 - 29 mmol/L Final    Glucose 08/08/2024 102  70 - 110 mg/dL Final    BUN 08/08/2024 9  8 - 23 mg/dL Final    Creatinine 08/08/2024 0.8  0.5 - 1.4 mg/dL Final    Calcium 08/08/2024 9.7  8.7 - 10.5 mg/dL Final    Total Protein 08/08/2024 7.6  6.0 - 8.4 g/dL Final    Albumin 08/08/2024 3.9  3.5 - 5.2 g/dL Final    Total Bilirubin 08/08/2024 1.3 (H)  0.1 - 1.0 mg/dL Final    Alkaline Phosphatase 08/08/2024 88  55 - 135 U/L Final    AST 08/08/2024 118 (H)  10 - 40 U/L Final    ALT 08/08/2024 122 (H)  10 - 44 U/L Final    eGFR 08/08/2024 >60  >60 mL/min/1.73 m^2 Final    Anion Gap 08/08/2024 7 (L)  8 - 16 mmol/L Final    AFP 08/08/2024 4.5  0.0 - 8.4 ng/mL Final    TSH 08/08/2024 0.950  0.400 - 4.000 uIU/mL Final          Imaging: IR Embolization for Tumor_Organ Ischemia  Narrative: EXAMINATION:  Hepatic radioembolization preparatory angiogram and Tc99m-MAA administration     Procedural Personnel     Attending physician(s): Wilber Yuen     Fellow physician(s): None     Resident physician(s): None     Advanced practice provider(s): None     Pre-procedure diagnosis: Hepatocellular carcinoma     Post-procedure diagnosis: Same     Indication: Mapping angiogram in preparation for radioembolization     Additional clinical history: None     Complications: No immediate complications.     TECHNIQUE:  - Arterial access with  ultrasound guidance     - Aortography: None     - Visceral angiography: Celiac angiography     - Selective hepatic angiography: Common hepatic angiography     - Superselective angiography: Performed as described below     - Embolization and post-embolization angiography: Not performed     - Technetium-99m MAA administration as described below     - Additional procedures: Cone-beam CT and 3D rotational angiography     FINDINGS:  Pre-procedure     Consent: Informed consent for the procedure was obtained and time-out was performed prior to the procedure.     Preparation: The site was prepared and draped using maximal sterile barrier technique including cutaneous antisepsis.     Anesthesia/sedation     The IR procedural team has confirmed the patient ID and re-evaluated the patient and sedation plan confirming it is suitable for the patient's condition and procedure.     Level of anesthesia/sedation: Moderate sedation (conscious sedation)     Anesthesia/sedation administered by: Nurse or other independent trained observer     Total intra-service sedation time (minutes): 60     Access     Local anesthesia was administered. The vessel was sonographically evaluated and judged to be patent. Real time ultrasound was used to visualize needle entry into the vessel and a permanent image was stored. A 5 Omani sheath was placed.     Vessel accessed: Right common femoral artery     Access technique: Micropuncture set with 21 gauge needle     Angiography     Angiography was performed using 5 Omani Motarjame catheter and 2.8 Omani Progreat microcatheter.     Variant anatomy: None     Vessel catheterized: Celiac artery     An angiogram was performed, which demonstrates a hypervascular mass in the liver, corresponding to the known tumor.  Delayed portal venogram demonstrates patent portal vein with hepatopetal flow.     Vessel catheterized: Common hepatic artery     An angiogram was performed, which demonstrates a hypervascular  mass in the right hepatic lobe, corresponding to the area of known tumor.     Vessel catheterized: Left hepatic artery     An angiogram was performed, which demonstrates no significant hypervascularity suggest vascular supply the area of known tumor.  Cone beam CT and rotational 3D angiography was performed at this location.  Images were independently reviewed and interpreted at a separate workstation.  These images demonstrate no significant vascular supply to the area of known tumor.     Vessel catheterized: Middle hepatic artery     An angiogram was performed, which demonstrates subtle hypervascularity arising from a segmental branch of the middle hepatic artery.  Cone beam CT and rotational 3D angiography was performed at this location.  Images were independently reviewed and interpreted at a separate workstation.  These images demonstrate a small amount of vascular supply to the area of known tumor in the right hepatic lobe.     Vessel catheterized: Segment IV a branch of the middle hepatic artery     Angiogram was performed, which demonstrates vascular supply to the area of known tumor in the right hepatic lobe.  Cone beam CT and rotational 3D angiography is performed at this location.  Images were independently reviewed and interpreted at a separate workstation.  These images demonstrate vascular supply to the area of known tumor in the right hepatic lobe.     Vessel catheterized: Right hepatic artery     An angiogram was performed, which demonstrates large hypervascular mass in the right hepatic lobe, corresponding to area of known tumor.  Cone beam CT and rotational 3D angiography was performed at this location.  Images were independently reviewed and interpreted at a separate workstation.  These images demonstrate vascular supply to the area of known tumor in the right hepatic lobe.     Vessel catheterized: Cystic artery     Angiogram was performed, which demonstrates no evidence of vascular supply to the  area of known tumor in the liver.     Vessel catheterized: Medial subsegmental segment 5 branch of the right hepatic artery     Angiogram was performed, which demonstrates faint hypervascularity and in questionable supply to the area of portal vein tumor thrombus.  Cone beam CT and rotational 3D angiography was performed at this location.  Images were independently reviewed and interpreted at a separate workstation.  These images demonstrate vascular supply to the central area of portal vein tumor thrombus in the right hepatic lobe.     Vessel catheterized: Posterior subsegmental segment VIII branch of the right hepatic artery     Angiogram was performed, which demonstrates vascular supply to the area of known tumor in the right hepatic lobe.  Cone beam CT and rotational 3D angiography was performed at this location.  Images were independently reviewed and interpreted at a separate workstation.  These images demonstrate vascular supply to the area of known tumor in the right hepatic lobe.     Vessel catheterized: Lateral subsegmental segment V branch of the right hepatic artery     An angiogram was performed, which demonstrates vascular supply to the area of known tumor in the right hepatic lobe.  Cone beam CT and rotational 3D angiography was performed at this location.  Images were independently reviewed and interpreted at a separate workstation.  These images demonstrate vascular supply to the area of known tumor in the right hepatic lobe.     Vessel catheterized: Posterior division of the right hepatic artery     An angiogram was performed, which demonstrates hypervascular focus in the posterior aspect of the right hepatic lobe.     Vessel catheterized: Subsegmental segment VI branch of the right hepatic artery     Angiogram was performed, which demonstrates vascular supply to the hypervascular mass in the posterior aspect of the right hepatic lobe.  Cone beam CT and rotational 3D angiography was performed at  this location.  Images were independently reviewed and interpreted at a separate workstation.  These images demonstrate vascular supply to the area of known tumor in the posterior aspect of the right hepatic lobe.     Vessel catheterized: Anterior subsegmental segment VIII branch of the right hepatic artery     Angiogram was performed, which demonstrates vascular supply to the area of portal vein tumor thrombus in the right hepatic lobe.  Cone beam CT and rotational 3D angiography was performed at this location.  Images were independently reviewed and interpreted at a separate workstation.  These images demonstrate vascular supply to the area of known portal vein tumor thrombus in the right hepatic lobe.     Radioisotope administration     Radioisotope: Technetium-99m MAA     Catheter position for administration: Right hepatic artery     Radioisotope activity administered at this location (mCi): 5     Closure     Access site angiography performed: Yes     Patent vessel with appropriate access level     Arterial closure technique: Vascade     Hemostasis achieved from closure technique: Yes     Duration of manual compression (minutes): 5     Contrast     Contrast agent: Omnipaque 300     Contrast volume (mL): 100     Radiation Dose     Fluoroscopy time ( minutes): 17.3     Reference air kerma ( mGy ): 4042     Kerma area product ( uGy-m2 ): 32093     Additional Details     Additional description of procedure: None     Additional findings: None     Equipment details: None     Specimens removed: None     Estimated blood loss (mL): Less than 10     Standardized report: [id number]     Attestation     Signer name: Wilber Yuen     I attest that I was present for the entire procedure. I reviewed the stored images and agree with the report as written.  Impression: Radioembolization preparatory angiography with administration of Tc99m-MAA.     Plan:     Patient to be sent to nuclear medicine for  radioisotope imaging and calculation of lung shunt fraction.     _______________________________________________________________     Electronically signed by:Wilber Yuen  Date:                                            07/25/2024  Time:                                           09:47  NM Liver Imaging Static PRE Y-90 Emboliz  EXAMINATION:  NM LIVER IMAGING STATIC PRE Y-90 EMBOLIZATION; NM SPECT/CT SINGLE AREA     CLINICAL HISTORY:  Liver cell carcinoma     TECHNIQUE:  5 mCi Tc-99m MAA was injected intra-arterially by interventional radiology, and a static image of the chest and abdomen was acquired in the anterior view.  SPECT-CT images of the abdomen were also acquired.     COMPARISON:  None     FINDINGS:  There is expected uptake within the liver, and the liver lung shunt fraction is 10.8%.  There is no other significant extrahepatic activity by planar or SPECT-CT imaging.     IMPRESSION  Liver lung shunt fraction of 10.8 % without other extrahepatic activity.     Electronically signed by:Wilber Yuen  Date:                                            07/25/2024  Time:                                           09:08  NM Spect/CT Single Area  EXAMINATION:  NM LIVER IMAGING STATIC PRE Y-90 EMBOLIZATION; NM SPECT/CT SINGLE AREA     CLINICAL HISTORY:  Liver cell carcinoma     TECHNIQUE:  5 mCi Tc-99m MAA was injected intra-arterially by interventional radiology, and a static image of the chest and abdomen was acquired in the anterior view.  SPECT-CT images of the abdomen were also acquired.     COMPARISON:  None     FINDINGS:  There is expected uptake within the liver, and the liver lung shunt fraction is 10.8%.  There is no other significant extrahepatic activity by planar or SPECT-CT imaging.     IMPRESSION  Liver lung shunt fraction of 10.8 % without other extrahepatic activity.     Electronically signed by:Wilber Yuen  Date:                                            07/25/2024   Assessment:       1. HCC  (hepatocellular carcinoma)    2. Thrombocytopenia    3. Nutritional anemia    4. Malignant neoplasm of overlapping sites of bladder    5. Longstanding persistent atrial fibrillation    6. On antineoplastic chemotherapy    7. Heartburn    8. Immunotherapy encounter    9. Drug rash           Plan:      HCC, Stage III - Microwave ablation 9/2019, MRI Abdomen shows recurrent disease. Discussed case with IR, recommend Y90 + immunotherapy for best disease control. We discussed the results of the [facility] study and superiority of Durvalumab/Tremlimumab to Sorafenib for improved overall survival. Alternative therapy with be Bevacizumab/Atezolizumab per Impower 150 but would impact timing of Y90. Would recommend either approach over Sorafenib, patient and family understanding. AFP 21 -> 18.   - return to clinic prior to C2  8/13/24: ok to proceed with treatment. Follow up in 4 weeks with repeat cbc, cmp     # IO dermatitis - patient rash >80% BSA x2 days, improved with topical steroid. Took oral steroid, prednisone 50 mg x2 days, but discontinued due to insomnia. We discussed the chance of repeat IO toxicity upon drug rechallenge; since symptoms have resolved and patient will move to Durvalumab monotherapy for C2 believe benefit outweighs risk to continue therapy.      Continue topical steroid + oral steroids and notify if rash recurs.      # Thrombocytopenia - Attributed to cirrhosis, established with hepatology. Hold anticoagulation if plts <50k   Repeat cbc in 2 weeks      # Anemia - Mild iron deficiency, encourage iron in diet     # Non-Muscle Invasive Bladder Cancer, Ta - Follows with Dr. Bray, TURBT 2/2024. Cystoscopy 6/20/24 negative for recurrence.      # Atrial fibrillation - Coumadin -> Xarelto. HOLD anticoagulation if plts fall <50k      # Hep C Cirrhosis - Treated with SVR, patient declines liver transplant in the past. Established with hepatology for compensated cirrhosis, EGD 7/2/24     # Heartburn - EKG  shows a fib to rule out cardiac source, continue tums + antacid             Med Onc Chart Routing      Follow up with physician . As scheduled with labs. CBC in 2 weeks without visit.   Follow up with D[facility]    Infusion scheduling note    Injection scheduling note    Labs    Imaging    Pharmacy appointment    Other referrals                  Plan was discussed with the patient at length, and he verbalized understanding. Hudson was given an opportunity to ask questions that were answered to his satisfaction, and he was advised to call in the interval if any problems or questions arise.    Assessment/Plan reviewed and approved by Dr Kirkland    30 minutes were spent in coordination of patient's care, record review and counseling.    MOIRA Vegas, FNP-C  Hematology & Oncology

## 2024-08-13 NOTE — PLAN OF CARE
Problem: Adult Inpatient Plan of Care  Goal: Plan of Care Review  Outcome: Progressing  Flowsheets (Taken 8/13/2024 5154)  Plan of Care Reviewed With:   patient   family     Problem: Oncology Care  Goal: Effective Coping  Outcome: Progressing  Intervention: Support and Enhance Coping Strategies  Flowsheets (Taken 8/13/2024 9320)  Supportive Measures: active listening utilized  Environmental Support: calm environment promoted  Goal: Improved Activity Tolerance  Outcome: Progressing   Pt here for Imfinzi, procedure explained, pt tolerated treatment well. Discharged home

## 2024-08-21 ENCOUNTER — PATIENT MESSAGE (OUTPATIENT)
Dept: INTERVENTIONAL RADIOLOGY/VASCULAR | Facility: CLINIC | Age: 69
End: 2024-08-21
Payer: MEDICARE

## 2024-08-22 ENCOUNTER — TELEPHONE (OUTPATIENT)
Dept: INTERVENTIONAL RADIOLOGY/VASCULAR | Facility: CLINIC | Age: 69
End: 2024-08-22
Payer: MEDICARE

## 2024-08-22 ENCOUNTER — TELEPHONE (OUTPATIENT)
Dept: GASTROENTEROLOGY | Facility: CLINIC | Age: 69
End: 2024-08-22
Payer: MEDICARE

## 2024-08-22 NOTE — TELEPHONE ENCOUNTER
Called and spoke to pt regarding EGD that has been ordered. Pt stated he would like for me to call his son because he handles all of his appointments and scheduling. Called pt son, son stated they do not wish to schedule at this time.   Case cancelled.

## 2024-08-22 NOTE — TELEPHONE ENCOUNTER
Called patient after receiving message that his Y90 mapping was painful. Explained we will schedule his Y90 delivery with anesthesia. Also explained this will change his arrival time, but IR  will call him to let him know. Patient verbalized understanding and agreement. No further questions at this time

## 2024-08-26 ENCOUNTER — PATIENT MESSAGE (OUTPATIENT)
Dept: INTERVENTIONAL RADIOLOGY/VASCULAR | Facility: HOSPITAL | Age: 69
End: 2024-08-26
Payer: MEDICARE

## 2024-08-28 ENCOUNTER — DOCUMENTATION ONLY (OUTPATIENT)
Dept: PREADMISSION TESTING | Facility: HOSPITAL | Age: 69
End: 2024-08-28
Payer: MEDICARE

## 2024-08-29 ENCOUNTER — LAB VISIT (OUTPATIENT)
Dept: LAB | Facility: HOSPITAL | Age: 69
End: 2024-08-29
Attending: INTERNAL MEDICINE
Payer: MEDICARE

## 2024-08-29 DIAGNOSIS — C22.0 HCC (HEPATOCELLULAR CARCINOMA): ICD-10-CM

## 2024-08-29 LAB
BASOPHILS # BLD AUTO: 0.04 K/UL (ref 0–0.2)
BASOPHILS NFR BLD: 0.8 % (ref 0–1.9)
DIFFERENTIAL METHOD BLD: ABNORMAL
EOSINOPHIL # BLD AUTO: 0.9 K/UL (ref 0–0.5)
EOSINOPHIL NFR BLD: 19 % (ref 0–8)
ERYTHROCYTE [DISTWIDTH] IN BLOOD BY AUTOMATED COUNT: 14.6 % (ref 11.5–14.5)
HCT VFR BLD AUTO: 38 % (ref 40–54)
HGB BLD-MCNC: 13.3 G/DL (ref 14–18)
IMM GRANULOCYTES # BLD AUTO: 0.02 K/UL (ref 0–0.04)
IMM GRANULOCYTES NFR BLD AUTO: 0.4 % (ref 0–0.5)
LYMPHOCYTES # BLD AUTO: 0.6 K/UL (ref 1–4.8)
LYMPHOCYTES NFR BLD: 13 % (ref 18–48)
MCH RBC QN AUTO: 32 PG (ref 27–31)
MCHC RBC AUTO-ENTMCNC: 35 G/DL (ref 32–36)
MCV RBC AUTO: 92 FL (ref 82–98)
MONOCYTES # BLD AUTO: 0.5 K/UL (ref 0.3–1)
MONOCYTES NFR BLD: 10.1 % (ref 4–15)
NEUTROPHILS # BLD AUTO: 2.7 K/UL (ref 1.8–7.7)
NEUTROPHILS NFR BLD: 56.7 % (ref 38–73)
NRBC BLD-RTO: 0 /100 WBC
PLATELET # BLD AUTO: 68 K/UL (ref 150–450)
PMV BLD AUTO: 11.1 FL (ref 9.2–12.9)
RBC # BLD AUTO: 4.15 M/UL (ref 4.6–6.2)
WBC # BLD AUTO: 4.83 K/UL (ref 3.9–12.7)

## 2024-08-29 PROCEDURE — 36415 COLL VENOUS BLD VENIPUNCTURE: CPT | Mod: PN

## 2024-08-29 PROCEDURE — 85025 COMPLETE CBC W/AUTO DIFF WBC: CPT | Mod: PN

## 2024-08-30 ENCOUNTER — ANESTHESIA EVENT (OUTPATIENT)
Dept: INTERVENTIONAL RADIOLOGY/VASCULAR | Facility: HOSPITAL | Age: 69
End: 2024-08-30
Payer: MEDICARE

## 2024-08-30 ENCOUNTER — HOSPITAL ENCOUNTER (OUTPATIENT)
Dept: RADIOLOGY | Facility: HOSPITAL | Age: 69
Discharge: HOME | End: 2024-08-30
Attending: FAMILY MEDICINE
Payer: MEDICARE

## 2024-08-30 ENCOUNTER — HOSPITAL ENCOUNTER (OUTPATIENT)
Dept: INTERVENTIONAL RADIOLOGY/VASCULAR | Facility: HOSPITAL | Age: 69
Discharge: HOME | End: 2024-08-30
Attending: FAMILY MEDICINE | Admitting: RADIOLOGY
Payer: MEDICARE

## 2024-08-30 VITALS
TEMPERATURE: 99 F | RESPIRATION RATE: 18 BRPM | DIASTOLIC BLOOD PRESSURE: 75 MMHG | WEIGHT: 219 LBS | OXYGEN SATURATION: 99 % | SYSTOLIC BLOOD PRESSURE: 156 MMHG | HEART RATE: 82 BPM | BODY MASS INDEX: 31.35 KG/M2 | HEIGHT: 70 IN

## 2024-08-30 DIAGNOSIS — C22.0 HCC (HEPATOCELLULAR CARCINOMA): ICD-10-CM

## 2024-08-30 LAB
ALBUMIN SERPL BCP-MCNC: 4 G/DL (ref 3.5–5.2)
ALP SERPL-CCNC: 79 U/L (ref 55–135)
ALT SERPL W/O P-5'-P-CCNC: 37 U/L (ref 10–44)
ANION GAP SERPL CALC-SCNC: 10 MMOL/L (ref 8–16)
AST SERPL-CCNC: 46 U/L (ref 10–40)
BASOPHILS # BLD AUTO: 0.03 K/UL (ref 0–0.2)
BASOPHILS NFR BLD: 0.6 % (ref 0–1.9)
BILIRUB DIRECT SERPL-MCNC: 0.6 MG/DL (ref 0.1–0.3)
BILIRUB SERPL-MCNC: 1.9 MG/DL (ref 0.1–1)
BUN SERPL-MCNC: 10 MG/DL (ref 8–23)
CALCIUM SERPL-MCNC: 9.5 MG/DL (ref 8.7–10.5)
CHLORIDE SERPL-SCNC: 106 MMOL/L (ref 95–110)
CO2 SERPL-SCNC: 23 MMOL/L (ref 23–29)
CREAT SERPL-MCNC: 0.7 MG/DL (ref 0.5–1.4)
DIFFERENTIAL METHOD BLD: ABNORMAL
EOSINOPHIL # BLD AUTO: 1.1 K/UL (ref 0–0.5)
EOSINOPHIL NFR BLD: 23.7 % (ref 0–8)
ERYTHROCYTE [DISTWIDTH] IN BLOOD BY AUTOMATED COUNT: 14.8 % (ref 11.5–14.5)
EST. GFR  (NO RACE VARIABLE): >60 ML/MIN/1.73 M^2
GLUCOSE SERPL-MCNC: 124 MG/DL (ref 70–110)
HCT VFR BLD AUTO: 42.7 % (ref 40–54)
HGB BLD-MCNC: 14.2 G/DL (ref 14–18)
IMM GRANULOCYTES # BLD AUTO: 0.01 K/UL (ref 0–0.04)
IMM GRANULOCYTES NFR BLD AUTO: 0.2 % (ref 0–0.5)
INR PPP: 1.3 (ref 0.8–1.2)
LYMPHOCYTES # BLD AUTO: 0.6 K/UL (ref 1–4.8)
LYMPHOCYTES NFR BLD: 13.1 % (ref 18–48)
MCH RBC QN AUTO: 31.1 PG (ref 27–31)
MCHC RBC AUTO-ENTMCNC: 33.3 G/DL (ref 32–36)
MCV RBC AUTO: 94 FL (ref 82–98)
MONOCYTES # BLD AUTO: 0.5 K/UL (ref 0.3–1)
MONOCYTES NFR BLD: 9.4 % (ref 4–15)
NEUTROPHILS # BLD AUTO: 2.6 K/UL (ref 1.8–7.7)
NEUTROPHILS NFR BLD: 53 % (ref 38–73)
NRBC BLD-RTO: 0 /100 WBC
PLATELET # BLD AUTO: 75 K/UL (ref 150–450)
PMV BLD AUTO: 11.6 FL (ref 9.2–12.9)
POTASSIUM SERPL-SCNC: 4 MMOL/L (ref 3.5–5.1)
PROT SERPL-MCNC: 7.8 G/DL (ref 6–8.4)
PROTHROMBIN TIME: 13.7 SEC (ref 9–12.5)
RBC # BLD AUTO: 4.56 M/UL (ref 4.6–6.2)
SODIUM SERPL-SCNC: 139 MMOL/L (ref 136–145)
WBC # BLD AUTO: 4.81 K/UL (ref 3.9–12.7)

## 2024-08-30 PROCEDURE — 37000009 HC ANESTHESIA EA ADD 15 MINS

## 2024-08-30 PROCEDURE — C1887 CATHETER, GUIDING: HCPCS

## 2024-08-30 PROCEDURE — 85610 PROTHROMBIN TIME: CPT | Performed by: STUDENT IN AN ORGANIZED HEALTH CARE EDUCATION/TRAINING PROGRAM

## 2024-08-30 PROCEDURE — 78201 LIVER IMAGING STATIC ONLY: CPT | Mod: 26,59,, | Performed by: NUCLEAR MEDICINE

## 2024-08-30 PROCEDURE — 63600175 PHARM REV CODE 636 W HCPCS: Performed by: NURSE ANESTHETIST, CERTIFIED REGISTERED

## 2024-08-30 PROCEDURE — C1894 INTRO/SHEATH, NON-LASER: HCPCS

## 2024-08-30 PROCEDURE — 80053 COMPREHEN METABOLIC PANEL: CPT | Performed by: STUDENT IN AN ORGANIZED HEALTH CARE EDUCATION/TRAINING PROGRAM

## 2024-08-30 PROCEDURE — 78201 LIVER IMAGING STATIC ONLY: CPT | Mod: TC

## 2024-08-30 PROCEDURE — 85025 COMPLETE CBC W/AUTO DIFF WBC: CPT | Performed by: STUDENT IN AN ORGANIZED HEALTH CARE EDUCATION/TRAINING PROGRAM

## 2024-08-30 PROCEDURE — C1769 GUIDE WIRE: HCPCS

## 2024-08-30 PROCEDURE — 25000003 PHARM REV CODE 250: Performed by: NURSE ANESTHETIST, CERTIFIED REGISTERED

## 2024-08-30 PROCEDURE — 82248 BILIRUBIN DIRECT: CPT | Performed by: STUDENT IN AN ORGANIZED HEALTH CARE EDUCATION/TRAINING PROGRAM

## 2024-08-30 PROCEDURE — 37000008 HC ANESTHESIA 1ST 15 MINUTES

## 2024-08-30 PROCEDURE — 78830 RP LOCLZJ TUM SPECT W/CT 1: CPT | Mod: 26,,, | Performed by: NUCLEAR MEDICINE

## 2024-08-30 PROCEDURE — 78830 RP LOCLZJ TUM SPECT W/CT 1: CPT | Mod: TC

## 2024-08-30 RX ORDER — PHENYLEPHRINE HYDROCHLORIDE 10 MG/ML
INJECTION INTRAVENOUS CONTINUOUS PRN
Status: DISCONTINUED | OUTPATIENT
Start: 2024-08-30 | End: 2024-08-30

## 2024-08-30 RX ORDER — PROPOFOL 10 MG/ML
VIAL (ML) INTRAVENOUS
Status: DISCONTINUED | OUTPATIENT
Start: 2024-08-30 | End: 2024-08-30

## 2024-08-30 RX ORDER — ONDANSETRON HYDROCHLORIDE 2 MG/ML
INJECTION, SOLUTION INTRAVENOUS
Status: DISCONTINUED | OUTPATIENT
Start: 2024-08-30 | End: 2024-08-30

## 2024-08-30 RX ORDER — ROCURONIUM BROMIDE 10 MG/ML
INJECTION, SOLUTION INTRAVENOUS
Status: DISCONTINUED | OUTPATIENT
Start: 2024-08-30 | End: 2024-08-30

## 2024-08-30 RX ORDER — HALOPERIDOL 5 MG/ML
0.5 INJECTION INTRAMUSCULAR EVERY 10 MIN PRN
Status: DISCONTINUED | OUTPATIENT
Start: 2024-08-30 | End: 2024-08-31 | Stop reason: HOSPADM

## 2024-08-30 RX ORDER — SODIUM CHLORIDE 0.9 % (FLUSH) 0.9 %
10 SYRINGE (ML) INJECTION
Status: DISCONTINUED | OUTPATIENT
Start: 2024-08-30 | End: 2024-08-31 | Stop reason: HOSPADM

## 2024-08-30 RX ORDER — KETAMINE HCL IN 0.9 % NACL 50 MG/5 ML
SYRINGE (ML) INTRAVENOUS
Status: DISCONTINUED | OUTPATIENT
Start: 2024-08-30 | End: 2024-08-30

## 2024-08-30 RX ORDER — GLUCAGON 1 MG
1 KIT INJECTION
Status: DISCONTINUED | OUTPATIENT
Start: 2024-08-30 | End: 2024-08-31 | Stop reason: HOSPADM

## 2024-08-30 RX ORDER — DEXAMETHASONE SODIUM PHOSPHATE 4 MG/ML
INJECTION, SOLUTION INTRA-ARTICULAR; INTRALESIONAL; INTRAMUSCULAR; INTRAVENOUS; SOFT TISSUE
Status: DISCONTINUED | OUTPATIENT
Start: 2024-08-30 | End: 2024-08-30

## 2024-08-30 RX ORDER — DEXMEDETOMIDINE HYDROCHLORIDE 100 UG/ML
INJECTION, SOLUTION INTRAVENOUS
Status: DISCONTINUED | OUTPATIENT
Start: 2024-08-30 | End: 2024-08-30

## 2024-08-30 RX ORDER — MIDAZOLAM HYDROCHLORIDE 1 MG/ML
INJECTION INTRAMUSCULAR; INTRAVENOUS
Status: DISCONTINUED | OUTPATIENT
Start: 2024-08-30 | End: 2024-08-30

## 2024-08-30 RX ORDER — FENTANYL CITRATE 50 UG/ML
25 INJECTION, SOLUTION INTRAMUSCULAR; INTRAVENOUS EVERY 5 MIN PRN
Status: DISCONTINUED | OUTPATIENT
Start: 2024-08-30 | End: 2024-08-31 | Stop reason: HOSPADM

## 2024-08-30 RX ORDER — LIDOCAINE HYDROCHLORIDE 10 MG/ML
1 INJECTION, SOLUTION EPIDURAL; INFILTRATION; INTRACAUDAL; PERINEURAL ONCE
Status: DISCONTINUED | OUTPATIENT
Start: 2024-08-30 | End: 2024-08-31 | Stop reason: HOSPADM

## 2024-08-30 RX ORDER — FENTANYL CITRATE 50 UG/ML
INJECTION, SOLUTION INTRAMUSCULAR; INTRAVENOUS
Status: DISCONTINUED | OUTPATIENT
Start: 2024-08-30 | End: 2024-08-30

## 2024-08-30 RX ORDER — SUCCINYLCHOLINE CHLORIDE 20 MG/ML
INJECTION INTRAMUSCULAR; INTRAVENOUS
Status: DISCONTINUED | OUTPATIENT
Start: 2024-08-30 | End: 2024-08-30

## 2024-08-30 RX ORDER — SODIUM CHLORIDE 9 MG/ML
INJECTION, SOLUTION INTRAVENOUS CONTINUOUS
Status: DISCONTINUED | OUTPATIENT
Start: 2024-08-30 | End: 2024-08-31 | Stop reason: HOSPADM

## 2024-08-30 RX ADMIN — Medication 10 MG: at 11:08

## 2024-08-30 RX ADMIN — DEXMEDETOMIDINE 12 MCG: 200 INJECTION, SOLUTION INTRAVENOUS at 10:08

## 2024-08-30 RX ADMIN — ROCURONIUM BROMIDE 40 MG: 10 INJECTION INTRAVENOUS at 10:08

## 2024-08-30 RX ADMIN — ROCURONIUM BROMIDE 50 MG: 10 INJECTION INTRAVENOUS at 11:08

## 2024-08-30 RX ADMIN — PHENYLEPHRINE HYDROCHLORIDE 200 MCG: 10 INJECTION INTRAVENOUS at 10:08

## 2024-08-30 RX ADMIN — PHENYLEPHRINE HYDROCHLORIDE 0.3 MCG/KG/MIN: 10 INJECTION INTRAVENOUS at 10:08

## 2024-08-30 RX ADMIN — PHENYLEPHRINE HYDROCHLORIDE 200 MCG: 10 INJECTION INTRAVENOUS at 01:08

## 2024-08-30 RX ADMIN — Medication 20 MG: at 10:08

## 2024-08-30 RX ADMIN — SUCCINYLCHOLINE CHLORIDE 100 MG: 20 INJECTION, SOLUTION INTRAMUSCULAR; INTRAVENOUS at 10:08

## 2024-08-30 RX ADMIN — PHENYLEPHRINE HYDROCHLORIDE 100 MCG: 10 INJECTION INTRAVENOUS at 12:08

## 2024-08-30 RX ADMIN — FENTANYL CITRATE 100 MCG: 50 INJECTION, SOLUTION INTRAMUSCULAR; INTRAVENOUS at 10:08

## 2024-08-30 RX ADMIN — ROCURONIUM BROMIDE 10 MG: 10 INJECTION INTRAVENOUS at 10:08

## 2024-08-30 RX ADMIN — PHENYLEPHRINE HYDROCHLORIDE 0.5 MCG/KG/MIN: 10 INJECTION INTRAVENOUS at 10:08

## 2024-08-30 RX ADMIN — PROPOFOL 150 MG: 10 INJECTION, EMULSION INTRAVENOUS at 10:08

## 2024-08-30 RX ADMIN — DEXAMETHASONE SODIUM PHOSPHATE 12 MG: 4 INJECTION, SOLUTION INTRAMUSCULAR; INTRAVENOUS at 11:08

## 2024-08-30 RX ADMIN — DEXAMETHASONE SODIUM PHOSPHATE 8 MG: 4 INJECTION, SOLUTION INTRAMUSCULAR; INTRAVENOUS at 10:08

## 2024-08-30 RX ADMIN — DEXMEDETOMIDINE 8 MCG: 200 INJECTION, SOLUTION INTRAVENOUS at 11:08

## 2024-08-30 RX ADMIN — SUGAMMADEX 200 MG: 100 INJECTION, SOLUTION INTRAVENOUS at 01:08

## 2024-08-30 RX ADMIN — MIDAZOLAM HYDROCHLORIDE 2 MG: 2 INJECTION, SOLUTION INTRAMUSCULAR; INTRAVENOUS at 10:08

## 2024-08-30 RX ADMIN — ONDANSETRON 4 MG: 2 INJECTION INTRAMUSCULAR; INTRAVENOUS at 10:08

## 2024-08-30 RX ADMIN — Medication 20 MG: at 12:08

## 2024-08-30 RX ADMIN — SODIUM CHLORIDE: 0.9 INJECTION, SOLUTION INTRAVENOUS at 10:08

## 2024-08-30 NOTE — TRANSFER OF CARE
"Anesthesia Transfer of Care Note    Patient: Hudson Tavares    Procedure(s) Performed: * No procedures listed *    Patient location: PACU    Anesthesia Type: general    Transport from OR: Transported from OR on 6-10 L/min O2 by face mask with adequate spontaneous ventilation    Post pain: adequate analgesia    Post assessment: no apparent anesthetic complications and tolerated procedure well    Post vital signs: stable    Level of consciousness: awake and alert    Nausea/Vomiting: no nausea/vomiting    Complications: none    Transfer of care protocol was followed      Last vitals: Visit Vitals  /74 (BP Location: Left arm, Patient Position: Lying)   Pulse 82   Temp 36.8 °C (98.2 °F) (Temporal)   Resp 18   Ht 5' 10" (1.778 m)   Wt 99.3 kg (219 lb)   SpO2 97%   BMI 31.42 kg/m²     "

## 2024-08-30 NOTE — NURSING
Yttrium (Y-90) procedure to treat liver tumor complete. Pt tolerated well. VSS. No signs or symptoms of distress noted.Vascade closure device in place. Hemostasis 1250. Pt to remain flat until 1450 Pt will be transferred to PACU bed after extubation/stabilization escorted by RN and CRNA who will give report.

## 2024-08-30 NOTE — DISCHARGE SUMMARY
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    Admit Date: 8/30/2024  Discharge Date: 08/30/2024     Instructions Given to Patient: Yes  Diet: Resume prior diet  Activity: Activity as tolerated and no driving for today    Description of Condition on Discharge: Stable  Vital Signs (Most Recent): Temp: 98.2 °F (36.8 °C) (08/30/24 0905)  Pulse: 82 (08/30/24 0905)  Resp: 18 (08/30/24 0905)  BP: 137/74 (08/30/24 0905)  SpO2: 97 % (08/30/24 0905)    Discharge Disposition: Home    Discharge Diagnosis: HCC     Follow-up:   Patient will return for repeat Y90 delivery  Initiate ASA 81mg for non-flow limiting dissection within common hepatic artery    Nova Madison MD

## 2024-08-30 NOTE — PLAN OF CARE
Pt AAOx4, RR even and unlabored, color wnl, no acute distress. Tolerating liquids. Discharge instructions reviewed with patient/family with verbal understanding.    Pt ambulated with steady gait, no bleeding or hematoma noted. Denies numbness or tingling in lower extremities.

## 2024-08-30 NOTE — PLAN OF CARE
Pt arrived to unit accompanied by his family.  Pre op orders and assessment initiated.  Pt remains npo.  Call bell within reach.

## 2024-08-30 NOTE — DISCHARGE INSTRUCTIONS
Post-Yttrium (Y90) instructions:    Dont drive for 3 days.  Avoid walking, bending, and taking stairs for 24 hours.  No heavy lifting (gallon of milk) or strenuous exercise for 10 days.  Keep small children, pregnant women, and pets 3 feet away for 3 days.  Keep your dressing clean and dry for 24 hours. Do not submerge insertion site in water (bath tub, swimming pool) until fully healed.  Be sure to follow any other instructions from your doctor.      Call your doctor if you have any of the following:    Fever of 100.4 (38C) or higher lasting for 24 to 48 hours  Bleeding, swelling, or a large lump at the insertion site  Sharp or increasing pain at the insertion site  Leg pain, numbness, or a cold leg or foot  Any other symptoms your provider instructed you to report based on your medical condition.    Contact information:    For immediate concerns that are not emergent, you may call our interventional radiology clinic at [phone].    ** After hours and weekends: Call the paging  at [phone] and ask for the Radiology Physician on call**

## 2024-08-30 NOTE — NURSING
Pt arrived to [facility] for Y-90 treatment of liver tumor with anesthesia, escorted to room by RN and CRNA who will assume care. Pt oriented to unit and staff. Plan of care reviewed with patient, patient verbalizes understanding. Comfort measures utilized. Pt safely transferred from stretcher to procedural table. Fall risk reviewed with patient, fall risk interventions maintained with direct observation/attendance. Safety strap applied, positioner pillows utilized to minimize pressure points. Blankets applied. Pt prepped and draped utilizing standard sterile technique. Patient placed on continuous monitoring, as required by sedation policy. Timeouts completed utilizing standard universal time-out, per department and facility policy. RN to remain at bedside, continuous monitoring maintained. Pt resting comfortably. Denies pain/discomfort. Will continue to monitor. See flow sheets for monitoring, medication administration, and updates.

## 2024-08-30 NOTE — H&P
Radiology History & Physical      SUBJECTIVE:     Chief Complaint: liver lesion    History of Present Illness:  Hudson Tavares is a 69 y.o. male who presents for Y-90  Past Medical History:   Diagnosis Date    Adenomatous colon polyp     Alcohol abuse 07/09/2015    Anticoagulant long-term use     coumadin - discontinued    Atrial fibrillation 07/09/2015    CAD (coronary artery disease), native coronary artery 05/03/2016    Degenerative joint disease 07/09/2015    Encounter for blood transfusion     Enlarged liver     Essential hypertension 07/09/2015    Hep C w/o coma, chronic     treated with Harvoni    Hiatal hernia     patient states hernia has resolved    Hx of bladder infections     finished cipro 4/29/16    S/P CABG (coronary artery bypass graft) 05/17/2016    LIMA to LAD, SVG to OM, SVG to diagonal, SVG to right PDA, Maze, left atrial appendage resection (5/16)    Spleen enlarged     Thrombocytopenia 05/05/2016    Tobacco abuse 07/09/2015     Past Surgical History:   Procedure Laterality Date    CARDIAC SURGERY  05/04/2016    4 vessel CABG    CERVICAL FUSION  2004    COLONOSCOPY  12/03/2018    Steve Wells    CYSTOSCOPY W/ RETROGRADES N/A 2/27/2024    Procedure: CYSTOSCOPY, WITH RETROGRADE PYELOGRAM;  Surgeon: STORMY Bray MD;  Location: [facility] OR;  Service: Urology;  Laterality: N/A;    CYSTOSCOPY WITH BIOPSY OF BLADDER N/A 2/27/2024    Procedure: CYSTOSCOPY, WITH BLADDER BIOPSY, WITH FULGURATION IF INDICATED;  Surgeon: STORMY Bray MD;  Location: [facility] OR;  Service: Urology;  Laterality: N/A;    CYSTOURETHROSCOPY, WITH THROMBUS REMOVAL N/A 2/27/2024    Procedure: CYSTOURETHROSCOPY, WITH THROMBUS REMOVAL;  Surgeon: STORMY Bray MD;  Location: [facility] OR;  Service: Urology;  Laterality: N/A;    JOINT REPLACEMENT  TOTAL RIGHT KNEE     OPEN ANTERIOR SHOULDER RECONSTRUCTION Left     RADIOFREQUENCY ABLATION N/A 9/24/2019    Procedure: Radiofrequency Ablation;  Surgeon: Anayeli Seymour;  Location: [facility]  "DANYEL;  Service: Anesthesiology;  Laterality: N/A;  Room 173/[facility]    TIBIA FRACTURE SURGERY Right 1971       Home Meds:   Prior to Admission medications    Medication Sig Start Date End Date Taking? Authorizing Provider   famotidine (PEPCID) 20 MG tablet Take 20 mg by mouth every morning.   Yes Provider, Historical   losartan (COZAAR) 50 MG tablet Take 50 mg by mouth every evening. 4/29/24  Yes Provider, Historical   metoprolol succinate (TOPROL-XL) 50 MG 24 hr tablet Take 1 tablet (50 mg total) by mouth once daily.  Patient taking differently: Take 50 mg by mouth every morning. 11/16/21  Yes Jorge Keith MD   rivaroxaban (XARELTO) 20 mg Tab Take 1 tablet (20 mg total) by mouth daily with dinner or evening meal. 7/31/24  Yes Aleyda Kirkland MD   spironolactone (ALDACTONE) 25 MG tablet Take 25 mg by mouth as needed.   Yes Provider, Historical   ALPRAZolam (XANAX) 0.5 MG tablet Take 0.5 mg by mouth every evening. 9/28/23   Provider, Historical   triamcinolone acetonide 0.1% (KENALOG) 0.1 % cream Apply topically 2 (two) times daily. 7/31/24   Aleyda Kirkland MD     Anticoagulants/Antiplatelets: no anticoagulation    Allergies:   Review of patient's allergies indicates:   Allergen Reactions    Cilostazol Other (See Comments)     Colon bleeding    Stadol [butorphanol tartrate] Hallucinations     "I saw pink elephants"    Keflex [cephalexin] Other (See Comments)     Was using 500mg TID oral and had a strong metallic taste in his mouth and could not function.  Can use IV cephalosporin.    Morphine Other (See Comments)     "makes him drunk", Altered Mental Status    Sulfa (sulfonamide antibiotics) Hives, Nausea And Vomiting and Other (See Comments)     Since childhood; does not know reaction      Finasteride Other (See Comments)     Sedation History:  reports coding with anesthesia one time many years ago, has since had anesthesia and tolerated. Anesthesiologist aware.     Review of Systems:   Hematological: no " known coagulopathies  Respiratory: no shortness of breath  Cardiovascular: no chest pain  Gastrointestinal: no abdominal pain  Genito-Urinary: no dysuria  Musculoskeletal: negative  Neurological: no TIA or stroke symptoms         OBJECTIVE:     Vital Signs (Most Recent)  Temp: 98.2 °F (36.8 °C) (08/30/24 0905)  Pulse: 82 (08/30/24 0905)  Resp: 18 (08/30/24 0905)  BP: 137/74 (08/30/24 0905)  SpO2: 97 % (08/30/24 0905)    Physical Exam:  ASA: per anesthesia  Mallampati: per anesthesia    General: no acute distress  Mental Status: alert and oriented to person, place and time  HEENT: normocephalic, atraumatic  Chest: unlabored breathing  Heart: regular heart rate  Abdomen: nondistended  Extremity: moves all extremities    Laboratory  Lab Results   Component Value Date    INR 1.3 (H) 08/30/2024       Lab Results   Component Value Date    WBC 4.81 08/30/2024    HGB 14.2 08/30/2024    HCT 42.7 08/30/2024    MCV 94 08/30/2024    PLT 75 (L) 08/30/2024      Lab Results   Component Value Date     08/08/2024     08/08/2024    K 4.3 08/08/2024     08/08/2024    CO2 26 08/08/2024    BUN 9 08/08/2024    CREATININE 0.8 08/08/2024    CALCIUM 9.7 08/08/2024    MG 1.9 05/21/2023     (H) 08/08/2024     (H) 08/08/2024    ALBUMIN 3.9 08/08/2024    BILITOT 1.3 (H) 08/08/2024    BILIDIR 0.4 (H) 07/23/2024       ASSESSMENT/PLAN:     Sedation Plan: per anesthesia  Patient will undergo Y90 embolization of liver lesion.    Donis Mora MD MPH  Radiology PGY-3

## 2024-08-30 NOTE — ANESTHESIA PREPROCEDURE EVALUATION
08/30/2024  Hudson Tavares is a 69 y.o., male.      Results for orders placed in visit on 12/15/22    Echo    Interpretation Summary  · Atrial fibrillation observed.  · The left ventricle is normal in size with concentric remodeling and  · The estimated ejection fraction is 60%.  · There is abnormal septal wall motion consistent with post-operative status.  · Normal right ventricular size with normal right ventricular systolic function.  · Severe left atrial enlargement.  · Moderate right atrial enlargement.  · Mild tricuspid regurgitation.  · Normal central venous pressure (3 mmHg).  · The estimated PA systolic pressure is 35 mmHg.      Pre-op Assessment    I have reviewed the Patient Summary Reports.     I have reviewed the Nursing Notes. I have reviewed the NPO Status.   I have reviewed the Medications.     Review of Systems  Anesthesia Hx:  No problems with previous Anesthesia             Denies Family Hx of Anesthesia complications.    Denies Personal Hx of Anesthesia complications.                    Social:  Alcohol Use, Smoker       Hematology/Oncology:                      Current/Recent Cancer.                EENT/Dental:  EENT/Dental Normal           Cardiovascular:     Hypertension   CAD   CABG/stent Dysrhythmias atrial fibrillation  CHF                                 Pulmonary:  Pulmonary Normal                       Hepatic/GI:    Hiatal Hernia,  Liver Disease, Hepatitis, C           Musculoskeletal:  Arthritis               Neurological:  Neurology Normal                                          Physical Exam  General: Well nourished and Cooperative    Airway:  Mallampati: II   Mouth Opening: Normal  TM Distance: Normal  Tongue: Normal    Dental:  Caps / Implants    Chest/Lungs:  Clear to auscultation, Normal Respiratory Rate    Heart:  Rate: Normal  Rhythm: Irregularly  Irregular        Anesthesia Plan  Type of Anesthesia, risks & benefits discussed:    Anesthesia Type: Gen ETT  Intra-op Monitoring Plan: Standard ASA Monitors  Post Op Pain Control Plan: multimodal analgesia and IV/PO Opioids PRN  Induction:  IV  Airway Plan: Direct and Video, Post-Induction  Informed Consent: Informed consent signed with the Patient and all parties understand the risks and agree with anesthesia plan.  All questions answered.   ASA Score: 3  Day of Surgery Review of History & Physical: H&P Update referred to the surgeon/provider.    Ready For Surgery From Anesthesia Perspective.     .

## 2024-08-30 NOTE — ANESTHESIA PROCEDURE NOTES
Intubation    Date/Time: 8/30/2024 10:31 AM    Performed by: Hanny Brice CRNA  Authorized by: Tommy Gomez MD    Intubation:     Induction:  Intravenous    Intubated:  Postinduction    Mask Ventilation:  Easy mask    Attempts:  1    Attempted By:  CRNA    Method of Intubation:  Video laryngoscopy    Blade:  Arguello 3    Laryngeal View Grade: Grade I - full view of cords      Difficult Airway Encountered?: No      Complications:  None    Airway Device:  Oral endotracheal tube    Airway Device Size:  7.5    Tube secured:  22    Secured at:  The lips    Placement Verified By:  Capnometry    Complicating Factors:  None    Findings Post-Intubation:  BS equal bilateral and atraumatic/condition of teeth unchanged

## 2024-08-30 NOTE — PROCEDURES
IR Post-Procedure Note    Pre Op Diagnosis: Hepatocellular carcinoma    Post Op Diagnosis: Same    Procedure: Yttrium treatment    Procedure performed by:  Nova Madison MD    Written Informed Consent Obtained:  Yes    Specimen Removed: No    Estimated Blood Loss:  Minimal    Findings:    RCFA access and celiac/hepatic angiography    Successful delivery of Y90 into segment 5 and segment 8 branches    Patient tolerated procedure well.    Small, non-flow limiting dissection noted within common hepatic artery. Recommend initiating ASA 81mg.       Nova Madison MD

## 2024-08-30 NOTE — PLAN OF CARE
Patient sitting up in bed. No bleeding or hematoma noted, denies numbness or tingling in lower extremities.

## 2024-09-02 NOTE — ANESTHESIA POSTPROCEDURE EVALUATION
Anesthesia Post Evaluation    Patient: Hudson Tavares    Procedure(s) Performed: * No procedures listed *    Final Anesthesia Type: general      Patient location during evaluation: PACU  Patient participation: Yes- Able to Participate  Level of consciousness: awake and alert  Post-procedure vital signs: reviewed and stable  Pain management: adequate  Airway patency: patent  DAVE mitigation strategies: Multimodal analgesia, Preoperative use of mandibular advancement devices or oral appliances and Intraoperative administration of CPAP, nasopharyngeal airway, or oral appliance during sedation  PONV status at discharge: No PONV  Anesthetic complications: no      Cardiovascular status: blood pressure returned to baseline, hemodynamically stable and stable  Respiratory status: unassisted and spontaneous ventilation  Hydration status: euvolemic  Follow-up not needed.              Vitals Value Taken Time   /72 08/30/24 1517   Temp 37.1 °C (98.8 °F) 08/30/24 1515   Pulse 82 08/30/24 1519   Resp 21 08/30/24 1518   SpO2 100 % 08/30/24 1519   Vitals shown include unfiled device data.      No case tracking events are documented in the log.      Pain/Danis Score: No data recorded

## 2024-09-05 ENCOUNTER — LAB VISIT (OUTPATIENT)
Dept: LAB | Facility: HOSPITAL | Age: 69
End: 2024-09-05
Attending: STUDENT IN AN ORGANIZED HEALTH CARE EDUCATION/TRAINING PROGRAM
Payer: MEDICARE

## 2024-09-05 DIAGNOSIS — Z79.899 ON ANTINEOPLASTIC CHEMOTHERAPY: ICD-10-CM

## 2024-09-05 DIAGNOSIS — C22.0 HCC (HEPATOCELLULAR CARCINOMA): ICD-10-CM

## 2024-09-05 LAB
AFP SERPL-MCNC: 3.3 NG/ML (ref 0–8.4)
ALBUMIN SERPL BCP-MCNC: 3.7 G/DL (ref 3.5–5.2)
ALP SERPL-CCNC: 90 U/L (ref 55–135)
ALT SERPL W/O P-5'-P-CCNC: 75 U/L (ref 10–44)
ANION GAP SERPL CALC-SCNC: 10 MMOL/L (ref 8–16)
AST SERPL-CCNC: 90 U/L (ref 10–40)
BASOPHILS # BLD AUTO: 0.01 K/UL (ref 0–0.2)
BASOPHILS NFR BLD: 0.2 % (ref 0–1.9)
BILIRUB SERPL-MCNC: 2.1 MG/DL (ref 0.1–1)
BUN SERPL-MCNC: 11 MG/DL (ref 8–23)
CALCIUM SERPL-MCNC: 9.3 MG/DL (ref 8.7–10.5)
CHLORIDE SERPL-SCNC: 103 MMOL/L (ref 95–110)
CO2 SERPL-SCNC: 20 MMOL/L (ref 23–29)
CREAT SERPL-MCNC: 0.7 MG/DL (ref 0.5–1.4)
DIFFERENTIAL METHOD BLD: ABNORMAL
EOSINOPHIL # BLD AUTO: 0.6 K/UL (ref 0–0.5)
EOSINOPHIL NFR BLD: 10.8 % (ref 0–8)
ERYTHROCYTE [DISTWIDTH] IN BLOOD BY AUTOMATED COUNT: 14.3 % (ref 11.5–14.5)
EST. GFR  (NO RACE VARIABLE): >60 ML/MIN/1.73 M^2
GLUCOSE SERPL-MCNC: 113 MG/DL (ref 70–110)
HCT VFR BLD AUTO: 39.8 % (ref 40–54)
HGB BLD-MCNC: 14 G/DL (ref 14–18)
IMM GRANULOCYTES # BLD AUTO: 0.02 K/UL (ref 0–0.04)
IMM GRANULOCYTES NFR BLD AUTO: 0.4 % (ref 0–0.5)
LYMPHOCYTES # BLD AUTO: 0.4 K/UL (ref 1–4.8)
LYMPHOCYTES NFR BLD: 8.5 % (ref 18–48)
MCH RBC QN AUTO: 32 PG (ref 27–31)
MCHC RBC AUTO-ENTMCNC: 35.2 G/DL (ref 32–36)
MCV RBC AUTO: 91 FL (ref 82–98)
MONOCYTES # BLD AUTO: 0.6 K/UL (ref 0.3–1)
MONOCYTES NFR BLD: 11.6 % (ref 4–15)
NEUTROPHILS # BLD AUTO: 3.5 K/UL (ref 1.8–7.7)
NEUTROPHILS NFR BLD: 68.5 % (ref 38–73)
NRBC BLD-RTO: 0 /100 WBC
PLATELET # BLD AUTO: 77 K/UL (ref 150–450)
PMV BLD AUTO: 12.3 FL (ref 9.2–12.9)
POTASSIUM SERPL-SCNC: 3.8 MMOL/L (ref 3.5–5.1)
PROT SERPL-MCNC: 7.7 G/DL (ref 6–8.4)
RBC # BLD AUTO: 4.38 M/UL (ref 4.6–6.2)
SODIUM SERPL-SCNC: 133 MMOL/L (ref 136–145)
TSH SERPL DL<=0.005 MIU/L-ACNC: 1.16 UIU/ML (ref 0.4–4)
WBC # BLD AUTO: 5.07 K/UL (ref 3.9–12.7)

## 2024-09-05 PROCEDURE — 80053 COMPREHEN METABOLIC PANEL: CPT | Mod: PN | Performed by: STUDENT IN AN ORGANIZED HEALTH CARE EDUCATION/TRAINING PROGRAM

## 2024-09-05 PROCEDURE — 84443 ASSAY THYROID STIM HORMONE: CPT | Performed by: STUDENT IN AN ORGANIZED HEALTH CARE EDUCATION/TRAINING PROGRAM

## 2024-09-05 PROCEDURE — 82105 ALPHA-FETOPROTEIN SERUM: CPT | Performed by: STUDENT IN AN ORGANIZED HEALTH CARE EDUCATION/TRAINING PROGRAM

## 2024-09-05 PROCEDURE — 36415 COLL VENOUS BLD VENIPUNCTURE: CPT | Mod: PN | Performed by: STUDENT IN AN ORGANIZED HEALTH CARE EDUCATION/TRAINING PROGRAM

## 2024-09-05 PROCEDURE — 85025 COMPLETE CBC W/AUTO DIFF WBC: CPT | Mod: PN | Performed by: STUDENT IN AN ORGANIZED HEALTH CARE EDUCATION/TRAINING PROGRAM

## 2024-09-09 ENCOUNTER — TELEPHONE (OUTPATIENT)
Dept: HEMATOLOGY/ONCOLOGY | Facility: CLINIC | Age: 69
End: 2024-09-09
Payer: MEDICARE

## 2024-09-09 DIAGNOSIS — Z79.899 ON ANTINEOPLASTIC CHEMOTHERAPY: ICD-10-CM

## 2024-09-09 DIAGNOSIS — C22.0 HCC (HEPATOCELLULAR CARCINOMA): Primary | ICD-10-CM

## 2024-09-09 NOTE — TELEPHONE ENCOUNTER
Spoke with the pt and the pt wanted to know if they we were going to be open on Wednesday I stated to the pt as of now I do not know. Pt verbalized and understanding.  ----- Message from Lois Whaley sent at 9/9/2024  2:56 PM CDT -----  Type: Needs Medical Advice  Who Called:  Faina (daughter)  Symptoms (please be specific):    How long has patient had these symptoms:    Pharmacy name and phone #:    Best Call Back Number:   Additional Information: Faina is requesting a call back to reschedule her dad appts..

## 2024-09-10 ENCOUNTER — TELEPHONE (OUTPATIENT)
Dept: HEMATOLOGY/ONCOLOGY | Facility: CLINIC | Age: 69
End: 2024-09-10
Payer: MEDICARE

## 2024-09-10 ENCOUNTER — PATIENT MESSAGE (OUTPATIENT)
Dept: HEMATOLOGY/ONCOLOGY | Facility: CLINIC | Age: 69
End: 2024-09-10
Payer: MEDICARE

## 2024-09-10 DIAGNOSIS — Z79.01 ANTICOAGULATED ON COUMADIN: Primary | ICD-10-CM

## 2024-09-10 DIAGNOSIS — C22.0 HCC (HEPATOCELLULAR CARCINOMA): ICD-10-CM

## 2024-09-10 NOTE — TELEPHONE ENCOUNTER
Notified Mr Treviño that he will be seeing John Murray NP in person tomorrow at 9:00 am. Patient verbalized understanding.    Rescheduled Mr Treviño's office visit with Dr Kirkland to virtual tomorrow. He will do VV at the cancer center since he lives in [place]. Will notify him if clinic closes and we need to reschedule.

## 2024-09-11 ENCOUNTER — DOCUMENTATION ONLY (OUTPATIENT)
Dept: INFUSION THERAPY | Facility: HOSPITAL | Age: 69
End: 2024-09-11
Payer: MEDICARE

## 2024-09-11 ENCOUNTER — OFFICE VISIT (OUTPATIENT)
Dept: HEMATOLOGY/ONCOLOGY | Facility: CLINIC | Age: 69
End: 2024-09-11
Payer: MEDICARE

## 2024-09-11 ENCOUNTER — INFUSION (OUTPATIENT)
Dept: INFUSION THERAPY | Facility: HOSPITAL | Age: 69
End: 2024-09-11
Attending: STUDENT IN AN ORGANIZED HEALTH CARE EDUCATION/TRAINING PROGRAM
Payer: MEDICARE

## 2024-09-11 VITALS
DIASTOLIC BLOOD PRESSURE: 82 MMHG | RESPIRATION RATE: 16 BRPM | HEART RATE: 87 BPM | HEIGHT: 70 IN | SYSTOLIC BLOOD PRESSURE: 156 MMHG | TEMPERATURE: 97 F | WEIGHT: 223.13 LBS | OXYGEN SATURATION: 98 % | BODY MASS INDEX: 31.94 KG/M2

## 2024-09-11 VITALS
WEIGHT: 223.13 LBS | HEIGHT: 70 IN | OXYGEN SATURATION: 98 % | SYSTOLIC BLOOD PRESSURE: 147 MMHG | RESPIRATION RATE: 16 BRPM | DIASTOLIC BLOOD PRESSURE: 84 MMHG | BODY MASS INDEX: 31.94 KG/M2 | TEMPERATURE: 97 F | HEART RATE: 72 BPM

## 2024-09-11 DIAGNOSIS — L27.0 DRUG RASH: ICD-10-CM

## 2024-09-11 DIAGNOSIS — C22.0 HCC (HEPATOCELLULAR CARCINOMA): Primary | ICD-10-CM

## 2024-09-11 DIAGNOSIS — R12 HEARTBURN: ICD-10-CM

## 2024-09-11 DIAGNOSIS — C67.8 MALIGNANT NEOPLASM OF OVERLAPPING SITES OF BLADDER: ICD-10-CM

## 2024-09-11 DIAGNOSIS — Z29.89 IMMUNOTHERAPY ENCOUNTER: ICD-10-CM

## 2024-09-11 DIAGNOSIS — I48.11 LONGSTANDING PERSISTENT ATRIAL FIBRILLATION: ICD-10-CM

## 2024-09-11 DIAGNOSIS — Z79.899 ON ANTINEOPLASTIC CHEMOTHERAPY: ICD-10-CM

## 2024-09-11 DIAGNOSIS — D53.9 NUTRITIONAL ANEMIA: ICD-10-CM

## 2024-09-11 DIAGNOSIS — D69.6 THROMBOCYTOPENIA: ICD-10-CM

## 2024-09-11 PROCEDURE — 4010F ACE/ARB THERAPY RXD/TAKEN: CPT | Mod: CPTII,S$GLB,,

## 2024-09-11 PROCEDURE — 3008F BODY MASS INDEX DOCD: CPT | Mod: CPTII,S$GLB,,

## 2024-09-11 PROCEDURE — 1125F AMNT PAIN NOTED PAIN PRSNT: CPT | Mod: CPTII,S$GLB,,

## 2024-09-11 PROCEDURE — 3079F DIAST BP 80-89 MM HG: CPT | Mod: CPTII,S$GLB,,

## 2024-09-11 PROCEDURE — 25000003 PHARM REV CODE 250: Mod: PN

## 2024-09-11 PROCEDURE — 3288F FALL RISK ASSESSMENT DOCD: CPT | Mod: CPTII,S$GLB,,

## 2024-09-11 PROCEDURE — 99999 PR PBB SHADOW E&M-EST. PATIENT-LVL III: CPT | Mod: PBBFAC,,,

## 2024-09-11 PROCEDURE — 3077F SYST BP >= 140 MM HG: CPT | Mod: CPTII,S$GLB,,

## 2024-09-11 PROCEDURE — G2211 COMPLEX E/M VISIT ADD ON: HCPCS | Mod: S$GLB,,,

## 2024-09-11 PROCEDURE — 99214 OFFICE O/P EST MOD 30 MIN: CPT | Mod: S$GLB,,,

## 2024-09-11 PROCEDURE — 96413 CHEMO IV INFUSION 1 HR: CPT | Mod: PN

## 2024-09-11 PROCEDURE — 1101F PT FALLS ASSESS-DOCD LE1/YR: CPT | Mod: CPTII,S$GLB,,

## 2024-09-11 PROCEDURE — 63600175 PHARM REV CODE 636 W HCPCS: Mod: JZ,JG,PN

## 2024-09-11 RX ORDER — DIPHENHYDRAMINE HYDROCHLORIDE 50 MG/ML
50 INJECTION INTRAMUSCULAR; INTRAVENOUS ONCE AS NEEDED
Status: CANCELLED | OUTPATIENT
Start: 2024-09-11

## 2024-09-11 RX ORDER — DIPHENHYDRAMINE HYDROCHLORIDE 50 MG/ML
50 INJECTION INTRAMUSCULAR; INTRAVENOUS ONCE AS NEEDED
Status: DISCONTINUED | OUTPATIENT
Start: 2024-09-11 | End: 2024-09-11 | Stop reason: HOSPADM

## 2024-09-11 RX ORDER — PROCHLORPERAZINE EDISYLATE 5 MG/ML
5 INJECTION INTRAMUSCULAR; INTRAVENOUS
Status: DISCONTINUED | OUTPATIENT
Start: 2024-09-11 | End: 2024-09-11 | Stop reason: HOSPADM

## 2024-09-11 RX ORDER — HEPARIN 100 UNIT/ML
500 SYRINGE INTRAVENOUS
Status: DISCONTINUED | OUTPATIENT
Start: 2024-09-11 | End: 2024-09-11 | Stop reason: HOSPADM

## 2024-09-11 RX ORDER — EPINEPHRINE 0.3 MG/.3ML
0.3 INJECTION SUBCUTANEOUS ONCE AS NEEDED
Status: CANCELLED | OUTPATIENT
Start: 2024-09-11

## 2024-09-11 RX ORDER — HEPARIN 100 UNIT/ML
500 SYRINGE INTRAVENOUS
Status: CANCELLED | OUTPATIENT
Start: 2024-09-11

## 2024-09-11 RX ORDER — SODIUM CHLORIDE 0.9 % (FLUSH) 0.9 %
10 SYRINGE (ML) INJECTION
Status: DISCONTINUED | OUTPATIENT
Start: 2024-09-11 | End: 2024-09-11 | Stop reason: HOSPADM

## 2024-09-11 RX ORDER — SODIUM CHLORIDE 0.9 % (FLUSH) 0.9 %
10 SYRINGE (ML) INJECTION
Status: CANCELLED | OUTPATIENT
Start: 2024-09-11

## 2024-09-11 RX ORDER — PROCHLORPERAZINE EDISYLATE 5 MG/ML
5 INJECTION INTRAMUSCULAR; INTRAVENOUS
Status: CANCELLED
Start: 2024-09-11

## 2024-09-11 RX ORDER — EPINEPHRINE 0.3 MG/.3ML
0.3 INJECTION SUBCUTANEOUS ONCE AS NEEDED
Status: DISCONTINUED | OUTPATIENT
Start: 2024-09-11 | End: 2024-09-11 | Stop reason: HOSPADM

## 2024-09-11 RX ADMIN — SODIUM CHLORIDE: 9 INJECTION, SOLUTION INTRAVENOUS at 09:09

## 2024-09-11 RX ADMIN — SODIUM CHLORIDE 1500 MG: 9 INJECTION, SOLUTION INTRAVENOUS at 09:09

## 2024-09-11 NOTE — PROGRESS NOTES
PATIENT: Hudson Tavares  MRN: [id number]  DATE: 9/11/2024      Diagnosis:   1. HCC (hepatocellular carcinoma)    2. Thrombocytopenia    3. Nutritional anemia    4. Malignant neoplasm of overlapping sites of bladder    5. On antineoplastic chemotherapy    6. Longstanding persistent atrial fibrillation    7. Heartburn    8. Drug rash    9. Immunotherapy encounter            Chief Complaint: Follow-up (HCC (hepatocellular carcinoma))          Subjective:    Interval History: Mr. Tavares is a 69 y.o. male who returns for follow up for review of labs and treatment. Pt reports feeling well overall. Still experiencing dry skin to scalp. Applying moisturizing emollient as advised. Denies fever, chills, sob, cp, palpitations, swelling, fatigue, numbness, tingling, n/v, diarrhea, constipation, abdominal pain, new bumps, lumps, bleeding, bruising.     Oncologic History:   Per Record  69 y.o. male with HCC who presents for toxicity check following C1 Durvalumab/tremi. He has some groin pain following Y90 mapping but no bruising over acces site. Following C1 patient developed rash 7/26 and lacrimation, improved with topical steroid. Took prednisone 50 mg 7/27 and 40 mg 7/28 and experienced severe insomnia so discontinued. Notes symptoms of reflux which improves with pepcid.       First Y90 scheduled 8/30.  Oncology History   HCC (hepatocellular carcinoma)   9/24/2019 Initial Diagnosis    HCC (hepatocellular carcinoma) - treated with microwave ablation     6/26/2024 Relapse    Patient established with hematology for thrombocytopenia, HCC and cirrhosis history prompted further GI work-up    6/26/24: MRI Abdomen shows large lesions in segments 8 and 5 with washout compatible with HCC, measuring 8 x4.2 cm transverse and 10 cm caudal, prominent tumor thrombus in right portal vein, 1 cm lesion in posterior right hepatic lobe concerning for additional focus     7/3/2024 Cancer Staged    Staging form: Liver, AJCC 8th Edition  -  Clinical: Stage IIIA (cT3, cN0, cM0)     7/16/2024 -  Chemotherapy    Treatment Summary   Plan Name: OP Tremelimumab + Durvalumab x1 followed by DURVALUMAB 1500 MG Q4W  Treatment Goal: Palliative  Status: Active  Start Date: 7/16/2024  End Date: 6/17/2025 (Planned)  Provider: Aleyda Kirkland MD  Chemotherapy: [No matching medication found in this treatment plan]     Malignant neoplasm of overlapping sites of bladder   6/25/2024 Initial Diagnosis    Malignant neoplasm of overlapping sites of bladder     6/25/2024 Cancer Staged    Staging form: Urinary Bladder, AJCC 8th Edition  - Clinical: Stage 0a (cTa, cN0, cM0)         Past Medical History:   Past Medical History:   Diagnosis Date    Adenomatous colon polyp     Alcohol abuse 07/09/2015    Anticoagulant long-term use     coumadin - discontinued    Atrial fibrillation 07/09/2015    CAD (coronary artery disease), native coronary artery 05/03/2016    Degenerative joint disease 07/09/2015    Encounter for blood transfusion     Enlarged liver     Essential hypertension 07/09/2015    Hep C w/o coma, chronic     treated with Harvoni    Hiatal hernia     patient states hernia has resolved    Hx of bladder infections     finished cipro 4/29/16    S/P CABG (coronary artery bypass graft) 05/17/2016    LIMA to LAD, SVG to OM, SVG to diagonal, SVG to right PDA, Maze, left atrial appendage resection (5/16)    Spleen enlarged     Thrombocytopenia 05/05/2016    Tobacco abuse 07/09/2015       Past Surgical HIstory:   Past Surgical History:   Procedure Laterality Date    CARDIAC SURGERY  05/04/2016    4 vessel CABG    CERVICAL FUSION  2004    COLONOSCOPY  12/03/2018    [facility] Wells    CYSTOSCOPY W/ RETROGRADES N/A 2/27/2024    Procedure: CYSTOSCOPY, WITH RETROGRADE PYELOGRAM;  Surgeon: STORMY Bray MD;  Location: [facility] OR;  Service: Urology;  Laterality: N/A;    CYSTOSCOPY WITH BIOPSY OF BLADDER N/A 2/27/2024    Procedure: CYSTOSCOPY, WITH BLADDER BIOPSY, WITH FULGURATION IF  "INDICATED;  Surgeon: STORMY Bray MD;  Location: [facility] OR;  Service: Urology;  Laterality: N/A;    CYSTOURETHROSCOPY, WITH THROMBUS REMOVAL N/A 2/27/2024    Procedure: CYSTOURETHROSCOPY, WITH THROMBUS REMOVAL;  Surgeon: STORMY Bray MD;  Location: [facility] OR;  Service: Urology;  Laterality: N/A;    JOINT REPLACEMENT  TOTAL RIGHT KNEE     OPEN ANTERIOR SHOULDER RECONSTRUCTION Left     RADIOFREQUENCY ABLATION N/A 9/24/2019    Procedure: Radiofrequency Ablation;  Surgeon: Anayeli Surgeon;  Location: [facility];  Service: Anesthesiology;  Laterality: N/A;  Room 173/[facility]    TIBIA FRACTURE SURGERY Right 1971       Family History:   Family History   Problem Relation Name Age of Onset    Heart disease Mother          congestive heart failure    Diabetes Mother      Hypertension Mother      Heart disease Sister          heart murmur    Atrial fibrillation Sister      Cancer Sister          breast    Cancer Father  60        colon    Chronic back pain Brother      Atrial fibrillation Sister      Heart disease Sister      Collagen disease Neg Hx         Social History:  reports that he quit smoking about 8 years ago. His smoking use included cigarettes. He started smoking about 38 years ago. He has a 7.5 pack-year smoking history. He has never used smokeless tobacco. He reports that he does not drink alcohol and does not use drugs.    Allergies:  Review of patient's allergies indicates:   Allergen Reactions    Cilostazol Other (See Comments)     Colon bleeding    Stadol [butorphanol tartrate] Hallucinations     "I saw pink elephants"    Keflex [cephalexin] Other (See Comments)     Was using 500mg TID oral and had a strong metallic taste in his mouth and could not function.  Can use IV cephalosporin.    Morphine Other (See Comments)     "makes him drunk", Altered Mental Status    Sulfa (sulfonamide antibiotics) Hives, Nausea And Vomiting and Other (See Comments)     Since childhood; does not know reaction      Finasteride " Other (See Comments)       Medications:  Current Outpatient Medications   Medication Sig Dispense Refill    ALPRAZolam (XANAX) 0.5 MG tablet Take 0.5 mg by mouth every evening.      famotidine (PEPCID) 20 MG tablet Take 20 mg by mouth every morning.      losartan (COZAAR) 50 MG tablet Take 50 mg by mouth every evening.      metoprolol succinate (TOPROL-XL) 50 MG 24 hr tablet Take 1 tablet (50 mg total) by mouth once daily. (Patient taking differently: Take 50 mg by mouth every morning.) 90 tablet 3    rivaroxaban (XARELTO) 20 mg Tab Take 1 tablet (20 mg total) by mouth daily with dinner or evening meal. 30 tablet 3    spironolactone (ALDACTONE) 25 MG tablet Take 25 mg by mouth as needed.      triamcinolone acetonide 0.1% (KENALOG) 0.1 % cream Apply topically 2 (two) times daily. 453.6 g 1     No current facility-administered medications for this visit.     Facility-Administered Medications Ordered in Other Visits   Medication Dose Route Frequency Provider Last Rate Last Admin    diphenhydrAMINE injection 12.5 mg  12.5 mg Intravenous Q6H PRN Stefano Sherwood MD        electrolyte-S (ISOLYTE)   Intravenous Continuous Stefano Sherwood MD        fentaNYL 50 mcg/mL injection 25 mcg  25 mcg Intravenous Q5 Min PRN Stefano Sherwood MD        HYDROmorphone (PF) injection 0.2 mg  0.2 mg Intravenous Q5 Min PRN Stefano Sherwood MD        lactated ringers infusion   Intravenous Continuous Stefano Sherwood MD 10 mL/hr at 02/27/24 0824 New Bag at 02/27/24 0824    LIDOcaine (PF) 10 mg/ml (1%) injection 10 mg  1 mL Intradermal Once Stefano Sherwood MD        ondansetron injection 4 mg  4 mg Intravenous Daily PRN Stefano Sherwood MD        oxyCODONE immediate release tablet 5 mg  5 mg Oral Q3H PRN Stefano Sherwood MD   5 mg at 02/27/24 1030       Review of Systems   Constitutional:  Positive for activity change. Negative for appetite change, diaphoresis, fatigue and fever.   HENT: Negative.     Respiratory:  Negative for chest  "tightness and shortness of breath.    Cardiovascular:  Negative for chest pain, palpitations and leg swelling.   Gastrointestinal: Negative.    Genitourinary: Negative.    Musculoskeletal: Negative.    Neurological: Negative.    Hematological: Negative.    Psychiatric/Behavioral: Negative.         ECOG Performance Status:   ECOG SCORE             Objective:      Vitals:   Vitals:    09/11/24 0842   BP: (!) 156/82   BP Location: Left arm   Patient Position: Sitting   BP Method: Medium (Automatic)   Pulse: 87   Resp: 16   Temp: 97.2 °F (36.2 °C)   TempSrc: Temporal   SpO2: 98%   Weight: 101.2 kg (223 lb 1.7 oz)   Height: 5' 10" (1.778 m)       BMI: Body mass index is 32.01 kg/m².    Physical Exam  HENT:      Head: Normocephalic.      Nose: Nose normal.      Mouth/Throat:      Mouth: Mucous membranes are moist.      Pharynx: Oropharynx is clear.   Eyes:      Pupils: Pupils are equal, round, and reactive to light.   Cardiovascular:      Rate and Rhythm: Normal rate. Rhythm irregular.      Heart sounds: Normal heart sounds.   Pulmonary:      Effort: Pulmonary effort is normal.      Breath sounds: Normal breath sounds.   Abdominal:      General: Bowel sounds are normal.   Musculoskeletal:         General: Normal range of motion.      Cervical back: Normal range of motion.   Skin:     General: Skin is warm and dry.             Comments: Scaling, flaky skin to scalp.    Neurological:      Mental Status: He is alert and oriented to person, place, and time.   Psychiatric:         Mood and Affect: Mood normal.         Behavior: Behavior normal.         Laboratory Data:  Lab Visit on 09/05/2024   Component Date Value Ref Range Status    WBC 09/05/2024 5.07  3.90 - 12.70 K/uL Final    RBC 09/05/2024 4.38 (L)  4.60 - 6.20 M/uL Final    Hemoglobin 09/05/2024 14.0  14.0 - 18.0 g/dL Final    Hematocrit 09/05/2024 39.8 (L)  40.0 - 54.0 % Final    MCV 09/05/2024 91  82 - 98 fL Final    MCH 09/05/2024 32.0 (H)  27.0 - 31.0 pg Final    " MCHC 09/05/2024 35.2  32.0 - 36.0 g/dL Final    RDW 09/05/2024 14.3  11.5 - 14.5 % Final    Platelets 09/05/2024 77 (L)  150 - 450 K/uL Final    MPV 09/05/2024 12.3  9.2 - 12.9 fL Final    Immature Granulocytes 09/05/2024 0.4  0.0 - 0.5 % Final    Gran # (ANC) 09/05/2024 3.5  1.8 - 7.7 K/uL Final    Immature Grans (Abs) 09/05/2024 0.02  0.00 - 0.04 K/uL Final    Lymph # 09/05/2024 0.4 (L)  1.0 - 4.8 K/uL Final    Mono # 09/05/2024 0.6  0.3 - 1.0 K/uL Final    Eos # 09/05/2024 0.6 (H)  0.0 - 0.5 K/uL Final    Baso # 09/05/2024 0.01  0.00 - 0.20 K/uL Final    nRBC 09/05/2024 0  0 /100 WBC Final    Gran % 09/05/2024 68.5  38.0 - 73.0 % Final    Lymph % 09/05/2024 8.5 (L)  18.0 - 48.0 % Final    Mono % 09/05/2024 11.6  4.0 - 15.0 % Final    Eosinophil % 09/05/2024 10.8 (H)  0.0 - 8.0 % Final    Basophil % 09/05/2024 0.2  0.0 - 1.9 % Final    Differential Method 09/05/2024 Automated   Final    Sodium 09/05/2024 133 (L)  136 - 145 mmol/L Final    Potassium 09/05/2024 3.8  3.5 - 5.1 mmol/L Final    Chloride 09/05/2024 103  95 - 110 mmol/L Final    CO2 09/05/2024 20 (L)  23 - 29 mmol/L Final    Glucose 09/05/2024 113 (H)  70 - 110 mg/dL Final    BUN 09/05/2024 11  8 - 23 mg/dL Final    Creatinine 09/05/2024 0.7  0.5 - 1.4 mg/dL Final    Calcium 09/05/2024 9.3  8.7 - 10.5 mg/dL Final    Total Protein 09/05/2024 7.7  6.0 - 8.4 g/dL Final    Albumin 09/05/2024 3.7  3.5 - 5.2 g/dL Final    Total Bilirubin 09/05/2024 2.1 (H)  0.1 - 1.0 mg/dL Final    Alkaline Phosphatase 09/05/2024 90  55 - 135 U/L Final    AST 09/05/2024 90 (H)  10 - 40 U/L Final    ALT 09/05/2024 75 (H)  10 - 44 U/L Final    eGFR 09/05/2024 >60.0  >60 mL/min/1.73 m^2 Final    Anion Gap 09/05/2024 10  8 - 16 mmol/L Final    AFP 09/05/2024 3.3  0.0 - 8.4 ng/mL Final    TSH 09/05/2024 1.162  0.400 - 4.000 uIU/mL Final          Imaging: IR Embolization for Tumor_Organ Ischemia  Narrative: EXAMINATION:  Hepatic radioembolization preparatory angiogram and Tc99m-MAA  administration     Procedural Personnel     Attending physician(s): Wilber Yuen     Fellow physician(s): None     Resident physician(s): None     Advanced practice provider(s): None     Pre-procedure diagnosis: Hepatocellular carcinoma     Post-procedure diagnosis: Same     Indication: Mapping angiogram in preparation for radioembolization     Additional clinical history: None     Complications: No immediate complications.     TECHNIQUE:  - Arterial access with ultrasound guidance     - Aortography: None     - Visceral angiography: Celiac angiography     - Selective hepatic angiography: Common hepatic angiography     - Superselective angiography: Performed as described below     - Embolization and post-embolization angiography: Not performed     - Technetium-99m MAA administration as described below     - Additional procedures: Cone-beam CT and 3D rotational angiography     FINDINGS:  Pre-procedure     Consent: Informed consent for the procedure was obtained and time-out was performed prior to the procedure.     Preparation: The site was prepared and draped using maximal sterile barrier technique including cutaneous antisepsis.     Anesthesia/sedation     The IR procedural team has confirmed the patient ID and re-evaluated the patient and sedation plan confirming it is suitable for the patient's condition and procedure.     Level of anesthesia/sedation: Moderate sedation (conscious sedation)     Anesthesia/sedation administered by: Nurse or other independent trained observer     Total intra-service sedation time (minutes): 60     Access     Local anesthesia was administered. The vessel was sonographically evaluated and judged to be patent. Real time ultrasound was used to visualize needle entry into the vessel and a permanent image was stored. A 5 Occitan sheath was placed.     Vessel accessed: Right common femoral artery     Access technique: Micropuncture set with 21 gauge needle     Angiography     Angiography  was performed using 5 [place]er Motarjame catheter and 2.8 [place] Konger Progreat microcatheter.     Variant anatomy: None     Vessel catheterized: Celiac artery     An angiogram was performed, which demonstrates a hypervascular mass in the liver, corresponding to the known tumor.  Delayed portal venogram demonstrates patent portal vein with hepatopetal flow.     Vessel catheterized: Common hepatic artery     An angiogram was performed, which demonstrates a hypervascular mass in the right hepatic lobe, corresponding to the area of known tumor.     Vessel catheterized: Left hepatic artery     An angiogram was performed, which demonstrates no significant hypervascularity suggest vascular supply the area of known tumor.  Cone beam CT and rotational 3D angiography was performed at this location.  Images were independently reviewed and interpreted at a separate workstation.  These images demonstrate no significant vascular supply to the area of known tumor.     Vessel catheterized: Middle hepatic artery     An angiogram was performed, which demonstrates subtle hypervascularity arising from a segmental branch of the middle hepatic artery.  Cone beam CT and rotational 3D angiography was performed at this location.  Images were independently reviewed and interpreted at a separate workstation.  These images demonstrate a small amount of vascular supply to the area of known tumor in the right hepatic lobe.     Vessel catheterized: Segment IV a branch of the middle hepatic artery     Angiogram was performed, which demonstrates vascular supply to the area of known tumor in the right hepatic lobe.  Cone beam CT and rotational 3D angiography is performed at this location.  Images were independently reviewed and interpreted at a separate workstation.  These images demonstrate vascular supply to the area of known tumor in the right hepatic lobe.     Vessel catheterized: Right hepatic artery     An angiogram was performed, which demonstrates  large hypervascular mass in the right hepatic lobe, corresponding to area of known tumor.  Cone beam CT and rotational 3D angiography was performed at this location.  Images were independently reviewed and interpreted at a separate workstation.  These images demonstrate vascular supply to the area of known tumor in the right hepatic lobe.     Vessel catheterized: Cystic artery     Angiogram was performed, which demonstrates no evidence of vascular supply to the area of known tumor in the liver.     Vessel catheterized: Medial subsegmental segment 5 branch of the right hepatic artery     Angiogram was performed, which demonstrates faint hypervascularity and in questionable supply to the area of portal vein tumor thrombus.  Cone beam CT and rotational 3D angiography was performed at this location.  Images were independently reviewed and interpreted at a separate workstation.  These images demonstrate vascular supply to the central area of portal vein tumor thrombus in the right hepatic lobe.     Vessel catheterized: Posterior subsegmental segment VIII branch of the right hepatic artery     Angiogram was performed, which demonstrates vascular supply to the area of known tumor in the right hepatic lobe.  Cone beam CT and rotational 3D angiography was performed at this location.  Images were independently reviewed and interpreted at a separate workstation.  These images demonstrate vascular supply to the area of known tumor in the right hepatic lobe.     Vessel catheterized: Lateral subsegmental segment V branch of the right hepatic artery     An angiogram was performed, which demonstrates vascular supply to the area of known tumor in the right hepatic lobe.  Cone beam CT and rotational 3D angiography was performed at this location.  Images were independently reviewed and interpreted at a separate workstation.  These images demonstrate vascular supply to the area of known tumor in the right hepatic lobe.     Vessel  catheterized: Posterior division of the right hepatic artery     An angiogram was performed, which demonstrates hypervascular focus in the posterior aspect of the right hepatic lobe.     Vessel catheterized: Subsegmental segment VI branch of the right hepatic artery     Angiogram was performed, which demonstrates vascular supply to the hypervascular mass in the posterior aspect of the right hepatic lobe.  Cone beam CT and rotational 3D angiography was performed at this location.  Images were independently reviewed and interpreted at a separate workstation.  These images demonstrate vascular supply to the area of known tumor in the posterior aspect of the right hepatic lobe.     Vessel catheterized: Anterior subsegmental segment VIII branch of the right hepatic artery     Angiogram was performed, which demonstrates vascular supply to the area of portal vein tumor thrombus in the right hepatic lobe.  Cone beam CT and rotational 3D angiography was performed at this location.  Images were independently reviewed and interpreted at a separate workstation.  These images demonstrate vascular supply to the area of known portal vein tumor thrombus in the right hepatic lobe.     Radioisotope administration     Radioisotope: Technetium-99m MAA     Catheter position for administration: Right hepatic artery     Radioisotope activity administered at this location (mCi): 5     Closure     Access site angiography performed: Yes     Patent vessel with appropriate access level     Arterial closure technique: Vascade     Hemostasis achieved from closure technique: Yes     Duration of manual compression (minutes): 5     Contrast     Contrast agent: Omnipaque 300     Contrast volume (mL): 100     Radiation Dose     Fluoroscopy time ( minutes): 17.3     Reference air kerma ( mGy ): 4042     Kerma area product ( uGy-m2 ): 35718     Additional Details     Additional description of procedure: None     Additional findings: None     Equipment  details: None     Specimens removed: None     Estimated blood loss (mL): Less than 10     Standardized report: [id number]     Attestation     Signer name: Wilber Yuen     I attest that I was present for the entire procedure. I reviewed the stored images and agree with the report as written.  Impression: Radioembolization preparatory angiography with administration of Tc99m-MAA.     Plan:     Patient to be sent to nuclear medicine for radioisotope imaging and calculation of lung shunt fraction.     _______________________________________________________________     Electronically signed by:Wilber Yuen  Date:                                            07/25/2024  Time:                                           09:47  NM Liver Imaging Static PRE Y-90 Emboliz  EXAMINATION:  NM LIVER IMAGING STATIC PRE Y-90 EMBOLIZATION; NM SPECT/CT SINGLE AREA     CLINICAL HISTORY:  Liver cell carcinoma     TECHNIQUE:  5 mCi Tc-99m MAA was injected intra-arterially by interventional radiology, and a static image of the chest and abdomen was acquired in the anterior view.  SPECT-CT images of the abdomen were also acquired.     COMPARISON:  None     FINDINGS:  There is expected uptake within the liver, and the liver lung shunt fraction is 10.8%.  There is no other significant extrahepatic activity by planar or SPECT-CT imaging.     IMPRESSION  Liver lung shunt fraction of 10.8 % without other extrahepatic activity.     Electronically signed by:Wilber Yuen  Date:                                            07/25/2024  Time:                                           09:08  NM Spect/CT Single Area  EXAMINATION:  NM LIVER IMAGING STATIC PRE Y-90 EMBOLIZATION; NM SPECT/CT SINGLE AREA     CLINICAL HISTORY:  Liver cell carcinoma     TECHNIQUE:  5 mCi Tc-99m MAA was injected intra-arterially by interventional radiology, and a static image of the chest and abdomen was acquired in the anterior view.  SPECT-CT images of the  abdomen were also acquired.     COMPARISON:  None     FINDINGS:  There is expected uptake within the liver, and the liver lung shunt fraction is 10.8%.  There is no other significant extrahepatic activity by planar or SPECT-CT imaging.     IMPRESSION  Liver lung shunt fraction of 10.8 % without other extrahepatic activity.     Electronically signed by:Wilber Yuen  Date:                                            07/25/2024   Assessment:       1. HCC (hepatocellular carcinoma)    2. Thrombocytopenia    3. Nutritional anemia    4. Malignant neoplasm of overlapping sites of bladder    5. On antineoplastic chemotherapy    6. Longstanding persistent atrial fibrillation    7. Heartburn    8. Drug rash    9. Immunotherapy encounter               Plan:      HCC, Stage III - Microwave ablation 9/2019, MRI Abdomen shows recurrent disease. Discussed case with IR, recommend Y90 + immunotherapy for best disease control. We discussed the results of the [facility] study and superiority of Durvalumab/Tremlimumab to Sorafenib for improved overall survival. Alternative therapy with be Bevacizumab/Atezolizumab per [employer] 150 but would impact timing of Y90. Would recommend either approach over Sorafenib, patient and family understanding. AFP 21 -> 18.   - return to clinic prior to C2  8/13/24: ok to proceed with treatment. Follow up in 4 weeks with repeat cbc, cmp  9/11/24: TBILI 2.1 AST 90/ALT 75. Ok to proceed with treatment. Will monitor liver function. AFP 3.3     # IO dermatitis - patient rash >80% BSA x2 days, improved with topical steroid. Took oral steroid, prednisone 50 mg x2 days, but discontinued due to insomnia. We discussed the chance of repeat IO toxicity upon drug rechallenge; since symptoms have resolved and patient will move to Durvalumab monotherapy for C2 believe benefit outweighs risk to continue therapy.      Continue topical steroid + oral steroids and notify if rash recurs.      # Thrombocytopenia - Attributed to  cirrhosis, established with hepatology. Hold anticoagulation if plts <50k   Repeat cbc in 2 weeks      # Anemia - Mild iron deficiency, encourage iron in diet     # Non-Muscle Invasive Bladder Cancer, Ta - Follows with Dr. Bray, TURBT 2/2024. Cystoscopy 6/20/24 negative for recurrence.      # Atrial fibrillation - Coumadin -> Xarelto. HOLD anticoagulation if plts fall <50k      # Hep C Cirrhosis - Treated with SVR, patient declines liver transplant in the past. Established with hepatology for compensated cirrhosis, EGD 7/2/24     # Heartburn - EKG shows a fib to rule out cardiac source, continue tums + antacid     Visit today included increased complexity associated with the care of the episodic problem  addressed and managing the longitudinal care of the patient due to the serious and/or complex managed problem(s) HCC.         Med Onc Chart Routing      Follow up with physician . 10/8/24 with Repeat cbc, cmp, afp with Dr Kirkland   Follow up with DARLING    Infusion scheduling note   ok to proceed with treatment   Injection scheduling note    Labs    Imaging    Pharmacy appointment    Other referrals                  Plan was discussed with the patient at length, and he verbalized understanding. Hudson was given an opportunity to ask questions that were answered to his satisfaction, and he was advised to call in the interval if any problems or questions arise.    Assessment/Plan reviewed and approved by Dr Kirkland    30 minutes were spent in coordination of patient's care, record review and counseling.    MOIRA Vegas, FNP-C  Hematology & Oncology

## 2024-09-11 NOTE — PLAN OF CARE
Problem: Adult Inpatient Plan of Care  Goal: Plan of Care Review  Outcome: Progressing  Flowsheets (Taken 9/11/2024 0919)  Plan of Care Reviewed With:   patient   family  Goal: Patient-Specific Goal (Individualized)  Outcome: Progressing  Flowsheets (Taken 9/11/2024 0919)  Individualized Care Needs: recliner, step daughter at bedside  Anxieties, Fears or Concerns: none today  Patient/Family-Specific Goals (Include Timeframe): no s/s of rx     Problem: Oncology Care  Goal: Effective Coping  Outcome: Progressing  Intervention: Support and Enhance Coping Strategies  Flowsheets (Taken 9/11/2024 0919)  Environmental Support:   calm environment promoted   distractions minimized  Goal: Improved Activity Tolerance  Outcome: Progressing  Intervention: Promote Improved Energy  Flowsheets (Taken 9/11/2024 0919)  Environmental Support:   calm environment promoted   distractions minimized   Pt tolerated Imfinzi infusion well.  No adverse reaction noted.   IV flushed with NS and D/C per protocol.  Patient left clinic in no acute distress with granddaughter.

## 2024-09-11 NOTE — PROGRESS NOTES
Oncology Nutrition   Chemotherapy Infusion Visit    Nutrition Follow Up   RD met with patient at chairside during infusion tx. Pt reports continues to do well nutritionally, only complain is rash on head. Eating without difficulty, maintaining weight, and denies nutrition related side effects. Provided support as appropriate.     Wt Readings from Last 10 Encounters:   09/11/24 101.2 kg (223 lb 1.7 oz)   09/11/24 101.2 kg (223 lb 1.7 oz)   08/30/24 99.3 kg (219 lb)   08/13/24 99.6 kg (219 lb 9.3 oz)   08/13/24 99.6 kg (219 lb 9.3 oz)   07/31/24 99.6 kg (219 lb 9.3 oz)   07/24/24 98 kg (216 lb)   07/18/24 99.7 kg (219 lb 14.5 oz)   07/16/24 98.5 kg (217 lb 2.5 oz)   07/12/24 99.6 kg (219 lb 9.3 oz)       All other nutrition questions/concerns addressed as appropriate. Will continue to follow and monitor throughout treatment PRN.     Mary Alberto, MS, RD, LDN  09/11/2024  10:24 AM

## 2024-09-12 ENCOUNTER — HOSPITAL ENCOUNTER (OUTPATIENT)
Dept: RADIOLOGY | Facility: HOSPITAL | Age: 69
Discharge: HOME | End: 2024-09-12
Attending: RADIOLOGY
Payer: MEDICARE

## 2024-09-12 DIAGNOSIS — Z79.01 ANTICOAGULATED ON COUMADIN: ICD-10-CM

## 2024-09-12 PROCEDURE — 93922 UPR/L XTREMITY ART 2 LEVELS: CPT | Mod: TC,52,PO

## 2024-09-12 PROCEDURE — 93926 LOWER EXTREMITY STUDY: CPT | Mod: TC,PO,RT

## 2024-09-12 PROCEDURE — 93922 UPR/L XTREMITY ART 2 LEVELS: CPT | Mod: 26,52,, | Performed by: RADIOLOGY

## 2024-09-12 PROCEDURE — 93926 LOWER EXTREMITY STUDY: CPT | Mod: 26,RT,, | Performed by: RADIOLOGY

## 2024-09-17 ENCOUNTER — TELEPHONE (OUTPATIENT)
Dept: HEMATOLOGY/ONCOLOGY | Facility: CLINIC | Age: 69
End: 2024-09-17
Payer: MEDICARE

## 2024-09-17 DIAGNOSIS — C22.0 HCC (HEPATOCELLULAR CARCINOMA): Primary | ICD-10-CM

## 2024-09-17 DIAGNOSIS — E07.9 THYROID CONDITION: ICD-10-CM

## 2024-09-17 NOTE — TELEPHONE ENCOUNTER
Patient is wanting to talk with Dr Kirkland about concerns he has post treatment. He had 10 out of 10 pain in his rib cage/abdominal area on Friday- took tylenol because he doesn't take pain medication. Did not have a BM on Friday but did have on on Saturday. Having regular Bms since then. Passing gas. Pain today is a 6 out of 10. Also concerned about his scalp- he said it is very dry, red, and has pimples all over the top. Saw KENNETH Lopez last week who told him to rub coconut oil on it which has helped the redness some but he said this is the worst it has been post treatment. He also is concerned about having treatment on 10/9 and Y90 procedure on 10/10- he thinks its too close together and his body won't be able to handle it. Informed patient that I will ask Dr Kirkland to call him to discuss per his request. Dr Kirkland notified of the above and will call the patient.

## 2024-09-17 NOTE — TELEPHONE ENCOUNTER
Secure chat message from Dr. Kirkland stating to go ahead and get Mr. Tavares scheduled. He is scheduled at 11am on 9/19. I called Mr. Treviño to confirm that dates and times work for him. Lab work ordered as well per Dr. Kirkland and patient is aware to come to clinic for 10am to get these labs.

## 2024-09-19 ENCOUNTER — LAB VISIT (OUTPATIENT)
Dept: LAB | Facility: HOSPITAL | Age: 69
End: 2024-09-19
Attending: STUDENT IN AN ORGANIZED HEALTH CARE EDUCATION/TRAINING PROGRAM
Payer: MEDICARE

## 2024-09-19 ENCOUNTER — OFFICE VISIT (OUTPATIENT)
Dept: HEMATOLOGY/ONCOLOGY | Facility: CLINIC | Age: 69
End: 2024-09-19
Payer: MEDICARE

## 2024-09-19 VITALS
BODY MASS INDEX: 31.43 KG/M2 | OXYGEN SATURATION: 98 % | WEIGHT: 219.56 LBS | HEIGHT: 70 IN | HEART RATE: 88 BPM | DIASTOLIC BLOOD PRESSURE: 74 MMHG | SYSTOLIC BLOOD PRESSURE: 151 MMHG | TEMPERATURE: 98 F

## 2024-09-19 DIAGNOSIS — I48.11 LONGSTANDING PERSISTENT ATRIAL FIBRILLATION: ICD-10-CM

## 2024-09-19 DIAGNOSIS — D69.6 THROMBOCYTOPENIA: ICD-10-CM

## 2024-09-19 DIAGNOSIS — R12 HEARTBURN: ICD-10-CM

## 2024-09-19 DIAGNOSIS — C67.8 MALIGNANT NEOPLASM OF OVERLAPPING SITES OF BLADDER: ICD-10-CM

## 2024-09-19 DIAGNOSIS — L27.0 DRUG RASH: ICD-10-CM

## 2024-09-19 DIAGNOSIS — E07.9 THYROID CONDITION: ICD-10-CM

## 2024-09-19 DIAGNOSIS — Z29.89 IMMUNOTHERAPY ENCOUNTER: ICD-10-CM

## 2024-09-19 DIAGNOSIS — C22.0 HCC (HEPATOCELLULAR CARCINOMA): Primary | ICD-10-CM

## 2024-09-19 DIAGNOSIS — C22.0 HCC (HEPATOCELLULAR CARCINOMA): ICD-10-CM

## 2024-09-19 DIAGNOSIS — D53.9 NUTRITIONAL ANEMIA: ICD-10-CM

## 2024-09-19 DIAGNOSIS — B19.20 COMPENSATED HCV CIRRHOSIS: ICD-10-CM

## 2024-09-19 DIAGNOSIS — K74.69 COMPENSATED HCV CIRRHOSIS: ICD-10-CM

## 2024-09-19 PROBLEM — D61.818 OTHER PANCYTOPENIA: Status: ACTIVE | Noted: 2024-09-19

## 2024-09-19 PROBLEM — C67.2 MALIGNANT NEOPLASM OF LATERAL WALL OF BLADDER: Status: ACTIVE | Noted: 2024-06-25

## 2024-09-19 PROBLEM — J43.1 PANLOBULAR EMPHYSEMA: Status: ACTIVE | Noted: 2024-09-19

## 2024-09-19 PROBLEM — K21.9 GERD (GASTROESOPHAGEAL REFLUX DISEASE): Status: ACTIVE | Noted: 2022-01-18

## 2024-09-19 PROBLEM — Z86.711 HX OF PULMONARY EMBOLUS: Status: ACTIVE | Noted: 2022-01-18

## 2024-09-19 PROBLEM — Z53.09 CONTRAINDICATION TO STATIN MEDICATION: Chronic | Status: ACTIVE | Noted: 2022-11-30

## 2024-09-19 LAB
AFP SERPL-MCNC: 4.6 NG/ML (ref 0–8.4)
ALBUMIN SERPL BCP-MCNC: 3.6 G/DL (ref 3.5–5.2)
ALP SERPL-CCNC: 91 U/L (ref 55–135)
ALT SERPL W/O P-5'-P-CCNC: 22 U/L (ref 10–44)
ANION GAP SERPL CALC-SCNC: 9 MMOL/L (ref 8–16)
AST SERPL-CCNC: 35 U/L (ref 10–40)
BASOPHILS # BLD AUTO: 0.03 K/UL (ref 0–0.2)
BASOPHILS NFR BLD: 0.8 % (ref 0–1.9)
BILIRUB SERPL-MCNC: 1.4 MG/DL (ref 0.1–1)
BUN SERPL-MCNC: 8 MG/DL (ref 8–23)
CALCIUM SERPL-MCNC: 9.6 MG/DL (ref 8.7–10.5)
CHLORIDE SERPL-SCNC: 105 MMOL/L (ref 95–110)
CO2 SERPL-SCNC: 25 MMOL/L (ref 23–29)
CREAT SERPL-MCNC: 0.7 MG/DL (ref 0.5–1.4)
DIFFERENTIAL METHOD BLD: ABNORMAL
EOSINOPHIL # BLD AUTO: 0.4 K/UL (ref 0–0.5)
EOSINOPHIL NFR BLD: 9.4 % (ref 0–8)
ERYTHROCYTE [DISTWIDTH] IN BLOOD BY AUTOMATED COUNT: 13.8 % (ref 11.5–14.5)
EST. GFR  (NO RACE VARIABLE): >60 ML/MIN/1.73 M^2
GLUCOSE SERPL-MCNC: 78 MG/DL (ref 70–110)
HCT VFR BLD AUTO: 39.9 % (ref 40–54)
HGB BLD-MCNC: 13.8 G/DL (ref 14–18)
IMM GRANULOCYTES # BLD AUTO: 0.01 K/UL (ref 0–0.04)
IMM GRANULOCYTES NFR BLD AUTO: 0.3 % (ref 0–0.5)
LYMPHOCYTES # BLD AUTO: 0.3 K/UL (ref 1–4.8)
LYMPHOCYTES NFR BLD: 8.1 % (ref 18–48)
MCH RBC QN AUTO: 32.1 PG (ref 27–31)
MCHC RBC AUTO-ENTMCNC: 34.6 G/DL (ref 32–36)
MCV RBC AUTO: 93 FL (ref 82–98)
MONOCYTES # BLD AUTO: 0.5 K/UL (ref 0.3–1)
MONOCYTES NFR BLD: 12.1 % (ref 4–15)
NEUTROPHILS # BLD AUTO: 2.6 K/UL (ref 1.8–7.7)
NEUTROPHILS NFR BLD: 69.3 % (ref 38–73)
NRBC BLD-RTO: 0 /100 WBC
PLATELET # BLD AUTO: 107 K/UL (ref 150–450)
PMV BLD AUTO: 10.9 FL (ref 9.2–12.9)
POTASSIUM SERPL-SCNC: 4.1 MMOL/L (ref 3.5–5.1)
PROT SERPL-MCNC: 7.8 G/DL (ref 6–8.4)
RBC # BLD AUTO: 4.3 M/UL (ref 4.6–6.2)
SODIUM SERPL-SCNC: 139 MMOL/L (ref 136–145)
TSH SERPL DL<=0.005 MIU/L-ACNC: 1.19 UIU/ML (ref 0.4–4)
WBC # BLD AUTO: 3.81 K/UL (ref 3.9–12.7)

## 2024-09-19 PROCEDURE — 3077F SYST BP >= 140 MM HG: CPT | Mod: CPTII,S$GLB,, | Performed by: STUDENT IN AN ORGANIZED HEALTH CARE EDUCATION/TRAINING PROGRAM

## 2024-09-19 PROCEDURE — 1125F AMNT PAIN NOTED PAIN PRSNT: CPT | Mod: CPTII,S$GLB,, | Performed by: STUDENT IN AN ORGANIZED HEALTH CARE EDUCATION/TRAINING PROGRAM

## 2024-09-19 PROCEDURE — 80053 COMPREHEN METABOLIC PANEL: CPT | Mod: PN | Performed by: STUDENT IN AN ORGANIZED HEALTH CARE EDUCATION/TRAINING PROGRAM

## 2024-09-19 PROCEDURE — 82105 ALPHA-FETOPROTEIN SERUM: CPT | Performed by: STUDENT IN AN ORGANIZED HEALTH CARE EDUCATION/TRAINING PROGRAM

## 2024-09-19 PROCEDURE — 3288F FALL RISK ASSESSMENT DOCD: CPT | Mod: CPTII,S$GLB,, | Performed by: STUDENT IN AN ORGANIZED HEALTH CARE EDUCATION/TRAINING PROGRAM

## 2024-09-19 PROCEDURE — 99214 OFFICE O/P EST MOD 30 MIN: CPT | Mod: S$GLB,,, | Performed by: STUDENT IN AN ORGANIZED HEALTH CARE EDUCATION/TRAINING PROGRAM

## 2024-09-19 PROCEDURE — G2211 COMPLEX E/M VISIT ADD ON: HCPCS | Mod: S$GLB,,, | Performed by: STUDENT IN AN ORGANIZED HEALTH CARE EDUCATION/TRAINING PROGRAM

## 2024-09-19 PROCEDURE — 36415 COLL VENOUS BLD VENIPUNCTURE: CPT | Mod: PN | Performed by: STUDENT IN AN ORGANIZED HEALTH CARE EDUCATION/TRAINING PROGRAM

## 2024-09-19 PROCEDURE — 3078F DIAST BP <80 MM HG: CPT | Mod: CPTII,S$GLB,, | Performed by: STUDENT IN AN ORGANIZED HEALTH CARE EDUCATION/TRAINING PROGRAM

## 2024-09-19 PROCEDURE — 99999 PR PBB SHADOW E&M-EST. PATIENT-LVL IV: CPT | Mod: PBBFAC,,, | Performed by: STUDENT IN AN ORGANIZED HEALTH CARE EDUCATION/TRAINING PROGRAM

## 2024-09-19 PROCEDURE — 3008F BODY MASS INDEX DOCD: CPT | Mod: CPTII,S$GLB,, | Performed by: STUDENT IN AN ORGANIZED HEALTH CARE EDUCATION/TRAINING PROGRAM

## 2024-09-19 PROCEDURE — 1101F PT FALLS ASSESS-DOCD LE1/YR: CPT | Mod: CPTII,S$GLB,, | Performed by: STUDENT IN AN ORGANIZED HEALTH CARE EDUCATION/TRAINING PROGRAM

## 2024-09-19 PROCEDURE — 84443 ASSAY THYROID STIM HORMONE: CPT | Performed by: STUDENT IN AN ORGANIZED HEALTH CARE EDUCATION/TRAINING PROGRAM

## 2024-09-19 PROCEDURE — 85025 COMPLETE CBC W/AUTO DIFF WBC: CPT | Mod: PN | Performed by: STUDENT IN AN ORGANIZED HEALTH CARE EDUCATION/TRAINING PROGRAM

## 2024-09-19 PROCEDURE — 4010F ACE/ARB THERAPY RXD/TAKEN: CPT | Mod: CPTII,S$GLB,, | Performed by: STUDENT IN AN ORGANIZED HEALTH CARE EDUCATION/TRAINING PROGRAM

## 2024-09-19 PROCEDURE — 1159F MED LIST DOCD IN RCRD: CPT | Mod: CPTII,S$GLB,, | Performed by: STUDENT IN AN ORGANIZED HEALTH CARE EDUCATION/TRAINING PROGRAM

## 2024-09-19 NOTE — PROGRESS NOTES
[facility]     Reason for visit: Follow Up     Best Contact Phone Number(s): There are no phone numbers on file.     Oncology History   Hepatocellular carcinoma   9/24/2019 Initial Diagnosis    HCC (hepatocellular carcinoma) - treated with microwave ablation     6/26/2024 Relapse    Patient established with hematology for thrombocytopenia, HCC and cirrhosis history prompted further GI work-up    6/26/24: MRI Abdomen shows large lesions in segments 8 and 5 with washout compatible with HCC, measuring 8 x4.2 cm transverse and 10 cm caudal, prominent tumor thrombus in right portal vein, 1 cm lesion in posterior right hepatic lobe concerning for additional focus     7/3/2024 Cancer Staged    Staging form: Liver, AJCC 8th Edition  - Clinical: Stage IIIA (cT3, cN0, cM0)     7/16/2024 -  Chemotherapy    Treatment Summary   Plan Name: OP Tremelimumab + Durvalumab x1 followed by DURVALUMAB 1500 MG Q4W  Treatment Goal: Palliative  Status: Active  Start Date: 7/16/2024  End Date: 6/17/2025 (Planned)  Provider: Aleyda Kirkland MD  Chemotherapy: [No matching medication found in this treatment plan]     Malignant neoplasm of lateral wall of bladder   6/25/2024 Initial Diagnosis    Malignant neoplasm of overlapping sites of bladder     6/25/2024 Cancer Staged    Staging form: Urinary Bladder, AJCC 8th Edition  - Clinical: Stage 0a (cTa, cN0, cM0)       HPI: Hudson Tavares is a 69 y.o. male with HCC who presents for follow-up after Cycle 3 Durvalumab. Since last visit patient reports severe pain over rib cage and abdomen. Is having regular bowel movements. Has also had severe dry rash over scalp, refractory to coconut oil. He had bruising over groin and abdomen after Y90. Is not taking pain medication.     Second Y90 scheduled 10/9/24.     Patient presents with daughter, Rubi. Has daughter Mara and granddaughter, Anselmo and son Roberto. ECOG PS is 0.  History has been obtained by chart review and  discussion with the patient.    ROS:   A complete 12-point review of systems was reviewed and is negative except as mentioned above.     Past Medical History:   Past Medical History:   Diagnosis Date    Adenomatous colon polyp     Alcohol abuse 07/09/2015    Anticoagulant long-term use     coumadin - discontinued    Atrial fibrillation 07/09/2015    CAD (coronary artery disease), native coronary artery 05/03/2016    Degenerative joint disease 07/09/2015    Encounter for blood transfusion     Enlarged liver     Essential hypertension 07/09/2015    Hep C w/o coma, chronic     treated with Harvoni    Hiatal hernia     patient states hernia has resolved    Hx of bladder infections     finished cipro 4/29/16    S/P CABG (coronary artery bypass graft) 05/17/2016    LIMA to LAD, SVG to OM, SVG to diagonal, SVG to right PDA, Maze, left atrial appendage resection (5/16)    Spleen enlarged     Thrombocytopenia 05/05/2016    Tobacco abuse 07/09/2015        Past Surgical History:   Past Surgical History:   Procedure Laterality Date    CARDIAC SURGERY  05/04/2016    4 vessel CABG    CERVICAL FUSION  2004    COLONOSCOPY  12/03/2018    Steve Wells    CYSTOSCOPY W/ RETROGRADES N/A 2/27/2024    Procedure: CYSTOSCOPY, WITH RETROGRADE PYELOGRAM;  Surgeon: STORMY Bray MD;  Location: [facility] OR;  Service: Urology;  Laterality: N/A;    CYSTOSCOPY WITH BIOPSY OF BLADDER N/A 2/27/2024    Procedure: CYSTOSCOPY, WITH BLADDER BIOPSY, WITH FULGURATION IF INDICATED;  Surgeon: STORMY Bray MD;  Location: [facility] OR;  Service: Urology;  Laterality: N/A;    CYSTOURETHROSCOPY, WITH THROMBUS REMOVAL N/A 2/27/2024    Procedure: CYSTOURETHROSCOPY, WITH THROMBUS REMOVAL;  Surgeon: STORMY Bray MD;  Location: [facility] OR;  Service: Urology;  Laterality: N/A;    JOINT REPLACEMENT  TOTAL RIGHT KNEE     OPEN ANTERIOR SHOULDER RECONSTRUCTION Left     RADIOFREQUENCY ABLATION N/A 9/24/2019    Procedure: Radiofrequency  "Ablation;  Surgeon: Anayeli Surgeon;  Location[facility];  Service: Anesthesiology;  Laterality: N/A;  Room 173/[place]    TIBIA FRACTURE SURGERY Right 1971        Family History:   Family History   Problem Relation Name Age of Onset    Heart disease Mother          congestive heart failure    Diabetes Mother      Hypertension Mother      Heart disease Sister          heart murmur    Atrial fibrillation Sister      Cancer Sister          breast    Cancer Father  60        colon    Chronic back pain Brother      Atrial fibrillation Sister      Heart disease Sister      Collagen disease Neg Hx          Social History:   Social History     Tobacco Use    Smoking status: Former     Current packs/day: 0.00     Average packs/day: 0.3 packs/day for 30.0 years (7.5 ttl pk-yrs)     Types: Cigarettes     Start date: 1986     Quit date: 2016     Years since quittin.3    Smokeless tobacco: Never   Substance Use Topics    Alcohol use: No     Alcohol/week: 0.0 standard drinks of alcohol     Comment: last drink 2015        I have reviewed and updated the patient's past medical, surgical, family and social histories.    Allergies:   Review of patient's allergies indicates:   Allergen Reactions    Cilostazol Other (See Comments)     Colon bleeding    Stadol [butorphanol tartrate] Hallucinations     "I saw pink elephants"    Keflex [cephalexin] Other (See Comments)     Was using 500mg TID oral and had a strong metallic taste in his mouth and could not function.  Can use IV cephalosporin.    Morphine Other (See Comments)     "makes him drunk", Altered Mental Status    Sulfa (sulfonamide antibiotics) Hives, Nausea And Vomiting and Other (See Comments)     Since childhood; does not know reaction      Finasteride Other (See Comments)        Medications:   Current Outpatient Medications   Medication Sig Dispense Refill    ALPRAZolam (XANAX) 0.5 MG tablet Take 0.5 mg by mouth every evening.      famotidine " "(PEPCID) 20 MG tablet Take 20 mg by mouth every morning.      losartan (COZAAR) 50 MG tablet Take 50 mg by mouth every evening.      metoprolol succinate (TOPROL-XL) 50 MG 24 hr tablet Take 1 tablet (50 mg total) by mouth once daily. (Patient taking differently: Take 50 mg by mouth every morning.) 90 tablet 3    rivaroxaban (XARELTO) 20 mg Tab Take 1 tablet (20 mg total) by mouth daily with dinner or evening meal. 30 tablet 3    spironolactone (ALDACTONE) 25 MG tablet Take 25 mg by mouth as needed.      triamcinolone acetonide 0.1% (KENALOG) 0.1 % cream Apply topically 2 (two) times daily. 453.6 g 1     No current facility-administered medications for this visit.     Facility-Administered Medications Ordered in Other Visits   Medication Dose Route Frequency Provider Last Rate Last Admin    diphenhydrAMINE injection 12.5 mg  12.5 mg Intravenous Q6H PRN Stefano Sherwood MD        electrolyte-S (ISOLYTE)   Intravenous Continuous Stefano Sherwood MD        fentaNYL 50 mcg/mL injection 25 mcg  25 mcg Intravenous Q5 Min PRN Stefano Sherwood MD        HYDROmorphone (PF) injection 0.2 mg  0.2 mg Intravenous Q5 Min PRN Stefano Sherwood MD        lactated ringers infusion   Intravenous Continuous Stefano Sherwood MD 10 mL/hr at 02/27/24 0824 New Bag at 02/27/24 0824    LIDOcaine (PF) 10 mg/ml (1%) injection 10 mg  1 mL Intradermal Once Stefano Sherwood MD        ondansetron injection 4 mg  4 mg Intravenous Daily PRN Stefano Sherwood MD        oxyCODONE immediate release tablet 5 mg  5 mg Oral Q3H PRN Stefano Sherwood MD   5 mg at 02/27/24 1030        Physical Exam:   BP (!) 151/74   Pulse 88   Temp 97.6 °F (36.4 °C) (Temporal)   Ht 5' 10" (1.778 m)   Wt 99.6 kg (219 lb 9.3 oz)   SpO2 98%   BMI 31.51 kg/m²                Physical Exam  Constitutional:       Appearance: Normal appearance.   HENT:      Head: Normocephalic and atraumatic.      Mouth/Throat:      Mouth: Mucous membranes are moist.   Eyes:      " Extraocular Movements: Extraocular movements intact.      Pupils: Pupils are equal, round, and reactive to light.   Cardiovascular:      Rate and Rhythm: Normal rate. Rhythm irregular.      Pulses: Normal pulses.      Heart sounds: No murmur heard.  Pulmonary:      Effort: Pulmonary effort is normal. No respiratory distress.      Breath sounds: Normal breath sounds.   Abdominal:      General: Abdomen is flat. There is no distension.      Palpations: Abdomen is soft.      Tenderness: There is no abdominal tenderness.   Musculoskeletal:         General: No swelling or tenderness. Normal range of motion.      Cervical back: Normal range of motion. No rigidity.   Skin:     General: Skin is warm and dry.      Coloration: Skin is not jaundiced.      Comments: Erythematous dry skin over scap    Neurological:      General: No focal deficit present.      Mental Status: He is alert and oriented to person, place, and time.   Psychiatric:         Mood and Affect: Mood normal.         Behavior: Behavior normal.         Labs:   Lab Results   Component Value Date    WBC 3.81 (L) 09/19/2024    HGB 13.8 (L) 09/19/2024    HCT 39.9 (L) 09/19/2024    MCV 93 09/19/2024     (L) 09/19/2024       Lab Results   Component Value Date     09/19/2024    K 4.1 09/19/2024     09/19/2024    CO2 25 09/19/2024    BUN 8 09/19/2024    CREATININE 0.7 09/19/2024    ALBUMIN 3.6 09/19/2024    BILITOT 1.4 (H) 09/19/2024    ALKPHOS 91 09/19/2024    AST 35 09/19/2024    ALT 22 09/19/2024       Imaging:   US Lower Extrem Arteries Right with JAMMIE (xpd)  Narrative: EXAMINATION:  US ARTERIAL LOWER EXTREMITY RIGHT WITH JAMMIE (XPD)    CLINICAL HISTORY:  Long term (current) use of anticoagulants    TECHNIQUE:  Ultrasound interrogation of the right lower extremity arterial system was performed utilizing grayscale, color-flow, and Doppler imaging.  Ankle brachial indices were also measured in both lower  extremities.    COMPARISON:  None    FINDINGS:  The right lower extremity ankle brachial index measures 0.97.  The left lower extremity ankle brachial index measures 1.1.    There is atherosclerotic calcification present within the right common femoral, superficial femoral, popliteal, and anterior tibial arteries.  No focal areas of > 50% hemodynamically significant stenosis observed in the right common femoral, femoral, popliteal, anterior tibial or posterior tibial arteries.  No vessel occlusion.  No pseudoaneurysm formation observed in the vicinity of the right common femoral artery.  There is a 26 x 9 x 25 mm hypoechoic soft tissue hematoma with low level internal echoes present superficial to the right common femoral artery, likely related to the patient's recent procedure/vascular access.  Impression: 1. 26 x 9 x 25 mm postprocedural hematoma anterior/superficial to the right common femoral artery.  No pseudoaneurysm formation observed.  2. Normal ankle brachial indices in both lower extremities.  No focal areas of > 50% hemodynamically significant stenosis or vessel occlusion involving the right common femoral, superficial femoral, popliteal, anterior tibial, or posterior tibial arteries.    Electronically signed by: Dionicio Lazar MD  Date:    09/12/2024  Time:    14:53  IR Embolization for Tumor_Organ Ischemia  Narrative: EXAMINATION:  Hepatic radioembolization - Radioisotope administration    Procedural Personnel    Attending physician(s): Nova Madison MD    Fellow physician(s): None    Resident physician(s): None    Advanced practice provider(s): None    Pre-procedure diagnosis: Hepatocellular carcinoma    Post-procedure diagnosis: Same    Indication: Locoregional therapy for tumor control    Additional clinical history: None    Vascular invasion: None    Complications: No immediate complications.    CLINICAL HISTORY:  As above    TECHNIQUE:  - Arterial access with ultrasound guidance    - Aortography:  None    - Visceral angiography: Celiac angiography    - Selective hepatic angiography: Proper hepatic angiography    - Superselective hepatic angiography: 2 vessels    - Radioisotope administration as described below    - Additional procedures: None    COMPARISON:  Mapping study 07/24/2024    FINDINGS:  Pre-procedure    Consent: Informed consent for the procedure was obtained and time-out was performed prior to the procedure.    Preparation: The site was prepared and draped using maximal sterile barrier technique including cutaneous antisepsis.    Antibiotic administered: No antibiotics    Anesthesia/sedation    The IR procedural team has confirmed the patient ID and re-evaluated the patient and sedation plan confirming it is suitable for the patient's condition and procedure.    Level of anesthesia/sedation: General anesthesia    Anesthesia/sedation administered by: Anesthesiology    Total intra-service sedation time (minutes): Please refer to Anesthesia documentation.    Access    Local anesthesia was administered. The vessel was sonographically evaluated and judged to be patent. Real time ultrasound was used to visualize needle entry into the vessel and a permanent image was stored. A 5 Latvian sheath was placed.    Vessel accessed: Right common femoral artery    Access technique: Micropuncture set with 21 gauge needle    Angiography    Variant anatomy: None    Vessel catheterized: Celiac artery.    DSA demonstrates non-flow-limiting dissection within the common hepatic artery.    Vessel catheterized: Right hepatic artery, anterior division    DSA demonstrates non flow limiting dissection.    Vessel catheterized: Segment 8 hepatic artery    DSA demonstrates tumoral blush.    Advanced imaging    Localized intra-operative cone-beam CT and 3D rotational angiography was performed with contrast prior to embolization. Images were transmitted to an independent workstation for 3D rendering and image review under  concurrent physician supervision.    Indication for advanced imaging: Determine vascular anatomy and/or tumor supply    Tumoral blush is noted.    Vessel catheterized: Segment 5 hepatic artery    DSA demonstrates tumoral blush.    Advanced imaging    Localized intra-operative cone-beam CT and 3D rotational angiography was performed with contrast prior to embolization. Images were transmitted to an independent workstation for 3D rendering and image review under concurrent physician supervision.    Indication for advanced imaging: Determine vascular anatomy and/or tumor supply    Tumoral blush is noted.    Radioisotope administration    Radioisotope: Yttrium-90 glass microspheres    Catheter position for administration: Segment 5 and segment 8    Administration endpoint: Complete delivery of dose    Dosimetry    Dosimetry model: MIRD    Segment V    Target liver volume (mL): 105    Target dose to volume (Gray): 600    Dose size selected (GBq): 5.5    Activity at administration (GBq): 1.48    Percentage delivery (%): 94.8    Segment VIII    Target liver volume (mL): 117    Target dose to volume (Gray): 540    Dose size selected (GBq): 5.5    Activity at administration (GBq): 1.48    Percentage delivery (%): 95.1    Lung shunt fraction (%): 10.8    Radiation dose to lungs (Gy): 7.82    Cumulative radiation dose to lungs (Gy): 7.82    Closure    Access site angiography performed: Yes    Patent vessel with appropriate access level    Arterial closure technique: Vascade    Hemostasis achieved from closure technique: Yes    Duration of manual compression (minutes): 5    Contrast    Contrast agent: Omnipaque 300    Contrast volume (mL): 50    Radiation Dose    Fluoroscopy time (minutes): 9.6    Reference air kerma (mGy): 1013    Kerma area product ( uGy-m2): 39060    Additional Details    Additional description of procedure: None    Additional findings: None    Equipment details: None    Specimens removed: None    Estimated  blood loss (mL): Less than 10    Standardized report: [id number]    Attestation    Signer name: Nova Madison MD    I attest that I was present for the entire procedure. I reviewed the stored images and agree with the report as written.  Impression: Hepatic angiography demonstrates non flow-limiting dissection of the common hepatic artery.  Successful administration of Y90 within segments 5 and 8.    Plan:    Should begin aspirin 81 mg daily.  Repeat Y90 delivery in 4 weeks.    _______________________________________________________________    Electronically signed by: Nova Madison  Date:    09/12/2024  Time:    11:59          Assessment:       1. HCC (hepatocellular carcinoma)    2. Drug rash    3. Immunotherapy encounter    4. Thrombocytopenia    5. Nutritional anemia    6. Malignant neoplasm of overlapping sites of bladder    7. Longstanding persistent atrial fibrillation    8. Compensated HCV cirrhosis    9. Heartburn           Plan:           # HCC, Stage III - Microwave ablation 9/2019, MRI Abdomen shows recurrent disease. Discussed case with IR, recommend Y90 + immunotherapy for best disease control. We discussed the results of the [place] study and superiority of Durvalumab/Tremlimumab to Sorafenib for improved overall survival. Alternative therapy with be Bevacizumab/Atezolizumab per Impower 150 but would impact timing of Y90. Would recommend either approach over Sorafenib, patient and family understanding. AFP 21 -> 18.     Patient has experienced rash over scalp and trunk since starting immunotherapy. Rash has responded to topical steroids, prednisone 50 mg x2 doses but stopped due to significant insomnia. AFP 21 -> 3.3.   - return to clinic prior to C4, delay x1 week due to timing of Y90  - repeat CT CAP prior to C4    # IO dermatitis - evaluate rash prior to C4     # Thrombocytopenia - Attributed to cirrhosis, established with hepatology. Improved on therapy >100k    # Anemia - Mild  iron deficiency, encourage iron in diet    # Non-Muscle Invasive Bladder Cancer, Ta - Follows with Dr. Bray, TURBT 2/2024. Cystoscopy 6/20/24 negative for recurrence, cancelled 9/2024. Will try to reschedule in November after next Y90 dose    # Atrial fibrillation - Coumadin -> Xarelto. HOLD anticoagulation if plts fall <50k     # Hep C Cirrhosis - Treated with SVR, patient declines liver transplant in the past. Established with hepatology for compensated cirrhosis, EGD 7/2/24  - improved liver enzymes AST//122 -> 32/22    # Heartburn - EKG shows a fib to rule out cardiac source, continue tums + antacid    Follow up: prior to C4     The above information has been reviewed with the patient and all questions have been answered to their apparent satisfaction.  They understand that they can call the clinic with any questions.    Aleyda Kirkland MD  Hematology/Oncology  [facility]      Med Onc Chart Routing      Follow up with physician . Move chinedu 10/9 -> 10/16   Follow up with DARLING    Infusion scheduling note   move C4 to 10/17   Injection scheduling note    Labs   Scheduling:  Preferred lab:  Lab interval:  Move labs 10/9 -> 10/16   Imaging CT chest abdomen pelvis   CT CAP 10/16 same day as chinedu visit   Pharmacy appointment    Other referrals              Answers submitted by the patient for this visit:  Review of Systems Questionnaire (Submitted on 6/20/2024)  appetite change : No  unexpected weight change: No  mouth sores: No  visual disturbance: Yes  cough: No  shortness of breath: No  chest pain: No  abdominal pain: No  diarrhea: No  frequency: Yes  back pain: No  rash: No  headaches: No  adenopathy: No  nervous/ anxious: No

## 2024-10-02 ENCOUNTER — PATIENT MESSAGE (OUTPATIENT)
Dept: HEMATOLOGY/ONCOLOGY | Facility: CLINIC | Age: 69
End: 2024-10-02
Payer: MEDICARE

## 2024-10-02 ENCOUNTER — PATIENT MESSAGE (OUTPATIENT)
Dept: INTERVENTIONAL RADIOLOGY/VASCULAR | Facility: CLINIC | Age: 69
End: 2024-10-02
Payer: MEDICARE

## 2024-10-04 ENCOUNTER — PATIENT MESSAGE (OUTPATIENT)
Dept: INTERVENTIONAL RADIOLOGY/VASCULAR | Facility: HOSPITAL | Age: 69
End: 2024-10-04
Payer: MEDICARE

## 2024-10-08 ENCOUNTER — DOCUMENTATION ONLY (OUTPATIENT)
Dept: PREADMISSION TESTING | Facility: HOSPITAL | Age: 69
End: 2024-10-08
Payer: MEDICARE

## 2024-10-08 NOTE — PRE-PROCEDURE INSTRUCTIONS
PRE-OP INSTRUCTIONS:  Instructed patient to have no food,milk or milk products after midnight 10/9/2024  It is ok to take AM medications with a few sips of water   Medication instructions for pm prior to and am of surgery reviewed.  Instructed patient to avoid taking vitamins,supplements,aspirin or ibuprofen the am of surgery.  Shower instructions provided    Patient denies any side effects or issues with anesthesia or sedation.

## 2024-10-10 ENCOUNTER — HOSPITAL ENCOUNTER (OUTPATIENT)
Dept: INTERVENTIONAL RADIOLOGY/VASCULAR | Facility: HOSPITAL | Age: 69
Discharge: HOME | End: 2024-10-10
Attending: RADIOLOGY | Admitting: RADIOLOGY
Payer: MEDICARE

## 2024-10-10 ENCOUNTER — ANESTHESIA (OUTPATIENT)
Dept: INTERVENTIONAL RADIOLOGY/VASCULAR | Facility: HOSPITAL | Age: 69
End: 2024-10-10
Payer: MEDICARE

## 2024-10-10 ENCOUNTER — HOSPITAL ENCOUNTER (OUTPATIENT)
Dept: RADIOLOGY | Facility: HOSPITAL | Age: 69
Discharge: HOME | End: 2024-10-10
Attending: RADIOLOGY
Payer: MEDICARE

## 2024-10-10 VITALS
BODY MASS INDEX: 31.21 KG/M2 | SYSTOLIC BLOOD PRESSURE: 170 MMHG | RESPIRATION RATE: 22 BRPM | OXYGEN SATURATION: 98 % | WEIGHT: 218 LBS | TEMPERATURE: 98 F | DIASTOLIC BLOOD PRESSURE: 94 MMHG | HEART RATE: 77 BPM | HEIGHT: 70 IN

## 2024-10-10 DIAGNOSIS — C22.0 HCC (HEPATOCELLULAR CARCINOMA): ICD-10-CM

## 2024-10-10 PROCEDURE — C1769 GUIDE WIRE: HCPCS

## 2024-10-10 PROCEDURE — C1894 INTRO/SHEATH, NON-LASER: HCPCS

## 2024-10-10 PROCEDURE — 79445 NUCLEAR RX INTRA-ARTERIAL: CPT | Mod: TC | Performed by: RADIOLOGY

## 2024-10-10 PROCEDURE — 36248 INS CATH ABD/L-EXT ART ADDL: CPT | Mod: RT | Performed by: RADIOLOGY

## 2024-10-10 PROCEDURE — 76377 3D RENDER W/INTRP POSTPROCES: CPT | Mod: 26,,, | Performed by: RADIOLOGY

## 2024-10-10 PROCEDURE — 75774 ARTERY X-RAY EACH VESSEL: CPT | Mod: TC,59 | Performed by: RADIOLOGY

## 2024-10-10 PROCEDURE — C1757 CATH, THROMBECTOMY/EMBOLECT: HCPCS

## 2024-10-10 PROCEDURE — 76377 3D RENDER W/INTRP POSTPROCES: CPT | Mod: TC | Performed by: RADIOLOGY

## 2024-10-10 PROCEDURE — 36247 INS CATH ABD/L-EXT ART 3RD: CPT | Mod: 51,,, | Performed by: RADIOLOGY

## 2024-10-10 PROCEDURE — 78830 RP LOCLZJ TUM SPECT W/CT 1: CPT | Mod: TC,59

## 2024-10-10 PROCEDURE — 78830 RP LOCLZJ TUM SPECT W/CT 1: CPT | Mod: 26,,, | Performed by: STUDENT IN AN ORGANIZED HEALTH CARE EDUCATION/TRAINING PROGRAM

## 2024-10-10 PROCEDURE — 37000009 HC ANESTHESIA EA ADD 15 MINS

## 2024-10-10 PROCEDURE — 76380 CAT SCAN FOLLOW-UP STUDY: CPT | Mod: 26,59,, | Performed by: RADIOLOGY

## 2024-10-10 PROCEDURE — 76937 US GUIDE VASCULAR ACCESS: CPT | Mod: 26,,, | Performed by: RADIOLOGY

## 2024-10-10 PROCEDURE — G0269 OCCLUSIVE DEVICE IN VEIN ART: HCPCS | Performed by: RADIOLOGY

## 2024-10-10 PROCEDURE — 75726 ARTERY X-RAYS ABDOMEN: CPT | Mod: 26,59,, | Performed by: RADIOLOGY

## 2024-10-10 PROCEDURE — 37000008 HC ANESTHESIA 1ST 15 MINUTES

## 2024-10-10 PROCEDURE — C2616 BRACHYTX, NON-STR,YTTRIUM-90: HCPCS

## 2024-10-10 PROCEDURE — 77263 THER RADIOLOGY TX PLNG CPLX: CPT | Mod: ,,, | Performed by: RADIOLOGY

## 2024-10-10 PROCEDURE — 79445 NUCLEAR RX INTRA-ARTERIAL: CPT | Mod: 26,,, | Performed by: RADIOLOGY

## 2024-10-10 PROCEDURE — 37243 VASC EMBOLIZE/OCCLUDE ORGAN: CPT | Performed by: RADIOLOGY

## 2024-10-10 PROCEDURE — 78201 LIVER IMAGING STATIC ONLY: CPT | Mod: TC

## 2024-10-10 PROCEDURE — 25500020 PHARM REV CODE 255: Performed by: RADIOLOGY

## 2024-10-10 PROCEDURE — 76380 CAT SCAN FOLLOW-UP STUDY: CPT | Mod: TC | Performed by: RADIOLOGY

## 2024-10-10 PROCEDURE — 63600175 PHARM REV CODE 636 W HCPCS: Performed by: NURSE ANESTHETIST, CERTIFIED REGISTERED

## 2024-10-10 PROCEDURE — 25000003 PHARM REV CODE 250: Performed by: NURSE ANESTHETIST, CERTIFIED REGISTERED

## 2024-10-10 PROCEDURE — 77300 RADIATION THERAPY DOSE PLAN: CPT | Mod: TC,59 | Performed by: RADIOLOGY

## 2024-10-10 PROCEDURE — 75726 ARTERY X-RAYS ABDOMEN: CPT | Mod: TC,59 | Performed by: RADIOLOGY

## 2024-10-10 PROCEDURE — 36248 INS CATH ABD/L-EXT ART ADDL: CPT | Mod: RT,,, | Performed by: RADIOLOGY

## 2024-10-10 PROCEDURE — 75774 ARTERY X-RAY EACH VESSEL: CPT | Mod: 26,59,, | Performed by: RADIOLOGY

## 2024-10-10 PROCEDURE — 78201 LIVER IMAGING STATIC ONLY: CPT | Mod: 26,59,, | Performed by: STUDENT IN AN ORGANIZED HEALTH CARE EDUCATION/TRAINING PROGRAM

## 2024-10-10 PROCEDURE — 76937 US GUIDE VASCULAR ACCESS: CPT | Mod: TC | Performed by: RADIOLOGY

## 2024-10-10 PROCEDURE — 36247 INS CATH ABD/L-EXT ART 3RD: CPT | Mod: RT | Performed by: RADIOLOGY

## 2024-10-10 PROCEDURE — 77300 RADIATION THERAPY DOSE PLAN: CPT | Mod: 26,59,, | Performed by: RADIOLOGY

## 2024-10-10 RX ORDER — FENTANYL CITRATE 50 UG/ML
INJECTION, SOLUTION INTRAMUSCULAR; INTRAVENOUS
Status: DISCONTINUED | OUTPATIENT
Start: 2024-10-10 | End: 2024-10-10

## 2024-10-10 RX ORDER — GLUCAGON 1 MG
1 KIT INJECTION
Status: DISCONTINUED | OUTPATIENT
Start: 2024-10-10 | End: 2024-10-11 | Stop reason: HOSPADM

## 2024-10-10 RX ORDER — LIDOCAINE HYDROCHLORIDE 20 MG/ML
INJECTION INTRAVENOUS
Status: DISCONTINUED | OUTPATIENT
Start: 2024-10-10 | End: 2024-10-10

## 2024-10-10 RX ORDER — ONDANSETRON HYDROCHLORIDE 2 MG/ML
INJECTION, SOLUTION INTRAVENOUS
Status: DISCONTINUED | OUTPATIENT
Start: 2024-10-10 | End: 2024-10-10

## 2024-10-10 RX ORDER — DEXAMETHASONE SODIUM PHOSPHATE 4 MG/ML
INJECTION, SOLUTION INTRA-ARTICULAR; INTRALESIONAL; INTRAMUSCULAR; INTRAVENOUS; SOFT TISSUE
Status: DISCONTINUED | OUTPATIENT
Start: 2024-10-10 | End: 2024-10-10

## 2024-10-10 RX ORDER — OXYCODONE HYDROCHLORIDE 5 MG/1
5 TABLET ORAL
Status: DISCONTINUED | OUTPATIENT
Start: 2024-10-10 | End: 2024-10-11 | Stop reason: HOSPADM

## 2024-10-10 RX ORDER — SODIUM CHLORIDE 9 MG/ML
INJECTION, SOLUTION INTRAVENOUS CONTINUOUS
Status: DISCONTINUED | OUTPATIENT
Start: 2024-10-10 | End: 2024-10-11 | Stop reason: HOSPADM

## 2024-10-10 RX ORDER — HYDROMORPHONE HYDROCHLORIDE 1 MG/ML
0.2 INJECTION, SOLUTION INTRAMUSCULAR; INTRAVENOUS; SUBCUTANEOUS EVERY 5 MIN PRN
Status: DISCONTINUED | OUTPATIENT
Start: 2024-10-10 | End: 2024-10-11 | Stop reason: HOSPADM

## 2024-10-10 RX ORDER — PHENYLEPHRINE HYDROCHLORIDE 10 MG/ML
INJECTION INTRAVENOUS
Status: DISCONTINUED | OUTPATIENT
Start: 2024-10-10 | End: 2024-10-10

## 2024-10-10 RX ORDER — LIDOCAINE HYDROCHLORIDE 10 MG/ML
1 INJECTION, SOLUTION EPIDURAL; INFILTRATION; INTRACAUDAL; PERINEURAL ONCE
Status: DISCONTINUED | OUTPATIENT
Start: 2024-10-10 | End: 2024-10-11 | Stop reason: HOSPADM

## 2024-10-10 RX ORDER — DEXMEDETOMIDINE HYDROCHLORIDE 100 UG/ML
INJECTION, SOLUTION INTRAVENOUS
Status: DISCONTINUED | OUTPATIENT
Start: 2024-10-10 | End: 2024-10-10

## 2024-10-10 RX ORDER — PROPOFOL 10 MG/ML
VIAL (ML) INTRAVENOUS
Status: DISCONTINUED | OUTPATIENT
Start: 2024-10-10 | End: 2024-10-10

## 2024-10-10 RX ORDER — FENTANYL CITRATE 50 UG/ML
25 INJECTION, SOLUTION INTRAMUSCULAR; INTRAVENOUS EVERY 5 MIN PRN
Status: DISCONTINUED | OUTPATIENT
Start: 2024-10-10 | End: 2024-10-11 | Stop reason: HOSPADM

## 2024-10-10 RX ORDER — HALOPERIDOL 5 MG/ML
0.5 INJECTION INTRAMUSCULAR EVERY 10 MIN PRN
Status: DISCONTINUED | OUTPATIENT
Start: 2024-10-10 | End: 2024-10-11 | Stop reason: HOSPADM

## 2024-10-10 RX ORDER — ROCURONIUM BROMIDE 10 MG/ML
INJECTION, SOLUTION INTRAVENOUS
Status: DISCONTINUED | OUTPATIENT
Start: 2024-10-10 | End: 2024-10-10

## 2024-10-10 RX ADMIN — PHENYLEPHRINE HYDROCHLORIDE 0.4 MCG/KG/MIN: 10 INJECTION INTRAVENOUS at 11:10

## 2024-10-10 RX ADMIN — DEXMEDETOMIDINE 8 MCG: 200 INJECTION, SOLUTION INTRAVENOUS at 10:10

## 2024-10-10 RX ADMIN — IOHEXOL 70 ML: 300 INJECTION, SOLUTION INTRAVENOUS at 12:10

## 2024-10-10 RX ADMIN — PROPOFOL 100 MG: 10 INJECTION, EMULSION INTRAVENOUS at 12:10

## 2024-10-10 RX ADMIN — ROCURONIUM BROMIDE 20 MG: 10 INJECTION, SOLUTION INTRAVENOUS at 11:10

## 2024-10-10 RX ADMIN — LIDOCAINE HYDROCHLORIDE 100 MG: 20 INJECTION INTRAVENOUS at 10:10

## 2024-10-10 RX ADMIN — FENTANYL CITRATE 100 MCG: 50 INJECTION, SOLUTION INTRAMUSCULAR; INTRAVENOUS at 10:10

## 2024-10-10 RX ADMIN — DEXMEDETOMIDINE 4 MCG: 200 INJECTION, SOLUTION INTRAVENOUS at 11:10

## 2024-10-10 RX ADMIN — DEXMEDETOMIDINE 4 MCG: 200 INJECTION, SOLUTION INTRAVENOUS at 12:10

## 2024-10-10 RX ADMIN — PHENYLEPHRINE HYDROCHLORIDE 100 MCG: 10 INJECTION INTRAVENOUS at 11:10

## 2024-10-10 RX ADMIN — ROCURONIUM BROMIDE 50 MG: 10 INJECTION, SOLUTION INTRAVENOUS at 10:10

## 2024-10-10 RX ADMIN — PHENYLEPHRINE HYDROCHLORIDE 100 MCG: 10 INJECTION INTRAVENOUS at 01:10

## 2024-10-10 RX ADMIN — SUGAMMADEX 200 MG: 100 INJECTION, SOLUTION INTRAVENOUS at 01:10

## 2024-10-10 RX ADMIN — DEXMEDETOMIDINE 4 MCG: 200 INJECTION, SOLUTION INTRAVENOUS at 01:10

## 2024-10-10 RX ADMIN — DEXAMETHASONE SODIUM PHOSPHATE 20 MG: 4 INJECTION, SOLUTION INTRAMUSCULAR; INTRAVENOUS at 11:10

## 2024-10-10 RX ADMIN — SODIUM CHLORIDE: 0.9 INJECTION, SOLUTION INTRAVENOUS at 10:10

## 2024-10-10 RX ADMIN — PHENYLEPHRINE HYDROCHLORIDE 100 MCG: 10 INJECTION INTRAVENOUS at 10:10

## 2024-10-10 RX ADMIN — PROPOFOL 150 MG: 10 INJECTION, EMULSION INTRAVENOUS at 10:10

## 2024-10-10 RX ADMIN — ONDANSETRON 4 MG: 2 INJECTION INTRAMUSCULAR; INTRAVENOUS at 01:10

## 2024-10-10 NOTE — DISCHARGE INSTRUCTIONS
Post-Yttrium (Y90) instructions:    Dont drive for 3 days.  Avoid walking, bending, and taking stairs for 24 hours.  No heavy lifting (gallon of milk) or strenuous exercise for 10 days.  Keep small children, pregnant women, and pets 3 feet away for 3 days.  Keep your dressing clean and dry for 24 hours. Do not submerge insertion site in water (bath tub, swimming pool) until fully healed.  Be sure to follow any other instructions from your doctor.      Call your doctor if you have any of the following:    Fever of 100.4 (38C) or higher lasting for 24 to 48 hours  Bleeding, swelling, or a large lump at the insertion site  Sharp or increasing pain at the insertion site  Leg pain, numbness, or a cold leg or foot  Any other symptoms your provider instructed you to report based on your medical condition.    Contact information:    For immediate concerns that are not emergent, you may call our interventional radiology clinic at [phone].    ** After hours and weekends: Call the paging  at [phone] and ask for the Radiology Physician on call**

## 2024-10-10 NOTE — ANESTHESIA PREPROCEDURE EVALUATION
10/10/2024  Hudson Tavares is a 69 y.o., male w pmhx of cad s/p 4v cabg in 2016, afib, hep c and HCC s/p IR ablation x 1     Easy mask, easy intubation    Labs ordered for dop, last labe 09/24 wnl      Pre-op Assessment    I have reviewed the Patient Summary Reports.     I have reviewed the Nursing Notes. I have reviewed the NPO Status.   I have reviewed the Medications.     Review of Systems  Anesthesia Hx:  No problems with previous Anesthesia   History of prior surgery of interest to airway management or planning:          Denies Family Hx of Anesthesia complications.    Denies Personal Hx of Anesthesia complications.                    Social:  Alcohol Use, Smoker       Hematology/Oncology:                      Current/Recent Cancer.                EENT/Dental:  EENT/Dental Normal           Cardiovascular:     Hypertension   CAD   CABG/stent Dysrhythmias atrial fibrillation  CHF                                 Pulmonary:  Pulmonary Normal                       Hepatic/GI:    Hiatal Hernia,  Liver Disease, Hepatitis, C           Musculoskeletal:  Arthritis               Neurological:  Neurology Normal                                          Physical Exam  General: Well nourished and Cooperative    Airway:  Mallampati: II   Mouth Opening: Normal  TM Distance: Normal  Tongue: Normal    Dental:  Loose teeth, Periodontal disease    Loose bottom teeth x 2, discussed possibility of dental injury    Anesthesia Plan  Type of Anesthesia, risks & benefits discussed:    Anesthesia Type: Gen ETT  Intra-op Monitoring Plan: Standard ASA Monitors  Induction:  IV  Informed Consent: Informed consent signed with the Patient and all parties understand the risks and agree with anesthesia plan.  All questions answered.   ASA Score: 3  Day of Surgery Review of History & Physical: H&P Update referred to the  surgeon/provider.    Ready For Surgery From Anesthesia Perspective.     .

## 2024-10-10 NOTE — PLAN OF CARE
Patient arrived to room. PIV placed. Admit assessment completed. Plan of care discussed with patient. Son at bedside. Nurse call bell within reach. Will monitor

## 2024-10-10 NOTE — PLAN OF CARE
Pt arrived to IR for y90 with anesthesia. CRNA at bedside. Pt oriented to unit and staff. Plan of care reviewed with patient, patient verbalizes understanding. Comfort measures utilized. Pt safely transferred from stretcher to procedural table. Fall risk reviewed with patient, fall risk interventions maintained. Safety strap applied, positioner pillows utilized to minimize pressure points. Blankets applied. Pt prepped and draped utilizing standard sterile technique. Patient placed on continuous monitoring, as required by sedation policy. Timeouts completed utilizing standard universal time-out, per department and facility policy. RN to remain at bedside, continuous monitoring maintained per CRNA. Pt resting comfortably. Denies pain/discomfort. Will continue to monitor. See flow sheets for monitoring, medication administration, and updates.

## 2024-10-10 NOTE — H&P
Radiology History & Physical      SUBJECTIVE:     Chief Complaint: HCC    History of Present Illness:  Hudson Tavares is a 69 y.o. male with HCC who presents for y90 delivery.   Past Medical History:   Diagnosis Date    Adenomatous colon polyp     Alcohol abuse 07/09/2015    Anticoagulant long-term use     coumadin - discontinued    Atrial fibrillation 07/09/2015    CAD (coronary artery disease), native coronary artery 05/03/2016    Degenerative joint disease 07/09/2015    Encounter for blood transfusion     Enlarged liver     Essential hypertension 07/09/2015    Hep C w/o coma, chronic     treated with Harvoni    Hiatal hernia     patient states hernia has resolved    Hx of bladder infections     finished cipro 4/29/16    S/P CABG (coronary artery bypass graft) 05/17/2016    LIMA to LAD, SVG to OM, SVG to diagonal, SVG to right PDA, Maze, left atrial appendage resection (5/16)    Spleen enlarged     Thrombocytopenia 05/05/2016    Tobacco abuse 07/09/2015     Past Surgical History:   Procedure Laterality Date    CARDIAC SURGERY  05/04/2016    4 vessel CABG    CERVICAL FUSION  2004    COLONOSCOPY  12/03/2018    Steve Wells    CYSTOSCOPY W/ RETROGRADES N/A 2/27/2024    Procedure: CYSTOSCOPY, WITH RETROGRADE PYELOGRAM;  Surgeon: STORMY Bray MD;  Location: [facility] OR;  Service: Urology;  Laterality: N/A;    CYSTOSCOPY WITH BIOPSY OF BLADDER N/A 2/27/2024    Procedure: CYSTOSCOPY, WITH BLADDER BIOPSY, WITH FULGURATION IF INDICATED;  Surgeon: STORMY Bray MD;  Location: [facility] OR;  Service: Urology;  Laterality: N/A;    CYSTOURETHROSCOPY, WITH THROMBUS REMOVAL N/A 2/27/2024    Procedure: CYSTOURETHROSCOPY, WITH THROMBUS REMOVAL;  Surgeon: STORMY Bray MD;  Location: [facility] OR;  Service: Urology;  Laterality: N/A;    JOINT REPLACEMENT  TOTAL RIGHT KNEE     OPEN ANTERIOR SHOULDER RECONSTRUCTION Left     RADIOFREQUENCY ABLATION N/A 9/24/2019    Procedure: Radiofrequency Ablation;  Surgeon: Anayeli Seymour;   "Location: [facility];  Service: Anesthesiology;  Laterality: N/A;  Room 173/[place]    TIBIA FRACTURE SURGERY Right 1971       Home Meds:   Prior to Admission medications    Medication Sig Start Date End Date Taking? Authorizing Provider   famotidine (PEPCID) 20 MG tablet Take 20 mg by mouth every morning.   Yes Provider, Historical   losartan (COZAAR) 50 MG tablet Take 50 mg by mouth every evening. 4/29/24  Yes Provider, Historical   metoprolol succinate (TOPROL-XL) 50 MG 24 hr tablet Take 1 tablet (50 mg total) by mouth once daily.  Patient taking differently: Take 50 mg by mouth every morning. 11/16/21  Yes Jorge Keith MD   rivaroxaban (XARELTO) 20 mg Tab Take 1 tablet (20 mg total) by mouth daily with dinner or evening meal. 7/31/24  Yes Aleyda Kirkland MD   spironolactone (ALDACTONE) 25 MG tablet Take 25 mg by mouth as needed.   Yes Provider, Historical   triamcinolone acetonide 0.1% (KENALOG) 0.1 % cream Apply topically 2 (two) times daily. 7/31/24  Yes Aleyda Kirkland MD   ALPRAZolam (XANAX) 0.5 MG tablet Take 0.5 mg by mouth every evening. 9/28/23   Provider, Historical     Anticoagulants/Antiplatelets:  reviewed    Allergies:   Review of patient's allergies indicates:   Allergen Reactions    Cilostazol Other (See Comments)     Colon bleeding    Stadol [butorphanol tartrate] Hallucinations     "I saw pink elephants"    Keflex [cephalexin] Other (See Comments)     Was using 500mg TID oral and had a strong metallic taste in his mouth and could not function.  Can use IV cephalosporin.    Sulfa (sulfonamide antibiotics) Hives, Nausea And Vomiting and Other (See Comments)     Since childhood; does not know reaction      Finasteride Other (See Comments)     Sedation History:  no adverse reactions    Review of Systems:   Hematological: no known coagulopathies  Respiratory: no shortness of breath  Cardiovascular: no chest pain  Gastrointestinal: no abdominal pain  Genito-Urinary: no dysuria  Musculoskeletal: " negative  Neurological: no TIA or stroke symptoms         OBJECTIVE:     Vital Signs (Most Recent)  Temp: 97.7 °F (36.5 °C) (10/10/24 0811)  Pulse: 84 (10/10/24 0811)  Resp: 20 (10/10/24 0811)  BP: (!) 147/70 (10/10/24 0814)  SpO2: 97 % (10/10/24 0811)    Physical Exam:  ASA: per anes    Mallampati: per anes      General: no acute distress  Mental Status: alert and oriented to person, place and time  HEENT: normocephalic, atraumatic  Chest: unlabored breathing  Heart: regular heart rate  Abdomen: nondistended  Extremity: moves all extremities    Laboratory  Lab Results   Component Value Date    INR 1.2 10/10/2024       Lab Results   Component Value Date    WBC 4.79 10/10/2024    HGB 13.7 (L) 10/10/2024    HCT 39.7 (L) 10/10/2024    MCV 94 10/10/2024    PLT 93 (L) 10/10/2024      Lab Results   Component Value Date     (H) 10/10/2024     10/10/2024    K 4.2 10/10/2024     10/10/2024    CO2 24 10/10/2024    BUN 10 10/10/2024    CREATININE 0.7 10/10/2024    CALCIUM 9.5 10/10/2024    MG 1.9 05/21/2023    ALT 18 10/10/2024    AST 29 10/10/2024    ALBUMIN 3.6 10/10/2024    BILITOT 1.6 (H) 10/10/2024    BILIDIR 0.6 (H) 10/10/2024       ASSESSMENT/PLAN:     Sedation Plan: per anes  Patient will undergo y90 delivery.    Alexa Goss MD PGY-2  Department of Radiology  [facility]

## 2024-10-10 NOTE — Clinical Note
Bilateral: Groin.   Scrubbed with ChloroPrep With Tint.    Hair: N/A.  Skin prep dry before draping.  Prepped by: Eden Kaur RT 10/10/2024 10:55 AM.

## 2024-10-10 NOTE — PLAN OF CARE
Y90 completed. Patient tolerated well. No s/s of complications noted. Vascade closure device deployed in right femoral artery; hemostasis achieved at 1225. Patient to lay flat for 2 hours until 1425 time on 10/1024 date per MD. Right groin site clean, dry, and intact; no bleeding or hematoma noted. Patient to be transferred to nuclear medicine for post-procedural scan then to recovery, accompanied by CRNA and RN, per MD. Report to be given at bedside to RN.

## 2024-10-10 NOTE — ANESTHESIA PROCEDURE NOTES
Intubation    Date/Time: 10/10/2024 10:48 AM    Performed by: Saurav West CRNA  Authorized by: Jadyn Culp MD    Intubation:     Induction:  Intravenous    Intubated:  Postinduction    Mask Ventilation:  Easy mask    Attempts:  1    Attempted By:  CRNA    Method of Intubation:  Video laryngoscopy    Blade:  Arguello 3    Laryngeal View Grade: Grade I - full view of cords      Difficult Airway Encountered?: No      Complications:  None    Airway Device:  Oral endotracheal tube    Airway Device Size:  7.0    Style/Cuff Inflation:  Cuffed (inflated to minimal occlusive pressure)    Placement Verified By:  Capnometry    Complicating Factors:  None    Findings Post-Intubation:  BS equal bilateral and atraumatic/condition of teeth unchanged

## 2024-10-10 NOTE — TRANSFER OF CARE
"Anesthesia Transfer of Care Note    Patient: Hudson Tavares    Procedure(s) Performed: * No procedures listed *    Patient location: [facility]    Anesthesia Type: general    Transport from OR: Transported from OR on 6-10 L/min O2 by face mask with adequate spontaneous ventilation    Post pain: adequate analgesia    Post assessment: no apparent anesthetic complications and tolerated procedure well    Post vital signs: stable    Level of consciousness: awake, alert and oriented    Nausea/Vomiting: no nausea/vomiting    Complications: none    Transfer of care protocol was followed      Last vitals: Visit Vitals  BP (!) 147/70 (BP Location: Right arm, Patient Position: Lying)   Pulse 84   Temp 36.5 °C (97.7 °F) (Temporal)   Resp 20   Ht 5' 10" (1.778 m)   Wt 98.9 kg (218 lb)   SpO2 97%   BMI 31.28 kg/m²     "

## 2024-10-11 NOTE — ANESTHESIA POSTPROCEDURE EVALUATION
Anesthesia Post Evaluation    Patient: Hudson Tavares    Procedure(s) Performed: * No procedures listed *    Final Anesthesia Type: general      Patient location during evaluation: PACU  Patient participation: Yes- Able to Participate  Level of consciousness: awake and alert and oriented  Post-procedure vital signs: reviewed and stable  Pain management: adequate  Airway patency: patent    PONV status at discharge: No PONV  Anesthetic complications: no      Cardiovascular status: blood pressure returned to baseline  Respiratory status: unassisted and spontaneous ventilation  Hydration status: euvolemic  Follow-up not needed.              Vitals Value Taken Time   /94 10/10/24 1447   Temp 36.7 °C (98 °F) 10/10/24 1445   Pulse 77 10/10/24 1500   Resp 22 10/10/24 1500   SpO2 98 % 10/10/24 1500         No case tracking events are documented in the log.      Pain/Danis Score: Danis Score: 10 (10/10/2024  2:00 PM)

## 2024-10-14 ENCOUNTER — TELEPHONE (OUTPATIENT)
Dept: INTERVENTIONAL RADIOLOGY/VASCULAR | Facility: CLINIC | Age: 69
End: 2024-10-14
Payer: MEDICARE

## 2024-10-14 DIAGNOSIS — C22.0 HCC (HEPATOCELLULAR CARCINOMA): Primary | ICD-10-CM

## 2024-10-16 ENCOUNTER — DOCUMENTATION ONLY (OUTPATIENT)
Dept: HEMATOLOGY/ONCOLOGY | Facility: CLINIC | Age: 69
End: 2024-10-16
Payer: MEDICARE

## 2024-10-16 ENCOUNTER — INFUSION (OUTPATIENT)
Dept: INFUSION THERAPY | Facility: HOSPITAL | Age: 69
End: 2024-10-16
Attending: STUDENT IN AN ORGANIZED HEALTH CARE EDUCATION/TRAINING PROGRAM
Payer: MEDICARE

## 2024-10-16 ENCOUNTER — DOCUMENTATION ONLY (OUTPATIENT)
Dept: INFUSION THERAPY | Facility: HOSPITAL | Age: 69
End: 2024-10-16
Payer: MEDICARE

## 2024-10-16 ENCOUNTER — OFFICE VISIT (OUTPATIENT)
Dept: HEMATOLOGY/ONCOLOGY | Facility: CLINIC | Age: 69
End: 2024-10-16
Payer: MEDICARE

## 2024-10-16 VITALS
WEIGHT: 220.88 LBS | SYSTOLIC BLOOD PRESSURE: 146 MMHG | BODY MASS INDEX: 31.62 KG/M2 | RESPIRATION RATE: 18 BRPM | HEART RATE: 82 BPM | TEMPERATURE: 98 F | DIASTOLIC BLOOD PRESSURE: 74 MMHG | HEIGHT: 70 IN | OXYGEN SATURATION: 97 %

## 2024-10-16 VITALS
HEIGHT: 70 IN | DIASTOLIC BLOOD PRESSURE: 87 MMHG | RESPIRATION RATE: 18 BRPM | SYSTOLIC BLOOD PRESSURE: 152 MMHG | TEMPERATURE: 98 F | BODY MASS INDEX: 31.62 KG/M2 | HEART RATE: 91 BPM | OXYGEN SATURATION: 97 % | WEIGHT: 220.88 LBS

## 2024-10-16 DIAGNOSIS — C22.0 HEPATOCELLULAR CARCINOMA: Primary | ICD-10-CM

## 2024-10-16 DIAGNOSIS — Z29.89 IMMUNOTHERAPY ENCOUNTER: ICD-10-CM

## 2024-10-16 DIAGNOSIS — D69.6 THROMBOCYTOPENIA: ICD-10-CM

## 2024-10-16 DIAGNOSIS — C67.8 MALIGNANT NEOPLASM OF OVERLAPPING SITES OF BLADDER: ICD-10-CM

## 2024-10-16 DIAGNOSIS — L27.0 DRUG RASH: ICD-10-CM

## 2024-10-16 DIAGNOSIS — R12 HEARTBURN: ICD-10-CM

## 2024-10-16 DIAGNOSIS — K74.69 COMPENSATED HCV CIRRHOSIS: ICD-10-CM

## 2024-10-16 DIAGNOSIS — I48.11 LONGSTANDING PERSISTENT ATRIAL FIBRILLATION: ICD-10-CM

## 2024-10-16 DIAGNOSIS — B19.20 COMPENSATED HCV CIRRHOSIS: ICD-10-CM

## 2024-10-16 DIAGNOSIS — D53.9 NUTRITIONAL ANEMIA: ICD-10-CM

## 2024-10-16 DIAGNOSIS — C22.0 HCC (HEPATOCELLULAR CARCINOMA): Primary | ICD-10-CM

## 2024-10-16 PROCEDURE — 3077F SYST BP >= 140 MM HG: CPT | Mod: CPTII,S$GLB,, | Performed by: STUDENT IN AN ORGANIZED HEALTH CARE EDUCATION/TRAINING PROGRAM

## 2024-10-16 PROCEDURE — 25000003 PHARM REV CODE 250: Mod: PN | Performed by: STUDENT IN AN ORGANIZED HEALTH CARE EDUCATION/TRAINING PROGRAM

## 2024-10-16 PROCEDURE — 4010F ACE/ARB THERAPY RXD/TAKEN: CPT | Mod: CPTII,S$GLB,, | Performed by: STUDENT IN AN ORGANIZED HEALTH CARE EDUCATION/TRAINING PROGRAM

## 2024-10-16 PROCEDURE — 1125F AMNT PAIN NOTED PAIN PRSNT: CPT | Mod: CPTII,S$GLB,, | Performed by: STUDENT IN AN ORGANIZED HEALTH CARE EDUCATION/TRAINING PROGRAM

## 2024-10-16 PROCEDURE — 3008F BODY MASS INDEX DOCD: CPT | Mod: CPTII,S$GLB,, | Performed by: STUDENT IN AN ORGANIZED HEALTH CARE EDUCATION/TRAINING PROGRAM

## 2024-10-16 PROCEDURE — 1159F MED LIST DOCD IN RCRD: CPT | Mod: CPTII,S$GLB,, | Performed by: STUDENT IN AN ORGANIZED HEALTH CARE EDUCATION/TRAINING PROGRAM

## 2024-10-16 PROCEDURE — 3288F FALL RISK ASSESSMENT DOCD: CPT | Mod: CPTII,S$GLB,, | Performed by: STUDENT IN AN ORGANIZED HEALTH CARE EDUCATION/TRAINING PROGRAM

## 2024-10-16 PROCEDURE — 96413 CHEMO IV INFUSION 1 HR: CPT | Mod: PN

## 2024-10-16 PROCEDURE — 3078F DIAST BP <80 MM HG: CPT | Mod: CPTII,S$GLB,, | Performed by: STUDENT IN AN ORGANIZED HEALTH CARE EDUCATION/TRAINING PROGRAM

## 2024-10-16 PROCEDURE — 99999 PR PBB SHADOW E&M-EST. PATIENT-LVL III: CPT | Mod: PBBFAC,,, | Performed by: STUDENT IN AN ORGANIZED HEALTH CARE EDUCATION/TRAINING PROGRAM

## 2024-10-16 PROCEDURE — G2211 COMPLEX E/M VISIT ADD ON: HCPCS | Mod: S$GLB,,, | Performed by: STUDENT IN AN ORGANIZED HEALTH CARE EDUCATION/TRAINING PROGRAM

## 2024-10-16 PROCEDURE — 63600175 PHARM REV CODE 636 W HCPCS: Mod: JZ,JG,PN | Performed by: STUDENT IN AN ORGANIZED HEALTH CARE EDUCATION/TRAINING PROGRAM

## 2024-10-16 PROCEDURE — 1101F PT FALLS ASSESS-DOCD LE1/YR: CPT | Mod: CPTII,S$GLB,, | Performed by: STUDENT IN AN ORGANIZED HEALTH CARE EDUCATION/TRAINING PROGRAM

## 2024-10-16 PROCEDURE — 99215 OFFICE O/P EST HI 40 MIN: CPT | Mod: S$GLB,,, | Performed by: STUDENT IN AN ORGANIZED HEALTH CARE EDUCATION/TRAINING PROGRAM

## 2024-10-16 RX ORDER — SODIUM CHLORIDE 0.9 % (FLUSH) 0.9 %
10 SYRINGE (ML) INJECTION
Status: DISCONTINUED | OUTPATIENT
Start: 2024-10-16 | End: 2024-10-16 | Stop reason: HOSPADM

## 2024-10-16 RX ORDER — DIPHENHYDRAMINE HYDROCHLORIDE 50 MG/ML
50 INJECTION INTRAMUSCULAR; INTRAVENOUS ONCE AS NEEDED
Status: CANCELLED | OUTPATIENT
Start: 2024-10-16

## 2024-10-16 RX ORDER — DIPHENHYDRAMINE HYDROCHLORIDE 50 MG/ML
50 INJECTION INTRAMUSCULAR; INTRAVENOUS ONCE AS NEEDED
Status: DISCONTINUED | OUTPATIENT
Start: 2024-10-16 | End: 2024-10-16 | Stop reason: HOSPADM

## 2024-10-16 RX ORDER — PROCHLORPERAZINE EDISYLATE 5 MG/ML
5 INJECTION INTRAMUSCULAR; INTRAVENOUS
Status: CANCELLED
Start: 2024-10-16

## 2024-10-16 RX ORDER — EPINEPHRINE 0.3 MG/.3ML
0.3 INJECTION SUBCUTANEOUS ONCE AS NEEDED
Status: DISCONTINUED | OUTPATIENT
Start: 2024-10-16 | End: 2024-10-16 | Stop reason: HOSPADM

## 2024-10-16 RX ORDER — SODIUM CHLORIDE 0.9 % (FLUSH) 0.9 %
10 SYRINGE (ML) INJECTION
Status: CANCELLED | OUTPATIENT
Start: 2024-10-16

## 2024-10-16 RX ORDER — HEPARIN 100 UNIT/ML
500 SYRINGE INTRAVENOUS
Status: CANCELLED | OUTPATIENT
Start: 2024-10-16

## 2024-10-16 RX ORDER — EPINEPHRINE 0.3 MG/.3ML
0.3 INJECTION SUBCUTANEOUS ONCE AS NEEDED
Status: CANCELLED | OUTPATIENT
Start: 2024-10-16

## 2024-10-16 RX ORDER — PROCHLORPERAZINE EDISYLATE 5 MG/ML
5 INJECTION INTRAMUSCULAR; INTRAVENOUS
Status: DISCONTINUED | OUTPATIENT
Start: 2024-10-16 | End: 2024-10-16 | Stop reason: HOSPADM

## 2024-10-16 RX ORDER — HEPARIN 100 UNIT/ML
500 SYRINGE INTRAVENOUS
Status: DISCONTINUED | OUTPATIENT
Start: 2024-10-16 | End: 2024-10-16 | Stop reason: HOSPADM

## 2024-10-16 RX ADMIN — SODIUM CHLORIDE 1500 MG: 9 INJECTION, SOLUTION INTRAVENOUS at 04:10

## 2024-10-16 RX ADMIN — SODIUM CHLORIDE: 9 INJECTION, SOLUTION INTRAVENOUS at 04:10

## 2024-10-16 NOTE — Clinical Note
Hi - I am taking care of Mr. Tavares for HCC. He has NMIBC, last cystoscopy 6/2024. He was supposed to be back in September, wondering if you could schedule a follow up?

## 2024-10-16 NOTE — PROGRESS NOTES
Nutrition Note  RD received referral from RAMAKRISHNA Greene to see pt. Pt reports his iron has been low and was told to eat a high iron diet. RD provided pt with a list of high iron foods Instructed him to eat vitamin C rich foods with iron foods for better absorption. Examples provided.    Wt Readings from Last 10 Encounters:   10/16/24 100.2 kg (220 lb 14.4 oz)   10/16/24 100.2 kg (220 lb 14.4 oz)   10/10/24 98.9 kg (218 lb)   09/19/24 99.6 kg (219 lb 9.3 oz)   09/11/24 101.2 kg (223 lb 1.7 oz)   09/11/24 101.2 kg (223 lb 1.7 oz)   08/30/24 99.3 kg (219 lb)   08/13/24 99.6 kg (219 lb 9.3 oz)   08/13/24 99.6 kg (219 lb 9.3 oz)   07/31/24 99.6 kg (219 lb 9.3 oz)       Karol Bonilla, MICKIEN, LDN

## 2024-10-16 NOTE — PROGRESS NOTES
[facility]     Reason for visit: Follow Up     Best Contact Phone Number(s): There are no phone numbers on file.     Oncology History   Hepatocellular carcinoma   9/24/2019 Initial Diagnosis    HCC (hepatocellular carcinoma) - treated with microwave ablation     6/26/2024 Relapse    Patient established with hematology for thrombocytopenia, HCC and cirrhosis history prompted further GI work-up    6/26/24: MRI Abdomen shows large lesions in segments 8 and 5 with washout compatible with HCC, measuring 8 x4.2 cm transverse and 10 cm caudal, prominent tumor thrombus in right portal vein, 1 cm lesion in posterior right hepatic lobe concerning for additional focus     7/3/2024 Cancer Staged    Staging form: Liver, AJCC 8th Edition  - Clinical: Stage IIIA (cT3, cN0, cM0)     7/16/2024 -  Chemotherapy    Treatment Summary   Plan Name: OP Tremelimumab + Durvalumab x1 followed by DURVALUMAB 1500 MG Q4W  Treatment Goal: Palliative  Status: Active  Start Date: 7/16/2024  End Date: 6/25/2025 (Planned)  Provider: Aleyda Kirkland MD  Chemotherapy: [No matching medication found in this treatment plan]     Malignant neoplasm of lateral wall of bladder   6/25/2024 Initial Diagnosis    Malignant neoplasm of overlapping sites of bladder     6/25/2024 Cancer Staged    Staging form: Urinary Bladder, AJCC 8th Edition  - Clinical: Stage 0a (cTa, cN0, cM0)       HPI: Hudson Tavares is a 69 y.o. male with HCC who presents prior to C4 Durvalumab. Patient had Y90 10/10/24 and has had abdominal pain and arthralgia since this time. He has noticed his thumb pops with flexion since Y90. Denies dyspnea but has rib cage tenderness since procedure. No chest pain. Pt unable to complete CT CAP today since could not take a deep breath.     Patient presents with daughter, Rubi. Has daughter Mara and granddaughter, Anselmo and son Roberto. ECOG PS is 0.  History has been obtained by chart review and discussion with the  patient.    ROS:   A complete 12-point review of systems was reviewed and is negative except as mentioned above.     Past Medical History:   Past Medical History:   Diagnosis Date    Adenomatous colon polyp     Alcohol abuse 07/09/2015    Anticoagulant long-term use     coumadin - discontinued    Atrial fibrillation 07/09/2015    CAD (coronary artery disease), native coronary artery 05/03/2016    Degenerative joint disease 07/09/2015    Encounter for blood transfusion     Enlarged liver     Essential hypertension 07/09/2015    Hep C w/o coma, chronic     treated with Harvoni    Hiatal hernia     patient states hernia has resolved    Hx of bladder infections     finished cipro 4/29/16    S/P CABG (coronary artery bypass graft) 05/17/2016    LIMA to LAD, SVG to OM, SVG to diagonal, SVG to right PDA, Maze, left atrial appendage resection (5/16)    Spleen enlarged     Thrombocytopenia 05/05/2016    Tobacco abuse 07/09/2015        Past Surgical History:   Past Surgical History:   Procedure Laterality Date    CARDIAC SURGERY  05/04/2016    4 vessel CABG    CERVICAL FUSION  2004    COLONOSCOPY  12/03/2018    Steve Wells    CYSTOSCOPY W/ RETROGRADES N/A 2/27/2024    Procedure: CYSTOSCOPY, WITH RETROGRADE PYELOGRAM;  Surgeon: STORMY Bray MD;  Location: [facility] OR;  Service: Urology;  Laterality: N/A;    CYSTOSCOPY WITH BIOPSY OF BLADDER N/A 2/27/2024    Procedure: CYSTOSCOPY, WITH BLADDER BIOPSY, WITH FULGURATION IF INDICATED;  Surgeon: STORMY Bray MD;  Location: [facility] OR;  Service: Urology;  Laterality: N/A;    CYSTOURETHROSCOPY, WITH THROMBUS REMOVAL N/A 2/27/2024    Procedure: CYSTOURETHROSCOPY, WITH THROMBUS REMOVAL;  Surgeon: STORMY Bray MD;  Location: [facility] OR;  Service: Urology;  Laterality: N/A;    JOINT REPLACEMENT  TOTAL RIGHT KNEE     OPEN ANTERIOR SHOULDER RECONSTRUCTION Left     RADIOFREQUENCY ABLATION N/A 9/24/2019    Procedure: Radiofrequency Ablation;  Surgeon: Anayeli Seymour;  Location: [facility]  "DANYEL;  Service: Anesthesiology;  Laterality: N/A;  Room 173/[facility]    TIBIA FRACTURE SURGERY Right 1971        Family History:   Family History   Problem Relation Name Age of Onset    Heart disease Mother          congestive heart failure    Diabetes Mother      Hypertension Mother      Heart disease Sister          heart murmur    Atrial fibrillation Sister      Cancer Sister          breast    Cancer Father  60        colon    Chronic back pain Brother      Atrial fibrillation Sister      Heart disease Sister      Collagen disease Neg Hx          Social History:   Social History     Tobacco Use    Smoking status: Former     Current packs/day: 0.00     Average packs/day: 0.3 packs/day for 30.0 years (7.5 ttl pk-yrs)     Types: Cigarettes     Start date: 1986     Quit date: 2016     Years since quittin.4    Smokeless tobacco: Never   Substance Use Topics    Alcohol use: No     Alcohol/week: 0.0 standard drinks of alcohol     Comment: last drink 2015        I have reviewed and updated the patient's past medical, surgical, family and social histories.    Allergies:   Review of patient's allergies indicates:   Allergen Reactions    Cilostazol Other (See Comments)     Colon bleeding    Stadol [butorphanol tartrate] Hallucinations     "I saw pink elephants"    Keflex [cephalexin] Other (See Comments)     Was using 500mg TID oral and had a strong metallic taste in his mouth and could not function.  Can use IV cephalosporin.    Sulfa (sulfonamide antibiotics) Hives, Nausea And Vomiting and Other (See Comments)     Since childhood; does not know reaction      Finasteride Other (See Comments)        Medications:   Current Outpatient Medications   Medication Sig Dispense Refill    ALPRAZolam (XANAX) 0.5 MG tablet Take 0.5 mg by mouth every evening.      famotidine (PEPCID) 20 MG tablet Take 20 mg by mouth every morning.      losartan (COZAAR) 50 MG tablet Take 50 mg by mouth every evening.      metoprolol " "succinate (TOPROL-XL) 50 MG 24 hr tablet Take 1 tablet (50 mg total) by mouth once daily. (Patient taking differently: Take 50 mg by mouth every morning.) 90 tablet 3    rivaroxaban (XARELTO) 20 mg Tab Take 1 tablet (20 mg total) by mouth daily with dinner or evening meal. 30 tablet 3    spironolactone (ALDACTONE) 25 MG tablet Take 25 mg by mouth as needed.      triamcinolone acetonide 0.1% (KENALOG) 0.1 % cream Apply topically 2 (two) times daily. 453.6 g 1     No current facility-administered medications for this visit.     Facility-Administered Medications Ordered in Other Visits   Medication Dose Route Frequency Provider Last Rate Last Admin    diphenhydrAMINE injection 12.5 mg  12.5 mg Intravenous Q6H PRN Stefano Sherwood MD        electrolyte-S (ISOLYTE)   Intravenous Continuous Stefano Sherwood MD        fentaNYL 50 mcg/mL injection 25 mcg  25 mcg Intravenous Q5 Min PRN Stefano Sherwood MD        HYDROmorphone (PF) injection 0.2 mg  0.2 mg Intravenous Q5 Min PRN Stefano Sherwood MD        lactated ringers infusion   Intravenous Continuous Stefano Sherwood MD 10 mL/hr at 02/27/24 0824 New Bag at 02/27/24 0824    LIDOcaine (PF) 10 mg/ml (1%) injection 10 mg  1 mL Intradermal Once Stefano Sherwood MD        ondansetron injection 4 mg  4 mg Intravenous Daily PRN Stefano Sherwood MD        oxyCODONE immediate release tablet 5 mg  5 mg Oral Q3H PRN Stefano Sherwood MD   5 mg at 02/27/24 1030        Physical Exam:   BP (!) 146/74 (BP Location: Left arm, Patient Position: Sitting)   Pulse 82   Temp 97.7 °F (36.5 °C) (Temporal)   Resp 18   Ht 5' 10" (1.778 m)   Wt 100.2 kg (220 lb 14.4 oz)   SpO2 97%   BMI 31.70 kg/m²                Physical Exam  Constitutional:       Appearance: Normal appearance.   HENT:      Head: Normocephalic and atraumatic.      Mouth/Throat:      Mouth: Mucous membranes are moist.   Eyes:      Extraocular Movements: Extraocular movements intact.      Pupils: Pupils are equal, round, and " reactive to light.   Cardiovascular:      Rate and Rhythm: Normal rate. Rhythm irregular.      Pulses: Normal pulses.      Heart sounds: No murmur heard.  Pulmonary:      Effort: Pulmonary effort is normal. No respiratory distress.      Breath sounds: Normal breath sounds.   Abdominal:      General: Abdomen is flat. There is no distension.      Palpations: Abdomen is soft.      Tenderness: There is no abdominal tenderness.   Musculoskeletal:         General: No swelling or tenderness. Normal range of motion.      Cervical back: Normal range of motion. No rigidity.   Skin:     General: Skin is warm and dry.      Coloration: Skin is not jaundiced.   Neurological:      General: No focal deficit present.      Mental Status: He is alert and oriented to person, place, and time.   Psychiatric:         Mood and Affect: Mood normal.         Behavior: Behavior normal.           Labs:   Lab Results   Component Value Date    WBC 6.19 10/16/2024    HGB 14.2 10/16/2024    HCT 40.9 10/16/2024    MCV 93 10/16/2024    PLT 83 (L) 10/16/2024       Lab Results   Component Value Date     10/16/2024    K 3.7 10/16/2024     10/16/2024    CO2 22 (L) 10/16/2024    BUN 9 10/16/2024    CREATININE 0.7 10/16/2024    ALBUMIN 3.5 10/16/2024    BILITOT 1.4 (H) 10/16/2024    ALKPHOS 101 10/16/2024     (H) 10/16/2024    ALT 93 (H) 10/16/2024       Imaging:   IR Embolization for Tumor_Organ Ischemia  Narrative: EXAMINATION:  Hepatic radioembolization - Radioisotope administration    Procedural Personnel    Attending physician(s): Wilber Yuen    Fellow physician(s): None    Resident physician(s): None    Advanced practice provider(s): None    Pre-procedure diagnosis: Hepatocellular carcinoma    Post-procedure diagnosis: Same    Indication: Locoregional therapy for tumor control    Additional clinical history: None    Vascular invasion: None    Complications: No immediate complications.    TECHNIQUE:  - Arterial access with  ultrasound guidance    - Aortography: None    - Visceral angiography: Celiac angiography    - Selective hepatic angiography: Proper hepatic angiography    - Superselective hepatic angiography: Multiple vessels  as described below    - Radioisotope administration as described below    - Additional procedures: Cone-beam CT and 3D rotational angiography    FINDINGS:  Pre-procedure    Consent: Informed consent for the procedure was obtained and time-out was performed prior to the procedure.    Preparation: The site was prepared and draped using maximal sterile barrier technique including cutaneous antisepsis.    Antibiotic administered: Prophylactic dose within 1 hour of procedure start time or 2 hours for vancomycin or fluoroquinolones    Anesthesia/sedation    Level of anesthesia/sedation: General anesthesia    Anesthesia/sedation administered by: Anesthesiology    Total intra-service sedation time (minutes): See anesthesia report    Access    Local anesthesia was administered. The vessel was sonographically evaluated and judged to be patent. Real time ultrasound was used to visualize needle entry into the vessel and a permanent image was stored. A 5 Maori sheath was placed.    Vessel accessed: Right common femoral artery    Access technique: Micropuncture set with 21 gauge needle    Angiography    Variant anatomy: None    Vessel catheterized: Celiac artery    An angiogram was performed, which demonstrates normal branching anatomy of the celiac artery.    Vessel catheterized: Proper hepatic artery    An angiogram was performed, which demonstrates subtle hypervascular tumors in the liver, corresponding to the known lesions.    Vessel catheterized: Middle hepatic artery    Angiogram was performed, which demonstrates vascular supply to the hypervascular tumor in the right hepatic lobe.    Vessel catheterized: Segment IV branch of the middle hepatic artery    An angiogram was performed, which demonstrates vascular supply  to the area of known tumor in the right hepatic lobe.  Cone beam CT and rotational 3D angiography was performed at this location.  Images were independently reviewed and interpreted at a separate workstation.  These images demonstrate vascular supply to the area of known tumor in the right hepatic lobe.    Vessel catheterized: Right hepatic artery    Angiogram was performed, which demonstrates vascular supply to area of known tumor in the right hepatic lobe.    Vessel catheterized: Segment VI branch of the right hepatic artery    An angiogram was performed, which demonstrates vascular supply to the area of known tumor in the right hepatic lobe.    Vessel catheterized: Subsegmental segment VI branch of the right hepatic artery    An angiogram was performed, which demonstrates vascular supply to the area of known tumor in the right hepatic lobe.  Cone beam CT and rotational 3D angiography was performed at this location.  Images were independently reviewed and interpreted at a separate workstation.  These images demonstrate vascular supply to the area of known tumor in the right hepatic lobe.    Radioisotope administration    Radioisotope: Yttrium-90 glass microspheres    Catheter position for administration: 2 doses (segment IV in segment VI)    Administration endpoint: Complete delivery of dose    Dosimetry #1    Dosimetry model: MIRD    Target liver volume (mL): 104    Target dose to volume (Gray): 657    Dose size selected (GBq): 4.5    Activity at administration (GBq): 1.61    Percentage delivery (%): 98.1    Lung shunt fraction (%): 10.8    Radiation dose to lungs (Gy): 9.11    Dosimetry #2    Dosimetry model: MIRD    Target liver volume (mL): 81    Target dose to volume (Gray): 844    Dose size selected (GBq): 4.5    Activity at administration (GBq): 1.61    Percentage delivery (%): 97.7    Lung shunt fraction (%): 10.8    Radiation dose to lungs (Gy): 8.87    Cumulative radiation dose to lungs (Gy):  25.8    Closure    Access site angiography performed: Yes    Patent vessel with appropriate access level    Arterial closure technique: Vascade    Hemostasis achieved from closure technique: Yes    Duration of manual compression (minutes): 5    Contrast    Contrast agent: Omnipaque 300    Contrast volume (mL): 75    Radiation Dose    Fluoroscopy time ( minutes): 5.9    Reference air kerma ( mGy ): 1361    Kerma area product ( uGy-m2 ): 54252    Additional Details    Additional description of procedure: None    Additional findings: None    Equipment details: None    Specimens removed: None    Estimated blood loss (mL): Less than 10    Standardized report: [id number]    Attestation    Signer name: Wilber Yuen    I attest that I was present for the entire procedure. I reviewed the stored images and agree with the report as written.  Impression: Hepatic angiography demonstrates hypervascular tumors in the right hepatic lobe.  Successful yttrium radioembolization, as detailed above.    Plan:    We will plan for follow-up imaging in 1 month to assess treatment response.    _______________________________________________________________    Electronically signed by: Wilber Yuen  Date:    10/11/2024  Time:    16:53          Assessment:       1. HCC (hepatocellular carcinoma)    2. Drug rash    3. Immunotherapy encounter    4. Thrombocytopenia    5. Nutritional anemia    6. Malignant neoplasm of overlapping sites of bladder    7. Longstanding persistent atrial fibrillation    8. Compensated HCV cirrhosis    9. Heartburn           Plan:           # HCC, Stage III - Microwave ablation 9/2019, MRI Abdomen shows recurrent disease. Discussed case with IR, recommend Y90 + immunotherapy for best disease control. We discussed the results of the [place] study and superiority of Durvalumab/Tremlimumab to Sorafenib for improved overall survival. Alternative therapy with be Bevacizumab/Atezolizumab per Impower 150  but would impact timing of Y90. Would recommend either approach over Sorafenib, patient and family understanding. AFP 21 -> 18.     Patient has experienced rash over scalp and trunk since starting immunotherapy. Rash has responded to topical steroids, prednisone 50 mg x2 doses but stopped due to significant insomnia. AFP 21 -> 3.3 -> repeat in process.   - return to clinic prior to C5  - unable to complete CT CAP after C4 due to inability to take deep breath due to rib cage after Y90. Add CT Chest to MRI abdomen 11/5     # IO dermatitis - resolved, has topical steroids at home    # Thrombocytopenia - Attributed to cirrhosis, established with hepatology. Improved on therapy    # Anemia - Mild iron deficiency, encourage iron in diet    # Non-Muscle Invasive Bladder Cancer, Ta - Follows with Dr. Bray, TURBT 2/2024. Cystoscopy 6/20/24 negative for recurrence, cancelled 9/2024. Will try to reschedule in November after next Y90 dose    # Atrial fibrillation - Coumadin -> Xarelto. HOLD anticoagulation if plts fall <50k     # Hep C Cirrhosis - Treated with SVR, patient declines liver transplant in the past. Established with hepatology for compensated cirrhosis, EGD 7/2/24  - improved liver enzymes AST//122 -> 32/22 -> 129/93 after Y90     # Heartburn - EKG shows a fib to rule out cardiac source, continue tums + antacid    # Arthritis - Taking Apap 500 mg bid prn    Follow up: prior to C5     The above information has been reviewed with the patient and all questions have been answered to their apparent satisfaction.  They understand that they can call the clinic with any questions.    Aleyda Kirkland MD  Hematology/Oncology  [facility]      Med Onc Chart Routing      Follow up with physician 4 weeks. chinedu 11/13   Follow up with DARLING    Infusion scheduling note   C5 durvalumab after visit   Injection scheduling note    Labs CBC and CMP   Scheduling:  Preferred lab:  Lab interval:  lab apt  prior to visit: cbc, cmp, afp, tsh   Imaging   CT Chest 11/5 after MRI same location   Pharmacy appointment    Other referrals

## 2024-10-16 NOTE — PROGRESS NOTES
RAMAKRISHNA met with patient at chairside. He shared that the last medication he was on was really hard on him, and he's been feeling much better. He had his granddaughter with him today for support. He was told that his iron is low and it was recommended that he eat an iron-rich diet. Karol Bonilla RD shared a list of foods that are high in iron. He was very appreciative.

## 2024-10-16 NOTE — PLAN OF CARE
Problem: Adult Inpatient Plan of Care  Goal: Plan of Care Review  Outcome: Progressing  Flowsheets (Taken 10/16/2024 1545)  Plan of Care Reviewed With: patient  Goal: Patient-Specific Goal (Individualized)  Outcome: Progressing  Flowsheets (Taken 10/16/2024 1545)  Individualized Care Needs: recliner, dimmed lights  Anxieties, Fears or Concerns: joints aching since Y90     Problem: Fatigue  Goal: Improved Activity Tolerance  Outcome: Progressing  Intervention: Promote Improved Energy  Flowsheets (Taken 10/16/2024 1545)  Fatigue Management: paced activity encouraged  Sleep/Rest Enhancement:   noise level reduced   relaxation techniques promoted   room darkened  Activity Management:   Ambulated -L4   Ambulated in phan - L4  Environmental Support: calm environment promoted     Pt tolerated imfinzi, VSS. NAD noted. Pt d/c home.

## 2024-10-16 NOTE — PROGRESS NOTES
Patient informed registration desk that he does not want to have his CT scan done today because he can not breath deep enough. He was informed by Dr Isael TERAN that the CT does not require deep breathing. Patient still states that he does not want to have the CT scan today. Will cancel appointment and notify Dr Kirkland.

## 2024-10-28 PROBLEM — Z53.09 CONTRAINDICATION TO STATIN MEDICATION: Chronic | Status: RESOLVED | Noted: 2022-11-30 | Resolved: 2024-10-28

## 2024-10-31 ENCOUNTER — OFFICE VISIT (OUTPATIENT)
Dept: CARDIOLOGY | Facility: CLINIC | Age: 69
End: 2024-10-31
Payer: MEDICARE

## 2024-10-31 VITALS
SYSTOLIC BLOOD PRESSURE: 150 MMHG | HEART RATE: 90 BPM | DIASTOLIC BLOOD PRESSURE: 89 MMHG | HEIGHT: 70 IN | BODY MASS INDEX: 32.01 KG/M2 | WEIGHT: 223.56 LBS

## 2024-10-31 DIAGNOSIS — I50.32 CHRONIC DIASTOLIC CONGESTIVE HEART FAILURE: ICD-10-CM

## 2024-10-31 DIAGNOSIS — Z95.1 S/P CABG X 4: ICD-10-CM

## 2024-10-31 DIAGNOSIS — I25.10 CORONARY ARTERY DISEASE INVOLVING NATIVE CORONARY ARTERY OF NATIVE HEART WITHOUT ANGINA PECTORIS: ICD-10-CM

## 2024-10-31 DIAGNOSIS — I48.11 LONGSTANDING PERSISTENT ATRIAL FIBRILLATION: ICD-10-CM

## 2024-10-31 DIAGNOSIS — I73.9 PERIPHERAL ARTERY DISEASE: ICD-10-CM

## 2024-10-31 DIAGNOSIS — I48.20 CHRONIC ATRIAL FIBRILLATION: ICD-10-CM

## 2024-10-31 DIAGNOSIS — I10 ESSENTIAL HYPERTENSION: ICD-10-CM

## 2024-10-31 DIAGNOSIS — I25.10 ARTERIOSCLEROSIS OF CORONARY ARTERY: Primary | ICD-10-CM

## 2024-10-31 PROCEDURE — 99999 PR PBB SHADOW E&M-EST. PATIENT-LVL III: CPT | Mod: PBBFAC,,, | Performed by: INTERNAL MEDICINE

## 2024-10-31 RX ORDER — METOPROLOL SUCCINATE 50 MG/1
50 TABLET, EXTENDED RELEASE ORAL 2 TIMES DAILY
Qty: 90 TABLET | Refills: 3 | Status: SHIPPED | OUTPATIENT
Start: 2024-10-31

## 2024-11-05 ENCOUNTER — HOSPITAL ENCOUNTER (OUTPATIENT)
Dept: RADIOLOGY | Facility: HOSPITAL | Age: 69
Discharge: HOME | End: 2024-11-05
Attending: STUDENT IN AN ORGANIZED HEALTH CARE EDUCATION/TRAINING PROGRAM
Payer: MEDICARE

## 2024-11-05 ENCOUNTER — HOSPITAL ENCOUNTER (OUTPATIENT)
Dept: RADIOLOGY | Facility: HOSPITAL | Age: 69
Discharge: HOME | End: 2024-11-05
Attending: FAMILY MEDICINE
Payer: MEDICARE

## 2024-11-05 DIAGNOSIS — C22.0 HCC (HEPATOCELLULAR CARCINOMA): ICD-10-CM

## 2024-11-05 PROCEDURE — 25500020 PHARM REV CODE 255: Mod: PO | Performed by: FAMILY MEDICINE

## 2024-11-05 PROCEDURE — A9585 GADOBUTROL INJECTION: HCPCS | Mod: PO | Performed by: FAMILY MEDICINE

## 2024-11-05 PROCEDURE — 25500020 PHARM REV CODE 255: Mod: PO | Performed by: STUDENT IN AN ORGANIZED HEALTH CARE EDUCATION/TRAINING PROGRAM

## 2024-11-05 PROCEDURE — 74183 MRI ABD W/O CNTR FLWD CNTR: CPT | Mod: 26,,, | Performed by: RADIOLOGY

## 2024-11-05 PROCEDURE — 74183 MRI ABD W/O CNTR FLWD CNTR: CPT | Mod: TC,PO

## 2024-11-05 PROCEDURE — 71260 CT THORAX DX C+: CPT | Mod: 26,,, | Performed by: RADIOLOGY

## 2024-11-05 PROCEDURE — 71260 CT THORAX DX C+: CPT | Mod: TC,PO

## 2024-11-05 RX ORDER — GADOBUTROL 604.72 MG/ML
10 INJECTION INTRAVENOUS
Status: COMPLETED | OUTPATIENT
Start: 2024-11-05 | End: 2024-11-05

## 2024-11-05 RX ADMIN — IOHEXOL 75 ML: 350 INJECTION, SOLUTION INTRAVENOUS at 10:11

## 2024-11-05 RX ADMIN — GADOBUTROL 10 ML: 604.72 INJECTION INTRAVENOUS at 08:11

## 2024-11-13 ENCOUNTER — OFFICE VISIT (OUTPATIENT)
Dept: HEMATOLOGY/ONCOLOGY | Facility: CLINIC | Age: 69
End: 2024-11-13
Payer: MEDICARE

## 2024-11-13 ENCOUNTER — DOCUMENTATION ONLY (OUTPATIENT)
Dept: INFUSION THERAPY | Facility: HOSPITAL | Age: 69
End: 2024-11-13
Payer: MEDICARE

## 2024-11-13 ENCOUNTER — INFUSION (OUTPATIENT)
Dept: INFUSION THERAPY | Facility: HOSPITAL | Age: 69
End: 2024-11-13
Attending: STUDENT IN AN ORGANIZED HEALTH CARE EDUCATION/TRAINING PROGRAM
Payer: MEDICARE

## 2024-11-13 VITALS
BODY MASS INDEX: 31.94 KG/M2 | SYSTOLIC BLOOD PRESSURE: 153 MMHG | TEMPERATURE: 97 F | RESPIRATION RATE: 16 BRPM | HEIGHT: 70 IN | WEIGHT: 223.13 LBS | DIASTOLIC BLOOD PRESSURE: 77 MMHG | HEART RATE: 97 BPM | OXYGEN SATURATION: 99 %

## 2024-11-13 VITALS
HEIGHT: 70 IN | TEMPERATURE: 97 F | SYSTOLIC BLOOD PRESSURE: 149 MMHG | HEART RATE: 87 BPM | BODY MASS INDEX: 31.94 KG/M2 | DIASTOLIC BLOOD PRESSURE: 83 MMHG | RESPIRATION RATE: 17 BRPM | OXYGEN SATURATION: 99 % | WEIGHT: 223.13 LBS

## 2024-11-13 DIAGNOSIS — C67.8 MALIGNANT NEOPLASM OF OVERLAPPING SITES OF BLADDER: ICD-10-CM

## 2024-11-13 DIAGNOSIS — H53.8 BLURRED VISION: ICD-10-CM

## 2024-11-13 DIAGNOSIS — C22.0 HEPATOCELLULAR CARCINOMA: Primary | ICD-10-CM

## 2024-11-13 DIAGNOSIS — R12 HEARTBURN: ICD-10-CM

## 2024-11-13 DIAGNOSIS — I48.11 LONGSTANDING PERSISTENT ATRIAL FIBRILLATION: ICD-10-CM

## 2024-11-13 DIAGNOSIS — R42 LIGHTHEADEDNESS: ICD-10-CM

## 2024-11-13 DIAGNOSIS — D69.6 THROMBOCYTOPENIA: ICD-10-CM

## 2024-11-13 DIAGNOSIS — Z29.89 IMMUNOTHERAPY ENCOUNTER: ICD-10-CM

## 2024-11-13 DIAGNOSIS — C22.0 HCC (HEPATOCELLULAR CARCINOMA): Primary | ICD-10-CM

## 2024-11-13 DIAGNOSIS — D53.9 NUTRITIONAL ANEMIA: ICD-10-CM

## 2024-11-13 DIAGNOSIS — B19.20 COMPENSATED HCV CIRRHOSIS: ICD-10-CM

## 2024-11-13 DIAGNOSIS — K74.69 COMPENSATED HCV CIRRHOSIS: ICD-10-CM

## 2024-11-13 DIAGNOSIS — L27.0 DRUG RASH: ICD-10-CM

## 2024-11-13 DIAGNOSIS — Z79.899 ON ANTINEOPLASTIC CHEMOTHERAPY: ICD-10-CM

## 2024-11-13 PROCEDURE — 96413 CHEMO IV INFUSION 1 HR: CPT | Mod: PN

## 2024-11-13 PROCEDURE — 1101F PT FALLS ASSESS-DOCD LE1/YR: CPT | Mod: CPTII,S$GLB,, | Performed by: STUDENT IN AN ORGANIZED HEALTH CARE EDUCATION/TRAINING PROGRAM

## 2024-11-13 PROCEDURE — 3288F FALL RISK ASSESSMENT DOCD: CPT | Mod: CPTII,S$GLB,, | Performed by: STUDENT IN AN ORGANIZED HEALTH CARE EDUCATION/TRAINING PROGRAM

## 2024-11-13 PROCEDURE — G2211 COMPLEX E/M VISIT ADD ON: HCPCS | Mod: S$GLB,,, | Performed by: STUDENT IN AN ORGANIZED HEALTH CARE EDUCATION/TRAINING PROGRAM

## 2024-11-13 PROCEDURE — 25000003 PHARM REV CODE 250: Mod: PN | Performed by: STUDENT IN AN ORGANIZED HEALTH CARE EDUCATION/TRAINING PROGRAM

## 2024-11-13 PROCEDURE — 3008F BODY MASS INDEX DOCD: CPT | Mod: CPTII,S$GLB,, | Performed by: STUDENT IN AN ORGANIZED HEALTH CARE EDUCATION/TRAINING PROGRAM

## 2024-11-13 PROCEDURE — 3079F DIAST BP 80-89 MM HG: CPT | Mod: CPTII,S$GLB,, | Performed by: STUDENT IN AN ORGANIZED HEALTH CARE EDUCATION/TRAINING PROGRAM

## 2024-11-13 PROCEDURE — 63600175 PHARM REV CODE 636 W HCPCS: Mod: JZ,JG,PN | Performed by: STUDENT IN AN ORGANIZED HEALTH CARE EDUCATION/TRAINING PROGRAM

## 2024-11-13 PROCEDURE — 99999 PR PBB SHADOW E&M-EST. PATIENT-LVL III: CPT | Mod: PBBFAC,,, | Performed by: STUDENT IN AN ORGANIZED HEALTH CARE EDUCATION/TRAINING PROGRAM

## 2024-11-13 PROCEDURE — 4010F ACE/ARB THERAPY RXD/TAKEN: CPT | Mod: CPTII,S$GLB,, | Performed by: STUDENT IN AN ORGANIZED HEALTH CARE EDUCATION/TRAINING PROGRAM

## 2024-11-13 PROCEDURE — 3077F SYST BP >= 140 MM HG: CPT | Mod: CPTII,S$GLB,, | Performed by: STUDENT IN AN ORGANIZED HEALTH CARE EDUCATION/TRAINING PROGRAM

## 2024-11-13 PROCEDURE — 99215 OFFICE O/P EST HI 40 MIN: CPT | Mod: S$GLB,,, | Performed by: STUDENT IN AN ORGANIZED HEALTH CARE EDUCATION/TRAINING PROGRAM

## 2024-11-13 RX ORDER — SODIUM CHLORIDE 0.9 % (FLUSH) 0.9 %
10 SYRINGE (ML) INJECTION
Status: CANCELLED | OUTPATIENT
Start: 2024-11-13

## 2024-11-13 RX ORDER — EPINEPHRINE 0.3 MG/.3ML
0.3 INJECTION SUBCUTANEOUS ONCE AS NEEDED
Status: CANCELLED | OUTPATIENT
Start: 2024-11-13

## 2024-11-13 RX ORDER — PROCHLORPERAZINE EDISYLATE 5 MG/ML
5 INJECTION INTRAMUSCULAR; INTRAVENOUS
Status: CANCELLED
Start: 2024-11-13

## 2024-11-13 RX ORDER — DIPHENHYDRAMINE HYDROCHLORIDE 50 MG/ML
50 INJECTION INTRAMUSCULAR; INTRAVENOUS ONCE AS NEEDED
Status: CANCELLED | OUTPATIENT
Start: 2024-11-13

## 2024-11-13 RX ORDER — HEPARIN 100 UNIT/ML
500 SYRINGE INTRAVENOUS
Status: CANCELLED | OUTPATIENT
Start: 2024-11-13

## 2024-11-13 RX ORDER — TRIAMCINOLONE ACETONIDE 1 MG/G
CREAM TOPICAL 2 TIMES DAILY
Qty: 454 G | Refills: 1 | Status: SHIPPED | OUTPATIENT
Start: 2024-11-13

## 2024-11-13 RX ADMIN — SODIUM CHLORIDE 1500 MG: 9 INJECTION, SOLUTION INTRAVENOUS at 03:11

## 2024-11-13 RX ADMIN — SODIUM CHLORIDE: 9 INJECTION, SOLUTION INTRAVENOUS at 02:11

## 2024-11-13 NOTE — PROGRESS NOTES
Oncology Nutrition   Chemotherapy Infusion Visit    Nutrition Follow Up   RD met with patient at chairside during infusion tx. Pt states he is doing well nutritionally; he was struggling with low H/H but has been really trying to eat a lot of high iron foods. Pt states he is struggling with some lightheadedness- Dr. Kirkland aware and had MRI 11/5. RD encouraged adequate hydration. Otherwise pt declined needs from RD today. Weight stable.    Wt Readings from Last 10 Encounters:   11/13/24 101.2 kg (223 lb 1.7 oz)   10/31/24 101.4 kg (223 lb 8.7 oz)   10/16/24 100.2 kg (220 lb 14.4 oz)   10/16/24 100.2 kg (220 lb 14.4 oz)   10/10/24 98.9 kg (218 lb)   09/19/24 99.6 kg (219 lb 9.3 oz)   09/11/24 101.2 kg (223 lb 1.7 oz)   09/11/24 101.2 kg (223 lb 1.7 oz)   08/30/24 99.3 kg (219 lb)   08/13/24 99.6 kg (219 lb 9.3 oz)       All other nutrition questions/concerns addressed as appropriate. Will continue to follow and monitor throughout treatment PRN.     Mary Alberto, MS, RD, LDN  11/13/2024  3:25 PM

## 2024-11-13 NOTE — PROGRESS NOTES
"[facility]     Reason for visit: Follow Up     Best Contact Phone Number(s): There are no phone numbers on file.     Oncology History   Hepatocellular carcinoma   9/24/2019 Initial Diagnosis    HCC (hepatocellular carcinoma) - treated with microwave ablation     6/26/2024 Relapse    Patient established with hematology for thrombocytopenia, HCC and cirrhosis history prompted further GI work-up    6/26/24: MRI Abdomen shows large lesions in segments 8 and 5 with washout compatible with HCC, measuring 8 x4.2 cm transverse and 10 cm caudal, prominent tumor thrombus in right portal vein, 1 cm lesion in posterior right hepatic lobe concerning for additional focus     7/3/2024 Cancer Staged    Staging form: Liver, AJCC 8th Edition  - Clinical: Stage IIIA (cT3, cN0, cM0)     7/16/2024 -  Chemotherapy    Treatment Summary   Plan Name: OP Tremelimumab + Durvalumab x1 followed by DURVALUMAB 1500 MG Q4W  Treatment Goal: Palliative  Status: Active  Start Date: 7/16/2024  End Date: 6/25/2025 (Planned)  Provider: Aleyda Kirkland MD  Chemotherapy: [No matching medication found in this treatment plan]     Malignant neoplasm of lateral wall of bladder   6/25/2024 Initial Diagnosis    Malignant neoplasm of overlapping sites of bladder     6/25/2024 Cancer Staged    Staging form: Urinary Bladder, AJCC 8th Edition  - Clinical: Stage 0a (cTa, cN0, cM0)       HPI: Hudson Tavares is a 69 y.o. male with HCC who presents prior to C5 Durvalumab. Since last visit patient has "blackout episodes" with near-syncope 5 days prior to infusions. He notes consistent lightheadedness and blurred vision. Recently increased metoprolol dose, experiencing infrequent palpitations. 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     Patient presents alone. Has daughter, Rubi. Has daughter Mara and granddaughter, Anselmo and son Roberto. ECOG PS is 0.  History has been obtained by chart review and discussion with the " patient.    ROS:   A complete 12-point review of systems was reviewed and is negative except as mentioned above.     Past Medical History:   Past Medical History:   Diagnosis Date    Adenomatous colon polyp     Alcohol abuse 07/09/2015    Anticoagulant long-term use     coumadin - discontinued    Atrial fibrillation 07/09/2015    CAD (coronary artery disease), native coronary artery 05/03/2016    Degenerative joint disease 07/09/2015    Encounter for blood transfusion     Enlarged liver     Essential hypertension 07/09/2015    Hep C w/o coma, chronic     treated with Harvoni    Hiatal hernia     patient states hernia has resolved    Hx of bladder infections     finished cipro 4/29/16    S/P CABG (coronary artery bypass graft) 05/17/2016    LIMA to LAD, SVG to OM, SVG to diagonal, SVG to right PDA, Maze, left atrial appendage resection (5/16)    Spleen enlarged     Thrombocytopenia 05/05/2016    Tobacco abuse 07/09/2015        Past Surgical History:   Past Surgical History:   Procedure Laterality Date    CARDIAC SURGERY  05/04/2016    4 vessel CABG    CERVICAL FUSION  2004    COLONOSCOPY  12/03/2018    Steve Wells    CYSTOSCOPY W/ RETROGRADES N/A 2/27/2024    Procedure: CYSTOSCOPY, WITH RETROGRADE PYELOGRAM;  Surgeon: STORMY Bray MD;  Location: [facility] OR;  Service: Urology;  Laterality: N/A;    CYSTOSCOPY WITH BIOPSY OF BLADDER N/A 2/27/2024    Procedure: CYSTOSCOPY, WITH BLADDER BIOPSY, WITH FULGURATION IF INDICATED;  Surgeon: STORMY Bray MD;  Location: [facility] OR;  Service: Urology;  Laterality: N/A;    CYSTOURETHROSCOPY, WITH THROMBUS REMOVAL N/A 2/27/2024    Procedure: CYSTOURETHROSCOPY, WITH THROMBUS REMOVAL;  Surgeon: STORMY Bray MD;  Location: [facility] OR;  Service: Urology;  Laterality: N/A;    JOINT REPLACEMENT  TOTAL RIGHT KNEE     OPEN ANTERIOR SHOULDER RECONSTRUCTION Left     RADIOFREQUENCY ABLATION N/A 9/24/2019    Procedure: Radiofrequency Ablation;  Surgeon: Anayeli Seymour;  Location: [facility]  "DANYEL;  Service: Anesthesiology;  Laterality: N/A;  Room 173/[facility]    TIBIA FRACTURE SURGERY Right 1971        Family History:   Family History   Problem Relation Name Age of Onset    Heart disease Mother          congestive heart failure    Diabetes Mother      Hypertension Mother      Heart disease Sister          heart murmur    Atrial fibrillation Sister      Cancer Sister          breast    Cancer Father  60        colon    Chronic back pain Brother      Atrial fibrillation Sister      Heart disease Sister      Collagen disease Neg Hx          Social History:   Social History     Tobacco Use    Smoking status: Former     Current packs/day: 0.00     Average packs/day: 0.3 packs/day for 30.0 years (7.5 ttl pk-yrs)     Types: Cigarettes     Start date: 1986     Quit date: 2016     Years since quittin.5    Smokeless tobacco: Never   Substance Use Topics    Alcohol use: No     Alcohol/week: 0.0 standard drinks of alcohol     Comment: last drink 2015        I have reviewed and updated the patient's past medical, surgical, family and social histories.    Allergies:   Review of patient's allergies indicates:   Allergen Reactions    Cilostazol Other (See Comments)     Colon bleeding    Stadol [butorphanol tartrate] Hallucinations     "I saw pink elephants"    Keflex [cephalexin] Other (See Comments)     Was using 500mg TID oral and had a strong metallic taste in his mouth and could not function.  Can use IV cephalosporin.    Sulfa (sulfonamide antibiotics) Hives, Nausea And Vomiting and Other (See Comments)     Since childhood; does not know reaction      Finasteride Other (See Comments)        Medications:   Current Outpatient Medications   Medication Sig Dispense Refill    ALPRAZolam (XANAX) 0.5 MG tablet Take 0.5 mg by mouth every evening.      famotidine (PEPCID) 20 MG tablet Take 20 mg by mouth every morning.      losartan (COZAAR) 50 MG tablet Take 50 mg by mouth every evening.      metoprolol " "succinate (TOPROL-XL) 50 MG 24 hr tablet Take 1 tablet (50 mg total) by mouth 2 (two) times daily. 90 tablet 3    rivaroxaban (XARELTO) 20 mg Tab Take 1 tablet (20 mg total) by mouth daily with dinner or evening meal. 30 tablet 3    spironolactone (ALDACTONE) 25 MG tablet Take 25 mg by mouth as needed.      triamcinolone acetonide 0.1% (KENALOG) 0.1 % cream Apply topically 2 (two) times daily. 453.6 g 1     No current facility-administered medications for this visit.     Facility-Administered Medications Ordered in Other Visits   Medication Dose Route Frequency Provider Last Rate Last Admin    diphenhydrAMINE injection 12.5 mg  12.5 mg Intravenous Q6H PRN Stefano Sherwood MD        electrolyte-S (ISOLYTE)   Intravenous Continuous Stefano Sherwood MD        fentaNYL 50 mcg/mL injection 25 mcg  25 mcg Intravenous Q5 Min PRN Stefano Sherwood MD        HYDROmorphone (PF) injection 0.2 mg  0.2 mg Intravenous Q5 Min PRN Stefano Sherwood MD        lactated ringers infusion   Intravenous Continuous Stefano Sherwood MD 10 mL/hr at 02/27/24 0824 New Bag at 02/27/24 0824    LIDOcaine (PF) 10 mg/ml (1%) injection 10 mg  1 mL Intradermal Once Stefano Sherwood MD        ondansetron injection 4 mg  4 mg Intravenous Daily PRN Stefano Sherwood MD        oxyCODONE immediate release tablet 5 mg  5 mg Oral Q3H PRN Stefano Sherwood MD   5 mg at 02/27/24 1030        Physical Exam:   BP (!) 149/83 (BP Location: Right forearm, Patient Position: Sitting)   Pulse 87   Temp 97.3 °F (36.3 °C) (Temporal)   Resp 17   Ht 5' 10" (1.778 m)   Wt 101.2 kg (223 lb 1.7 oz)   SpO2 99%   BMI 32.01 kg/m²                Physical Exam  Constitutional:       Appearance: Normal appearance.   HENT:      Head: Normocephalic and atraumatic.      Mouth/Throat:      Mouth: Mucous membranes are moist.   Eyes:      Extraocular Movements: Extraocular movements intact.      Pupils: Pupils are equal, round, and reactive to light.   Cardiovascular:      Rate and " Rhythm: Normal rate. Rhythm irregular.      Pulses: Normal pulses.      Heart sounds: No murmur heard.  Pulmonary:      Effort: Pulmonary effort is normal. No respiratory distress.      Breath sounds: Normal breath sounds.   Abdominal:      General: Abdomen is flat. There is no distension.      Palpations: Abdomen is soft.      Tenderness: There is no abdominal tenderness.   Musculoskeletal:         General: No swelling or tenderness. Normal range of motion.      Cervical back: Normal range of motion. No rigidity.   Skin:     General: Skin is warm and dry.      Coloration: Skin is not jaundiced.   Neurological:      General: No focal deficit present.      Mental Status: He is alert and oriented to person, place, and time.   Psychiatric:         Mood and Affect: Mood normal.         Behavior: Behavior normal.           Labs:   Lab Results   Component Value Date    WBC 3.94 11/05/2024    HGB 14.2 11/05/2024    HCT 41.1 11/05/2024    MCV 95 11/05/2024     (L) 11/05/2024       Lab Results   Component Value Date     11/05/2024    K 4.4 11/05/2024     11/05/2024    CO2 25 11/05/2024    BUN 9 11/05/2024    CREATININE 0.7 11/05/2024    ALBUMIN 3.8 11/05/2024    BILITOT 1.8 (H) 11/05/2024    ALKPHOS 113 11/05/2024    AST 31 11/05/2024    ALT 17 11/05/2024       Imaging:   CT Chest With Contrast  Narrative: EXAMINATION:  CT CHEST WITH CONTRAST    CLINICAL HISTORY:  Musculoskeletal neoplasm, assess treatment response;Liver cell carcinoma    TECHNIQUE:  Low dose axial images, sagittal and coronal reformations were obtained from the thoracic inlet to the lung bases following the IV administration of 75 mL of Omnipaque 350.    COMPARISON:  07/15/2024    FINDINGS:  The visualized structures at the base of the neck are unremarkable in appearance.    Mild symmetrical retroareolar density consistent with gynecomastia    Postoperative changes in the chest with median sternotomy sutures and suggesting prior CABG.   Vascular calcifications including coronary artery calcifications.  Prominent mediastinal node posteriorly on the right axial series 2, image 64 short axis diameter 10 mm not significantly changed compared to the prior exam no pleural or pericardial effusion.    Visualized upper abdominal structures; splenomegaly.  Nodular contour the liver.  Hypodensities within the liver not fully characterized on the chest study.  Mildly prominent nodes right cardiophrenic angle short axis diameters 6 mm also not significantly changed.  The right renal cyst.    Lung; mild dependent hypoventilatory changes.  No consolidation.  2-3 mm nodular focus posteriorly right lung series 4, image 127 not significantly changed.  No new or enlarging pulmonary nodule suspicious for metastatic disease.    Osseous structures show degenerative changes.  Median sternotomy.  Postoperative changes also noted in the cervical spine.  No obvious aggressive appearing osseous lesion or acute vertebral body compression.  Internal fixation left scapula.  Impression: Findings are not significantly changed compared to the prior exam including small 2-3 mm nodule in the lung.  Mildly prominent mediastinal nodes.    Additional findings as detailed above including features of cirrhosis in the visualized upper abdomen, hepatic lesions, splenomegaly, right renal cyst    Electronically signed by: Simona Levine MD  Date:    11/05/2024  Time:    10:55  MRI Abdomen W WO Contrast  Narrative: EXAMINATION:  MRI ABDOMEN W WO CONTRAST    CLINICAL HISTORY:  HCC s/p Y90;  Liver cell carcinoma    TECHNIQUE:  Multisequence, multiplanar MRI of the abdomen performed per liver protocol before and after administration of 10 mL Gadavist intravenous contrast. Additionally 2D and 3D MRCP sequences are performed as well as MIP images.    COMPARISON:  CT chest, abdomen, and pelvis 07/15/2024.  MRI of the abdomen 06/26/2024.    FINDINGS:  Liver: Redemonstrated cirrhotic liver  morphology.  Extensive post treatment affects within the right hepatic lobe status post multiple ablation most recently in the interim on 10/10/2024 the compared to the most recent prior MRI of the abdomen from 06/26/2024.  Central necrotic cavities are present within the right hepatic lobe most pronounced within segments 5 and 8 as well as new treatment related changes within hepatic segment 6.  There is extensive persistent marginal enhancement which is nonspecific but without evidence of any clear washout or pseudo capsule.  No definite new distant lesions concerning for new satellite lesions.  No new findings within the left hepatic lobe.  Chronic presumed tumor thrombus within the right portal vein similar to the prior exam.  Main and left portal veins are patent.  Hepatic veins are patent.    Biliary: Gallbladder is normal.  No intrahepatic or extrahepatic biliary dilatation.    Pancreas: No mass or ductal dilatation identified.    Spleen: Enlarged measuring 16 cm.    Adrenal glands: Normal.    Kidneys: No solid renal mass.  Simple cyst within the right kidney measuring approximately 4 cm.  No hydronephrosis.    Miscellaneous: Small volume perihepatic ascites.  Normal caliber of the abdominal aorta.    Bones: No suspicious marrow lesions.  Impression: 1. Status post multiple right hepatic lobe ablation is most recently 10/10/2024 with findings consistent with central necrotic changes within the treated lesions without definite MR evidence to suggest residual or new disease.  Continued follow-up recommended.  No acute process.  2. Cirrhotic liver morphology and mild perihepatic ascites.  Splenomegaly.  3. Additional stable findings as above.    Electronically signed by: Jose Francisco Hutchinson  Date:    11/05/2024  Time:    09:57          Assessment:       1. HCC (hepatocellular carcinoma)    2. Drug rash    3. Thrombocytopenia    4. Nutritional anemia    5. Malignant neoplasm of overlapping sites of bladder    6.  Longstanding persistent atrial fibrillation    7. Compensated HCV cirrhosis    8. Heartburn           Plan:           # HCC, Stage III - Microwave ablation 9/2019, MRI Abdomen shows recurrent disease. Discussed case with IR, recommend Y90 + immunotherapy for best disease control. We discussed the results of the [place] study and superiority of Durvalumab/Tremlimumab to Sorafenib for improved overall survival. Alternative therapy with be Bevacizumab/Atezolizumab per Impower 150 but would impact timing of Y90. Would recommend either approach over Sorafenib, patient and family understanding. AFP 21 -> 18.     Patient has experienced rash over scalp and trunk since starting immunotherapy. Rash has responded to topical steroids, prednisone 50 mg x2 doses but stopped due to significant insomnia. Overall <10% BSA and symptoms do not interfere with QoL or therapy. AFP 21 -> 3.3 -> 6.8.     Reviewed risk/benefit of proceeding with therapy. With lightheadedness recommend MRI brain. Also urgent ophtho referral with blurred vision, patient has been recommended glasses but need to rule out immune checkpoint inhibitor toxicity.     MRI Abdomen and CT Chest show treatment response 11/2024, plan for restaging CT CAP 1/2025.     - labs reviewed, proceed with C5. RTC prior to C6  - MRI Abdomen shows treatment response, CT Chest negative for metastatic disease     # Lightheadedness - Recommend MRI brain next available, ER precautions advised     # Blurred vision - urgent ophthalmology referral, optometry scheduled 11/15 for vision testing    # IO dermatitis - Continue topical steroids at home, Grade I. Refill kenalog today    # Thrombocytopenia - Attributed to cirrhosis, established with hepatology. Improved on therapy    # Anemia - Mild iron deficiency, encourage iron in diet    # Non-Muscle Invasive Bladder Cancer, Ta - Follows with Dr. Bray, TURBT 2/2024. Cystoscopy 6/20/24 negative for recurrence, cancelled 9/2024. Will try  to reschedule in November after next Y90 dose    # Atrial fibrillation - Coumadin -> Xarelto. HOLD anticoagulation if plts fall <50k     # Hep C Cirrhosis - Treated with SVR, patient declines liver transplant in the past. Established with hepatology for compensated cirrhosis, EGD 7/2/24  - improved liver enzymes after Y90     # Heartburn - EKG shows a fib to rule out cardiac source, continue tums + antacid    # Arthritis - Taking Apap 500 mg bid prn    Follow up: prior to C6     The above information has been reviewed with the patient and all questions have been answered to their apparent satisfaction.  They understand that they can call the clinic with any questions.    Aleyda Kirkland MD  Hematology/Oncology  [facility]      Med Onc Chart Routing      Follow up with physician . Isael 12/11 at 930a   Follow up with DARLING    Infusion scheduling note   move 12/11 infusion -> 10a   Injection scheduling note    Labs CBC, CMP and CEA   Scheduling:  Preferred lab:  Lab interval:  lab apt prior to visit: cbc, cmp, afp, tsh   Imaging MRI   MRI stat   Pharmacy appointment    Other referrals

## 2024-11-14 ENCOUNTER — TELEPHONE (OUTPATIENT)
Dept: OPHTHALMOLOGY | Facility: CLINIC | Age: 69
End: 2024-11-14
Payer: MEDICARE

## 2024-11-14 NOTE — TELEPHONE ENCOUNTER
----- Message from Harvey Montalvo MD sent at 11/14/2024  9:10 AM CST -----  The typical ocular side effect of immune checkpoint inhibitors is dry eye and ocular surface issues.  This is something I think Dr. Lucio can evaluate and treat, as with all of our [facility] care team.  If there is something more substantial, I'm happy to see or get the patient to the proper provider.      Dr. Montalvo  ----- Message -----  From: Nataliya Shepherd  Sent: 11/13/2024   3:15 PM CST  To: Harvey Montalvo MD    Hi,   Does this pt have to be seen by you or can he keep already scheduled appt with Dr. Lucio on this Friday?  ----- Message -----  From: Aleyda Kirkland MD  Sent: 11/13/2024   2:37 PM CST  To: Tanisha Perez MA; Selvin Zhang    Hi Tanisha - I would like for the patient to see ophtho at next available. Patient is willing to go to [place]. Thank you!  ----- Message -----  From: Tanisha Perez MA  Sent: 11/13/2024   1:51 PM CST  To: Aleyda Kirkland MD; Selvin Zhang    Hi,      Pt has urgent referral to see ophthalmology. Pt is scheduled to see Dr Lucio, but the referring provider would prefer if the pt can be seen by an MD instead. Can you reach out to the pt?    Thanks,  Tanisha

## 2024-11-15 ENCOUNTER — OFFICE VISIT (OUTPATIENT)
Dept: OPTOMETRY | Facility: CLINIC | Age: 69
End: 2024-11-15
Payer: MEDICARE

## 2024-11-15 DIAGNOSIS — H10.533 CONTACT BLEPHAROCONJUNCTIVITIS OF BOTH EYES: ICD-10-CM

## 2024-11-15 DIAGNOSIS — C22.0 HCC (HEPATOCELLULAR CARCINOMA): Primary | ICD-10-CM

## 2024-11-15 DIAGNOSIS — H16.223 KERATOCONJUNCTIVITIS SICCA OF BOTH EYES NOT DUE TO SJOGREN'S SYNDROME: ICD-10-CM

## 2024-11-15 DIAGNOSIS — H25.13 NUCLEAR SCLEROSIS OF BOTH EYES: ICD-10-CM

## 2024-11-15 DIAGNOSIS — H52.7 REFRACTIVE ERROR: ICD-10-CM

## 2024-11-15 PROCEDURE — 99999 PR PBB SHADOW E&M-EST. PATIENT-LVL III: CPT | Mod: PBBFAC,,, | Performed by: OPTOMETRIST

## 2024-11-19 ENCOUNTER — TELEPHONE (OUTPATIENT)
Dept: INTERVENTIONAL RADIOLOGY/VASCULAR | Facility: CLINIC | Age: 69
End: 2024-11-19
Payer: MEDICARE

## 2024-11-22 DIAGNOSIS — C22.0 HCC (HEPATOCELLULAR CARCINOMA): Primary | ICD-10-CM

## 2024-11-23 ENCOUNTER — HOSPITAL ENCOUNTER (OUTPATIENT)
Dept: RADIOLOGY | Facility: HOSPITAL | Age: 69
Discharge: HOME | End: 2024-11-23
Attending: STUDENT IN AN ORGANIZED HEALTH CARE EDUCATION/TRAINING PROGRAM
Payer: MEDICARE

## 2024-11-23 DIAGNOSIS — C22.0 HCC (HEPATOCELLULAR CARCINOMA): ICD-10-CM

## 2024-11-23 PROCEDURE — A9585 GADOBUTROL INJECTION: HCPCS | Performed by: STUDENT IN AN ORGANIZED HEALTH CARE EDUCATION/TRAINING PROGRAM

## 2024-11-23 PROCEDURE — 70553 MRI BRAIN STEM W/O & W/DYE: CPT | Mod: 26,,, | Performed by: RADIOLOGY

## 2024-11-23 PROCEDURE — 25500020 PHARM REV CODE 255: Performed by: STUDENT IN AN ORGANIZED HEALTH CARE EDUCATION/TRAINING PROGRAM

## 2024-11-23 PROCEDURE — 70553 MRI BRAIN STEM W/O & W/DYE: CPT | Mod: TC

## 2024-11-23 RX ORDER — GADOBUTROL 604.72 MG/ML
10 INJECTION INTRAVENOUS
Status: COMPLETED | OUTPATIENT
Start: 2024-11-23 | End: 2024-11-23

## 2024-11-23 RX ADMIN — GADOBUTROL 10 ML: 604.72 INJECTION INTRAVENOUS at 11:11

## 2024-12-10 NOTE — PROGRESS NOTES
[facility]     Reason for visit: Follow Up     Best Contact Phone Number(s): There are no phone numbers on file.     Oncology History   Hepatocellular carcinoma   9/24/2019 Initial Diagnosis    HCC (hepatocellular carcinoma) - treated with microwave ablation     6/26/2024 Relapse    Patient established with hematology for thrombocytopenia, HCC and cirrhosis history prompted further GI work-up    6/26/24: MRI Abdomen shows large lesions in segments 8 and 5 with washout compatible with HCC, measuring 8 x4.2 cm transverse and 10 cm caudal, prominent tumor thrombus in right portal vein, 1 cm lesion in posterior right hepatic lobe concerning for additional focus     7/3/2024 Cancer Staged    Staging form: Liver, AJCC 8th Edition  - Clinical: Stage IIIA (cT3, cN0, cM0)     7/16/2024 -  Chemotherapy    Treatment Summary   Plan Name: OP Tremelimumab + Durvalumab x1 followed by DURVALUMAB 1500 MG Q4W  Treatment Goal: Palliative  Status: Active  Start Date: 7/16/2024  End Date: 6/25/2025 (Planned)  Provider: Aleyda Kirkland MD  Chemotherapy: [No matching medication found in this treatment plan]     Malignant neoplasm of lateral wall of bladder   6/25/2024 Initial Diagnosis    Malignant neoplasm of overlapping sites of bladder     6/25/2024 Cancer Staged    Staging form: Urinary Bladder, AJCC 8th Edition  - Clinical: Stage 0a (cTa, cN0, cM0)       HPI: Hudson Tavares is a 69 y.o. male with HCC who presents prior to C5 Durvalumab. Experiences skin rash over back with 7 days prior to treatment. 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 Similarly has episodes of blurred vision, improved after glasses prescription.     Patient presents alone. Has daughter, Rubi. Has daughter Mara and granddaughter, Anselmo and son Roberto. ECOG PS is 0.  History has been obtained by chart review and discussion with the  patient.    ROS:   A complete 12-point review of systems was reviewed and is negative except as mentioned above.     Past Medical History:   Past Medical History:   Diagnosis Date    Adenomatous colon polyp     Alcohol abuse 07/09/2015    Anticoagulant long-term use     coumadin - discontinued    Atrial fibrillation 07/09/2015    CAD (coronary artery disease), native coronary artery 05/03/2016    Degenerative joint disease 07/09/2015    Encounter for blood transfusion     Enlarged liver     Essential hypertension 07/09/2015    Hep C w/o coma, chronic     treated with Harvoni    Hiatal hernia     patient states hernia has resolved    Hx of bladder infections     finished cipro 4/29/16    S/P CABG (coronary artery bypass graft) 05/17/2016    LIMA to LAD, SVG to OM, SVG to diagonal, SVG to right PDA, Maze, left atrial appendage resection (5/16)    Spleen enlarged     Thrombocytopenia 05/05/2016    Tobacco abuse 07/09/2015        Past Surgical History:   Past Surgical History:   Procedure Laterality Date    CARDIAC SURGERY  05/04/2016    4 vessel CABG    CERVICAL FUSION  2004    COLONOSCOPY  12/03/2018    Steve Wells    CYSTOSCOPY W/ RETROGRADES N/A 2/27/2024    Procedure: CYSTOSCOPY, WITH RETROGRADE PYELOGRAM;  Surgeon: STORMY Bray MD;  Location: [facility] OR;  Service: Urology;  Laterality: N/A;    CYSTOSCOPY WITH BIOPSY OF BLADDER N/A 2/27/2024    Procedure: CYSTOSCOPY, WITH BLADDER BIOPSY, WITH FULGURATION IF INDICATED;  Surgeon: STORMY Bray MD;  Location: [facility] OR;  Service: Urology;  Laterality: N/A;    CYSTOURETHROSCOPY, WITH THROMBUS REMOVAL N/A 2/27/2024    Procedure: CYSTOURETHROSCOPY, WITH THROMBUS REMOVAL;  Surgeon: STORMY Bray MD;  Location: [facility] OR;  Service: Urology;  Laterality: N/A;    JOINT REPLACEMENT  TOTAL RIGHT KNEE     OPEN ANTERIOR SHOULDER RECONSTRUCTION Left     RADIOFREQUENCY ABLATION N/A 9/24/2019    Procedure: Radiofrequency Ablation;  Surgeon: Anayeli Seymour;  Location: [facility]  "DANYEL;  Service: Anesthesiology;  Laterality: N/A;  Room 173/[facility]    TIBIA FRACTURE SURGERY Right 1971        Family History:   Family History   Problem Relation Name Age of Onset    Heart disease Mother          congestive heart failure    Diabetes Mother      Hypertension Mother      Heart disease Sister          heart murmur    Atrial fibrillation Sister      Cancer Sister          breast    Cancer Father  60        colon    Chronic back pain Brother      Atrial fibrillation Sister      Heart disease Sister      Collagen disease Neg Hx          Social History:   Social History     Tobacco Use    Smoking status: Former     Current packs/day: 0.00     Average packs/day: 0.3 packs/day for 30.0 years (7.5 ttl pk-yrs)     Types: Cigarettes     Start date: 1986     Quit date: 2016     Years since quittin.6    Smokeless tobacco: Never   Substance Use Topics    Alcohol use: No     Alcohol/week: 0.0 standard drinks of alcohol     Comment: last drink 2015        I have reviewed and updated the patient's past medical, surgical, family and social histories.    Allergies:   Review of patient's allergies indicates:   Allergen Reactions    Cilostazol Other (See Comments)     Colon bleeding    Stadol [butorphanol tartrate] Hallucinations     "I saw pink elephants"    Keflex [cephalexin] Other (See Comments)     Was using 500mg TID oral and had a strong metallic taste in his mouth and could not function.  Can use IV cephalosporin.    Sulfa (sulfonamide antibiotics) Hives, Nausea And Vomiting and Other (See Comments)     Since childhood; does not know reaction      Finasteride Other (See Comments)        Medications:   Current Outpatient Medications   Medication Sig Dispense Refill    ALPRAZolam (XANAX) 0.5 MG tablet Take 0.5 mg by mouth every evening.      famotidine (PEPCID) 20 MG tablet Take 20 mg by mouth every morning.      losartan (COZAAR) 50 MG tablet Take 50 mg by mouth every evening.      metoprolol " "succinate (TOPROL-XL) 50 MG 24 hr tablet Take 1 tablet (50 mg total) by mouth 2 (two) times daily. 90 tablet 3    rivaroxaban (XARELTO) 20 mg Tab Take 1 tablet (20 mg total) by mouth daily with dinner or evening meal. 30 tablet 3    spironolactone (ALDACTONE) 25 MG tablet Take 25 mg by mouth as needed.      triamcinolone acetonide 0.1% (KENALOG) 0.1 % cream Apply topically 2 (two) times daily. 454 g 1    cholestyramine (QUESTRAN) 4 gram packet Take 1 packet (4 g total) by mouth 3 (three) times daily with meals. 270 packet 3     No current facility-administered medications for this visit.     Facility-Administered Medications Ordered in Other Visits   Medication Dose Route Frequency Provider Last Rate Last Admin    diphenhydrAMINE injection 12.5 mg  12.5 mg Intravenous Q6H PRN Stefano Sherwood MD        electrolyte-S (ISOLYTE)   Intravenous Continuous Stefano Sherwood MD        fentaNYL 50 mcg/mL injection 25 mcg  25 mcg Intravenous Q5 Min PRN Stefano Sherwood MD        HYDROmorphone (PF) injection 0.2 mg  0.2 mg Intravenous Q5 Min PRN Stefano Sherwood MD        lactated ringers infusion   Intravenous Continuous Stefano Sherwood MD 10 mL/hr at 02/27/24 0824 New Bag at 02/27/24 0824    LIDOcaine (PF) 10 mg/ml (1%) injection 10 mg  1 mL Intradermal Once Stefano Sherwood MD        ondansetron injection 4 mg  4 mg Intravenous Daily PRN Stefano Sherwood MD        oxyCODONE immediate release tablet 5 mg  5 mg Oral Q3H PRN Stefano Sherwood MD   5 mg at 02/27/24 1030        Physical Exam:   /70 (BP Location: Right arm, Patient Position: Sitting)   Pulse 89   Temp 98.2 °F (36.8 °C) (Temporal)   Resp 16   Ht 5' 10" (1.778 m)   Wt 102.4 kg (225 lb 12 oz)   SpO2 98%   BMI 32.39 kg/m²                Physical Exam  Constitutional:       Appearance: Normal appearance.   HENT:      Head: Normocephalic and atraumatic.      Mouth/Throat:      Mouth: Mucous membranes are moist.   Eyes:      Extraocular Movements: " Extraocular movements intact.      Pupils: Pupils are equal, round, and reactive to light.   Cardiovascular:      Rate and Rhythm: Normal rate. Rhythm irregular.      Pulses: Normal pulses.      Heart sounds: No murmur heard.  Pulmonary:      Effort: Pulmonary effort is normal. No respiratory distress.      Breath sounds: Normal breath sounds.   Abdominal:      General: Abdomen is flat. There is no distension.      Palpations: Abdomen is soft.      Tenderness: There is no abdominal tenderness.   Musculoskeletal:         General: No swelling or tenderness. Normal range of motion.      Cervical back: Normal range of motion. No rigidity.   Skin:     General: Skin is warm and dry.      Coloration: Skin is not jaundiced.      Comments: Rash over right side of back    Neurological:      General: No focal deficit present.      Mental Status: He is alert and oriented to person, place, and time.   Psychiatric:         Mood and Affect: Mood normal.         Behavior: Behavior normal.           Labs:   Lab Results   Component Value Date    WBC 4.28 12/11/2024    HGB 13.6 (L) 12/11/2024    HCT 39.1 (L) 12/11/2024    MCV 95 12/11/2024    PLT 86 (L) 12/11/2024       Lab Results   Component Value Date     12/11/2024    K 3.8 12/11/2024     12/11/2024    CO2 22 (L) 12/11/2024    BUN 10 12/11/2024    CREATININE 0.7 12/11/2024    ALBUMIN 3.6 12/11/2024    BILITOT 1.6 (H) 12/11/2024    ALKPHOS 107 12/11/2024    AST 33 12/11/2024    ALT 21 12/11/2024       Imaging:   MRI Brain W WO Contrast  Narrative: EXAMINATION:  MRI BRAIN W WO CONTRAST    CLINICAL HISTORY:  Metastatic disease evaluation; Liver cell carcinoma    TECHNIQUE:  Multiplanar multisequence MR imaging of the brain was performed with and without IV contrast.    COMPARISON:  None.    FINDINGS:  Gray matter distinction is preserved throughout the brain parenchyma.  The ventricles are midline without mass effect. Periventricular deep white matter FLAIR T2  hyperintensities noted consistent with changes of chronic microvascular ischemia.  No intra-axial hemorrhage or restricted diffusion.  No intra-axial fluid collection or mass.  No abnormal intracranial enhancement observed.  Bone marrow signal of the calvarium is within normal limits.  Major vascular flow voids are within normal limits.  The orbits globes and optic nerves are grossly unremarkable.  Mucous retention cysts versus polyps of the right sphenoid sinus with mucoperiosteal thickening.  Impression: Chronic and involutional changes of the brain with no acute intracranial abnormality observed    Electronically signed by: Bo Holbrook  Date:    11/23/2024  Time:    12:12          Assessment:       1. HCC (hepatocellular carcinoma)    2. Lightheadedness    3. Blurred vision    4. Drug rash    5. Thrombocytopenia    6. Nutritional anemia    7. Malignant neoplasm of overlapping sites of bladder    8. Longstanding persistent atrial fibrillation    9. Compensated HCV cirrhosis    10. Heartburn             Plan:           # HCC, Stage III - Child Faria A. Microwave ablation 9/2019, MRI Abdomen shows recurrent disease. Discussed case with IR, recommend Y90 + immunotherapy for best disease control. We discussed the results of the [facility] study and superiority of Durvalumab/Tremlimumab to Sorafenib for improved overall survival. Alternative therapy with be Bevacizumab/Atezolizumab per Impower 150 but would impact timing of Y90. Would recommend either approach over Sorafenib, patient and family understanding. AFP 21 -> 18.     Patient has experienced rash over scalp and trunk since starting immunotherapy. Rash has responded to topical steroids, prednisone 50 mg x2 doses but stopped due to significant insomnia. Overall <10% BSA and symptoms do not interfere with QoL or therapy. AFP 21 -> 3.3 -> 6.8, repeat in process.     Overall side effects of pruritus is tolerable, lightheadedness likely due to episodes of  hypoglycemia.     MRI Abdomen and CT Chest show treatment response 11/2024, plan for restaging CT CAP 1/2025. MRI per IR 2/2025.    - labs reviewed, proceed with C6. RTC prior to C5  - MRI Abdomen shows treatment response, CT Chest negative for metastatic disease     # Lightheadedness - MRI Brain 11/23/24 wnl, still with episodes of lightheadedness     # Blurred vision - optometry evaluation, recommend normal saline drops and prescribed glasses     # IO dermatitis - Continue topical steroids at home, Grade I. Refill kenalog today. Encourage sensitive solutions & unscented products    # Pruritus - trial cholestyramine in setting of hyperbilirubinemia     # Thrombocytopenia - Attributed to cirrhosis, established with hepatology. Improved on therapy    # Anemia - Mild iron deficiency, encourage iron in diet    # Non-Muscle Invasive Bladder Cancer, Ta - Follows with Dr. Bray, TURBT 2/2024. Cystoscopy 6/20/24 negative for recurrence, cancelled 9/2024. Will try to reschedule in November after next Y90 dose    # Atrial fibrillation - Coumadin -> Xarelto. HOLD anticoagulation if plts fall <50k     # Hep C Cirrhosis - Treated with SVR, patient declines liver transplant in the past. Established with hepatology for compensated cirrhosis, EGD 7/2/24  - improved liver enzymes after Y90   - establish with hepatology 12/2024    # Heartburn - improved, pepcid prn    # Arthritis - Taking Apap 500 mg bid prn    Follow up: prior to C7     The above information has been reviewed with the patient and all questions have been answered to their apparent satisfaction.  They understand that they can call the clinic with any questions.    Aleyda Kirkland MD  Hematology/Oncology  [facility]      Med Onc Chart Routing      Follow up with physician . Isael 1/8 in person ~10a/11a   Follow up with DARLING    Infusion scheduling note   durvalumab 1/9 ~10a   Injection scheduling note    Labs CBC and CMP   Scheduling:  Preferred  lab:  Lab interval:  lab apt prior to visit: all labs ordered today   Imaging    Pharmacy appointment    Other referrals

## 2024-12-11 ENCOUNTER — TELEPHONE (OUTPATIENT)
Dept: UROLOGY | Facility: CLINIC | Age: 69
End: 2024-12-11
Payer: MEDICARE

## 2024-12-11 ENCOUNTER — INFUSION (OUTPATIENT)
Dept: INFUSION THERAPY | Facility: HOSPITAL | Age: 69
End: 2024-12-11
Attending: STUDENT IN AN ORGANIZED HEALTH CARE EDUCATION/TRAINING PROGRAM
Payer: MEDICARE

## 2024-12-11 ENCOUNTER — OFFICE VISIT (OUTPATIENT)
Dept: HEMATOLOGY/ONCOLOGY | Facility: CLINIC | Age: 69
End: 2024-12-11
Payer: MEDICARE

## 2024-12-11 VITALS
OXYGEN SATURATION: 98 % | WEIGHT: 225.75 LBS | BODY MASS INDEX: 32.32 KG/M2 | RESPIRATION RATE: 16 BRPM | HEART RATE: 87 BPM | HEIGHT: 70 IN | TEMPERATURE: 98 F | SYSTOLIC BLOOD PRESSURE: 160 MMHG | DIASTOLIC BLOOD PRESSURE: 76 MMHG

## 2024-12-11 VITALS
BODY MASS INDEX: 32.32 KG/M2 | OXYGEN SATURATION: 98 % | RESPIRATION RATE: 16 BRPM | DIASTOLIC BLOOD PRESSURE: 70 MMHG | WEIGHT: 225.75 LBS | HEART RATE: 89 BPM | HEIGHT: 70 IN | SYSTOLIC BLOOD PRESSURE: 130 MMHG | TEMPERATURE: 98 F

## 2024-12-11 DIAGNOSIS — L27.0 DRUG RASH: ICD-10-CM

## 2024-12-11 DIAGNOSIS — C22.0 HCC (HEPATOCELLULAR CARCINOMA): Primary | ICD-10-CM

## 2024-12-11 DIAGNOSIS — I48.11 LONGSTANDING PERSISTENT ATRIAL FIBRILLATION: ICD-10-CM

## 2024-12-11 DIAGNOSIS — C67.8 MALIGNANT NEOPLASM OF OVERLAPPING SITES OF BLADDER: ICD-10-CM

## 2024-12-11 DIAGNOSIS — B19.20 COMPENSATED HCV CIRRHOSIS: ICD-10-CM

## 2024-12-11 DIAGNOSIS — C22.0 HEPATOCELLULAR CARCINOMA: ICD-10-CM

## 2024-12-11 DIAGNOSIS — R42 LIGHTHEADEDNESS: ICD-10-CM

## 2024-12-11 DIAGNOSIS — D53.9 NUTRITIONAL ANEMIA: ICD-10-CM

## 2024-12-11 DIAGNOSIS — K74.69 COMPENSATED HCV CIRRHOSIS: ICD-10-CM

## 2024-12-11 DIAGNOSIS — H53.8 BLURRED VISION: ICD-10-CM

## 2024-12-11 DIAGNOSIS — C22.0 HEPATOCELLULAR CARCINOMA: Primary | ICD-10-CM

## 2024-12-11 DIAGNOSIS — R12 HEARTBURN: ICD-10-CM

## 2024-12-11 DIAGNOSIS — D69.6 THROMBOCYTOPENIA: ICD-10-CM

## 2024-12-11 PROCEDURE — 3008F BODY MASS INDEX DOCD: CPT | Mod: CPTII,S$GLB,, | Performed by: STUDENT IN AN ORGANIZED HEALTH CARE EDUCATION/TRAINING PROGRAM

## 2024-12-11 PROCEDURE — 96413 CHEMO IV INFUSION 1 HR: CPT | Mod: PN

## 2024-12-11 PROCEDURE — 99214 OFFICE O/P EST MOD 30 MIN: CPT | Mod: S$GLB,,, | Performed by: STUDENT IN AN ORGANIZED HEALTH CARE EDUCATION/TRAINING PROGRAM

## 2024-12-11 PROCEDURE — 1126F AMNT PAIN NOTED NONE PRSNT: CPT | Mod: CPTII,S$GLB,, | Performed by: STUDENT IN AN ORGANIZED HEALTH CARE EDUCATION/TRAINING PROGRAM

## 2024-12-11 PROCEDURE — 3075F SYST BP GE 130 - 139MM HG: CPT | Mod: CPTII,S$GLB,, | Performed by: STUDENT IN AN ORGANIZED HEALTH CARE EDUCATION/TRAINING PROGRAM

## 2024-12-11 PROCEDURE — 99999 PR PBB SHADOW E&M-EST. PATIENT-LVL III: CPT | Mod: PBBFAC,,, | Performed by: STUDENT IN AN ORGANIZED HEALTH CARE EDUCATION/TRAINING PROGRAM

## 2024-12-11 PROCEDURE — 1159F MED LIST DOCD IN RCRD: CPT | Mod: CPTII,S$GLB,, | Performed by: STUDENT IN AN ORGANIZED HEALTH CARE EDUCATION/TRAINING PROGRAM

## 2024-12-11 PROCEDURE — 1101F PT FALLS ASSESS-DOCD LE1/YR: CPT | Mod: CPTII,S$GLB,, | Performed by: STUDENT IN AN ORGANIZED HEALTH CARE EDUCATION/TRAINING PROGRAM

## 2024-12-11 PROCEDURE — 3078F DIAST BP <80 MM HG: CPT | Mod: CPTII,S$GLB,, | Performed by: STUDENT IN AN ORGANIZED HEALTH CARE EDUCATION/TRAINING PROGRAM

## 2024-12-11 PROCEDURE — 3288F FALL RISK ASSESSMENT DOCD: CPT | Mod: CPTII,S$GLB,, | Performed by: STUDENT IN AN ORGANIZED HEALTH CARE EDUCATION/TRAINING PROGRAM

## 2024-12-11 PROCEDURE — 63600175 PHARM REV CODE 636 W HCPCS: Mod: JZ,JG,PN | Performed by: STUDENT IN AN ORGANIZED HEALTH CARE EDUCATION/TRAINING PROGRAM

## 2024-12-11 PROCEDURE — 1160F RVW MEDS BY RX/DR IN RCRD: CPT | Mod: CPTII,S$GLB,, | Performed by: STUDENT IN AN ORGANIZED HEALTH CARE EDUCATION/TRAINING PROGRAM

## 2024-12-11 PROCEDURE — G2211 COMPLEX E/M VISIT ADD ON: HCPCS | Mod: S$GLB,,, | Performed by: STUDENT IN AN ORGANIZED HEALTH CARE EDUCATION/TRAINING PROGRAM

## 2024-12-11 PROCEDURE — 25000003 PHARM REV CODE 250: Mod: PN | Performed by: STUDENT IN AN ORGANIZED HEALTH CARE EDUCATION/TRAINING PROGRAM

## 2024-12-11 PROCEDURE — 4010F ACE/ARB THERAPY RXD/TAKEN: CPT | Mod: CPTII,S$GLB,, | Performed by: STUDENT IN AN ORGANIZED HEALTH CARE EDUCATION/TRAINING PROGRAM

## 2024-12-11 RX ORDER — CHOLESTYRAMINE 4 G/9G
4 POWDER, FOR SUSPENSION ORAL
Qty: 270 PACKET | Refills: 3 | Status: SHIPPED | OUTPATIENT
Start: 2024-12-11 | End: 2025-12-11

## 2024-12-11 RX ORDER — DIPHENHYDRAMINE HYDROCHLORIDE 50 MG/ML
50 INJECTION INTRAMUSCULAR; INTRAVENOUS ONCE AS NEEDED
Status: CANCELLED | OUTPATIENT
Start: 2024-12-11

## 2024-12-11 RX ORDER — HEPARIN 100 UNIT/ML
500 SYRINGE INTRAVENOUS
Status: SHIPPED | OUTPATIENT
Start: 2024-12-11

## 2024-12-11 RX ORDER — SODIUM CHLORIDE 0.9 % (FLUSH) 0.9 %
10 SYRINGE (ML) INJECTION
Status: CANCELLED | OUTPATIENT
Start: 2024-12-11

## 2024-12-11 RX ORDER — DIPHENHYDRAMINE HYDROCHLORIDE 50 MG/ML
50 INJECTION INTRAMUSCULAR; INTRAVENOUS ONCE AS NEEDED
Status: DISCONTINUED | OUTPATIENT
Start: 2024-12-11 | End: 2024-12-11 | Stop reason: HOSPADM

## 2024-12-11 RX ORDER — SODIUM CHLORIDE 0.9 % (FLUSH) 0.9 %
10 SYRINGE (ML) INJECTION
Status: DISCONTINUED | OUTPATIENT
Start: 2024-12-11 | End: 2024-12-11 | Stop reason: HOSPADM

## 2024-12-11 RX ORDER — PROCHLORPERAZINE EDISYLATE 5 MG/ML
5 INJECTION INTRAMUSCULAR; INTRAVENOUS
Status: CANCELLED
Start: 2024-12-11

## 2024-12-11 RX ORDER — EPINEPHRINE 0.3 MG/.3ML
0.3 INJECTION SUBCUTANEOUS ONCE AS NEEDED
Status: CANCELLED | OUTPATIENT
Start: 2024-12-11

## 2024-12-11 RX ORDER — PROCHLORPERAZINE EDISYLATE 5 MG/ML
5 INJECTION INTRAMUSCULAR; INTRAVENOUS
Status: DISCONTINUED | OUTPATIENT
Start: 2024-12-11 | End: 2024-12-11 | Stop reason: HOSPADM

## 2024-12-11 RX ORDER — CHOLESTYRAMINE 4 G/9G
4 POWDER, FOR SUSPENSION ORAL
Qty: 270 PACKET | Refills: 3 | Status: SHIPPED | OUTPATIENT
Start: 2024-12-11 | End: 2024-12-11

## 2024-12-11 RX ORDER — EPINEPHRINE 0.3 MG/.3ML
0.3 INJECTION SUBCUTANEOUS ONCE AS NEEDED
Status: DISCONTINUED | OUTPATIENT
Start: 2024-12-11 | End: 2024-12-11 | Stop reason: HOSPADM

## 2024-12-11 RX ADMIN — SODIUM CHLORIDE 1500 MG: 9 INJECTION, SOLUTION INTRAVENOUS at 11:12

## 2024-12-11 RX ADMIN — SODIUM CHLORIDE: 9 INJECTION, SOLUTION INTRAVENOUS at 10:12

## 2024-12-11 NOTE — TELEPHONE ENCOUNTER
----- Message from Aleyda Kirkland MD sent at 12/11/2024 10:58 AM CST -----  He is ok at any time for cystoscopy. Would want to hold around Y90 but do not have any scheduled now!  ----- Message -----  From: Bia Ewing MA  Sent: 12/11/2024  10:45 AM CST  To: Aleyda Kirkland MD    Good Morning,    KENNETH Hardin would like to know at what point in pts treatment is best to plan to do a cystoscopy. We will work pt in the best we can as pt is overdue to cystoscopy.  ----- Message -----  From: Aelyda Kirkland MD  Sent: 12/11/2024  10:05 AM CST  To: STORMY Bray MD; #    Hi - Mr. Tavares is being treated for HCC, due for follow-up for NMIBC. Any chance we could work on scheduling? He is receiving immunotherapy q4w.     Thanks,  Aleyda

## 2024-12-11 NOTE — PLAN OF CARE
Problem: Adult Inpatient Plan of Care  Goal: Plan of Care Review  Outcome: Progressing  Flowsheets (Taken 12/11/2024 1055)  Plan of Care Reviewed With: patient  Goal: Patient-Specific Goal (Individualized)  Outcome: Progressing  Flowsheets (Taken 12/11/2024 1055)  Individualized Care Needs: recliner/snacks/dimmed lights/conversation  Anxieties, Fears or Concerns: none  Goal: Absence of Hospital-Acquired Illness or Injury  Outcome: Progressing  Goal: Optimal Comfort and Wellbeing  Outcome: Progressing  Goal: Readiness for Transition of Care  Outcome: Progressing     Problem: Fatigue  Goal: Improved Activity Tolerance  Outcome: Progressing  Intervention: Promote Improved Energy  Flowsheets (Taken 12/11/2024 1055)  Fatigue Management:   activity schedule adjusted   fatigue-related activity identified   frequent rest breaks encouraged   paced activity encouraged  Sleep/Rest Enhancement:   noise level reduced   regular sleep/rest pattern promoted   room darkened   reading promoted   relaxation techniques promoted  Activity Management:   Ambulated -L4   Ambulated to bathroom - L4   Ambulated in phan - L4   Up in stretcher chair - L1  Environmental Support: calm environment promoted   Pt tolerated Imfinzi well today. NAD/no concerns voiced. Reviewed follow-up appointments. All questions were answered, ambulated independently at d/c.

## 2024-12-11 NOTE — Clinical Note
Hi - Mr. Tavares is being treated for HCC, due for follow-up for NMIBC. Any chance we could work on scheduling? He is receiving immunotherapy q4w.   Thanks, Aleyda

## 2024-12-16 ENCOUNTER — PATIENT MESSAGE (OUTPATIENT)
Dept: HEMATOLOGY/ONCOLOGY | Facility: CLINIC | Age: 69
End: 2024-12-16
Payer: MEDICARE

## 2024-12-17 ENCOUNTER — OFFICE VISIT (OUTPATIENT)
Dept: HEPATOLOGY | Facility: CLINIC | Age: 69
End: 2024-12-17
Payer: MEDICARE

## 2024-12-17 VITALS — HEIGHT: 70 IN | BODY MASS INDEX: 32.19 KG/M2 | WEIGHT: 224.88 LBS

## 2024-12-17 DIAGNOSIS — K74.60 HEPATIC CIRRHOSIS, UNSPECIFIED HEPATIC CIRRHOSIS TYPE, UNSPECIFIED WHETHER ASCITES PRESENT: Primary | ICD-10-CM

## 2024-12-17 PROCEDURE — 4010F ACE/ARB THERAPY RXD/TAKEN: CPT | Mod: CPTII,S$GLB,, | Performed by: PHYSICIAN ASSISTANT

## 2024-12-17 PROCEDURE — 3008F BODY MASS INDEX DOCD: CPT | Mod: CPTII,S$GLB,, | Performed by: PHYSICIAN ASSISTANT

## 2024-12-17 PROCEDURE — 1160F RVW MEDS BY RX/DR IN RCRD: CPT | Mod: CPTII,S$GLB,, | Performed by: PHYSICIAN ASSISTANT

## 2024-12-17 PROCEDURE — 1159F MED LIST DOCD IN RCRD: CPT | Mod: CPTII,S$GLB,, | Performed by: PHYSICIAN ASSISTANT

## 2024-12-17 PROCEDURE — 99215 OFFICE O/P EST HI 40 MIN: CPT | Mod: S$GLB,,, | Performed by: PHYSICIAN ASSISTANT

## 2024-12-17 PROCEDURE — 1101F PT FALLS ASSESS-DOCD LE1/YR: CPT | Mod: CPTII,S$GLB,, | Performed by: PHYSICIAN ASSISTANT

## 2024-12-17 PROCEDURE — 99999 PR PBB SHADOW E&M-EST. PATIENT-LVL III: CPT | Mod: PBBFAC,,, | Performed by: PHYSICIAN ASSISTANT

## 2024-12-17 PROCEDURE — 3288F FALL RISK ASSESSMENT DOCD: CPT | Mod: CPTII,S$GLB,, | Performed by: PHYSICIAN ASSISTANT

## 2024-12-17 NOTE — Clinical Note
Joao Gonzalez - This pt has cirrhosis w/ portal HTN. I was wondering if you would be able to switch him (if appropriate) from metoprolol to coreg (minimum dose 6.25mg BID) b/c this would offer protection for varices and variceal bleeding and would prevent him from needing screening EGD. Thank you - Jen Scheuermann PA-C

## 2024-12-17 NOTE — PROGRESS NOTES
"HEPATOLOGY CLINIC VISIT NOTE   CHIEF COMPLAINT: Hepatitis C     HISTORY       This is a 69 y.o. White male w/ cirrhosis due to HCV (cured) who was found to have HCC in 2019. He was seen in 2019 by Dr Dr Malika Prescott in the Liver Transplant clinic. He underwent MWA w/ IR once, but ultimately declined transplant evaluation and was lost to Hepatology f/u. More recently he saw Dr Aleyda Kirkland (Hem/Onc) for pancytopenia; he was then referred here for hepatology care.     Recent w/u reveals HCC recurrence w/ 2024 AFP of 21 & MRI revealing  8cm x 4.2cm liver lesion c/w infiltrative HCC, add'l 1cm lesion c/w HCC, & tumor thrombus in RPV. Scan has already been reviewed by Dr Yuen who recommends Y90 (pt contacted about this but had just learned about HCC dx and was not ready to schedule) + immunotherapy (Onc appt scheduled tomorrow).      Cirrhosis history:  Well compensated  (+) portal HTN: SM 16.3cm, low plt 70s-100s  Varices: ?    Cirrhosis health maintenance:  - Varices screening:  EGD needed  - HAV status: Unknown  - HBV status: Unknown    HCV history:  Likely acquired through blood transfusion in teens,  S/p Harvoni 2018 - cured    Interval history (24):  Y90 24, 10/10/24  Started Imfinzi/Imjudo 2024; has continued w/ Imfinzi (Dr Kirkland)  - tolerating well overall. Recent pruritus, hives. Cholestyramine was prescribed    MRI 2024: no recurrence or residual. Next MRI schedule 2025  Labs: stable. MELD 10 (10/2024). AFP normalized:  21 -> 5.3    Denies jaundice, dark urine, hematemesis, melena, slowed mentation, abdominal distention.     EGD was ordered last visit, not scheduled b/c pt "had too much going on w/ Y90"  Of note he's on metoprolol 50mg (prescribed by cardiology)      PMH, PSH, SOCIAL HX, FAMILY HX      Reviewed in Epic  Pertinent findings:  FAMILY HX: neg for liver diease  SOCIAL HX: Resides in [place]  (wife  of cancer 2022)  Alcohol - no  Drugs - no      ROS: as per " HPI  Recent episodes dizziness, light-headedness. Has d/w oncology. Imaging of head negative. Felt due to hypoglycemic episodes. Plans to d/w PCP       PHYSICAL EXAM:  Friendly White male, in no acute distress; alert and oriented to person, place and time  HEENT: Sclerae anicteric.   NECK: Supple  LUNGS: Normal respiratory effort.   ABDOMEN: Flat, soft, nontender.   SKIN: Warm and dry. No jaundice, No obvious rashes.   NEURO/PSYCH: Normal gate. Memory intact. Thought and speech pattern appropriate. Behavior normal. No depression or anxiety noted.    PERTINENT DIAGNOSTIC RESULTS      Lab Results   Component Value Date    WBC 4.28 12/11/2024    HGB 13.6 (L) 12/11/2024    PLT 86 (L) 12/11/2024     Lab Results   Component Value Date    INR 1.2 10/10/2024     Lab Results   Component Value Date    AST 33 12/11/2024    ALT 21 12/11/2024    BILITOT 1.6 (H) 12/11/2024    ALBUMIN 3.6 12/11/2024    ALKPHOS 107 12/11/2024    CREATININE 0.7 12/11/2024    BUN 10 12/11/2024     12/11/2024    K 3.8 12/11/2024    AFP 5.3 12/11/2024     Results for orders placed during the hospital encounter of 11/05/24  MRI Abdomen W WO Contrast  CLINICAL HISTORY:HCC s/p Y90;  Liver cell carcinoma  TECHNIQUE:Multisequence, multiplanar MRI of the abdomen performed per liver protocol before and after administration of 10 mL Gadavist intravenous contrast. Additionally 2D and 3D MRCP sequences are performed as well as MIP images.  COMPARISON:CT chest, abdomen, and pelvis 07/15/2024.  MRI of the abdomen 06/26/2024.    FINDINGS:  Liver: Redemonstrated cirrhotic liver morphology.  Extensive post treatment affects within the right hepatic lobe status post multiple ablation most recently in the interim on 10/10/2024 the compared to the most recent prior MRI of the abdomen from 06/26/2024.  Central necrotic cavities are present within the right hepatic lobe most pronounced within segments 5 and 8 as well as new treatment related changes within hepatic  segment 6.  There is extensive persistent marginal enhancement which is nonspecific but without evidence of any clear washout or pseudo capsule.  No definite new distant lesions concerning for new satellite lesions.  No new findings within the left hepatic lobe.  Chronic presumed tumor thrombus within the right portal vein similar to the prior exam.  Main and left portal veins are patent.  Hepatic veins are patent.    Biliary: Gallbladder is normal.  No intrahepatic or extrahepatic biliary dilatation.    Pancreas: No mass or ductal dilatation identified.    Spleen: Enlarged measuring 16 cm.    Adrenal glands: Normal.    Kidneys: No solid renal mass.  Simple cyst within the right kidney measuring approximately 4 cm.  No hydronephrosis.    Miscellaneous: Small volume perihepatic ascites.  Normal caliber of the abdominal aorta.    Bones: No suspicious marrow lesions.    Impression  1. Status post multiple right hepatic lobe ablation is most recently 10/10/2024 with findings consistent with central necrotic changes within the treated lesions without definite MR evidence to suggest residual or new disease.  Continued follow-up recommended.  No acute process.  2. Cirrhotic liver morphology and mild perihepatic ascites.  Splenomegaly.  3. Additional stable findings as above.    ASSESSMENT        69 y.o. White male with:  1. Cirrhosis, well compensated  -- MELD score 10    2. HCC, dx 2019 w/ 2024 recurrence w/ RPV tumor thrombus  -- declined liver transplant eval  -- MWA 2019, Y90 8/2024, Y90 10/2024  -- seeing oncology: now on imfinzi    3. Portal hypertension  -- EGD: not done  -- SM  -- low plt 70s-100s    4. History of HCV: cured    5. AFib  -- recently switched from coumadin to xarelto  -- on metoprolol 50mg (cardiology)    PLAN        Proceed w/ 2/2025 MRI & IR clinic f/u as scheduled and Imfinzi per oncology.  HAV, HBV serology w/ next labs. Vaccinate accordingly.  Will msg Cardiology to see if they can use  carvedilol (minimum dose 6.25mg BID) instead of metoprolol to reduce risk of EV / EV bleed / hepatic decompensation.   - If this can be done, EGD not needed.   - If can't be done, needs EGD for EV screen.  F/U in clinic in 6 months  Reinforced education about cholestyramine dosing for pruritus: separate from other meds by minimum of 2, preferably 4 hours.     __________________________________________________________________    Duration of encounter: 47min  This includes face-to-face time and non face-to-face time preparing to see the patient (eg, review of tests), obtaining and/or reviewing separately obtained history, documenting clinical information in the electronic or other health record, independently interpreting resultsand communicating results to the patient/family/caregiver, or care coordination.

## 2024-12-18 DIAGNOSIS — I10 ESSENTIAL HYPERTENSION: ICD-10-CM

## 2024-12-18 DIAGNOSIS — I48.20 CHRONIC ATRIAL FIBRILLATION: Primary | ICD-10-CM

## 2024-12-18 RX ORDER — CARVEDILOL 6.25 MG/1
6.25 TABLET ORAL 2 TIMES DAILY WITH MEALS
Qty: 60 TABLET | Refills: 11 | Status: SHIPPED | OUTPATIENT
Start: 2024-12-18 | End: 2025-12-18

## 2024-12-20 ENCOUNTER — TELEPHONE (OUTPATIENT)
Dept: HEPATOLOGY | Facility: CLINIC | Age: 69
End: 2024-12-20
Payer: MEDICARE

## 2024-12-20 NOTE — TELEPHONE ENCOUNTER
I spoke with patient and msg from PA Scheuermann relayed.  He states that he has stopped metoprolol and start the carvedilol BID as ordered.  He states that each time he changes BP med he has dizziness.  He is going to monitor symptom and BP and call hepatology back if symptom does not resolve.

## 2024-12-20 NOTE — TELEPHONE ENCOUNTER
----- Message from Jennifer Scheuermann, PA sent at 12/19/2024  5:29 PM CST -----  Pls make sure pt knows his cardiologist sent a new script for his pressure medicine.  He will stop the metoprolol and start the carvedilol BID.     Thanks  ----- Message -----  From: Jonathan Gonzalez MD  Sent: 12/17/2024   1:47 PM CST  To: Jennifer B. Scheuermann, PA; #    Happy to switch him from metoprolol to coreg 6.25mg PO BID    -Jonathan  ----- Message -----  From: Scheuermann, Jennifer B., PA  Sent: 12/17/2024   1:16 PM CST  To: MD Joao Adan Dr -  This pt has cirrhosis w/ portal HTN. I was wondering if you would be able to switch him (if appropriate) from metoprolol to coreg (minimum dose 6.25mg BID) b/c this would offer protection for varices and variceal bleeding and would prevent him from needing screening EGD.  Thank you -  Jen Scheuermann PA-C

## 2024-12-20 NOTE — TELEPHONE ENCOUNTER
I spoke with patient and msg from PA Scheuermann relayed regarding once daily carvedilol use for for 3 - 4 days then increase to BID.

## 2024-12-20 NOTE — TELEPHONE ENCOUNTER
Noted.  You can also tell him that he may want to try doing the carvedilol once a day for 3-4 days and then increase to BID if still having trouble.    thanks

## 2024-12-23 DIAGNOSIS — I10 ESSENTIAL HYPERTENSION: ICD-10-CM

## 2024-12-23 DIAGNOSIS — I48.20 CHRONIC ATRIAL FIBRILLATION: ICD-10-CM

## 2024-12-23 RX ORDER — CARVEDILOL 6.25 MG/1
6.25 TABLET ORAL 2 TIMES DAILY WITH MEALS
Qty: 60 TABLET | Refills: 11 | Status: SHIPPED | OUTPATIENT
Start: 2024-12-23 | End: 2025-12-23

## 2024-12-26 ENCOUNTER — PROCEDURE VISIT (OUTPATIENT)
Dept: UROLOGY | Facility: CLINIC | Age: 69
End: 2024-12-26
Payer: MEDICARE

## 2024-12-26 VITALS — BODY MASS INDEX: 32.38 KG/M2 | HEIGHT: 70 IN | WEIGHT: 226.19 LBS

## 2024-12-26 DIAGNOSIS — Z85.51 HISTORY OF BLADDER CANCER: ICD-10-CM

## 2024-12-26 LAB
BILIRUBIN, UA POC OHS: NEGATIVE
BLOOD, UA POC OHS: NEGATIVE
CLARITY, UA POC OHS: CLEAR
COLOR, UA POC OHS: YELLOW
GLUCOSE, UA POC OHS: NEGATIVE
KETONES, UA POC OHS: NEGATIVE
LEUKOCYTES, UA POC OHS: NEGATIVE
NITRITE, UA POC OHS: NEGATIVE
PH, UA POC OHS: 7
PROTEIN, UA POC OHS: NEGATIVE
SPECIFIC GRAVITY, UA POC OHS: 1.01
UROBILINOGEN, UA POC OHS: 0.2

## 2024-12-26 NOTE — PROCEDURES
Cystoscopy    Date/Time: 12/26/2024 11:00 AM    Performed by: STORMY Bray MD  Authorized by: STORMY Bray MD    Consent Done?:  Yes (Written)  Timeout: prior to procedure the correct patient, procedure, and site was verified    Prep: patient was prepped and draped in usual sterile fashion    Anesthesia:  Lidocaine jelly  Indications: history bladder cancer    Position:  Supine  Anesthesia:  Lidocaine jelly  Patient sedated?: No    Preparation: Patient was prepped and draped in usual sterile fashion    Scope type:  Flexible cystoscope   patient tolerated the procedure well with no immediate complications    Blood Loss:  None     Indications:  69-year-old with a history of noninvasive low-grade urothelial carcinoma.  He underwent TURBT in February 20, 2024.  He is here for cystoscopy.     The flexible cystoscope was placed into the urethra and carefully advanced into the bladder.  A careful cystoscopic exam was then performed.  The entire bladder mucosa was systematically visualized.  Findings include moderate bladder wall trabeculation.  TUR scar was noted on the left side of the bladder wall.  Otherwise,  there were no lesions, masses foreign bodies or stones.   Each ureteral orifices were visualized and both had clear efflux of urine.  On retroflexion there was a small intravesical gland.  The cystoscope was then removed and I examined the entire length of the urethra.  There was moderate trilobar enlargement of the prostate otherwise the urethra appeared normal.  He tolerated the procedure well.  There were no complications     Impression:  Normal cystoscopy.  No clear evidence of recurrence.     Plan:  Follow-up 6 months for repeat cystoscopy

## 2024-12-27 ENCOUNTER — PATIENT MESSAGE (OUTPATIENT)
Dept: OPTOMETRY | Facility: CLINIC | Age: 69
End: 2024-12-27
Payer: MEDICARE

## 2025-01-08 ENCOUNTER — OFFICE VISIT (OUTPATIENT)
Dept: HEMATOLOGY/ONCOLOGY | Facility: CLINIC | Age: 70
End: 2025-01-08
Payer: MEDICARE

## 2025-01-08 ENCOUNTER — LAB VISIT (OUTPATIENT)
Dept: LAB | Facility: HOSPITAL | Age: 70
End: 2025-01-08
Payer: MEDICARE

## 2025-01-08 VITALS
RESPIRATION RATE: 18 BRPM | BODY MASS INDEX: 32.27 KG/M2 | SYSTOLIC BLOOD PRESSURE: 142 MMHG | HEART RATE: 90 BPM | HEIGHT: 70 IN | DIASTOLIC BLOOD PRESSURE: 96 MMHG | WEIGHT: 225.44 LBS | OXYGEN SATURATION: 98 %

## 2025-01-08 DIAGNOSIS — Z79.899 ON ANTINEOPLASTIC CHEMOTHERAPY: ICD-10-CM

## 2025-01-08 DIAGNOSIS — C22.0 HCC (HEPATOCELLULAR CARCINOMA): ICD-10-CM

## 2025-01-08 DIAGNOSIS — D53.9 NUTRITIONAL ANEMIA: ICD-10-CM

## 2025-01-08 DIAGNOSIS — R73.9 HYPERGLYCEMIA: ICD-10-CM

## 2025-01-08 DIAGNOSIS — H53.8 BLURRED VISION: ICD-10-CM

## 2025-01-08 DIAGNOSIS — L27.0 DRUG RASH: ICD-10-CM

## 2025-01-08 DIAGNOSIS — C67.8 MALIGNANT NEOPLASM OF OVERLAPPING SITES OF BLADDER: ICD-10-CM

## 2025-01-08 DIAGNOSIS — C22.0 HEPATOCELLULAR CARCINOMA: ICD-10-CM

## 2025-01-08 DIAGNOSIS — I48.11 LONGSTANDING PERSISTENT ATRIAL FIBRILLATION: ICD-10-CM

## 2025-01-08 DIAGNOSIS — K74.60 HEPATIC CIRRHOSIS, UNSPECIFIED HEPATIC CIRRHOSIS TYPE, UNSPECIFIED WHETHER ASCITES PRESENT: ICD-10-CM

## 2025-01-08 DIAGNOSIS — M19.90 ARTHRITIS: ICD-10-CM

## 2025-01-08 DIAGNOSIS — C22.0 HCC (HEPATOCELLULAR CARCINOMA): Primary | ICD-10-CM

## 2025-01-08 DIAGNOSIS — D69.6 THROMBOCYTOPENIA: ICD-10-CM

## 2025-01-08 DIAGNOSIS — B19.20 COMPENSATED HCV CIRRHOSIS: ICD-10-CM

## 2025-01-08 DIAGNOSIS — M77.9 TENDONITIS: ICD-10-CM

## 2025-01-08 DIAGNOSIS — R42 LIGHTHEADEDNESS: ICD-10-CM

## 2025-01-08 DIAGNOSIS — R12 HEARTBURN: ICD-10-CM

## 2025-01-08 DIAGNOSIS — K74.69 COMPENSATED HCV CIRRHOSIS: ICD-10-CM

## 2025-01-08 LAB
AFP SERPL-MCNC: 4.6 NG/ML (ref 0–8.4)
ALBUMIN SERPL BCP-MCNC: 3.6 G/DL (ref 3.5–5.2)
ALP SERPL-CCNC: 114 U/L (ref 40–150)
ALT SERPL W/O P-5'-P-CCNC: 19 U/L (ref 10–44)
ANION GAP SERPL CALC-SCNC: 8 MMOL/L (ref 8–16)
AST SERPL-CCNC: 29 U/L (ref 10–40)
BASOPHILS # BLD AUTO: 0.02 K/UL (ref 0–0.2)
BASOPHILS NFR BLD: 0.5 % (ref 0–1.9)
BILIRUB SERPL-MCNC: 1.4 MG/DL (ref 0.1–1)
BUN SERPL-MCNC: 13 MG/DL (ref 8–23)
CALCIUM SERPL-MCNC: 9 MG/DL (ref 8.7–10.5)
CHLORIDE SERPL-SCNC: 104 MMOL/L (ref 95–110)
CO2 SERPL-SCNC: 24 MMOL/L (ref 23–29)
CREAT SERPL-MCNC: 0.7 MG/DL (ref 0.5–1.4)
DIFFERENTIAL METHOD BLD: ABNORMAL
EOSINOPHIL # BLD AUTO: 0.3 K/UL (ref 0–0.5)
EOSINOPHIL NFR BLD: 7.8 % (ref 0–8)
ERYTHROCYTE [DISTWIDTH] IN BLOOD BY AUTOMATED COUNT: 13.2 % (ref 11.5–14.5)
EST. GFR  (NO RACE VARIABLE): >60 ML/MIN/1.73 M^2
FERRITIN SERPL-MCNC: 270 NG/ML (ref 20–300)
GLUCOSE SERPL-MCNC: 154 MG/DL (ref 70–110)
HAV IGG SER QL IA: REACTIVE
HBV CORE AB SERPL QL IA: NORMAL
HBV SURFACE AB SER-ACNC: <3 MIU/ML
HBV SURFACE AB SER-ACNC: NORMAL M[IU]/ML
HBV SURFACE AG SERPL QL IA: NORMAL
HCT VFR BLD AUTO: 40.1 % (ref 40–54)
HCV RNA SERPL QL NAA+PROBE: NOT DETECTED
HCV RNA SPEC NAA+PROBE-ACNC: NOT DETECTED IU/ML
HGB BLD-MCNC: 13.4 G/DL (ref 14–18)
IMM GRANULOCYTES # BLD AUTO: 0 K/UL (ref 0–0.04)
IMM GRANULOCYTES NFR BLD AUTO: 0 % (ref 0–0.5)
INR PPP: 1.3 (ref 0.8–1.2)
IRON SERPL-MCNC: 84 UG/DL (ref 45–160)
LYMPHOCYTES # BLD AUTO: 0.4 K/UL (ref 1–4.8)
LYMPHOCYTES NFR BLD: 10.8 % (ref 18–48)
MCH RBC QN AUTO: 32.1 PG (ref 27–31)
MCHC RBC AUTO-ENTMCNC: 33.4 G/DL (ref 32–36)
MCV RBC AUTO: 96 FL (ref 82–98)
MONOCYTES # BLD AUTO: 0.4 K/UL (ref 0.3–1)
MONOCYTES NFR BLD: 10.5 % (ref 4–15)
NEUTROPHILS # BLD AUTO: 2.6 K/UL (ref 1.8–7.7)
NEUTROPHILS NFR BLD: 70.4 % (ref 38–73)
NRBC BLD-RTO: 0 /100 WBC
PLATELET # BLD AUTO: 83 K/UL (ref 150–450)
PMV BLD AUTO: 11.6 FL (ref 9.2–12.9)
POTASSIUM SERPL-SCNC: 3.8 MMOL/L (ref 3.5–5.1)
PROT SERPL-MCNC: 7.7 G/DL (ref 6–8.4)
PROTHROMBIN TIME: 13.9 SEC (ref 9–12.5)
RBC # BLD AUTO: 4.17 M/UL (ref 4.6–6.2)
SATURATED IRON: 22 % (ref 20–50)
SODIUM SERPL-SCNC: 136 MMOL/L (ref 136–145)
TOTAL IRON BINDING CAPACITY: 383 UG/DL (ref 250–450)
TRANSFERRIN SERPL-MCNC: 259 MG/DL (ref 200–375)
WBC # BLD AUTO: 3.72 K/UL (ref 3.9–12.7)

## 2025-01-08 PROCEDURE — 3077F SYST BP >= 140 MM HG: CPT | Mod: CPTII,S$GLB,, | Performed by: STUDENT IN AN ORGANIZED HEALTH CARE EDUCATION/TRAINING PROGRAM

## 2025-01-08 PROCEDURE — 87340 HEPATITIS B SURFACE AG IA: CPT | Performed by: PHYSICIAN ASSISTANT

## 2025-01-08 PROCEDURE — 1101F PT FALLS ASSESS-DOCD LE1/YR: CPT | Mod: CPTII,S$GLB,, | Performed by: STUDENT IN AN ORGANIZED HEALTH CARE EDUCATION/TRAINING PROGRAM

## 2025-01-08 PROCEDURE — 1160F RVW MEDS BY RX/DR IN RCRD: CPT | Mod: CPTII,S$GLB,, | Performed by: STUDENT IN AN ORGANIZED HEALTH CARE EDUCATION/TRAINING PROGRAM

## 2025-01-08 PROCEDURE — 82105 ALPHA-FETOPROTEIN SERUM: CPT | Performed by: STUDENT IN AN ORGANIZED HEALTH CARE EDUCATION/TRAINING PROGRAM

## 2025-01-08 PROCEDURE — 87522 HEPATITIS C REVRS TRNSCRPJ: CPT | Performed by: PHYSICIAN ASSISTANT

## 2025-01-08 PROCEDURE — 82728 ASSAY OF FERRITIN: CPT | Performed by: STUDENT IN AN ORGANIZED HEALTH CARE EDUCATION/TRAINING PROGRAM

## 2025-01-08 PROCEDURE — 85610 PROTHROMBIN TIME: CPT | Performed by: STUDENT IN AN ORGANIZED HEALTH CARE EDUCATION/TRAINING PROGRAM

## 2025-01-08 PROCEDURE — 1126F AMNT PAIN NOTED NONE PRSNT: CPT | Mod: CPTII,S$GLB,, | Performed by: STUDENT IN AN ORGANIZED HEALTH CARE EDUCATION/TRAINING PROGRAM

## 2025-01-08 PROCEDURE — 83540 ASSAY OF IRON: CPT | Performed by: STUDENT IN AN ORGANIZED HEALTH CARE EDUCATION/TRAINING PROGRAM

## 2025-01-08 PROCEDURE — G2211 COMPLEX E/M VISIT ADD ON: HCPCS | Mod: S$GLB,,, | Performed by: STUDENT IN AN ORGANIZED HEALTH CARE EDUCATION/TRAINING PROGRAM

## 2025-01-08 PROCEDURE — 3080F DIAST BP >= 90 MM HG: CPT | Mod: CPTII,S$GLB,, | Performed by: STUDENT IN AN ORGANIZED HEALTH CARE EDUCATION/TRAINING PROGRAM

## 2025-01-08 PROCEDURE — 3288F FALL RISK ASSESSMENT DOCD: CPT | Mod: CPTII,S$GLB,, | Performed by: STUDENT IN AN ORGANIZED HEALTH CARE EDUCATION/TRAINING PROGRAM

## 2025-01-08 PROCEDURE — 86704 HEP B CORE ANTIBODY TOTAL: CPT | Performed by: PHYSICIAN ASSISTANT

## 2025-01-08 PROCEDURE — 36415 COLL VENOUS BLD VENIPUNCTURE: CPT | Performed by: STUDENT IN AN ORGANIZED HEALTH CARE EDUCATION/TRAINING PROGRAM

## 2025-01-08 PROCEDURE — 85025 COMPLETE CBC W/AUTO DIFF WBC: CPT | Performed by: STUDENT IN AN ORGANIZED HEALTH CARE EDUCATION/TRAINING PROGRAM

## 2025-01-08 PROCEDURE — 3008F BODY MASS INDEX DOCD: CPT | Mod: CPTII,S$GLB,, | Performed by: STUDENT IN AN ORGANIZED HEALTH CARE EDUCATION/TRAINING PROGRAM

## 2025-01-08 PROCEDURE — 99999 PR PBB SHADOW E&M-EST. PATIENT-LVL III: CPT | Mod: PBBFAC,,, | Performed by: STUDENT IN AN ORGANIZED HEALTH CARE EDUCATION/TRAINING PROGRAM

## 2025-01-08 PROCEDURE — 86706 HEP B SURFACE ANTIBODY: CPT | Performed by: PHYSICIAN ASSISTANT

## 2025-01-08 PROCEDURE — 80053 COMPREHEN METABOLIC PANEL: CPT | Performed by: STUDENT IN AN ORGANIZED HEALTH CARE EDUCATION/TRAINING PROGRAM

## 2025-01-08 PROCEDURE — 1159F MED LIST DOCD IN RCRD: CPT | Mod: CPTII,S$GLB,, | Performed by: STUDENT IN AN ORGANIZED HEALTH CARE EDUCATION/TRAINING PROGRAM

## 2025-01-08 PROCEDURE — 99215 OFFICE O/P EST HI 40 MIN: CPT | Mod: S$GLB,,, | Performed by: STUDENT IN AN ORGANIZED HEALTH CARE EDUCATION/TRAINING PROGRAM

## 2025-01-08 PROCEDURE — 86790 VIRUS ANTIBODY NOS: CPT | Performed by: PHYSICIAN ASSISTANT

## 2025-01-08 NOTE — PROGRESS NOTES
[facility]     Reason for visit: Follow Up     Best Contact Phone Number(s): There are no phone numbers on file.     Oncology History   Hepatocellular carcinoma   9/24/2019 Initial Diagnosis    HCC (hepatocellular carcinoma) - treated with microwave ablation     6/26/2024 Relapse    Patient established with hematology for thrombocytopenia, HCC and cirrhosis history prompted further GI work-up    6/26/24: MRI Abdomen shows large lesions in segments 8 and 5 with washout compatible with HCC, measuring 8 x4.2 cm transverse and 10 cm caudal, prominent tumor thrombus in right portal vein, 1 cm lesion in posterior right hepatic lobe concerning for additional focus     7/3/2024 Cancer Staged    Staging form: Liver, AJCC 8th Edition  - Clinical: Stage IIIA (cT3, cN0, cM0)     7/15/2024 Cancer Staged    CT CAP shows stable liver masses up to 7.3 cm not significantly changed from prior     7/16/2024 -  Chemotherapy    Treatment Summary   Plan Name: OP Tremelimumab + Durvalumab x1 followed by DURVALUMAB 1500 MG Q4W  Treatment Goal: Palliative  Status: Active  Start Date: 7/16/2024  End Date: 6/25/2025 (Planned)  Provider: Aleyda Kirkland MD  Chemotherapy: [No matching medication found in this treatment plan]     8/30/2024 Procedure    Y90     10/10/2024 Procedure    Y90     11/5/2024 Cancer Staged    CT Chest showing 2-3 mm nodule in the lung, mildly prominent mediastinal nodes    MRI shows central necrotic changes in treated lesions without suggestion of recurrent / residual disease. Cirrhotic morphology, mild perihepatic ascites     Malignant neoplasm of lateral wall of bladder   6/25/2024 Initial Diagnosis    Malignant neoplasm of overlapping sites of bladder     6/25/2024 Cancer Staged    Staging form: Urinary Bladder, AJCC 8th Edition  - Clinical: Stage 0a (cTa, cN0, cM0)       HPI: Hudson Tavares is a 69 y.o. male with HCC who presents prior to C7 Durvalumab. Since last visit has had to  discontinue cholestyramine due to toxicity. Switched metoprolol -> carvedilol without recurrent syncopal episodes. Has been very active on the farm and deer hunting. .     Patient presents alone. Has daughter, Rubi. Has daughter Mara and granddaughter, Anselmo and son Roberto. ECOG PS is 0.  History has been obtained by chart review and discussion with the patient.    ROS:   A complete 12-point review of systems was reviewed and is negative except as mentioned above.     Past Medical History:   Past Medical History:   Diagnosis Date    Adenomatous colon polyp     Alcohol abuse 07/09/2015    Anticoagulant long-term use     coumadin - discontinued    Atrial fibrillation 07/09/2015    CAD (coronary artery disease), native coronary artery 05/03/2016    Degenerative joint disease 07/09/2015    Encounter for blood transfusion     Enlarged liver     Essential hypertension 07/09/2015    Hep C w/o coma, chronic     treated with Harvoni    Hiatal hernia     patient states hernia has resolved    Hx of bladder infections     finished cipro 4/29/16    S/P CABG (coronary artery bypass graft) 05/17/2016    LIMA to LAD, SVG to OM, SVG to diagonal, SVG to right PDA, Maze, left atrial appendage resection (5/16)    Spleen enlarged     Thrombocytopenia 05/05/2016    Tobacco abuse 07/09/2015        Past Surgical History:   Past Surgical History:   Procedure Laterality Date    CARDIAC SURGERY  05/04/2016    4 vessel CABG    CERVICAL FUSION  2004    COLONOSCOPY  12/03/2018    [facility]    CYSTOSCOPY W/ RETROGRADES N/A 2/27/2024    Procedure: CYSTOSCOPY, WITH RETROGRADE PYELOGRAM;  Surgeon: STORMY Bray MD;  Location: [facility] OR;  Service: Urology;  Laterality: N/A;    CYSTOSCOPY WITH BIOPSY OF BLADDER N/A 2/27/2024    Procedure: CYSTOSCOPY, WITH BLADDER BIOPSY, WITH FULGURATION IF INDICATED;  Surgeon: STORMY Bray MD;  Location: [facility] OR;  Service: Urology;  Laterality: N/A;    CYSTOURETHROSCOPY, WITH THROMBUS REMOVAL N/A  "2024    Procedure: CYSTOURETHROSCOPY, WITH THROMBUS REMOVAL;  Surgeon: STORMY Bray MD;  Location: [facility] OR;  Service: Urology;  Laterality: N/A;    JOINT REPLACEMENT  TOTAL RIGHT KNEE     OPEN ANTERIOR SHOULDER RECONSTRUCTION Left     RADIOFREQUENCY ABLATION N/A 2019    Procedure: Radiofrequency Ablation;  Surgeon: Anayeli Surgeon;  [facility]: [facility];  Service: Anesthesiology;  Laterality: N/A;  Room 173/[place]    TIBIA FRACTURE SURGERY Right 1971        Family History:   Family History   Problem Relation Name Age of Onset    Heart disease Mother          congestive heart failure    Diabetes Mother      Hypertension Mother      Heart disease Sister          heart murmur    Atrial fibrillation Sister      Cancer Sister          breast    Cancer Father  60        colon    Chronic back pain Brother      Atrial fibrillation Sister      Heart disease Sister      Collagen disease Neg Hx          Social History:   Social History     Tobacco Use    Smoking status: Former     Current packs/day: 0.00     Average packs/day: 0.3 packs/day for 30.0 years (7.5 ttl pk-yrs)     Types: Cigarettes     Start date: 1986     Quit date: 2016     Years since quittin.6    Smokeless tobacco: Never   Substance Use Topics    Alcohol use: No     Alcohol/week: 0.0 standard drinks of alcohol     Comment: last drink 2015        I have reviewed and updated the patient's past medical, surgical, family and social histories.    Allergies:   Review of patient's allergies indicates:   Allergen Reactions    Cilostazol Other (See Comments)     Colon bleeding    Stadol [butorphanol tartrate] Hallucinations     "I saw pink elephants"    Keflex [cephalexin] Other (See Comments)     Was using 500mg TID oral and had a strong metallic taste in his mouth and could not function.  Can use IV cephalosporin.    Sulfa (sulfonamide antibiotics) Hives, Nausea And Vomiting and Other (See Comments)     Since childhood; does not know " reaction      Finasteride Other (See Comments)        Medications:   Current Outpatient Medications   Medication Sig Dispense Refill    ALPRAZolam (XANAX) 0.5 MG tablet Take 0.5 mg by mouth every evening.      carvediloL (COREG) 6.25 MG tablet Take 1 tablet (6.25 mg total) by mouth 2 (two) times daily with meals. 60 tablet 11    famotidine (PEPCID) 20 MG tablet Take 20 mg by mouth every morning.      losartan (COZAAR) 50 MG tablet Take 50 mg by mouth every evening.      rivaroxaban (XARELTO) 20 mg Tab Take 1 tablet (20 mg total) by mouth daily with dinner or evening meal. 30 tablet 3    spironolactone (ALDACTONE) 25 MG tablet Take 25 mg by mouth as needed.      triamcinolone acetonide 0.1% (KENALOG) 0.1 % cream Apply topically 2 (two) times daily. 454 g 1     Current Facility-Administered Medications   Medication Dose Route Frequency Provider Last Rate Last Admin    alteplase injection 2 mg  2 mg Intra-Catheter PRN Aleyda Kirkland MD        heparin, porcine (PF) 100 unit/mL injection flush 500 Units  500 Units Intravenous PRN Aleyda Kirkland MD         Facility-Administered Medications Ordered in Other Visits   Medication Dose Route Frequency Provider Last Rate Last Admin    diphenhydrAMINE injection 12.5 mg  12.5 mg Intravenous Q6H PRN Stefano Sherwood MD        electrolyte-S (ISOLYTE)   Intravenous Continuous Stefano Sherwood MD        fentaNYL 50 mcg/mL injection 25 mcg  25 mcg Intravenous Q5 Min PRN Stefano Sherwood MD        HYDROmorphone (PF) injection 0.2 mg  0.2 mg Intravenous Q5 Min PRN Stefano Sherwood MD        lactated ringers infusion   Intravenous Continuous Stefano Sherwood MD 10 mL/hr at 02/27/24 0824 New Bag at 02/27/24 0824    LIDOcaine (PF) 10 mg/ml (1%) injection 10 mg  1 mL Intradermal Once Stefano Sherwood MD        ondansetron injection 4 mg  4 mg Intravenous Daily PRN Stefano Sherwood MD        oxyCODONE immediate release tablet 5 mg  5 mg Oral Q3H PRN Stefano Sherwood MD   5 mg at  "02/27/24 1030        Physical Exam:   BP (!) 142/96 (BP Location: Right arm, Patient Position: Sitting)   Pulse 90   Resp 18   Ht 5' 10" (1.778 m)   Wt 102.3 kg (225 lb 6.7 oz)   SpO2 98%   BMI 32.34 kg/m²                Physical Exam  Constitutional:       Appearance: Normal appearance.   HENT:      Head: Normocephalic and atraumatic.      Mouth/Throat:      Mouth: Mucous membranes are moist.   Eyes:      Extraocular Movements: Extraocular movements intact.      Pupils: Pupils are equal, round, and reactive to light.   Cardiovascular:      Rate and Rhythm: Normal rate. Rhythm irregular.      Pulses: Normal pulses.      Heart sounds: No murmur heard.  Pulmonary:      Effort: Pulmonary effort is normal. No respiratory distress.      Breath sounds: Normal breath sounds.   Abdominal:      General: Abdomen is flat. There is no distension.      Palpations: Abdomen is soft.      Tenderness: There is no abdominal tenderness.   Musculoskeletal:         General: No swelling or tenderness. Normal range of motion.      Cervical back: Normal range of motion. No rigidity.   Skin:     General: Skin is warm and dry.      Coloration: Skin is not jaundiced.   Neurological:      General: No focal deficit present.      Mental Status: He is alert and oriented to person, place, and time.   Psychiatric:         Mood and Affect: Mood normal.         Behavior: Behavior normal.           Labs:   Lab Results   Component Value Date    WBC 3.72 (L) 01/08/2025    HGB 13.4 (L) 01/08/2025    HCT 40.1 01/08/2025    MCV 96 01/08/2025    PLT 83 (L) 01/08/2025       Lab Results   Component Value Date     01/08/2025    K 3.8 01/08/2025     01/08/2025    CO2 24 01/08/2025    BUN 13 01/08/2025    CREATININE 0.7 01/08/2025    ALBUMIN 3.6 01/08/2025    BILITOT 1.4 (H) 01/08/2025    ALKPHOS 114 01/08/2025    AST 29 01/08/2025    ALT 19 01/08/2025       Imaging:   MRI Brain W WO Contrast  Narrative: EXAMINATION:  MRI BRAIN W WO " CONTRAST    CLINICAL HISTORY:  Metastatic disease evaluation; Liver cell carcinoma    TECHNIQUE:  Multiplanar multisequence MR imaging of the brain was performed with and without IV contrast.    COMPARISON:  None.    FINDINGS:  Gray matter distinction is preserved throughout the brain parenchyma.  The ventricles are midline without mass effect. Periventricular deep white matter FLAIR T2 hyperintensities noted consistent with changes of chronic microvascular ischemia.  No intra-axial hemorrhage or restricted diffusion.  No intra-axial fluid collection or mass.  No abnormal intracranial enhancement observed.  Bone marrow signal of the calvarium is within normal limits.  Major vascular flow voids are within normal limits.  The orbits globes and optic nerves are grossly unremarkable.  Mucous retention cysts versus polyps of the right sphenoid sinus with mucoperiosteal thickening.  Impression: Chronic and involutional changes of the brain with no acute intracranial abnormality observed    Electronically signed by: Bo Holbrook  Date:    11/23/2024  Time:    12:12          Assessment:       1. HCC (hepatocellular carcinoma)    2. Hepatocellular carcinoma    3. Lightheadedness    4. Blurred vision    5. Drug rash    6. Thrombocytopenia    7. Nutritional anemia    8. Malignant neoplasm of overlapping sites of bladder    9. Longstanding persistent atrial fibrillation    10. Compensated HCV cirrhosis    11. Heartburn    12. Arthritis           Plan:           # HCC, Stage III - Child Faria A. Microwave ablation 9/2019, MRI Abdomen shows recurrent disease. Discussed case with IR, recommend Y90 + immunotherapy for best disease control. We discussed the results of the [place] study and superiority of Durvalumab/Tremlimumab to Sorafenib for improved overall survival. Alternative therapy with be Bevacizumab/Atezolizumab per Impower 150 but would impact timing of Y90. Would recommend either approach over Sorafenib, patient and  family understanding. AFP 21 -> 18.     Patient has experienced rash over scalp and trunk since starting immunotherapy. Rash has responded to topical steroids, prednisone 50 mg x2 doses but stopped due to significant insomnia. Overall <10% BSA and symptoms do not interfere with QoL or therapy. AFP 21 -> 3.3 -> 6.8 -> 4.6    Overall side effects of pruritus is tolerable, lightheadedness likely due to episodes of hypoglycemia.     MRI Abdomen and CT Chest show treatment response 11/2024, plan for restaging CT CAP 1/2025. MRI per IR 2/2025.    - labs reviewed, proceed with C7. RTC prior to C8  - MRI Abdomen shows treatment response, CT Chest negative for metastatic disease     # Lightheadedness - MRI Brain 11/23/24 wnl, improved with metoprolol -> carvedilol    # Blurred vision - optometry evaluation, recommend normal saline drops and prescribed glasses     # IO dermatitis - Continue topical steroids at home, Grade I. Refill kenalog today. Encourage sensitive solutions & unscented products    # Pruritus - did not tolerate cholestyramine    # Thrombocytopenia - Attributed to cirrhosis, established with hepatology. Improved on therapy    # Anemia - Mild iron deficiency, encourage iron in diet    # Non-Muscle Invasive Bladder Cancer, Ta - Follows with Dr. Bray, TURBT 2/2024. Cystoscopy 6/20/24 negative for recurrence, cancelled 9/2024. Cystoscopy 12/2024 negative for recurrence     # Atrial fibrillation - Coumadin -> Xarelto. HOLD anticoagulation if plts fall <50k     # Hep C Cirrhosis - Treated with SVR, patient declines liver transplant in the past. Established with hepatology for compensated cirrhosis, EGD 7/2/24  - improved liver enzymes after Y90   - establish with hepatology 12/2024    # Heartburn - improved, pepcid prn    # Arthritis - Taking Apap 500 mg bid prn    # Tendonitis - orthopedic hand referral     Follow up: prior to C8     The above information has been reviewed with the patient and all questions  have been answered to their apparent satisfaction.  They understand that they can call the clinic with any questions.    Aleyda Kirkland MD  Hematology/Oncology  [facility]      Med Onc Chart Routing      Follow up with physician . Isael 3/5   Follow up with DARLING    Infusion scheduling note   C9 imfimzi 3/6   Injection scheduling note    Labs   Scheduling:  Preferred lab:  Lab interval:  Labs prior to NP/MD visit: cbc, cmp, afp, tsh   Imaging CT chest abdomen pelvis   [place] CT CAP 2/5/25   Pharmacy appointment    Other referrals

## 2025-01-09 ENCOUNTER — TELEPHONE (OUTPATIENT)
Dept: HEPATOLOGY | Facility: CLINIC | Age: 70
End: 2025-01-09
Payer: MEDICARE

## 2025-01-09 ENCOUNTER — INFUSION (OUTPATIENT)
Dept: INFUSION THERAPY | Facility: HOSPITAL | Age: 70
End: 2025-01-09
Attending: STUDENT IN AN ORGANIZED HEALTH CARE EDUCATION/TRAINING PROGRAM
Payer: MEDICARE

## 2025-01-09 ENCOUNTER — DOCUMENTATION ONLY (OUTPATIENT)
Dept: INFUSION THERAPY | Facility: HOSPITAL | Age: 70
End: 2025-01-09
Payer: MEDICARE

## 2025-01-09 VITALS
RESPIRATION RATE: 20 BRPM | BODY MASS INDEX: 32.35 KG/M2 | TEMPERATURE: 98 F | HEIGHT: 70 IN | SYSTOLIC BLOOD PRESSURE: 118 MMHG | DIASTOLIC BLOOD PRESSURE: 72 MMHG | OXYGEN SATURATION: 98 % | WEIGHT: 226 LBS | HEART RATE: 88 BPM

## 2025-01-09 DIAGNOSIS — C22.0 HEPATOCELLULAR CARCINOMA: Primary | ICD-10-CM

## 2025-01-09 PROCEDURE — 25000003 PHARM REV CODE 250: Mod: PN | Performed by: STUDENT IN AN ORGANIZED HEALTH CARE EDUCATION/TRAINING PROGRAM

## 2025-01-09 PROCEDURE — 63600175 PHARM REV CODE 636 W HCPCS: Mod: JZ,TB,PN | Performed by: STUDENT IN AN ORGANIZED HEALTH CARE EDUCATION/TRAINING PROGRAM

## 2025-01-09 PROCEDURE — 96413 CHEMO IV INFUSION 1 HR: CPT | Mod: PN

## 2025-01-09 RX ORDER — PROCHLORPERAZINE EDISYLATE 5 MG/ML
5 INJECTION INTRAMUSCULAR; INTRAVENOUS
Status: CANCELLED
Start: 2025-01-09

## 2025-01-09 RX ORDER — DIPHENHYDRAMINE HYDROCHLORIDE 50 MG/ML
50 INJECTION INTRAMUSCULAR; INTRAVENOUS ONCE AS NEEDED
Status: CANCELLED | OUTPATIENT
Start: 2025-01-09

## 2025-01-09 RX ORDER — SODIUM CHLORIDE 0.9 % (FLUSH) 0.9 %
10 SYRINGE (ML) INJECTION
Status: CANCELLED | OUTPATIENT
Start: 2025-01-09

## 2025-01-09 RX ORDER — HEPARIN 100 UNIT/ML
500 SYRINGE INTRAVENOUS
Status: CANCELLED | OUTPATIENT
Start: 2025-01-09

## 2025-01-09 RX ORDER — EPINEPHRINE 0.3 MG/.3ML
0.3 INJECTION SUBCUTANEOUS ONCE AS NEEDED
Status: CANCELLED | OUTPATIENT
Start: 2025-01-09

## 2025-01-09 RX ADMIN — SODIUM CHLORIDE: 9 INJECTION, SOLUTION INTRAVENOUS at 10:01

## 2025-01-09 RX ADMIN — SODIUM CHLORIDE 1500 MG: 9 INJECTION, SOLUTION INTRAVENOUS at 11:01

## 2025-01-09 NOTE — TELEPHONE ENCOUNTER
Labs 1/8 reviewed:  Pls tell pt labs show Hep C virus negative as expected. We do not need to check this anymore.  Labs show he does not have protection against Hep A or B and vaccine is recommended.    I sent Rx to [facility] in [place] since that's where he goes for cancer treatments

## 2025-01-09 NOTE — PROGRESS NOTES
Oncology Nutrition       Weight Check   Chart reviewed. Weight check completed on pt.     CBW:225#  Weight at initiation of tx:219#    Wt Readings from Last 10 Encounters:   01/09/25 102.5 kg (225 lb 15.5 oz)   01/08/25 102.3 kg (225 lb 6.7 oz)   12/26/24 102.6 kg (226 lb 3.1 oz)   12/17/24 102 kg (224 lb 13.9 oz)   12/11/24 102.4 kg (225 lb 12 oz)   12/11/24 102.4 kg (225 lb 12 oz)   11/13/24 101.2 kg (223 lb 1.7 oz)   11/13/24 101.2 kg (223 lb 1.7 oz)   10/31/24 101.4 kg (223 lb 8.7 oz)   10/16/24 100.2 kg (220 lb 14.4 oz)      RD plan of care: Weight is currently 225#. No significant change at this time. Per nursing nutrition risk report, pt denies any nutritional concerns or challenges at this time. RD to continue to monitor; no nutritional interventions are needed at this time.     Mary Alberto, MS, RD, LDN  01/09/2025  11:58 AM

## 2025-01-09 NOTE — PLAN OF CARE
.Pt tolerated imfinzi infusion well.  No adverse reaction noted.   IV flushed with NS and D/C per protocol.  Patient left clinic in no acute distress.

## 2025-01-09 NOTE — TELEPHONE ENCOUNTER
I spoke with patient and msg from PA Scheuermann relayed.  He states that he will stop by pharmacy today and start vaccine series.

## 2025-01-27 ENCOUNTER — TELEPHONE (OUTPATIENT)
Dept: HEMATOLOGY/ONCOLOGY | Facility: CLINIC | Age: 70
End: 2025-01-27
Payer: MEDICARE

## 2025-01-27 DIAGNOSIS — K57.92 DIVERTICULITIS: Primary | ICD-10-CM

## 2025-01-27 DIAGNOSIS — K52.9 COLITIS: ICD-10-CM

## 2025-01-27 NOTE — PROGRESS NOTES
Presented to ER 1/27 with hematochezia, has scant blood in stool today. Not having diarrhea, stool is formed. Started on Ciprofloxacin. Patient declined stool studies. Agree to consider doing colonoscopy, put in referral. Has known contacts with viral illness.

## 2025-02-05 ENCOUNTER — PATIENT MESSAGE (OUTPATIENT)
Dept: HEMATOLOGY/ONCOLOGY | Facility: CLINIC | Age: 70
End: 2025-02-05
Payer: MEDICARE

## 2025-02-05 ENCOUNTER — TELEPHONE (OUTPATIENT)
Dept: HEMATOLOGY/ONCOLOGY | Facility: CLINIC | Age: 70
End: 2025-02-05
Payer: MEDICARE

## 2025-02-05 ENCOUNTER — HOSPITAL ENCOUNTER (OUTPATIENT)
Dept: RADIOLOGY | Facility: HOSPITAL | Age: 70
Discharge: HOME | End: 2025-02-05
Attending: FAMILY MEDICINE
Payer: MEDICARE

## 2025-02-05 DIAGNOSIS — C22.0 HCC (HEPATOCELLULAR CARCINOMA): ICD-10-CM

## 2025-02-05 PROCEDURE — A9585 GADOBUTROL INJECTION: HCPCS | Mod: PO | Performed by: FAMILY MEDICINE

## 2025-02-05 PROCEDURE — 74183 MRI ABD W/O CNTR FLWD CNTR: CPT | Mod: 26,,, | Performed by: STUDENT IN AN ORGANIZED HEALTH CARE EDUCATION/TRAINING PROGRAM

## 2025-02-05 PROCEDURE — 74183 MRI ABD W/O CNTR FLWD CNTR: CPT | Mod: TC,PO

## 2025-02-05 PROCEDURE — 25500020 PHARM REV CODE 255: Mod: PO | Performed by: FAMILY MEDICINE

## 2025-02-05 RX ORDER — GADOBUTROL 604.72 MG/ML
10 INJECTION INTRAVENOUS
Status: COMPLETED | OUTPATIENT
Start: 2025-02-05 | End: 2025-02-05

## 2025-02-05 RX ADMIN — GADOBUTROL 10 ML: 604.72 INJECTION INTRAVENOUS at 10:02

## 2025-02-05 NOTE — PROGRESS NOTES
[facility]     Reason for visit: HCC, follow up     Best Contact Phone Number(s): There are no phone numbers on file.     Oncology History   Hepatocellular carcinoma   9/24/2019 Initial Diagnosis    HCC (hepatocellular carcinoma) - treated with microwave ablation     6/26/2024 Relapse    Patient established with hematology for thrombocytopenia, HCC and cirrhosis history prompted further GI work-up    6/26/24: MRI Abdomen shows large lesions in segments 8 and 5 with washout compatible with HCC, measuring 8 x4.2 cm transverse and 10 cm caudal, prominent tumor thrombus in right portal vein, 1 cm lesion in posterior right hepatic lobe concerning for additional focus     7/3/2024 Cancer Staged    Staging form: Liver, AJCC 8th Edition  - Clinical: Stage IIIA (cT3, cN0, cM0)     7/15/2024 Cancer Staged    CT CAP shows stable liver masses up to 7.3 cm not significantly changed from prior     7/16/2024 -  Chemotherapy    Treatment Summary   Plan Name: OP Tremelimumab + Durvalumab x1 followed by DURVALUMAB 1500 MG Q4W  Treatment Goal: Palliative  Status: Active  Start Date: 7/16/2024  End Date: 6/25/2025 (Planned)  Provider: Aleyda Kirkland MD  Chemotherapy: [No matching medication found in this treatment plan]     8/30/2024 Procedure    Y90     10/10/2024 Procedure    Y90     11/5/2024 Cancer Staged    CT Chest showing 2-3 mm nodule in the lung, mildly prominent mediastinal nodes    MRI shows central necrotic changes in treated lesions without suggestion of recurrent / residual disease. Cirrhotic morphology, mild perihepatic ascites     Malignant neoplasm of lateral wall of bladder (Resolved)   6/25/2024 Initial Diagnosis    Malignant neoplasm of overlapping sites of bladder     6/25/2024 Cancer Staged    Staging form: Urinary Bladder, AJCC 8th Edition  - Clinical: Stage 0a (cTa, cN0, cM0)       HPI: Hudson Tavares is a 69 y.o. male with HCC who presents prior to C8 Durvalumab. Since last visit  patient with recent hospital visit on 1/25/25, d/c on 1/26/25 for colitis. Patient experienced bright red blood per rectum and CT scan showed colitis. Xarelto held for 24 hours while starting on antibiotics due to blood per rectum being minimal and Hgb being normal while in ER. He initially had resolution of blood per rectum, but indicates now that he is having dark, tarry stools. Hgb now 11.5 from 2/5/25.    Patient presents alone. Has daughter, Rubi. Has daughter Mara and granddaughter, Anselmo and son Roberto. ECOG PS is 0.  History has been obtained by chart review and discussion with the patient.    ROS:   A complete 12-point review of systems was reviewed and is negative except as mentioned above.     Past Medical History:   Past Medical History:   Diagnosis Date    Adenomatous colon polyp     Alcohol abuse 07/09/2015    Anticoagulant long-term use     coumadin - discontinued    Atrial fibrillation 07/09/2015    CAD (coronary artery disease), native coronary artery 05/03/2016    Degenerative joint disease 07/09/2015    Encounter for blood transfusion     Enlarged liver     Essential hypertension 07/09/2015    Hep C w/o coma, chronic     treated with Harvoni    Hiatal hernia     patient states hernia has resolved    Hx of bladder infections     finished cipro 4/29/16    S/P CABG (coronary artery bypass graft) 05/17/2016    LIMA to LAD, SVG to OM, SVG to diagonal, SVG to right PDA, Maze, left atrial appendage resection (5/16)    Spleen enlarged     Thrombocytopenia 05/05/2016    Tobacco abuse 07/09/2015        Past Surgical History:   Past Surgical History:   Procedure Laterality Date    CARDIAC SURGERY  05/04/2016    4 vessel CABG    CERVICAL FUSION  2004    COLONOSCOPY  12/03/2018    Steve Wells    CYSTOSCOPY W/ RETROGRADES N/A 2/27/2024    Procedure: CYSTOSCOPY, WITH RETROGRADE PYELOGRAM;  Surgeon: STORMY Bray MD;  [facility];  Service: Urology;  Laterality: N/A;    CYSTOSCOPY WITH BIOPSY OF  "BLADDER N/A 2024    Procedure: CYSTOSCOPY, WITH BLADDER BIOPSY, WITH FULGURATION IF INDICATED;  Surgeon: STORMY Bray MD;  Location: [facility] OR;  Service: Urology;  Laterality: N/A;    CYSTOURETHROSCOPY, WITH THROMBUS REMOVAL N/A 2024    Procedure: CYSTOURETHROSCOPY, WITH THROMBUS REMOVAL;  Surgeon: STORMY Bray MD;  Location: [facility] OR;  Service: Urology;  Laterality: N/A;    JOINT REPLACEMENT  TOTAL RIGHT KNEE     OPEN ANTERIOR SHOULDER RECONSTRUCTION Left     RADIOFREQUENCY ABLATION N/A 2019    Procedure: Radiofrequency Ablation;  Surgeon: Anayeli Surgeon;  Location: [facility];  Service: Anesthesiology;  Laterality: N/A;  Room 173/[place]    TIBIA FRACTURE SURGERY Right 1971        Family History:   Family History   Problem Relation Name Age of Onset    Heart disease Mother          congestive heart failure    Diabetes Mother      Hypertension Mother      Heart disease Sister          heart murmur    Atrial fibrillation Sister      Cancer Sister          breast    Cancer Father  60        colon    Chronic back pain Brother      Atrial fibrillation Sister      Heart disease Sister      Collagen disease Neg Hx          Social History:   Social History     Tobacco Use    Smoking status: Former     Current packs/day: 0.00     Average packs/day: 0.3 packs/day for 30.0 years (7.5 ttl pk-yrs)     Types: Cigarettes     Start date: 1986     Quit date: 2016     Years since quittin.7    Smokeless tobacco: Never   Substance Use Topics    Alcohol use: No     Alcohol/week: 0.0 standard drinks of alcohol     Comment: last drink 2015        I have reviewed and updated the patient's past medical, surgical, family and social histories.    Allergies:   Review of patient's allergies indicates:   Allergen Reactions    Cilostazol Other (See Comments)     Colon bleeding    Stadol [butorphanol tartrate] Hallucinations     "I saw pink elephants"    Keflex [cephalexin] Other (See Comments)     Was using " 500mg TID oral and had a strong metallic taste in his mouth and could not function.  Can use IV cephalosporin.    Sulfa (sulfonamide antibiotics) Hives, Nausea And Vomiting and Other (See Comments)     Since childhood; does not know reaction      Finasteride Other (See Comments)        Medications:   Current Outpatient Medications   Medication Sig Dispense Refill    ALPRAZolam (XANAX) 0.5 MG tablet Take 0.5 mg by mouth every evening.      carvediloL (COREG) 6.25 MG tablet Take 1 tablet (6.25 mg total) by mouth 2 (two) times daily with meals. 60 tablet 11    famotidine (PEPCID) 20 MG tablet Take 20 mg by mouth every morning.      hepatitis A and B vaccine, PF, (TWINRIX) 720 KATINA unit- 20 mcg/mL Syrg suspension Inject 1mL into the skin now, repeat in 1 month,  and 6 months 1 mL 2    losartan (COZAAR) 50 MG tablet Take 50 mg by mouth every evening.      rivaroxaban (XARELTO) 20 mg Tab Take 1 tablet (20 mg total) by mouth daily with dinner or evening meal. 30 tablet 3    spironolactone (ALDACTONE) 25 MG tablet Take 25 mg by mouth as needed.      triamcinolone acetonide 0.1% (KENALOG) 0.1 % cream Apply topically 2 (two) times daily. 454 g 1     Current Facility-Administered Medications   Medication Dose Route Frequency Provider Last Rate Last Admin    alteplase injection 2 mg  2 mg Intra-Catheter PRN Aleyda Kirkland MD        heparin, porcine (PF) 100 unit/mL injection flush 500 Units  500 Units Intravenous PRN Aleyda Kirkland MD         Facility-Administered Medications Ordered in Other Visits   Medication Dose Route Frequency Provider Last Rate Last Admin    diphenhydrAMINE injection 12.5 mg  12.5 mg Intravenous Q6H PRN Stefano Sherwood MD        electrolyte-S (ISOLYTE)   Intravenous Continuous Stefano Sherwood MD        fentaNYL 50 mcg/mL injection 25 mcg  25 mcg Intravenous Q5 Min PRN Stefano Sherwood MD        HYDROmorphone (PF) injection 0.2 mg  0.2 mg Intravenous Q5 Min PRN Stefano Sherwood MD        lactated  "ringers infusion   Intravenous Continuous Stefano Sherwood MD 10 mL/hr at 02/27/24 0824 New Bag at 02/27/24 0824    LIDOcaine (PF) 10 mg/ml (1%) injection 10 mg  1 mL Intradermal Once Stefano Sherwood MD        ondansetron injection 4 mg  4 mg Intravenous Daily PRN Stefano Sherwood MD        oxyCODONE immediate release tablet 5 mg  5 mg Oral Q3H PRN Stefano Sherwood MD   5 mg at 02/27/24 1030        Physical Exam:   /67 (BP Location: Right forearm, Patient Position: Sitting)   Pulse 83   Temp 98.9 °F (37.2 °C) (Temporal)   Resp 17   Ht 5' 10" (1.778 m)   Wt 104.7 kg (230 lb 13.2 oz)   SpO2 98%   BMI 33.12 kg/m²      Wt Readings from Last 3 Encounters:   02/06/25 104.7 kg (230 lb 13.2 oz)   01/25/25 103.6 kg (228 lb 6.3 oz)   01/09/25 102.5 kg (225 lb 15.5 oz)               Physical Exam  Constitutional:       Appearance: Normal appearance.   HENT:      Head: Normocephalic and atraumatic.      Mouth/Throat:      Mouth: Mucous membranes are moist.   Eyes:      Extraocular Movements: Extraocular movements intact.      Pupils: Pupils are equal, round, and reactive to light.   Cardiovascular:      Rate and Rhythm: Normal rate. Rhythm irregular.      Pulses: Normal pulses.      Heart sounds: No murmur heard.  Pulmonary:      Effort: Pulmonary effort is normal. No respiratory distress.      Breath sounds: Normal breath sounds.   Abdominal:      General: Abdomen is flat. There is no distension.      Palpations: Abdomen is soft.      Tenderness: There is no abdominal tenderness.   Musculoskeletal:      Cervical back: Normal range of motion. No rigidity.      Right lower leg: No edema.      Left lower leg: No edema.   Skin:     General: Skin is warm and dry.      Coloration: Skin is not jaundiced.   Neurological:      General: No focal deficit present.      Mental Status: He is alert and oriented to person, place, and time.   Psychiatric:         Mood and Affect: Mood normal.         Behavior: Behavior normal. "           Labs:   Lab Results   Component Value Date    WBC 4.30 02/05/2025    HGB 11.5 (L) 02/05/2025    HCT 34.5 (L) 02/05/2025    MCV 98 02/05/2025    PLT 80 (L) 02/05/2025       Lab Results   Component Value Date     02/05/2025    K 3.8 02/05/2025     02/05/2025    CO2 26 02/05/2025    BUN 12 02/05/2025    CREATININE 0.6 02/05/2025    ALBUMIN 3.6 02/05/2025    BILITOT 1.1 (H) 02/05/2025    ALKPHOS 102 02/05/2025    AST 33 02/05/2025    ALT 20 02/05/2025       Imaging:   MRI Abdomen W WO Contrast  Narrative: EXAMINATION:  MRI ABDOMEN W WO CONTRAST    CLINICAL HISTORY:  HCC s/p Y90;  Liver cell carcinoma    TECHNIQUE:  Multiplanar multisequence images of the abdomen before and after administration of 10 mL Gadavist intravenous contrast.    COMPARISON:  CT abdomen pelvis 01/25/2025, MRI abdomen 11/05/2024    FINDINGS:  Cirrhotic liver morphology with nodular contour and heterogeneous parenchyma.  Post treatment changes of multiple right hepatic lobe radioembolization.  Peripheral enhancement surrounds the ablation cavities which progresses on venous and delayed phases likely representing scarring/fibrosis.  No evidence of residual/recurrent disease at the treatment sites.  No new concerning liver lesion.    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  Posterior branch of the right portal vein, left portal vein, and main portal veins are patent.    Minimal diffuse wall thickening of the gallbladder likely related to hepatic dysfunction.  No biliary ductal dilatation.    Spleen is enlarged measuring 16.1 cm.  Adrenal glands and pancreas are unremarkable.    Stable right renal cysts.  No hydronephrosis.    No evidence of bowel obstruction or inflammation.    Trace perihepatic ascites.  No lymphadenopathy.    Stable infrarenal abdominal aortic ectasia measuring 2.2 cm.  Diffuse atherosclerosis.    Susceptibility artifact related to prior median sternotomy.  No concerning bone  lesion.  Impression: 1. Post treatment changes of the right hepatic lobe without evidence of residual/recurrent disease.  No new liver lesion.  Recommend continued surveillance.  2. Cirrhotic liver and sequela of portal venous hypertension including splenomegaly and trace ascites.  3. Additional findings as above.    Electronically signed by: David Rodriguez  Date:    02/05/2025  Time:    12:58          Assessment:       1. Hepatocellular carcinoma    2. Blood in stool    3. Thrombocytopenia, secondary    4. Anemia due to chronic blood loss    5. History of bladder cancer    6. 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 Microwave ablation 9/2019, MRI Abdomen shows recurrent disease. Discussed case with IR, recommend Y90 + immunotherapy for best disease control. We discussed the results of [facility] study and superiority of Durvalumab/Tremlimumab to Sorafenib for improved overall survival. Alternative therapy with be Bevacizumab/Atezolizumab per Impower 150 but would impact timing of Y90. Would recommend either approach over Sorafenib, patient and family understanding. AFP 21 -> 18.     Patient has experienced rash over scalp and trunk since starting immunotherapy. Rash has responded to topical steroids, prednisone 50 mg x2 doses but stopped due to significant insomnia. Overall <10% BSA and symptoms do not interfere with QoL or therapy. AFP 21 -> 3.3 -> 6.8 -> 4.6    Overall side effects of pruritus is tolerable, lightheadedness likely due to episodes of hypoglycemia.     MRI Abdomen and CT Chest show treatment response 11/2024, plan for restaging CT CAP 1/2025. MRI per IR 2/2025.    MRI Abdomen on 11/5/24 shows treatment response, CT Chest on 11/5/24 negative for metastatic disease     - labs reviewed, will hold C8 due to concern for bleeding, risk for esophageal varices with HCC    # Immunodeficiency due to drug therapy - Due to  current treatment, monitor to s/s of infectiion, adhere to neutropenic precautions while under active treatment. Will monitor    # Lightheadedness - MRI Brain 11/23/24 wnl, improved with metoprolol -> carvedilol      # IO dermatitis - Continue topical steroids at home, Grade I. Encourage sensitive solutions & unscented products. Patient endorses controlled with interventions.    # Thrombocytopenia - Attributed to cirrhosis, established with hepatology. Improved on therapy. Platelets 80k 2/5/25    # Anemia - Mild iron deficiency, encourage iron in diet. Hgb 11.5 g/dL 2/5/25 - will hold treatment today and have him see GI prior to re-starting treatment. Risk for bleeding with HCC. Urgent referral placed to GI and FOBT cards given.     # Non-Muscle Invasive Bladder Cancer, Ta - Follows with Dr. Bray, TURBT 2/2024. Cystoscopy 6/20/24 negative for recurrence, cancelled 9/2024. Cystoscopy 12/2024 negative for recurrence     # Atrial fibrillation - Coumadin -> Xarelto. HOLD anticoagulation if plts fall <50k     # Hep C Cirrhosis - Treated with SVR, patient declines liver transplant in the past. Established with hepatology for compensated cirrhosis, EGD 7/2/24  - improved liver enzymes after Y90   - establish with hepatology 12/2024    # Heartburn - improved, pepcid prn    # Arthritis - Taking Apap 500 mg bid prn    # Tendonitis - orthopedic hand referral     Follow up: After seeing GI - scheduled to see Dr. Kirkland with labs prior then treatment on NS 3/6/25; hold C8 until cleared by GI.     The above information has been reviewed with the patient and all questions have been answered to their apparent satisfaction.  They understand that they can call the clinic with any questions.    ALLY GarciaC  Hematology and Medical Oncology  [facility]  [facility] [place] [facility]    50 minutes of total time spent on the encounter, which includes face to face time and non-face to face time  preparing to see the patient (eg, review of tests), Obtaining and/or reviewing separately obtained history, documenting clinical information in the electronic or other health record, independently interpreting results if documented above (not separately reported) and communicating results to the patient/family/caregiver, or Care coordination (not separately reported).         Med Onc Chart Routing      Follow up with physician . Patient needs to see GI ASAP. FOBT cards to be given and returned. Patient is scheduled for labs prior to seeing Dr. Kirkland on 3/5/25 with infusion scheduled on NS 3/6/25 - will keep for now unless patient cleared sooner then will adjust.   Follow up with DARLING    Infusion scheduling note   Hold C8 Imfinzi; needs to see GI   Injection scheduling note    Labs    Imaging    Pharmacy appointment    Other referrals

## 2025-02-05 NOTE — TELEPHONE ENCOUNTER
He cancelled the CT today as he had one on 1/25. States his stool is still really black. Not having any pain. Should he take treatment tomorrow. He is waiting now to get his labs done. I told him I would let Dr. Kirkland know what was going on and ask her to check his labs after they result, and one of us would get back with him regarding treatment and other appointments tomorrow.

## 2025-02-05 NOTE — TELEPHONE ENCOUNTER
----- Message from [place] sent at 2/5/2025  4:11 PM CST -----  Type: Needs Medical Advice  Who Called:  Patient   Symptoms (please be specific):    How long has patient had these symptoms:    Pharmacy name and phone #:    Best Call Back Number: [phone]  Additional Information: Mr. Tavares is requesting a call back from the nurse regarding blood in stool and hemoglobin being low. He would like to know if he need to go to hosp. ?

## 2025-02-05 NOTE — TELEPHONE ENCOUNTER
Spoke with pt regarding symptoms and recent labs. Pt with dark stools since 1/29. Denies abd cramping / pain. Pt has some lightheadedness for the past couple days that is intermittent. Pt and family concerned with decreasing H&H levels. Informed pt that his levels are not critically low to need immediate medical attention, but the decreasing levels are concerning. Informed pt I would reach out to Dr. Kirkland to determine next steps. Pt scheduled to see NP tomorrow to address. Pt knows to report to ER should symptoms worsen.

## 2025-02-06 ENCOUNTER — TELEPHONE (OUTPATIENT)
Dept: HEPATOLOGY | Facility: CLINIC | Age: 70
End: 2025-02-06
Payer: MEDICARE

## 2025-02-06 ENCOUNTER — TELEPHONE (OUTPATIENT)
Dept: HEMATOLOGY/ONCOLOGY | Facility: CLINIC | Age: 70
End: 2025-02-06

## 2025-02-06 ENCOUNTER — OFFICE VISIT (OUTPATIENT)
Dept: HEMATOLOGY/ONCOLOGY | Facility: CLINIC | Age: 70
End: 2025-02-06
Payer: MEDICARE

## 2025-02-06 VITALS
WEIGHT: 230.81 LBS | HEIGHT: 70 IN | HEART RATE: 83 BPM | TEMPERATURE: 99 F | OXYGEN SATURATION: 98 % | BODY MASS INDEX: 33.04 KG/M2 | SYSTOLIC BLOOD PRESSURE: 128 MMHG | RESPIRATION RATE: 17 BRPM | DIASTOLIC BLOOD PRESSURE: 67 MMHG

## 2025-02-06 DIAGNOSIS — Z85.51 HISTORY OF BLADDER CANCER: ICD-10-CM

## 2025-02-06 DIAGNOSIS — K92.1 BLOOD IN STOOL: Primary | ICD-10-CM

## 2025-02-06 DIAGNOSIS — D50.0 ANEMIA DUE TO CHRONIC BLOOD LOSS: ICD-10-CM

## 2025-02-06 DIAGNOSIS — D84.821 IMMUNODEFICIENCY DUE TO DRUG THERAPY: ICD-10-CM

## 2025-02-06 DIAGNOSIS — K74.69 OTHER CIRRHOSIS OF LIVER: ICD-10-CM

## 2025-02-06 DIAGNOSIS — K92.1 BLOOD IN STOOL: ICD-10-CM

## 2025-02-06 DIAGNOSIS — Z79.899 IMMUNODEFICIENCY DUE TO DRUG THERAPY: ICD-10-CM

## 2025-02-06 DIAGNOSIS — D50.0 ANEMIA DUE TO CHRONIC BLOOD LOSS: Primary | ICD-10-CM

## 2025-02-06 DIAGNOSIS — C22.0 HEPATOCELLULAR CARCINOMA: Primary | ICD-10-CM

## 2025-02-06 DIAGNOSIS — D69.59 THROMBOCYTOPENIA, SECONDARY: ICD-10-CM

## 2025-02-06 PROBLEM — D61.818 OTHER PANCYTOPENIA: Status: RESOLVED | Noted: 2024-09-19 | Resolved: 2025-02-06

## 2025-02-06 PROBLEM — C67.2 MALIGNANT NEOPLASM OF LATERAL WALL OF BLADDER: Status: RESOLVED | Noted: 2024-06-25 | Resolved: 2025-02-06

## 2025-02-06 PROCEDURE — 3078F DIAST BP <80 MM HG: CPT | Mod: CPTII,S$GLB,, | Performed by: NURSE PRACTITIONER

## 2025-02-06 PROCEDURE — 1160F RVW MEDS BY RX/DR IN RCRD: CPT | Mod: CPTII,S$GLB,, | Performed by: NURSE PRACTITIONER

## 2025-02-06 PROCEDURE — 3008F BODY MASS INDEX DOCD: CPT | Mod: CPTII,S$GLB,, | Performed by: NURSE PRACTITIONER

## 2025-02-06 PROCEDURE — 1101F PT FALLS ASSESS-DOCD LE1/YR: CPT | Mod: CPTII,S$GLB,, | Performed by: NURSE PRACTITIONER

## 2025-02-06 PROCEDURE — 3288F FALL RISK ASSESSMENT DOCD: CPT | Mod: CPTII,S$GLB,, | Performed by: NURSE PRACTITIONER

## 2025-02-06 PROCEDURE — 3074F SYST BP LT 130 MM HG: CPT | Mod: CPTII,S$GLB,, | Performed by: NURSE PRACTITIONER

## 2025-02-06 PROCEDURE — 99215 OFFICE O/P EST HI 40 MIN: CPT | Mod: S$GLB,,, | Performed by: NURSE PRACTITIONER

## 2025-02-06 PROCEDURE — G2211 COMPLEX E/M VISIT ADD ON: HCPCS | Mod: S$GLB,,, | Performed by: NURSE PRACTITIONER

## 2025-02-06 PROCEDURE — 1159F MED LIST DOCD IN RCRD: CPT | Mod: CPTII,S$GLB,, | Performed by: NURSE PRACTITIONER

## 2025-02-06 PROCEDURE — 99999 PR PBB SHADOW E&M-EST. PATIENT-LVL V: CPT | Mod: PBBFAC,,, | Performed by: NURSE PRACTITIONER

## 2025-02-06 NOTE — NURSING
Chart reviewed. Called Mr. Treviño to check in.     He has scheduled an apt with GI on 2/19 in [place]. He states he feels he may need a colonoscopy to figure out the cause of his bleeding. He denies abdominal pain currently. Treatment today held pending GI clearance.     He states he's really tolerated his infusions well and has had no complaints. 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     His children are actively involved in his treatment and provide lots of support.     He denies any navigation needs currently. I encouraged him to reach out with any questions or concerns. My contact information provided. He verbalized understanding and thanked me for my call.

## 2025-02-06 NOTE — NURSING
Discussed CT scan scheduled 2/10 with Dr. Kirkland via secure chat. Per Dr. Kirkland, OK to cancel CT scan at this time - patient had previous CT scan completed in hospital end of January.     Called Mr. Tavares and informed him that CT scan has been cancelled. He v/u and thanked me for calling.

## 2025-02-06 NOTE — TELEPHONE ENCOUNTER
Patient: Hudson Tavares       MRN: [id number]      : 1955     Age: 69 y.o.  [address]  [place] [address]    Presenting Radiologists: Wilber Yuen MD    Providers: Jennifer Scheuermann, PA & Shahzad Galo MD    Priority of review: Cancer    Patient Transplant Status: Downstaging??    Reason for presentation:  Consideration for transplant eval    Clinical Summary: 68 y/o WM w/ well compensated cirrhosis due to (cured) HCV.    First dx w/ HCC in 2019. Underwent MWA once then declined liver transplant eval and lost to f/u.    Referred back to Hepatology 2024 w/ HCC recurrence (8x4 cm HCC w/ tumor thrombus RPV). Has since been treated w/ immunotherapy by Dr Aleyda Kirkland and undergone Y90 twice (2024, 10/2024). Most recent imaging 2025 reveals good response w/ no recurrence. RPV thrombus still present.     Durvalumab now on hold due to recent diarrhea w/ melena c/f colitis and immunotherapy toxicity. Oncology questioning whether liver transplant is option due to concern that he may not have good immunotherapy options moving forward.     Imaging to be reviewed: MRI 25    HCC Treatment History:   MWA: 2019  Tremelimumab + Durvalumab x 1 followed by durvalumab Q4 wks: started 2024. Now on hold  Y90: 24, 10/10/24    Platelets:   Lab Results   Component Value Date/Time    PLT 80 (L) 2025 11:51 AM     Creatinine:   Lab Results   Component Value Date/Time    CREATININE 0.6 2025 11:51 AM    CREATININE 0.70 10/08/2015 11:17 AM     Bilirubin:   Lab Results   Component Value Date/Time    BILITOT 1.1 (H) 2025 11:51 AM     AFP Last 3 each if available:   Lab Results   Component Value Date/Time    AFP 4.9 2025 11:51 AM    AFP 4.6 2025 08:50 AM    AFP 5.3 2024 08:25 AM       MELD: MELD 3.0: 11 at 2025 11:02 PM  MELD-Na: 12 at 2025 11:02 PM  Calculated from:  Serum Creatinine: 0.52 mg/dL (Using min of 1 mg/dL) at 2025  8:03 PM  Serum Sodium: 138  mmol/L (Using max of 137 mmol/L) at 1/25/2025  8:03 PM  Total Bilirubin: 1.9 mg/dL at 1/25/2025  8:03 PM  Serum Albumin: 4.2 g/dL (Using max of 3.5 g/dL) at 1/25/2025  8:03 PM  INR(ratio): 1.3 at 1/25/2025 11:02 PM  Age at listing (hypothetical): 69 years  Sex: Male at 1/25/2025 11:02 PM      Plan:     Follow-up Provider:

## 2025-02-10 ENCOUNTER — LAB VISIT (OUTPATIENT)
Dept: LAB | Facility: HOSPITAL | Age: 70
End: 2025-02-10
Payer: MEDICARE

## 2025-02-10 ENCOUNTER — CONFERENCE (OUTPATIENT)
Dept: TRANSPLANT | Facility: CLINIC | Age: 70
End: 2025-02-10
Payer: MEDICARE

## 2025-02-10 DIAGNOSIS — D50.0 ANEMIA DUE TO CHRONIC BLOOD LOSS: ICD-10-CM

## 2025-02-10 LAB
OB PNL STL: POSITIVE

## 2025-02-10 PROCEDURE — 82272 OCCULT BLD FECES 1-3 TESTS: CPT | Mod: PN | Performed by: NURSE PRACTITIONER

## 2025-02-10 NOTE — TELEPHONE ENCOUNTER
Patient: Hudson Tavares       MRN: [id number]      : 1955     Age: 69 y.o.  [address]  [place] [address]    Presenting Radiologists: Wilber Yuen MD    Providers: Jennifer Scheuermann, PA & Shahzad Galo MD    Priority of review: Cancer    Patient Transplant Status: Downstaging??    Reason for presentation:  Consideration for transplant eval    Clinical Summary: 68 y/o WM w/ well compensated cirrhosis due to (cured) HCV.    First dx w/ HCC in 2019. Underwent MWA once then declined liver transplant eval and lost to f/u.    Referred back to Hepatology 2024 w/ HCC recurrence (8x4 cm HCC w/ tumor thrombus RPV). Has since been treated w/ immunotherapy by Dr Aleyda Kirkland and undergone Y90 twice (2024, 10/2024). Most recent imaging 2025 reveals good response w/ no recurrence. RPV thrombus still present.     Durvalumab now on hold due to recent diarrhea w/ melena c/f colitis and immunotherapy toxicity. Oncology questioning whether liver transplant is option due to concern that he may not have good immunotherapy options moving forward.     Imaging to be reviewed: MRI 25    HCC Treatment History:   MWA: 2019  Tremelimumab + Durvalumab x 1 followed by durvalumab Q4 wks: started 2024. Now on hold  Y90: 24, 10/10/24    Platelets:   Lab Results   Component Value Date/Time    PLT 80 (L) 2025 11:51 AM     Creatinine:   Lab Results   Component Value Date/Time    CREATININE 0.6 2025 11:51 AM    CREATININE 0.70 10/08/2015 11:17 AM     Bilirubin:   Lab Results   Component Value Date/Time    BILITOT 1.1 (H) 2025 11:51 AM     AFP Last 3 each if available:   Lab Results   Component Value Date/Time    AFP 4.9 2025 11:51 AM    AFP 4.6 2025 08:50 AM    AFP 5.3 2024 08:25 AM       MELD: MELD 3.0: 11 at 2025 11:02 PM  MELD-Na: 12 at 2025 11:02 PM  Calculated from:  Serum Creatinine: 0.52 mg/dL (Using min of 1 mg/dL) at 2025  8:03 PM  Serum Sodium: 138  mmol/L (Using max of 137 mmol/L) at 1/25/2025  8:03 PM  Total Bilirubin: 1.9 mg/dL at 1/25/2025  8:03 PM  Serum Albumin: 4.2 g/dL (Using max of 3.5 g/dL) at 1/25/2025  8:03 PM  INR(ratio): 1.3 at 1/25/2025 11:02 PM  Age at listing (hypothetical): 69 years  Sex: Male at 1/25/2025 11:02 PM    IR Discussion/Plan: No definitive residual disease. [place] thrombus in anterior limb of right portal vein. Repeat imaging and AP in 3 months     Committee Discussion:  No residual disease or recurrence. There is a small bland thrombus in the anterior part of the Portal vein.    Plan:   Transplant evaluation.    Follow-up Provider: Dr Galo.

## 2025-02-11 ENCOUNTER — TELEPHONE (OUTPATIENT)
Facility: CLINIC | Age: 70
End: 2025-02-11
Payer: MEDICARE

## 2025-02-11 NOTE — TELEPHONE ENCOUNTER
----- Message from Abner Stafford MD sent at 2/6/2025  2:11 PM CST -----  Yes I would. And Aleyda I would present him at transplant conference so the surgeons can get the story and see if they think they should get him in to talk. He's downstaged and on immunotherapy. So they are going to want to see if he has a living donor I would bet.  ----- Message -----  From: Wilber Yuen MD  Sent: 2/6/2025   1:51 PM CST  To: Shahzad Galo MD; #    Mirian Montanasheri saw him last year.  Maybe we can plug him in with someone in hepatology to see if transplant is an option.  ----- Message -----  From: Aleyda Kirkland MD  Sent: 2/6/2025   1:11 PM CST  To: Abner Stafford MD; Wilber Yuen MD; #    Hi, No I do not believe you have met him. He presented to me in [place] but has followed me down to [facility].     I think Mr. Tavares would at least consider liver transplant. He has a large supportive family with some healthcare background so they would be great support. If he has immunotherapy toxicity I won't have a great systemic option to maintain his response.    Let me know if I can help route him to the transplant team, and thanks for taking a look!     Aleyda  ----- Message -----  From: Abner Stafford MD  Sent: 2/6/2025  12:43 PM CST  To: Wilber Yuen MD; Nataliya Ramirez NP; #    Have I seen this ozzy? His name is familiar but I don't see a note from me. I agree with wilber here though. His liver is cirrhotic, low platelets, elevated bili and his Y90 is close to the hilar plate and his RAPV is out. He is not going to tolerate a R hepatectomy.    If you're creative enough, his tumor was <8cm which should put him within downstaging eligibility for transplant, if he's not drinking. He's had a good response to Y90, now would be the time to start that workup if that's the route you want to explore.    Happy to help out just let me know  Abner  ----- Message -----  From: Wilber Yuen MD  Sent:  2/6/2025  11:35 AM CST  To: Abner Stafford MD; Nataliya Ramirez NP; #    I imagine that Abner would want to slow play this one.  But, we can loop him in and get his thoughts.  His liver does look cirrhotic.  Another consideration would be possible transplant too.  ----- Message -----  From: Aleyda Kirkland MD  Sent: 2/6/2025  11:28 AM CST  To: Wilber Yuen MD; Nataliya Ramirez NP; #    Amazing! Would we ever consider surgery or a curative intent approach for him?     Patient has developed diarrhea / concern for melena, getting stat GI consult and hopefully colonoscopy to rule out colitis. Durvalumab plan currently on hold until symptoms resolve.     I see him again in a few weeks.    ThanksAleyda  ----- Message -----  From: Wilber Yuen MD  Sent: 2/6/2025  10:57 AM CST  To: Nataliya Ramirez NP; Farzana Seymour PA-C; #    Continues to look great!  No residual or recurrence.  ----- Message -----  From: Nataliya Ramirez NP  Sent: 2/6/2025  10:40 AM CST  To: Wilber Yuen MD; Farzana Seymour PA-C; #    Hi, Wilber,    Please note Mr. Tavares's repeat MRI. He was treated on 10/10/2024 with Y90 for HCC. Report continues to suggest a good response. Please let us know your recs.    Thank you,  Nataliya  ----- Message -----  From: Wilber Yuen MD  Sent: 11/5/2024  11:41 AM CST  To: Nataliya Ramirez NP; Farzana Seymour PA-C; #    This looks great!  Let's get a 3 month f/u.  Thank you!  ----- Message -----  From: Nataliya Ramirez NP  Sent: 11/5/2024  10:40 AM CST  To: Wilber Yuen MD; Nataliya Ramirez, KENNETH; #    Hi, Wilber,    Please note MrBelen Tavares's MRI results. He is s/p Y90 on 10/10/2024 for HCC. Report suggests good response. 3 month f/u?    Thank you,  Nataliya

## 2025-02-11 NOTE — TELEPHONE ENCOUNTER
Pt reviewed in IR conference clinic this morning.  Final impression / recs: no evidence of HCC residual or recurrence and ok to proceed w/ transplant eval    I spoke w/ pt today  He is NOT ready to schedule an appt in transplant clinic. He would rather keep his next appt w/ Dr Kirkland to further discuss immunotherapy options.     For now he is scheduled to see me in June in Hepatology and will keep that appt.   After early March appt w/ Dr Kirkland he'll let me know if he wants to do something different.

## 2025-02-12 ENCOUNTER — OFFICE VISIT (OUTPATIENT)
Dept: INTERVENTIONAL RADIOLOGY/VASCULAR | Facility: CLINIC | Age: 70
End: 2025-02-12
Payer: MEDICARE

## 2025-02-12 VITALS
BODY MASS INDEX: 32.51 KG/M2 | HEART RATE: 89 BPM | HEIGHT: 70 IN | WEIGHT: 227.06 LBS | DIASTOLIC BLOOD PRESSURE: 80 MMHG | SYSTOLIC BLOOD PRESSURE: 142 MMHG

## 2025-02-12 DIAGNOSIS — C22.0 HCC (HEPATOCELLULAR CARCINOMA): Primary | ICD-10-CM

## 2025-02-12 PROCEDURE — 3079F DIAST BP 80-89 MM HG: CPT | Mod: CPTII,S$GLB,,

## 2025-02-12 PROCEDURE — 99213 OFFICE O/P EST LOW 20 MIN: CPT | Mod: S$GLB,,,

## 2025-02-12 PROCEDURE — 3008F BODY MASS INDEX DOCD: CPT | Mod: CPTII,S$GLB,,

## 2025-02-12 PROCEDURE — 3077F SYST BP >= 140 MM HG: CPT | Mod: CPTII,S$GLB,,

## 2025-02-12 PROCEDURE — 99999 PR PBB SHADOW E&M-EST. PATIENT-LVL II: CPT | Mod: PBBFAC,,,

## 2025-02-12 NOTE — PROGRESS NOTES
Subjective     Patient ID: Hudson Tavares is a 69 y.o. male.    Chief Complaint: HCC    Patient referred to Interventional Radiology by Jennifer Scheuermann, PA to discuss treatment of hepatocellular carcinoma. He was treated with microwave ablation on 9/24/2019 and Y90 on 10/10/2024. Presents here today to discuss follow up imaging. Patient reports feeling well.  He is accompanied by his son.    Review of Systems   Constitutional:  Negative for fatigue.   Respiratory:  Negative for shortness of breath.    Cardiovascular:  Negative for chest pain.   Gastrointestinal:  Negative for abdominal pain and blood in stool.   Neurological:  Negative for facial asymmetry and speech difficulty.   Psychiatric/Behavioral:  Negative for behavioral problems and confusion.         Objective     Physical Exam  Constitutional:       Appearance: Normal appearance.   HENT:      Head: Atraumatic.      Nose: Nose normal.   Eyes:      Conjunctiva/sclera: Conjunctivae normal.   Pulmonary:      Effort: Pulmonary effort is normal. No respiratory distress.   Neurological:      General: No focal deficit present.      Mental Status: He is alert and oriented to person, place, and time.   Psychiatric:         Mood and Affect: Mood normal.         Behavior: Behavior normal.     EXAMINATION:  MRI ABDOMEN W WO CONTRAST     CLINICAL HISTORY:  HCC s/p Y90;  Liver cell carcinoma     TECHNIQUE:  Multiplanar multisequence images of the abdomen before and after administration of 10 mL Gadavist intravenous contrast.     COMPARISON:  CT abdomen pelvis 01/25/2025, MRI abdomen 11/05/2024     FINDINGS:  Cirrhotic liver morphology with nodular contour and heterogeneous parenchyma.  Post treatment changes of multiple right hepatic lobe radioembolization.  Peripheral enhancement surrounds the ablation cavities which progresses on venous and delayed phases likely representing scarring/fibrosis.  No evidence of residual/recurrent disease at the treatment sites.   No new concerning liver lesion.     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  Posterior branch of the right portal vein, left portal vein, and main portal veins are patent.     Minimal diffuse wall thickening of the gallbladder likely related to hepatic dysfunction.  No biliary ductal dilatation.     Spleen is enlarged measuring 16.1 cm.  Adrenal glands and pancreas are unremarkable.     Stable right renal cysts.  No hydronephrosis.     No evidence of bowel obstruction or inflammation.     Trace perihepatic ascites.  No lymphadenopathy.     Stable infrarenal abdominal aortic ectasia measuring 2.2 cm.  Diffuse atherosclerosis.     Susceptibility artifact related to prior median sternotomy.  No concerning bone lesion.     Impression:     1. Post treatment changes of the right hepatic lobe without evidence of residual/recurrent disease.  No new liver lesion.  Recommend continued surveillance.  2. Cirrhotic liver and sequela of portal venous hypertension including splenomegaly and trace ascites.        Assessment and Plan     1. HCC (hepatocellular carcinoma)  -     MRI Abdomen W WO Contrast; Future; Expected date: 02/12/2025    - Dr. Yuen reviewed follow up imaging great treatment response from Y90. No residual or recurrence.   - Patient continues to no want to explore transplant options even with possible immunotherapy toxicity.   - Will plan for a MRI in 3 months (5/2025). Scheduled to see Dr. Kirkland on 3/5.     Vita Mcmahon PA-C  Interventional Radiology  [phone]

## 2025-02-20 DIAGNOSIS — M19.071 LOCALIZED OSTEOARTHRITIS OF RIGHT ANKLE: Primary | ICD-10-CM

## 2025-02-24 DIAGNOSIS — I48.11 LONGSTANDING PERSISTENT ATRIAL FIBRILLATION: ICD-10-CM

## 2025-02-25 ENCOUNTER — HOSPITAL ENCOUNTER (OUTPATIENT)
Dept: RADIOLOGY | Facility: HOSPITAL | Age: 70
Discharge: HOME | End: 2025-02-25
Attending: ORTHOPAEDIC SURGERY
Payer: MEDICARE

## 2025-02-25 ENCOUNTER — OFFICE VISIT (OUTPATIENT)
Dept: ORTHOPEDICS | Facility: CLINIC | Age: 70
End: 2025-02-25
Payer: MEDICARE

## 2025-02-25 ENCOUNTER — HOSPITAL ENCOUNTER (OUTPATIENT)
Dept: RADIOLOGY | Facility: HOSPITAL | Age: 70
Discharge: HOME | End: 2025-02-25
Attending: PHYSICIAN ASSISTANT
Payer: MEDICARE

## 2025-02-25 VITALS — HEIGHT: 70 IN | BODY MASS INDEX: 32.51 KG/M2 | WEIGHT: 227.06 LBS

## 2025-02-25 DIAGNOSIS — S69.92XA INJURY OF LEFT WRIST, INITIAL ENCOUNTER: ICD-10-CM

## 2025-02-25 DIAGNOSIS — M11.20 CALCIUM PYROPHOSPHATE DEPOSITION DISEASE (CPPD): ICD-10-CM

## 2025-02-25 DIAGNOSIS — M25.532 LEFT WRIST PAIN: ICD-10-CM

## 2025-02-25 DIAGNOSIS — M65.312 TRIGGER FINGER OF LEFT THUMB: Primary | ICD-10-CM

## 2025-02-25 DIAGNOSIS — M19.071 LOCALIZED OSTEOARTHRITIS OF RIGHT ANKLE: Primary | ICD-10-CM

## 2025-02-25 DIAGNOSIS — M19.071 LOCALIZED OSTEOARTHRITIS OF RIGHT ANKLE: ICD-10-CM

## 2025-02-25 PROCEDURE — 73610 X-RAY EXAM OF ANKLE: CPT | Mod: 26,RT,, | Performed by: RADIOLOGY

## 2025-02-25 PROCEDURE — 73610 X-RAY EXAM OF ANKLE: CPT | Mod: TC,PO,RT

## 2025-02-25 PROCEDURE — 73110 X-RAY EXAM OF WRIST: CPT | Mod: TC,PO,LT

## 2025-02-25 PROCEDURE — 73110 X-RAY EXAM OF WRIST: CPT | Mod: 26,LT,, | Performed by: RADIOLOGY

## 2025-02-25 PROCEDURE — 73630 X-RAY EXAM OF FOOT: CPT | Mod: 26,RT,, | Performed by: RADIOLOGY

## 2025-02-25 PROCEDURE — 73630 X-RAY EXAM OF FOOT: CPT | Mod: TC,PO,RT

## 2025-02-25 PROCEDURE — 99999 PR PBB SHADOW E&M-EST. PATIENT-LVL III: CPT | Mod: PBBFAC,,, | Performed by: PHYSICIAN ASSISTANT

## 2025-02-25 PROCEDURE — 99999 PR PBB SHADOW E&M-EST. PATIENT-LVL III: CPT | Mod: PBBFAC,,, | Performed by: ORTHOPAEDIC SURGERY

## 2025-02-25 RX ORDER — TRIAMCINOLONE ACETONIDE 40 MG/ML
40 INJECTION, SUSPENSION INTRA-ARTICULAR; INTRAMUSCULAR
Status: DISCONTINUED | OUTPATIENT
Start: 2025-02-25 | End: 2025-02-25 | Stop reason: HOSPADM

## 2025-02-25 RX ORDER — TRIAMCINOLONE ACETONIDE 40 MG/ML
40 INJECTION, SUSPENSION INTRA-ARTICULAR; INTRAMUSCULAR
Status: SHIPPED | OUTPATIENT
Start: 2025-02-25

## 2025-02-25 RX ADMIN — TRIAMCINOLONE ACETONIDE 40 MG: 40 INJECTION, SUSPENSION INTRA-ARTICULAR; INTRAMUSCULAR at 03:02

## 2025-02-25 RX ADMIN — TRIAMCINOLONE ACETONIDE 40 MG: 40 INJECTION, SUSPENSION INTRA-ARTICULAR; INTRAMUSCULAR at 02:02

## 2025-02-25 NOTE — PROCEDURES
Intermediate Joint Aspiration/Injection: R ankle    Date/Time: 2/25/2025 2:20 PM    Performed by: Vinicio Stuart MD  Authorized by: Vinicio Stuart MD    Consent Done?:  Yes (Verbal)  Indications:  Arthritis  Site marked: The procedure site was marked    Timeout: Prior to procedure the correct patient, procedure, and site was verified      Location:  Ankle  Site:  R ankle  Prep: Patient was prepped and draped in usual sterile fashion    Ultrasonic Guidance for needle placement: No  Needle size:  25 G  Approach:  Anteromedial  Medications:  40 mg triamcinolone acetonide 40 mg/mL  Patient tolerance:  Patient tolerated the procedure well with no immediate complications

## 2025-02-25 NOTE — PROGRESS NOTES
Status/Diagnosis: Right distal tib-fib malunion; Right ankle osteoarthritis.  Date of Surgery: none  Date of Injury: 1971  Return visit: PRN  X-rays on Return: pending patient complaint    Chief Complaint:   Chief Complaint   Patient presents with    Right Ankle - Pain     Present History:  Hudson Tavares is a 69 y.o. male who presents today for new patient evaluation.  Of note, patient sustained a severe right lower leg crush injury after a bull landed on the affected extremity.  This was treated nonoperatively.  Healed uneventfully.  Documented malunion navigation has done well over the last 50 years.  Most recently seen by Dr. Edmond in November 2021.  Underwent right ankle intra-articular corticosteroid injection at that time.  Patient endorses significant symptomatic relief for 6+ months.  Currently with several day history of new onset calf swelling and tenderness to palpation.  Reports he does have an appointment with his cardiologist checked for DVT today.  Denies any numbness or tingling.  No recent history of injury or other inciting event.  Minimal pain at rest, increased with weight-bearing.  Past medical history significant for atrial fibrillation on Coumadin; hypertension; hepatitis-C; and CAD status post CABG.    12/26/2023:  Patient returns today for repeat clinical evaluation and possible repeat injection.    Patient last seen in May 2023.  At that time underwent right ankle intra-articular corticosteroid injection.  Patient endorses near-complete symptomatic relief over the last 5-6 months.  The injection only recently began to wear off.  Currently endorses 10/10 pain.  No new injuries.  Does endorse some persistent nerve pain, at baseline.  Patient has tried boot and brace immobilization in the past but with minimal symptomatic relief.    02/25/2025:  Patient returns today after last being seen in December 2003.  Underwent right ankle intra-articular corticosteroid injection at that time.   Patient endorses complete, 100%, symptomatic relief for 13+ months.  Since last being seen, patient was diagnosed with cancer however he is currently in remission.  Now also on Xarelto instead of Coumadin for chronic atrial fibrillation.        Past Medical History:   Diagnosis Date    Adenomatous colon polyp     Alcohol abuse 07/09/2015    Anticoagulant long-term use     coumadin - discontinued    Atrial fibrillation 07/09/2015    CAD (coronary artery disease), native coronary artery 05/03/2016    Degenerative joint disease 07/09/2015    Encounter for blood transfusion     Enlarged liver     Essential hypertension 07/09/2015    Hep C w/o coma, chronic     treated with Harvoni    Hiatal hernia     patient states hernia has resolved    Hx of bladder infections     finished cipro 4/29/16    S/P CABG (coronary artery bypass graft) 05/17/2016    LIMA to LAD, SVG to OM, SVG to diagonal, SVG to right PDA, Maze, left atrial appendage resection (5/16)    Spleen enlarged     Thrombocytopenia 05/05/2016    Tobacco abuse 07/09/2015       Past Surgical History:   Procedure Laterality Date    CARDIAC SURGERY  05/04/2016    4 vessel CABG    CERVICAL FUSION  2004    COLONOSCOPY  12/03/2018    [facility]    CYSTOSCOPY W/ RETROGRADES N/A 2/27/2024    Procedure: CYSTOSCOPY, WITH RETROGRADE PYELOGRAM;  Surgeon: STORMY Bray MD;  Location: [facility] OR;  Service: Urology;  Laterality: N/A;    CYSTOSCOPY WITH BIOPSY OF BLADDER N/A 2/27/2024    Procedure: CYSTOSCOPY, WITH BLADDER BIOPSY, WITH FULGURATION IF INDICATED;  Surgeon: STORMY Bray MD;  Location: [facility] OR;  Service: Urology;  Laterality: N/A;    CYSTOURETHROSCOPY, WITH THROMBUS REMOVAL N/A 2/27/2024    Procedure: CYSTOURETHROSCOPY, WITH THROMBUS REMOVAL;  Surgeon: STORMY Bray MD;  Location: [facility] OR;  Service: Urology;  Laterality: N/A;    JOINT REPLACEMENT  TOTAL RIGHT KNEE     OPEN ANTERIOR SHOULDER RECONSTRUCTION Left     RADIOFREQUENCY ABLATION N/A 9/24/2019     "Procedure: Radiofrequency Ablation;  Surgeon: Anayeli Surgeon;  Location: [facility];  Service: Anesthesiology;  Laterality: N/A;  Room 173/[place]    TIBIA FRACTURE SURGERY Right 1971       Current Outpatient Medications   Medication Sig    ALPRAZolam (XANAX) 0.5 MG tablet Take 0.5 mg by mouth every evening.    carvediloL (COREG) 6.25 MG tablet Take 1 tablet (6.25 mg total) by mouth 2 (two) times daily with meals.    famotidine (PEPCID) 20 MG tablet Take 20 mg by mouth every morning.    hepatitis A and B vaccine, PF, (TWINRIX) 720 KATINA unit- 20 mcg/mL Syrg suspension Inject 1mL into the skin now, repeat in 1 month,  and 6 months    losartan (COZAAR) 50 MG tablet Take 50 mg by mouth every evening.    rivaroxaban (XARELTO) 20 mg Tab Take 1 tablet (20 mg total) by mouth daily with dinner or evening meal.    spironolactone (ALDACTONE) 25 MG tablet Take 25 mg by mouth as needed.    triamcinolone acetonide 0.1% (KENALOG) 0.1 % cream Apply topically 2 (two) times daily.     Current Facility-Administered Medications   Medication    alteplase injection 2 mg    heparin, porcine (PF) 100 unit/mL injection flush 500 Units     Facility-Administered Medications Ordered in Other Visits   Medication    diphenhydrAMINE injection 12.5 mg    electrolyte-S (ISOLYTE)    fentaNYL 50 mcg/mL injection 25 mcg    HYDROmorphone (PF) injection 0.2 mg    lactated ringers infusion    LIDOcaine (PF) 10 mg/ml (1%) injection 10 mg    ondansetron injection 4 mg    oxyCODONE immediate release tablet 5 mg       Review of patient's allergies indicates:   Allergen Reactions    Cilostazol Other (See Comments)     Colon bleeding    Stadol [butorphanol tartrate] Hallucinations     "I saw pink elephants"    Keflex [cephalexin] Other (See Comments)     Was using 500mg TID oral and had a strong metallic taste in his mouth and could not function.  Can use IV cephalosporin.    Sulfa (sulfonamide antibiotics) Hives, Nausea And Vomiting and Other (See Comments)     " Since childhood; does not know reaction      Finasteride Other (See Comments)       Family History   Problem Relation Name Age of Onset    Heart disease Mother          congestive heart failure    Diabetes Mother      Hypertension Mother      Heart disease Sister          heart murmur    Atrial fibrillation Sister      Cancer Sister          breast    Cancer Father  60        colon    Chronic back pain Brother      Atrial fibrillation Sister      Heart disease Sister      Collagen disease Neg Hx         Social History     Socioeconomic History    Marital status:    Tobacco Use    Smoking status: Former     Current packs/day: 0.00     Average packs/day: 0.3 packs/day for 30.0 years (7.5 ttl pk-yrs)     Types: Cigarettes     Start date: 1986     Quit date: 2016     Years since quittin.8    Smokeless tobacco: Never   Substance and Sexual Activity    Alcohol use: No     Alcohol/week: 0.0 standard drinks of alcohol     Comment: last drink 2015    Drug use: No    Sexual activity: Yes     Partners: Female       Physical exam:  There were no vitals filed for this visit.  Body mass index is 32.58 kg/m².  General: In no apparent distress; well developed and well nourished.  HEENT: normocephalic; atraumatic.  Cardiovascular: regular rate.  Respiratory: no increased work of breathing.  Musculoskeletal:   Gait:  Mild antalgic.  Inspection:    Exam unchanged.  Approximately 1.5cm right shorter than left leg length discrepancy.  Patient localizes pain along the anteromedial greater than anterolateral joint line.  Moderate pes planovalgus with associated hindfoot valgus.  No significant forefoot abduction.  Did not attempt single limb heel rise today.    No longer with posterior calf pain.  No pain referable to the foot.  No laxity with anterior drawer testing.  Silfverskiold: negative  Alignment:  Knee: neutral               Ankle: neutral              Hindfoot: valgus              Forefoot: neutral    Strength:              Dorsiflexion 5/5  Plantar flexion 5/5  Inversion 5/5   Eversion 5/5   Sensation:              SILT distally   ROM:              Ankle: Full with pain at extremes              Subtalar: Painless inversion and eversion   Pulses: 2+ DP/PT pulses.                   Imaging Studies/Outside documentation:  I have ordered/reviewed/interpreted the following images/outside documentation:  1. Weight bearing 3-views of Right foot and ankle:  No acute bony abnormality noted.  As previously noted, patient with history of distal tibia and fibula shaft fractures, now well healed.  There is moderate valgus and recurvatum deformity with malunion.  Ankle mortise remains congruent with moderate joint space narrowing.  Unable to visualize the proximal tibia to assess mechanical axis.  Calcaneal pitch and talonavicular uncoverage both within normal limits.  Moderate 1st MTP and subtalar joint DJD.  Lateral tilt of the calcaneus relative to the talus involving the posterior facet seen on the ankle mortise view.  Calcific changes involving the posterior tibial artery consistent with peripheral arterial disease.        Assessment:  Hudson Tavares is a 69 y.o. male with Right distal tib-fib malunion; Right ankle osteoarthritis.     Plan:   Clinical and radiographic findings were discussed.    Patient with complete symptomatic relief from previous injection over 1 year ago.  Repeat right ankle corticosteroid injection performed today without complication.  Patient tolerated this well.  See procedure note for details.    May repeat injection every 4-6 months as needed.  Patient is high risk for surgical intervention given multiple medical comorbidities.    Patient voiced understanding.  All questions were answered.  He will call regarding future follow-up.        This note was created using voice recognition software and may contain grammatical errors.

## 2025-02-25 NOTE — PROGRESS NOTES
2/25/2025    HPI:  Hudson Tavares is a 69 y.o. male, who presents to clinic today for evaluation of his left thumb locking and pain, as well as his left wrist pain.  States he has had the left thumb locking and pain for some time.  States the left wrist pain recently began after doing an extensive amount of physical activity involving the left wrist.  Denies any acute traumatic injuries.  Denies any paresthesias.  Denies any other complaints this time.    PMHX:  Past Medical History:   Diagnosis Date    Adenomatous colon polyp     Alcohol abuse 07/09/2015    Anticoagulant long-term use     coumadin - discontinued    Atrial fibrillation 07/09/2015    CAD (coronary artery disease), native coronary artery 05/03/2016    Degenerative joint disease 07/09/2015    Encounter for blood transfusion     Enlarged liver     Essential hypertension 07/09/2015    Hep C w/o coma, chronic     treated with Harvoni    Hiatal hernia     patient states hernia has resolved    Hx of bladder infections     finished cipro 4/29/16    S/P CABG (coronary artery bypass graft) 05/17/2016    LIMA to LAD, SVG to OM, SVG to diagonal, SVG to right PDA, Maze, left atrial appendage resection (5/16)    Spleen enlarged     Thrombocytopenia 05/05/2016    Tobacco abuse 07/09/2015       PSHX:  Past Surgical History:   Procedure Laterality Date    CARDIAC SURGERY  05/04/2016    4 vessel CABG    CERVICAL FUSION  2004    COLONOSCOPY  12/03/2018    [facility]    CYSTOSCOPY W/ RETROGRADES N/A 2/27/2024    Procedure: CYSTOSCOPY, WITH RETROGRADE PYELOGRAM;  Surgeon: STORMY Bray MD;  Location: [facility] OR;  Service: Urology;  Laterality: N/A;    CYSTOSCOPY WITH BIOPSY OF BLADDER N/A 2/27/2024    Procedure: CYSTOSCOPY, WITH BLADDER BIOPSY, WITH FULGURATION IF INDICATED;  Surgeon: STORMY Bray MD;  Location: [facility] OR;  Service: Urology;  Laterality: N/A;    CYSTOURETHROSCOPY, WITH THROMBUS REMOVAL N/A 2/27/2024    Procedure: CYSTOURETHROSCOPY, WITH  THROMBUS REMOVAL;  Surgeon: STORMY Bray MD;  Location: [facility] OR;  Service: Urology;  Laterality: N/A;    JOINT REPLACEMENT  TOTAL RIGHT KNEE     OPEN ANTERIOR SHOULDER RECONSTRUCTION Left     RADIOFREQUENCY ABLATION N/A 2019    Procedure: Radiofrequency Ablation;  Surgeon: Danyel Surgeon;  Location: [facility];  Service: Anesthesiology;  Laterality: N/A;  Room 173/[place]    TIBIA FRACTURE SURGERY Right        FMHX:  Family History   Problem Relation Name Age of Onset    Heart disease Mother          congestive heart failure    Diabetes Mother      Hypertension Mother      Heart disease Sister          heart murmur    Atrial fibrillation Sister      Cancer Sister          breast    Cancer Father  60        colon    Chronic back pain Brother      Atrial fibrillation Sister      Heart disease Sister      Collagen disease Neg Hx         SOCHX:  Social History     Tobacco Use    Smoking status: Former     Current packs/day: 0.00     Average packs/day: 0.3 packs/day for 30.0 years (7.5 ttl pk-yrs)     Types: Cigarettes     Start date: 1986     Quit date: 2016     Years since quittin.8    Smokeless tobacco: Never   Substance Use Topics    Alcohol use: No     Alcohol/week: 0.0 standard drinks of alcohol     Comment: last drink 2015       ALLERGIES:  Cilostazol, Stadol [butorphanol tartrate], Keflex [cephalexin], Sulfa (sulfonamide antibiotics), and Finasteride    CURRENT MEDICATIONS:  Medications Ordered Prior to Encounter[1]    REVIEW OF SYSTEMS:  Review of Systems Complete; Negative, unless noted above.    GENERAL PHYSICAL EXAM:   There were no vitals taken for this visit.   GEN: well developed, well nourished, no acute distress   PULM: No wheezing, no respiratory distress   CV: RRR    ORTHO EXAM:   Examination of the left hand/wrist reveals mild edema over the ulnar aspect of the left wrist.  No erythema, ecchymosis, or skin breakdown.  Able to flex in the fingers appropriately.  Active  triggering noted of the left thumb.  Tenderness palpation overlying the A1 pulley of the left thumb.  Generalized tenderness to palpation throughout the left wrist.  Normal sensation in the radial, ulnar, median nerve distributions.  Capillary refill less than 2 seconds.    RADIOLOGY:   X-rays of the left wrist were taken today in clinic.  X-rays read by myself.  Imaging showed presence of soft tissue swelling over the ulnar aspect of the left wrist.  Presence of chondrocalcinosis of the TFCC, which may be indicative of CPPD/inflammatory arthropathy.  Presence of an osseous cyst over the radial aspect of the lunate.  Presence of calcifications of the vasculature of the left wrist.  No other significant bony abnormalities noted.    ASSESSMENT:   Left thumb trigger finger, left wrist inflammatory arthropathy    PLAN:  1. I discussed with Hudson Tavares the trigger finger and inflammatory arthropathy pathology and treatment options in detail during today's visit.  After treatment options were discussed, we decided the best course of action at this time is to proceed with a steroid injection into the flexor tendon sheath at the level of the middle portion of the proximal phalanx of the left thumb in clinic today.  We also discussed would proceed with splinting via a removable Velcro wrist splint of the left wrist.  He verbally agreed with the treatment plan     2. Informed consent was obtained.  After an alcohol prep followed by chlorhexidine prep, a steroid injection was placed into the left thumb trigger finger.  He tolerated the procedure well with immediate complications.      3. He is placed in a removable Velcro short wrist splint of the left wrist in clinic today     4.  I would like him follow up in clinic in 2 weeks for repeat evaluation.  He was instructed to contact clinic for any problems or concerns in the interim.           [1]   Current Outpatient Medications on File Prior to Visit   Medication Sig  Dispense Refill    ALPRAZolam (XANAX) 0.5 MG tablet Take 0.5 mg by mouth every evening.      carvediloL (COREG) 6.25 MG tablet Take 1 tablet (6.25 mg total) by mouth 2 (two) times daily with meals. 60 tablet 11    famotidine (PEPCID) 20 MG tablet Take 20 mg by mouth every morning.      hepatitis A and B vaccine, PF, (TWINRIX) 720 KATINA unit- 20 mcg/mL Syrg suspension Inject 1mL into the skin now, repeat in 1 month,  and 6 months 1 mL 2    losartan (COZAAR) 50 MG tablet Take 50 mg by mouth every evening.      rivaroxaban (XARELTO) 20 mg Tab Take 1 tablet (20 mg total) by mouth daily with dinner or evening meal. 30 tablet 3    spironolactone (ALDACTONE) 25 MG tablet Take 25 mg by mouth as needed.      triamcinolone acetonide 0.1% (KENALOG) 0.1 % cream Apply topically 2 (two) times daily. 454 g 1     Current Facility-Administered Medications on File Prior to Visit   Medication Dose Route Frequency Provider Last Rate Last Admin    alteplase injection 2 mg  2 mg Intra-Catheter PRN Aleyda Kirkland MD        diphenhydrAMINE injection 12.5 mg  12.5 mg Intravenous Q6H PRN Stefano Sherwood MD        electrolyte-S (ISOLYTE)   Intravenous Continuous Stefano Sherwood MD        fentaNYL 50 mcg/mL injection 25 mcg  25 mcg Intravenous Q5 Min PRN Stefano Sherwood MD        heparin, porcine (PF) 100 unit/mL injection flush 500 Units  500 Units Intravenous PRN Aleyda Kirkland MD        HYDROmorphone (PF) injection 0.2 mg  0.2 mg Intravenous Q5 Min PRN Stefano Sherwood MD        lactated ringers infusion   Intravenous Continuous Stefano Sherwood MD 10 mL/hr at 02/27/24 0824 New Bag at 02/27/24 0824    LIDOcaine (PF) 10 mg/ml (1%) injection 10 mg  1 mL Intradermal Once Stefano Sherwood MD        ondansetron injection 4 mg  4 mg Intravenous Daily PRN Stefano Sherwood MD        oxyCODONE immediate release tablet 5 mg  5 mg Oral Q3H PRN Stefano Sherwood MD   5 mg at 02/27/24 1030    triamcinolone acetonide injection 40 mg  40 mg  Intra-articular  Vinicio Stuart MD   40 mg at 02/25/25 [id number]

## 2025-02-25 NOTE — PROCEDURES
Tendon Sheath    Date/Time: 2/25/2025 3:00 PM    Performed by: Jaya Newell PA-C  Authorized by: Jaya Newell PA-C    Consent Done?:  Yes (Verbal)  Indications:  Pain  Site marked: the procedure site was marked    Timeout: prior to procedure the correct patient, procedure, and site was verified    Prep: patient was prepped and draped in usual sterile fashion      Local anesthesia used?: Yes    Local anesthetic:  Lidocaine 1% without epinephrine  Anesthetic total (ml):  0.5    Location:  Thumb  Site:  L thumb flexor tendon sheath  Ultrasonic guidance for needle placement?: No    Needle size:  25 G  Approach:  Volar  Medications:  40 mg triamcinolone acetonide 40 mg/mL (20 mg injected)  Patient tolerance:  Patient tolerated the procedure well with no immediate complications

## 2025-03-05 ENCOUNTER — LAB VISIT (OUTPATIENT)
Dept: LAB | Facility: HOSPITAL | Age: 70
End: 2025-03-05
Attending: STUDENT IN AN ORGANIZED HEALTH CARE EDUCATION/TRAINING PROGRAM
Payer: MEDICARE

## 2025-03-05 ENCOUNTER — OFFICE VISIT (OUTPATIENT)
Dept: HEMATOLOGY/ONCOLOGY | Facility: CLINIC | Age: 70
End: 2025-03-05
Payer: MEDICARE

## 2025-03-05 VITALS
TEMPERATURE: 98 F | HEIGHT: 70 IN | RESPIRATION RATE: 17 BRPM | BODY MASS INDEX: 32.22 KG/M2 | SYSTOLIC BLOOD PRESSURE: 161 MMHG | WEIGHT: 225.06 LBS | OXYGEN SATURATION: 98 % | DIASTOLIC BLOOD PRESSURE: 78 MMHG | HEART RATE: 85 BPM

## 2025-03-05 DIAGNOSIS — I48.11 LONGSTANDING PERSISTENT ATRIAL FIBRILLATION: ICD-10-CM

## 2025-03-05 DIAGNOSIS — C22.0 HEPATOCELLULAR CARCINOMA: ICD-10-CM

## 2025-03-05 DIAGNOSIS — R42 LIGHTHEADEDNESS: ICD-10-CM

## 2025-03-05 DIAGNOSIS — M19.90 ARTHRITIS: ICD-10-CM

## 2025-03-05 DIAGNOSIS — Z85.51 HISTORY OF BLADDER CANCER: ICD-10-CM

## 2025-03-05 DIAGNOSIS — R12 HEARTBURN: ICD-10-CM

## 2025-03-05 DIAGNOSIS — K57.92 DIVERTICULITIS: ICD-10-CM

## 2025-03-05 DIAGNOSIS — D69.6 THROMBOCYTOPENIA: ICD-10-CM

## 2025-03-05 DIAGNOSIS — C22.0 HEPATOCELLULAR CARCINOMA: Primary | ICD-10-CM

## 2025-03-05 DIAGNOSIS — K74.69 COMPENSATED HCV CIRRHOSIS: ICD-10-CM

## 2025-03-05 DIAGNOSIS — Z79.899 ON ANTINEOPLASTIC CHEMOTHERAPY: ICD-10-CM

## 2025-03-05 DIAGNOSIS — C67.8 MALIGNANT NEOPLASM OF OVERLAPPING SITES OF BLADDER: ICD-10-CM

## 2025-03-05 DIAGNOSIS — B19.20 COMPENSATED HCV CIRRHOSIS: ICD-10-CM

## 2025-03-05 DIAGNOSIS — L27.0 DRUG RASH: ICD-10-CM

## 2025-03-05 DIAGNOSIS — D53.9 NUTRITIONAL ANEMIA: ICD-10-CM

## 2025-03-05 DIAGNOSIS — C22.0 HCC (HEPATOCELLULAR CARCINOMA): ICD-10-CM

## 2025-03-05 DIAGNOSIS — Z79.899 DRUG THERAPY: ICD-10-CM

## 2025-03-05 DIAGNOSIS — M77.9 TENDONITIS: ICD-10-CM

## 2025-03-05 DIAGNOSIS — H53.8 BLURRED VISION: ICD-10-CM

## 2025-03-05 LAB
AFP SERPL-MCNC: 3.7 NG/ML (ref 0–8.4)
ALBUMIN SERPL BCP-MCNC: 3.6 G/DL (ref 3.5–5.2)
ALP SERPL-CCNC: 102 U/L (ref 40–150)
ALT SERPL W/O P-5'-P-CCNC: 20 U/L (ref 10–44)
ANION GAP SERPL CALC-SCNC: 5 MMOL/L (ref 8–16)
AST SERPL-CCNC: 27 U/L (ref 10–40)
BASOPHILS # BLD AUTO: 0.02 K/UL (ref 0–0.2)
BASOPHILS NFR BLD: 0.4 % (ref 0–1.9)
BILIRUB SERPL-MCNC: 1.7 MG/DL (ref 0.1–1)
BUN SERPL-MCNC: 14 MG/DL (ref 8–23)
CALCIUM SERPL-MCNC: 8.9 MG/DL (ref 8.7–10.5)
CHLORIDE SERPL-SCNC: 104 MMOL/L (ref 95–110)
CO2 SERPL-SCNC: 25 MMOL/L (ref 23–29)
CREAT SERPL-MCNC: 0.7 MG/DL (ref 0.5–1.4)
DIFFERENTIAL METHOD BLD: ABNORMAL
EOSINOPHIL # BLD AUTO: 0.1 K/UL (ref 0–0.5)
EOSINOPHIL NFR BLD: 2.5 % (ref 0–8)
ERYTHROCYTE [DISTWIDTH] IN BLOOD BY AUTOMATED COUNT: 13.7 % (ref 11.5–14.5)
EST. GFR  (NO RACE VARIABLE): >60 ML/MIN/1.73 M^2
GLUCOSE SERPL-MCNC: 246 MG/DL (ref 70–110)
HCT VFR BLD AUTO: 41.2 % (ref 40–54)
HGB BLD-MCNC: 13.6 G/DL (ref 14–18)
IMM GRANULOCYTES # BLD AUTO: 0.04 K/UL (ref 0–0.04)
IMM GRANULOCYTES NFR BLD AUTO: 0.8 % (ref 0–0.5)
LYMPHOCYTES # BLD AUTO: 0.6 K/UL (ref 1–4.8)
LYMPHOCYTES NFR BLD: 10.8 % (ref 18–48)
MCH RBC QN AUTO: 31.9 PG (ref 27–31)
MCHC RBC AUTO-ENTMCNC: 33 G/DL (ref 32–36)
MCV RBC AUTO: 97 FL (ref 82–98)
MONOCYTES # BLD AUTO: 0.5 K/UL (ref 0.3–1)
MONOCYTES NFR BLD: 10.1 % (ref 4–15)
NEUTROPHILS # BLD AUTO: 4 K/UL (ref 1.8–7.7)
NEUTROPHILS NFR BLD: 75.4 % (ref 38–73)
NRBC BLD-RTO: 0 /100 WBC
PLATELET # BLD AUTO: 81 K/UL (ref 150–450)
PMV BLD AUTO: 11.9 FL (ref 9.2–12.9)
POTASSIUM SERPL-SCNC: 4.4 MMOL/L (ref 3.5–5.1)
PROT SERPL-MCNC: 7.5 G/DL (ref 6–8.4)
RBC # BLD AUTO: 4.26 M/UL (ref 4.6–6.2)
SODIUM SERPL-SCNC: 134 MMOL/L (ref 136–145)
TSH SERPL DL<=0.005 MIU/L-ACNC: 1.88 UIU/ML (ref 0.4–4)
WBC # BLD AUTO: 5.27 K/UL (ref 3.9–12.7)

## 2025-03-05 PROCEDURE — 3008F BODY MASS INDEX DOCD: CPT | Mod: CPTII,S$GLB,, | Performed by: STUDENT IN AN ORGANIZED HEALTH CARE EDUCATION/TRAINING PROGRAM

## 2025-03-05 PROCEDURE — G2211 COMPLEX E/M VISIT ADD ON: HCPCS | Mod: S$GLB,,, | Performed by: STUDENT IN AN ORGANIZED HEALTH CARE EDUCATION/TRAINING PROGRAM

## 2025-03-05 PROCEDURE — 84443 ASSAY THYROID STIM HORMONE: CPT | Performed by: STUDENT IN AN ORGANIZED HEALTH CARE EDUCATION/TRAINING PROGRAM

## 2025-03-05 PROCEDURE — 4010F ACE/ARB THERAPY RXD/TAKEN: CPT | Mod: CPTII,S$GLB,, | Performed by: STUDENT IN AN ORGANIZED HEALTH CARE EDUCATION/TRAINING PROGRAM

## 2025-03-05 PROCEDURE — 80053 COMPREHEN METABOLIC PANEL: CPT | Performed by: STUDENT IN AN ORGANIZED HEALTH CARE EDUCATION/TRAINING PROGRAM

## 2025-03-05 PROCEDURE — 99215 OFFICE O/P EST HI 40 MIN: CPT | Mod: S$GLB,,, | Performed by: STUDENT IN AN ORGANIZED HEALTH CARE EDUCATION/TRAINING PROGRAM

## 2025-03-05 PROCEDURE — 3288F FALL RISK ASSESSMENT DOCD: CPT | Mod: CPTII,S$GLB,, | Performed by: STUDENT IN AN ORGANIZED HEALTH CARE EDUCATION/TRAINING PROGRAM

## 2025-03-05 PROCEDURE — 1126F AMNT PAIN NOTED NONE PRSNT: CPT | Mod: CPTII,S$GLB,, | Performed by: STUDENT IN AN ORGANIZED HEALTH CARE EDUCATION/TRAINING PROGRAM

## 2025-03-05 PROCEDURE — 3077F SYST BP >= 140 MM HG: CPT | Mod: CPTII,S$GLB,, | Performed by: STUDENT IN AN ORGANIZED HEALTH CARE EDUCATION/TRAINING PROGRAM

## 2025-03-05 PROCEDURE — 99999 PR PBB SHADOW E&M-EST. PATIENT-LVL IV: CPT | Mod: PBBFAC,,, | Performed by: STUDENT IN AN ORGANIZED HEALTH CARE EDUCATION/TRAINING PROGRAM

## 2025-03-05 PROCEDURE — 3078F DIAST BP <80 MM HG: CPT | Mod: CPTII,S$GLB,, | Performed by: STUDENT IN AN ORGANIZED HEALTH CARE EDUCATION/TRAINING PROGRAM

## 2025-03-05 PROCEDURE — 82105 ALPHA-FETOPROTEIN SERUM: CPT | Performed by: STUDENT IN AN ORGANIZED HEALTH CARE EDUCATION/TRAINING PROGRAM

## 2025-03-05 PROCEDURE — 36415 COLL VENOUS BLD VENIPUNCTURE: CPT | Performed by: STUDENT IN AN ORGANIZED HEALTH CARE EDUCATION/TRAINING PROGRAM

## 2025-03-05 PROCEDURE — 1159F MED LIST DOCD IN RCRD: CPT | Mod: CPTII,S$GLB,, | Performed by: STUDENT IN AN ORGANIZED HEALTH CARE EDUCATION/TRAINING PROGRAM

## 2025-03-05 PROCEDURE — 85025 COMPLETE CBC W/AUTO DIFF WBC: CPT | Performed by: STUDENT IN AN ORGANIZED HEALTH CARE EDUCATION/TRAINING PROGRAM

## 2025-03-05 PROCEDURE — 1101F PT FALLS ASSESS-DOCD LE1/YR: CPT | Mod: CPTII,S$GLB,, | Performed by: STUDENT IN AN ORGANIZED HEALTH CARE EDUCATION/TRAINING PROGRAM

## 2025-03-05 PROCEDURE — 1160F RVW MEDS BY RX/DR IN RCRD: CPT | Mod: CPTII,S$GLB,, | Performed by: STUDENT IN AN ORGANIZED HEALTH CARE EDUCATION/TRAINING PROGRAM

## 2025-03-05 NOTE — PROGRESS NOTES
[facility] [place]     Reason for visit: Follow Up     Best Contact Phone Number(s): There are no phone numbers on file.     Oncology History   Hepatocellular carcinoma   9/24/2019 Initial Diagnosis    HCC (hepatocellular carcinoma) - treated with microwave ablation     6/26/2024 Relapse    Patient established with hematology for thrombocytopenia, HCC and cirrhosis history prompted further GI work-up    6/26/24: MRI Abdomen shows large lesions in segments 8 and 5 with washout compatible with HCC, measuring 8 x4.2 cm transverse and 10 cm caudal, prominent tumor thrombus in right portal vein, 1 cm lesion in posterior right hepatic lobe concerning for additional focus     7/3/2024 Cancer Staged    Staging form: Liver, AJCC 8th Edition  - Clinical: Stage IIIA (cT3, cN0, cM0)     7/15/2024 Cancer Staged    CT CAP shows stable liver masses up to 7.3 cm not significantly changed from prior     7/16/2024 -  Chemotherapy    Treatment Summary   Plan Name: OP Tremelimumab + Durvalumab x1 followed by DURVALUMAB 1500 MG Q4W  Treatment Goal: Palliative  Status: Active  Start Date: 7/16/2024  End Date: 6/25/2025 (Planned)  Provider: Aleyda Kirkland MD  Chemotherapy: [No matching medication found in this treatment plan]     8/30/2024 Procedure    Y90     10/10/2024 Procedure    Y90     11/5/2024 Cancer Staged    CT Chest showing 2-3 mm nodule in the lung, mildly prominent mediastinal nodes    MRI shows central necrotic changes in treated lesions without suggestion of recurrent / residual disease. Cirrhotic morphology, mild perihepatic ascites     1/25/2025 Adverse Reaction    ER with concern for colitis, treated with oral antibiotics and improved        2/5/2025 Cancer Staged    MRI Abdomen shows post treatment changes of the right hepatic lobe without residual/recurrent disease, no new liver lesion. Cirrhotic liver and sequela of portal venous hypertension including splenomegaly and trace ascites      2/10/2025  Tumor Conference     Liver tumor board, no residual disease or recurrence. Small bland thrombus in the anterior part of the portal vein      Malignant neoplasm of lateral wall of bladder (Resolved)   6/25/2024 Initial Diagnosis    Malignant neoplasm of overlapping sites of bladder     6/25/2024 Cancer Staged    Staging form: Urinary Bladder, AJCC 8th Edition  - Clinical: Stage 0a (cTa, cN0, cM0)       HPI: Hudson Tavares is a 69 y.o. male with HCC who presents prior to C8 Durvalumab. Critical access hospital last visit [place]. No recent dizzy spells, no lightheadedness. No clear cause, maybe beta blocker now improved on coreg. Pruritus resolved.     Since last visit durvaluamb held due to blood in stool. Patient denies diarrhea. Colonoscopy cancelled due to weather. Symptoms have now resolved.     Patient presents with son Roberto. Has daughter, Rubi. Has daughter Mara and granddaughter, Anselmo. ECOG PS is 0.    History has been obtained by chart review and discussion with the patient.    ROS:   A complete 12-point review of systems was reviewed and is negative except as mentioned above.     Past Medical History:   Past Medical History:   Diagnosis Date    Adenomatous colon polyp     Alcohol abuse 07/09/2015    Anticoagulant long-term use     coumadin - discontinued    Atrial fibrillation 07/09/2015    CAD (coronary artery disease), native coronary artery 05/03/2016    Degenerative joint disease 07/09/2015    Encounter for blood transfusion     Enlarged liver     Essential hypertension 07/09/2015    Hep C w/o coma, chronic     treated with Harvoni    Hiatal hernia     patient states hernia has resolved    Hx of bladder infections     finished cipro 4/29/16    S/P CABG (coronary artery bypass graft) 05/17/2016    LIMA to LAD, SVG to OM, SVG to diagonal, SVG to right PDA, Maze, left atrial appendage resection (5/16)    Spleen enlarged     Thrombocytopenia 05/05/2016    Tobacco abuse 07/09/2015        Past  Surgical History:   Past Surgical History:   Procedure Laterality Date    CARDIAC SURGERY  2016    4 vessel CABG    CERVICAL FUSION      COLONOSCOPY  2018    [facility]    CYSTOSCOPY W/ RETROGRADES N/A 2024    Procedure: CYSTOSCOPY, WITH RETROGRADE PYELOGRAM;  Surgeon: STORMY Bray MD;  Location: [facility] OR;  Service: Urology;  Laterality: N/A;    CYSTOSCOPY WITH BIOPSY OF BLADDER N/A 2024    Procedure: CYSTOSCOPY, WITH BLADDER BIOPSY, WITH FULGURATION IF INDICATED;  Surgeon: STORMY Bray MD;  Location: [facility] OR;  Service: Urology;  Laterality: N/A;    CYSTOURETHROSCOPY, WITH THROMBUS REMOVAL N/A 2024    Procedure: CYSTOURETHROSCOPY, WITH THROMBUS REMOVAL;  Surgeon: STORMY Bray MD;  Location: [facility] OR;  Service: Urology;  Laterality: N/A;    JOINT REPLACEMENT  TOTAL RIGHT KNEE     OPEN ANTERIOR SHOULDER RECONSTRUCTION Left     RADIOFREQUENCY ABLATION N/A 2019    Procedure: Radiofrequency Ablation;  Surgeon: Anayeli Surgeon;  Location: [facility];  Service: Anesthesiology;  Laterality: N/A;  Room 173/[facility]    TIBIA FRACTURE SURGERY Right         Family History:   Family History   Problem Relation Name Age of Onset    Heart disease Mother          congestive heart failure    Diabetes Mother      Hypertension Mother      Heart disease Sister          heart murmur    Atrial fibrillation Sister      Cancer Sister          breast    Cancer Father  60        colon    Chronic back pain Brother      Atrial fibrillation Sister      Heart disease Sister      Collagen disease Neg Hx          Social History:   Social History     Tobacco Use    Smoking status: Former     Current packs/day: 0.00     Average packs/day: 0.3 packs/day for 30.0 years (7.5 ttl pk-yrs)     Types: Cigarettes     Start date: 1986     Quit date: 2016     Years since quittin.8    Smokeless tobacco: Never   Substance Use Topics    Alcohol use: No     Alcohol/week: 0.0 standard drinks of alcohol  "    Comment: last drink 8/2015        I have reviewed and updated the patient's past medical, surgical, family and social histories.    Allergies:   Review of patient's allergies indicates:   Allergen Reactions    Cilostazol Other (See Comments)     Colon bleeding    Stadol [butorphanol tartrate] Hallucinations     "I saw pink elephants"    Keflex [cephalexin] Other (See Comments)     Was using 500mg TID oral and had a strong metallic taste in his mouth and could not function.  Can use IV cephalosporin.    Sulfa (sulfonamide antibiotics) Hives, Nausea And Vomiting and Other (See Comments)     Since childhood; does not know reaction      Finasteride Other (See Comments)        Medications:   Current Outpatient Medications   Medication Sig Dispense Refill    ALPRAZolam (XANAX) 0.5 MG tablet Take 0.5 mg by mouth every evening.      carvediloL (COREG) 6.25 MG tablet Take 1 tablet (6.25 mg total) by mouth 2 (two) times daily with meals. 60 tablet 11    famotidine (PEPCID) 20 MG tablet Take 20 mg by mouth every morning.      hepatitis A and B vaccine, PF, (TWINRIX) 720 KATINA unit- 20 mcg/mL Syrg suspension Inject 1mL into the skin now, repeat in 1 month,  and 6 months 1 mL 2    losartan (COZAAR) 50 MG tablet Take 50 mg by mouth every evening.      rivaroxaban (XARELTO) 20 mg Tab Take 1 tablet (20 mg total) by mouth daily with dinner or evening meal. 30 tablet 3    spironolactone (ALDACTONE) 25 MG tablet Take 25 mg by mouth as needed.      triamcinolone acetonide 0.1% (KENALOG) 0.1 % cream Apply topically 2 (two) times daily. 454 g 1     Current Facility-Administered Medications   Medication Dose Route Frequency Provider Last Rate Last Admin    alteplase injection 2 mg  2 mg Intra-Catheter PRN Aleyda Kirkland MD        heparin, porcine (PF) 100 unit/mL injection flush 500 Units  500 Units Intravenous PRN Aleyda Kirkland MD         Facility-Administered Medications Ordered in Other Visits   Medication Dose Route " "Frequency Provider Last Rate Last Admin    diphenhydrAMINE injection 12.5 mg  12.5 mg Intravenous Q6H PRN Stefano Sherwood MD        electrolyte-S (ISOLYTE)   Intravenous Continuous Stefano Sherwood MD        fentaNYL 50 mcg/mL injection 25 mcg  25 mcg Intravenous Q5 Min PRN Stefano Sherwood MD        HYDROmorphone (PF) injection 0.2 mg  0.2 mg Intravenous Q5 Min PRN Stefano Sherwood MD        lactated ringers infusion   Intravenous Continuous Stefano Sherwood MD 10 mL/hr at 02/27/24 0824 New Bag at 02/27/24 0824    LIDOcaine (PF) 10 mg/ml (1%) injection 10 mg  1 mL Intradermal Once Stefano Sherwood MD        ondansetron injection 4 mg  4 mg Intravenous Daily PRN Stefano Sherwood MD        oxyCODONE immediate release tablet 5 mg  5 mg Oral Q3H PRN Stefano Sherwood MD   5 mg at 02/27/24 1030    triamcinolone acetonide injection 40 mg  40 mg Intra-articular  Jaya Newell PA-C   40 mg at 02/25/25 1500        Physical Exam:   BP (!) 161/78   Pulse 85   Temp 98 °F (36.7 °C) (Oral)   Resp 17   Ht 5' 10" (1.778 m)   Wt 102.1 kg (225 lb 1.4 oz)   SpO2 98%   BMI 32.30 kg/m²                Physical Exam  Constitutional:       Appearance: Normal appearance.   HENT:      Head: Normocephalic and atraumatic.      Mouth/Throat:      Mouth: Mucous membranes are moist.   Eyes:      Extraocular Movements: Extraocular movements intact.      Pupils: Pupils are equal, round, and reactive to light.   Cardiovascular:      Rate and Rhythm: Normal rate. Rhythm irregular.      Pulses: Normal pulses.      Heart sounds: No murmur heard.  Pulmonary:      Effort: Pulmonary effort is normal. No respiratory distress.      Breath sounds: Normal breath sounds.   Abdominal:      General: Abdomen is flat. There is no distension.      Palpations: Abdomen is soft.      Tenderness: There is no abdominal tenderness.   Musculoskeletal:         General: No swelling or tenderness. Normal range of motion.      Cervical back: Normal range of " motion. No rigidity.   Skin:     General: Skin is warm and dry.      Coloration: Skin is not jaundiced.   Neurological:      General: No focal deficit present.      Mental Status: He is alert and oriented to person, place, and time.   Psychiatric:         Mood and Affect: Mood normal.         Behavior: Behavior normal.           Labs:   Lab Results   Component Value Date    WBC 5.27 03/05/2025    HGB 13.6 (L) 03/05/2025    HCT 41.2 03/05/2025    MCV 97 03/05/2025    PLT 81 (L) 03/05/2025       Lab Results   Component Value Date     (L) 03/05/2025    K 4.4 03/05/2025     03/05/2025    CO2 25 03/05/2025    BUN 14 03/05/2025    CREATININE 0.7 03/05/2025    ALBUMIN 3.6 03/05/2025    BILITOT 1.7 (H) 03/05/2025    ALKPHOS 102 03/05/2025    AST 27 03/05/2025    ALT 20 03/05/2025       Imaging:   X-Ray Wrist Complete Left  Narrative: EXAMINATION:  XR WRIST COMPLETE 3 VIEWS LEFT    CLINICAL HISTORY:  Pain in left wrist    TECHNIQUE:  PA, lateral, and oblique views of the left wrist were performed.    COMPARISON:  None    FINDINGS:  There is chondrocalcinosis present within the TFCC, radiocarpal joint, and scapholunate joint suggesting underlying CPPD.  There is mild degenerative change of the distal radioulnar joint.  There is intraosseous cyst formation within the lunate and proximal pole of the scaphoid.  No fracture, dislocation, or osseous destructive process.  There are vascular calcifications present along the palmar aspect of the forearm and wrist.  There is soft tissue swelling present about the wrist.  There is degenerative osteoarthritis of the thumb CMC joint.  Impression: As above    Electronically signed by: Dionicio Lazar MD  Date:    02/25/2025  Time:    15:26  X-Ray Ankle Complete Right  Narrative: EXAMINATION:  XR ANKLE COMPLETE 3 VIEW RIGHT    CLINICAL HISTORY:  Primary osteoarthritis, right ankle and foot    TECHNIQUE:  AP, lateral, and oblique images of the right ankle were  performed.    COMPARISON:  12/26/2023    FINDINGS:  Healing fractures of the distal tibia and fibula are seen in good position and alignment.  Ankle mortise is preserved.  Impression: See above    Electronically signed by: Armand Ramirez MD  Date:    02/25/2025  Time:    14:50  X-Ray Foot Complete Right  Narrative: EXAMINATION:  XR FOOT COMPLETE 3 VIEW RIGHT    CLINICAL HISTORY:  . Primary osteoarthritis, right ankle and foot    TECHNIQUE:  AP, lateral, and oblique views of the right foot were performed.    COMPARISON:  12/26/2023    FINDINGS:  Mild narrowing of the 1st MP joint is noted.  Phalanges metatarsals and tarsal bones are intact with no significant bony abnormalities.  Some vascular calcifications are noted.  Impression: See above    Electronically signed by: Armand Ramirez MD  Date:    02/25/2025  Time:    14:31          Assessment:       1. Hepatocellular carcinoma    2. Lightheadedness    3. Blurred vision    4. Drug rash    5. Thrombocytopenia    6. Nutritional anemia    7. History of bladder cancer    8. Longstanding persistent atrial fibrillation    9. Compensated HCV cirrhosis    10. Heartburn    11. Arthritis    12. Tendonitis    13. Malignant neoplasm of overlapping sites of bladder             Plan:           # HCC, Stage III - Child Faria A. Microwave ablation 9/2019, MRI Abdomen shows recurrent disease. Discussed case with IR, recommend Y90 + immunotherapy for best disease control. We discussed the results of the [facility] study and superiority of Durvalumab/Tremlimumab to Sorafenib for improved overall survival. Alternative therapy with be Bevacizumab/Atezolizumab per [employer] [facility] but would impact timing of Y90. Would recommend either approach over Sorafenib, patient and family understanding. AFP 21 -> 18.     Patient has experienced rash over scalp and trunk since starting immunotherapy. Rash has responded to topical steroids, prednisone 50 mg x2 doses but stopped due to significant  insomnia. Overall <10% BSA and symptoms do not interfere with QoL or therapy. AFP 21 -> 3.3 -> 6.8 -> 4.6    Overall side effects of pruritus is tolerable, lightheadedness likely due to episodes of hypoglycemia.     Patient underwent Y90 8/2024 and 10/2024. MRI Abdomen shows excellent treatment response with no residual disease. Per liver tumor conference, recommend liver transplant but patient declines. Strongly encouraged to consider today.    With no residual disease and adverse immunotherapy toxicity (pruritus, episode of hematochezia), recommend holding Durvalumab for now. Patient would be eligible for further immunotherapy upon rechallenge.     Plan for close surveillance, next CT Chest and MRI Abdomen 5/2025.     # Lightheadedness - MRI Brain 11/23/24 wnl, improved with metoprolol -> carvedilol    # Blurred vision - optometry evaluation, recommend normal saline drops and prescribed glasses     # IO dermatitis - Continue topical steroids at home, Grade I. Refill kenalog today. Encourage sensitive solutions & unscented products    # Pruritus - did not tolerate cholestyramine    # Thrombocytopenia - Attributed to cirrhosis, established with hepatology. Improved on therapy    # Anemia - Mild iron deficiency, encourage iron in diet    # Non-Muscle Invasive Bladder Cancer, Ta - Follows with Dr. Bray, TURBT 2/2024. Cystoscopy 6/20/24 negative for recurrence, cancelled 9/2024. Cystoscopy 12/2024 negative for recurrence   - due 6/2025 for cystoscopy     # Atrial fibrillation - Coumadin -> Xarelto. HOLD anticoagulation if plts fall <50k     # Hep C Cirrhosis - Treated with SVR, patient declines liver transplant in the past. Established with hepatology for compensated cirrhosis, EGD 7/2/24  - improved liver enzymes after Y90   - establish with hepatology 12/2024    # Heartburn - improved, pepcid prn    # Arthritis - Taking Apap 500 mg bid prn    # Tendonitis - follows with orthopedics in [place]    #  Diverticulitis-  episode of hematochezia, history of diverticulitis. Currently on anticoagulation. No diarrhea, unlikely to be immune checkpoint mediated. [facility] per patient preference     Follow up: after repeat scans     The above information has been reviewed with the patient and all questions have been answered to their apparent satisfaction.  They understand that they can call the clinic with any questions.    Aleyda Kirkland MD  Hematology/Oncology  [facility] [place]      Med Onc Chart Routing      Follow up with physician . Isael after imaging   Follow up with DARLING    Infusion scheduling note    Injection scheduling note    Labs   Scheduling:  Preferred lab:  Lab interval:  Labs prior to imaging: cbc, cmp, afp, tsh   Imaging   5/2025 [facility]: MRI abdomen and CT Chest   Pharmacy appointment    Other referrals

## 2025-03-10 ENCOUNTER — E-CONSULT (OUTPATIENT)
Dept: GASTROENTEROLOGY | Facility: CLINIC | Age: 70
End: 2025-03-10
Payer: MEDICARE

## 2025-03-10 DIAGNOSIS — K92.1 HEMATOCHEZIA: Primary | ICD-10-CM

## 2025-03-10 DIAGNOSIS — K62.5 RECTAL BLEEDING: Primary | ICD-10-CM

## 2025-03-10 NOTE — CONSULTS
[facility] [address]  Response for E-Consult     Patient Name: Hudson Tavares  MRN: [id number]  Primary Care Provider: Emir Alanis NP   Requesting Provider: Aleyda Kirkland MD  E-Consult to Gastroenterology  Consult performed by: Gomez Morales MD  Consult ordered by: Aleyda Kirkland MD  Reason for consult: Hematochezia          Recommendation:   1) Will arrange expedited colonoscopy    An Ambulatory Referral for an Endoscopy Order will be pended to the referring provider with the following:    Procedure: Colonoscopy    Diagnosis: Rectal bleeding    Procedure Timing: Within 4 weeks (Urgent)    70yo man with isolated episode of hematochezia.  Will arrange for colonoscopy to assess for etiology.    Additional future steps to consider:     Total time of Consultation: 10 minute    I did speak to the requesting provider verbally about this.     *This eConsult is based on the clinical data available to me and is furnished without benefit of a physical examination. The eConsult will need to be interpreted in light of any clinical issues or changes in patient status not available to me at the time of filing this eConsults. Significant changes in patient condition or level of acuity should result in immediate formal consultation and reevaluation. Please alert me if you have further questions.    Thank you for this eConsult referral.     Gomez Morales MD  [address]

## 2025-03-11 ENCOUNTER — OFFICE VISIT (OUTPATIENT)
Dept: ORTHOPEDICS | Facility: CLINIC | Age: 70
End: 2025-03-11
Payer: MEDICARE

## 2025-03-11 DIAGNOSIS — S69.92XA INJURY OF LEFT WRIST, INITIAL ENCOUNTER: ICD-10-CM

## 2025-03-11 DIAGNOSIS — M11.20 CALCIUM PYROPHOSPHATE DEPOSITION DISEASE (CPPD): ICD-10-CM

## 2025-03-11 DIAGNOSIS — M65.312 TRIGGER FINGER OF LEFT THUMB: Primary | ICD-10-CM

## 2025-03-11 PROCEDURE — 1126F AMNT PAIN NOTED NONE PRSNT: CPT | Mod: CPTII,S$GLB,, | Performed by: PHYSICIAN ASSISTANT

## 2025-03-11 PROCEDURE — 99213 OFFICE O/P EST LOW 20 MIN: CPT | Mod: S$GLB,,, | Performed by: PHYSICIAN ASSISTANT

## 2025-03-11 PROCEDURE — 99999 PR PBB SHADOW E&M-EST. PATIENT-LVL III: CPT | Mod: PBBFAC,,, | Performed by: PHYSICIAN ASSISTANT

## 2025-03-11 PROCEDURE — 4010F ACE/ARB THERAPY RXD/TAKEN: CPT | Mod: CPTII,S$GLB,, | Performed by: PHYSICIAN ASSISTANT

## 2025-03-11 PROCEDURE — 3288F FALL RISK ASSESSMENT DOCD: CPT | Mod: CPTII,S$GLB,, | Performed by: PHYSICIAN ASSISTANT

## 2025-03-11 PROCEDURE — 1160F RVW MEDS BY RX/DR IN RCRD: CPT | Mod: CPTII,S$GLB,, | Performed by: PHYSICIAN ASSISTANT

## 2025-03-11 PROCEDURE — 1101F PT FALLS ASSESS-DOCD LE1/YR: CPT | Mod: CPTII,S$GLB,, | Performed by: PHYSICIAN ASSISTANT

## 2025-03-11 PROCEDURE — 1159F MED LIST DOCD IN RCRD: CPT | Mod: CPTII,S$GLB,, | Performed by: PHYSICIAN ASSISTANT

## 2025-03-11 NOTE — PROGRESS NOTES
3/11/2025    HPI:  Hudson Tavares is a 69 y.o. male, who presents to clinic today for continued evaluation of his left thumb trigger finger and left wrist inflammatory arthropathy.  States the trigger finger injection of the resolve of the left thumb trigger finger.  States the splint has resolved his left wrist pain and swelling.  Denies any acute injuries since his last visit.  Denies any other complaints at this time.    PMHX:  Past Medical History:   Diagnosis Date    Adenomatous colon polyp     Alcohol abuse 07/09/2015    Anticoagulant long-term use     coumadin - discontinued    Atrial fibrillation 07/09/2015    CAD (coronary artery disease), native coronary artery 05/03/2016    Degenerative joint disease 07/09/2015    Encounter for blood transfusion     Enlarged liver     Essential hypertension 07/09/2015    Hep C w/o coma, chronic     treated with Harvoni    Hiatal hernia     patient states hernia has resolved    Hx of bladder infections     finished cipro 4/29/16    S/P CABG (coronary artery bypass graft) 05/17/2016    LIMA to LAD, SVG to OM, SVG to diagonal, SVG to right PDA, Maze, left atrial appendage resection (5/16)    Spleen enlarged     Thrombocytopenia 05/05/2016    Tobacco abuse 07/09/2015       PSHX:  Past Surgical History:   Procedure Laterality Date    CARDIAC SURGERY  05/04/2016    4 vessel CABG    CERVICAL FUSION  2004    COLONOSCOPY  12/03/2018    Steve Wells    CYSTOSCOPY W/ RETROGRADES N/A 2/27/2024    Procedure: CYSTOSCOPY, WITH RETROGRADE PYELOGRAM;  Surgeon: STORMY Bray MD;  Location: [facility] OR;  Service: Urology;  Laterality: N/A;    CYSTOSCOPY WITH BIOPSY OF BLADDER N/A 2/27/2024    Procedure: CYSTOSCOPY, WITH BLADDER BIOPSY, WITH FULGURATION IF INDICATED;  Surgeon: STORMY Bray MD;  Location: [facility] OR;  Service: Urology;  Laterality: N/A;    CYSTOURETHROSCOPY, WITH THROMBUS REMOVAL N/A 2/27/2024    Procedure: CYSTOURETHROSCOPY, WITH THROMBUS REMOVAL;  Surgeon: Asa  STORMY Chatterjee MD;  Location: [facility] OR;  Service: Urology;  Laterality: N/A;    JOINT REPLACEMENT  TOTAL RIGHT KNEE     OPEN ANTERIOR SHOULDER RECONSTRUCTION Left     RADIOFREQUENCY ABLATION N/A 2019    Procedure: Radiofrequency Ablation;  Surgeon: Danyel Surgeon;  Location: [facility];  Service: Anesthesiology;  Laterality: N/A;  Room 173/[place]    TIBIA FRACTURE SURGERY Right        FMHX:  Family History   Problem Relation Name Age of Onset    Heart disease Mother          congestive heart failure    Diabetes Mother      Hypertension Mother      Heart disease Sister          heart murmur    Atrial fibrillation Sister      Cancer Sister          breast    Cancer Father  60        colon    Chronic back pain Brother      Atrial fibrillation Sister      Heart disease Sister      Collagen disease Neg Hx         SOCHX:  Social History     Tobacco Use    Smoking status: Former     Current packs/day: 0.00     Average packs/day: 0.3 packs/day for 30.0 years (7.5 ttl pk-yrs)     Types: Cigarettes     Start date: 1986     Quit date: 2016     Years since quittin.8    Smokeless tobacco: Never   Substance Use Topics    Alcohol use: No     Alcohol/week: 0.0 standard drinks of alcohol     Comment: last drink 2015       ALLERGIES:  Cilostazol, Stadol [butorphanol tartrate], Keflex [cephalexin], Sulfa (sulfonamide antibiotics), and Finasteride    CURRENT MEDICATIONS:  Medications Ordered Prior to Encounter[1]    REVIEW OF SYSTEMS:  Review of Systems Complete; Negative, unless noted above.    GENERAL PHYSICAL EXAM:   There were no vitals taken for this visit.   GEN: well developed, well nourished, no acute distress   PULM: No wheezing, no respiratory distress   CV: RRR    ORTHO EXAM:   Examination of the left wrist/hand reveals no edema, erythema, ecchymosis or skin breakdown.  Able make composite fist and fully extend all fingers.  No active triggering noted of the left thumb.  No significant tenderness palpation of  the left thumb/left wrist.  5/5 /intrinsic strength.  Normal sensation in the radial, ulnar, median nerve distributions.  Capillary refill is 2 seconds.    RADIOLOGY:   None.    ASSESSMENT:   Left wrist until inflammatory arthropathy    PLAN:  1. I discussed with Hudson Tavares that he has progressed very well in the treatment course.  We discussed the best course of action this time is to transition to wearing the splint for activity only and return to normal activity as tolerated.  He verbally agreed with the treatment plan.      2.  I would like him follow up in clinic on a p.r.n. basis.  He was instructed to contact clinic for any problems or concerns.           [1]   Current Outpatient Medications on File Prior to Visit   Medication Sig Dispense Refill    ALPRAZolam (XANAX) 0.5 MG tablet Take 0.5 mg by mouth every evening.      carvediloL (COREG) 6.25 MG tablet Take 1 tablet (6.25 mg total) by mouth 2 (two) times daily with meals. 60 tablet 11    famotidine (PEPCID) 20 MG tablet Take 20 mg by mouth every morning.      hepatitis A and B vaccine, PF, (TWINRIX) 720 KATINA unit- 20 mcg/mL Syrg suspension Inject 1mL into the skin now, repeat in 1 month,  and 6 months 1 mL 2    losartan (COZAAR) 50 MG tablet Take 50 mg by mouth every evening.      rivaroxaban (XARELTO) 20 mg Tab Take 1 tablet (20 mg total) by mouth daily with dinner or evening meal. 30 tablet 3    spironolactone (ALDACTONE) 25 MG tablet Take 25 mg by mouth as needed.      triamcinolone acetonide 0.1% (KENALOG) 0.1 % cream Apply topically 2 (two) times daily. 454 g 1     Current Facility-Administered Medications on File Prior to Visit   Medication Dose Route Frequency Provider Last Rate Last Admin    alteplase injection 2 mg  2 mg Intra-Catheter PRN Aleyda Kirkland MD        diphenhydrAMINE injection 12.5 mg  12.5 mg Intravenous Q6H PRN Stefano Sherwood MD        electrolyte-S (ISOLYTE)   Intravenous Continuous Stefano Sherwood MD         fentaNYL 50 mcg/mL injection 25 mcg  25 mcg Intravenous Q5 Min PRN Stefano Sherwood MD        heparin, porcine (PF) 100 unit/mL injection flush 500 Units  500 Units Intravenous PRN Aleyda Kirkland MD        HYDROmorphone (PF) injection 0.2 mg  0.2 mg Intravenous Q5 Min PRN Stefano Sherwood MD        lactated ringers infusion   Intravenous Continuous Stefano Sherwood MD 10 mL/hr at 02/27/24 0824 New Bag at 02/27/24 0824    LIDOcaine (PF) 10 mg/ml (1%) injection 10 mg  1 mL Intradermal Once Stefano Sherwood MD        ondansetron injection 4 mg  4 mg Intravenous Daily PRN Stefano Sherwood MD        oxyCODONE immediate release tablet 5 mg  5 mg Oral Q3H PRN Stefano Sherwood MD   5 mg at 02/27/24 1030    triamcinolone acetonide injection 40 mg  40 mg Intra-articular  Jaya Newell PA-C   40 mg at 02/25/25 1500

## 2025-03-19 ENCOUNTER — PATIENT MESSAGE (OUTPATIENT)
Dept: CARDIOLOGY | Facility: CLINIC | Age: 70
End: 2025-03-19
Payer: MEDICARE

## 2025-03-27 ENCOUNTER — OFFICE VISIT (OUTPATIENT)
Dept: CARDIOLOGY | Facility: CLINIC | Age: 70
End: 2025-03-27
Payer: MEDICARE

## 2025-03-27 VITALS
BODY MASS INDEX: 32.61 KG/M2 | DIASTOLIC BLOOD PRESSURE: 80 MMHG | WEIGHT: 227.75 LBS | SYSTOLIC BLOOD PRESSURE: 146 MMHG | HEART RATE: 86 BPM | HEIGHT: 70 IN

## 2025-03-27 DIAGNOSIS — Z86.711 HX OF PULMONARY EMBOLUS: ICD-10-CM

## 2025-03-27 DIAGNOSIS — I48.11 LONGSTANDING PERSISTENT ATRIAL FIBRILLATION: ICD-10-CM

## 2025-03-27 DIAGNOSIS — I50.32 CHRONIC DIASTOLIC CONGESTIVE HEART FAILURE: ICD-10-CM

## 2025-03-27 DIAGNOSIS — I48.20 CHRONIC ATRIAL FIBRILLATION: Primary | ICD-10-CM

## 2025-03-27 DIAGNOSIS — I73.9 PERIPHERAL ARTERY DISEASE: ICD-10-CM

## 2025-03-27 DIAGNOSIS — Z95.1 S/P CABG X 4: ICD-10-CM

## 2025-03-27 DIAGNOSIS — I10 ESSENTIAL HYPERTENSION: ICD-10-CM

## 2025-03-27 PROCEDURE — 99999 PR PBB SHADOW E&M-EST. PATIENT-LVL III: CPT | Mod: PBBFAC,,,

## 2025-03-27 RX ORDER — CARVEDILOL 3.12 MG/1
3.12 TABLET ORAL 2 TIMES DAILY WITH MEALS
Qty: 60 TABLET | Refills: 11 | Status: SHIPPED | OUTPATIENT
Start: 2025-03-27 | End: 2026-03-27

## 2025-03-27 NOTE — PROGRESS NOTES
Subjective:    Patient ID:  Hudson Tavares is a 70 y.o. male patient here for evaluation Dizziness    History of Present Illness:     Patient of Dr. Gonzalez's here today because about an hour after AM medications when his diastolic drops to 60s he gets dizzy and pre-syncopal. Tolerates the dosing fine in the PM.     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     Most Recent Echocardiogram Results  Results for orders placed in visit on 12/15/22    Echo    Interpretation Summary  · Atrial fibrillation observed.  · The left ventricle is normal in size with concentric remodeling and  · The estimated ejection fraction is 60%.  · There is abnormal septal wall motion consistent with post-operative status.  · Normal right ventricular size with normal right ventricular systolic function.  · Severe left atrial enlargement.  · Moderate right atrial enlargement.  · Mild tricuspid regurgitation.  · Normal central venous pressure (3 mmHg).  · The estimated PA systolic pressure is 35 mmHg.      Most Recent Nuclear Stress Test Results  No results found for this or any previous visit.      Most Recent Cardiac PET Stress Test Results  No results found for this or any previous visit.      Most Recent Cardiovascular Angiogram results  No results found for this or any previous visit.      Other Most Recent Cardiology Results  Results for orders placed during the hospital encounter of 01/25/25    Cardiac monitoring strips      REVIEW OF SYSTEMS: As noted in HPI   CARDIOVASCULAR: No recent chest pain, palpitations, arm/neck/jaw pain, or edema.  RESPIRATORY: No recent fever, cough, SOB.  : No blood in the urine  GI: No reflux, nausea, vomiting, or blood in stool.   MUSCULOSKELETAL: No falls.   NEURO: No headaches, syncope.   EYES: No sudden changes in vision.     Past Medical History:   Diagnosis Date    Adenomatous colon polyp     Alcohol abuse 07/09/2015    Anticoagulant long-term use      coumadin - discontinued    Atrial fibrillation 07/09/2015    CAD (coronary artery disease), native coronary artery 05/03/2016    Degenerative joint disease 07/09/2015    Encounter for blood transfusion     Enlarged liver     Essential hypertension 07/09/2015    Hep C w/o coma, chronic     treated with Harvoni    Hiatal hernia     patient states hernia has resolved    Hx of bladder infections     finished cipro 4/29/16    S/P CABG (coronary artery bypass graft) 05/17/2016    LIMA to LAD, SVG to OM, SVG to diagonal, SVG to right PDA, Maze, left atrial appendage resection (5/16)    Spleen enlarged     Thrombocytopenia 05/05/2016    Tobacco abuse 07/09/2015     Past Surgical History:   Procedure Laterality Date    CARDIAC SURGERY  05/04/2016    4 vessel CABG    CERVICAL FUSION  2004    COLONOSCOPY  12/03/2018    Steve Wells    CYSTOSCOPY W/ RETROGRADES N/A 2/27/2024    Procedure: CYSTOSCOPY, WITH RETROGRADE PYELOGRAM;  Surgeon: STORMY Bray MD;  Location: [facility] OR;  Service: Urology;  Laterality: N/A;    CYSTOSCOPY WITH BIOPSY OF BLADDER N/A 2/27/2024    Procedure: CYSTOSCOPY, WITH BLADDER BIOPSY, WITH FULGURATION IF INDICATED;  Surgeon: STORMY Bray MD;  Location: [facility] OR;  Service: Urology;  Laterality: N/A;    CYSTOURETHROSCOPY, WITH THROMBUS REMOVAL N/A 2/27/2024    Procedure: CYSTOURETHROSCOPY, WITH THROMBUS REMOVAL;  Surgeon: STORMY Bray MD;  Location: [facility] OR;  Service: Urology;  Laterality: N/A;    JOINT REPLACEMENT  TOTAL RIGHT KNEE     OPEN ANTERIOR SHOULDER RECONSTRUCTION Left     RADIOFREQUENCY ABLATION N/A 9/24/2019    Procedure: Radiofrequency Ablation;  Surgeon: Danyel Surgeon;  Location: [facility];  Service: Anesthesiology;  Laterality: N/A;  Room 173/[facility]    TIBIA FRACTURE SURGERY Right 1971     Social History[1]      Objective      Vitals:    03/27/25 1418   BP: (!) 146/80   Pulse: 86       LAST EKG  Results for orders placed or performed during the hospital encounter of 01/25/25    EKG 12-lead    Collection Time: 01/25/25 11:25 PM   Result Value Ref Range    QRS Duration 84 ms    [facility] QTC Calculation 474 ms    Narrative    Test Reason : R00.0,    Vent. Rate : 109 BPM     Atrial Rate :    BPM     P-R Int :    ms          QRS Dur :  84 ms      QT Int : 352 ms       P-R-T Axes :     91  52 degrees    QTcB Int : 474 ms    Atrial fibrillation  Rightward axis  Abnormal ECG  When compared with ECG of 31-Jul-2024 15:26,  No significant change was found  Confirmed by Migel Avina ([id number]) on 1/29/2025 9:45:12 AM    Referred By: AAAREFERRAL SELF           Confirmed By: Migel Avina     LIPIDS - LAST 2   Lab Results   Component Value Date    CHOL 192 04/08/2021    CHOL 154 03/13/2018    HDL 59 04/08/2021    HDL 50 03/13/2018    LDLCALC 106.8 04/08/2021    LDLCALC 66.0 03/13/2018    TRIG 131 04/08/2021    TRIG 190 (H) 03/13/2018    CHOLHDL 30.7 04/08/2021    CHOLHDL 32.5 03/13/2018     CARDIAC PROFILE - LAST 2  Lab Results   Component Value Date    TROPONINI <0.012 05/21/2023      CBC - LAST 2  Lab Results   Component Value Date    WBC 5.27 03/05/2025    WBC 4.30 02/05/2025    HGB 13.6 (L) 03/05/2025    HGB 11.5 (L) 02/05/2025    HCT 41.2 03/05/2025    HCT 34.5 (L) 02/05/2025    PLT 81 (L) 03/05/2025    PLT 80 (L) 02/05/2025     Lab Results   Component Value Date    LABPT 15.8 (H) 01/25/2025    LABPT 22.9 (H) 05/21/2023    INR 1.3 01/25/2025    INR 1.3 (H) 01/08/2025    APTT 38.7 (H) 05/21/2023    APTT 42.6 (H) 09/26/2019     CHEMISTRY - LAST 2  Lab Results   Component Value Date     (L) 03/05/2025     02/05/2025    K 4.4 03/05/2025    K 3.8 02/05/2025    CO2 25 03/05/2025    CO2 26 02/05/2025    BUN 14 03/05/2025    BUN 12 02/05/2025    CREATININE 0.7 03/05/2025    CREATININE 0.6 02/05/2025     (H) 03/05/2025     02/05/2025    CALCIUM 8.9 03/05/2025    CALCIUM 9.3 02/05/2025    PH 7.39 05/04/2016    PH 7.37 05/04/2016    MG 1.9 05/21/2023    MG 1.7 06/16/2016    ALBUMIN 3.6  03/05/2025    ALBUMIN 3.6 02/05/2025    ALT 20 03/05/2025    ALT 20 02/05/2025    AST 27 03/05/2025    AST 33 02/05/2025      ENDOCRINE - LAST 2  Lab Results   Component Value Date    HGBA1C 5.3 08/15/2016    TSH 1.881 03/05/2025    TSH 2.137 02/05/2025        PHYSICAL EXAM  CONSTITUTIONAL: Well built, well nourished in no apparent distress  NECK: no carotid bruit, no JVD  LUNGS: CTA  CHEST WALL: no tenderness  HEART: regular rate and rhythm, S1, S2 normal, no murmur, click, rub or gallop   ABDOMEN: soft, non-tender; bowel sounds normal; no masses,  no organomegaly  EXTREMITIES: Extremities normal, no edema, no calf tenderness noted  NEURO: AAO X 3    I HAVE REVIEWED :    The vital signs, most recent cardiac testing, and most recent pertinent non-cardiology provider notes.    Current Outpatient Medications   Medication Instructions    ALPRAZolam (XANAX) 0.5 mg, Nightly    carvediloL (COREG) 6.25 mg, Oral, 2 times daily with meals    famotidine (PEPCID) 20 mg, Every morning    hepatitis A and B vaccine, PF, (TWINRIX) 720 KATINA unit- 20 mcg/mL Syrg suspension Inject 1mL into the skin now, repeat in 1 month,  and 6 months    losartan (COZAAR) 50 mg, Nightly    spironolactone (ALDACTONE) 25 mg, As needed (PRN)    triamcinolone acetonide 0.1% (KENALOG) 0.1 % cream Topical (Top), 2 times daily    XARELTO 20 mg, Oral, With dinner        Assessment & Plan   1. Chronic atrial fibrillation (Primary)  2. Longstanding persistent atrial fibrillation  Continue BB and Xarelto     3. Essential hypertension  Says he is getting weak after AM medications, Will reduce coreg to 3.125 mg in the AM and keep coreg 6.25 mg in the PM and assess response     4. S/P CABG x 4  NO angina   Will repeat echo with pre-syncope and dizziness     5. Chronic diastolic congestive heart failure  Compensated  Takes aldactone on PRN basis     6. Peripheral artery disease  2. Normal ankle brachial indices in both lower extremities. No focal areas of > 50%  hemodynamically significant stenosis or vessel occlusion involving the right common femoral, superficial femoral, popliteal, anterior tibial, or posterior tibial arteries.          6 mo    Naomi Peters, PA-C [facility]   Office: [phone]         [1]   Social History  Tobacco Use    Smoking status: Former     Current packs/day: 0.00     Average packs/day: 0.3 packs/day for 30.0 years (7.5 ttl pk-yrs)     Types: Cigarettes     Start date: 1986     Quit date: 2016     Years since quittin.9    Smokeless tobacco: Never   Substance Use Topics    Alcohol use: No     Alcohol/week: 0.0 standard drinks of alcohol     Comment: last drink 2015    Drug use: No

## 2025-04-04 ENCOUNTER — HOSPITAL ENCOUNTER (OUTPATIENT)
Dept: CARDIOLOGY | Facility: HOSPITAL | Age: 70
Discharge: HOME | End: 2025-04-04
Payer: MEDICARE

## 2025-04-04 VITALS — HEIGHT: 70 IN | BODY MASS INDEX: 32.5 KG/M2 | WEIGHT: 227 LBS

## 2025-04-04 DIAGNOSIS — I48.11 LONGSTANDING PERSISTENT ATRIAL FIBRILLATION: ICD-10-CM

## 2025-04-04 LAB
ASCENDING AORTA: 2.63 CM
AV INDEX (PROSTH): 0.61
AV MEAN GRADIENT: 9 MMHG
AV PEAK GRADIENT: 18 MMHG
AV REGURGITATION PRESSURE HALF TIME: 332 MS
AV VALVE AREA BY VELOCITY RATIO: 1.5 CM²
AV VALVE AREA: 1.9 CM²
AV VELOCITY RATIO: 0.48
BSA FOR ECHO PROCEDURE: 2.25 M2
CV ECHO LV RWT: 0.5 CM
DOP CALC AO PEAK VEL: 2.1 M/S
DOP CALC AO VTI: 35.6 CM
DOP CALC LVOT AREA: 3.1 CM2
DOP CALC LVOT DIAMETER: 2 CM
DOP CALC LVOT PEAK VEL: 1 M/S
DOP CALC LVOT STROKE VOLUME: 68.5 CM3
DOP CALCLVOT PEAK VEL VTI: 21.8 CM
E WAVE DECELERATION TIME: 204 MSEC
E/A RATIO: 1.78
E/E' RATIO: 10 M/S
ECHO LV POSTERIOR WALL: 1.1 CM (ref 0.6–1.1)
FRACTIONAL SHORTENING: 40.9 % (ref 28–44)
INTERVENTRICULAR SEPTUM: 1.2 CM (ref 0.6–1.1)
IVRT: 49 MSEC
LEFT ATRIUM AREA SYSTOLIC (APICAL 2 CHAMBER): 27.89 CM2
LEFT ATRIUM AREA SYSTOLIC (APICAL 4 CHAMBER): 25.46 CM2
LEFT ATRIUM SIZE: 5.1 CM
LEFT ATRIUM VOLUME INDEX MOD: 39 ML/M2
LEFT ATRIUM VOLUME MOD: 85 ML
LEFT INTERNAL DIMENSION IN SYSTOLE: 2.6 CM (ref 2.1–4)
LEFT VENTRICLE DIASTOLIC VOLUME INDEX: 40 ML/M2
LEFT VENTRICLE DIASTOLIC VOLUME: 88 ML
LEFT VENTRICLE END SYSTOLIC VOLUME APICAL 2 CHAMBER: 88.8 ML
LEFT VENTRICLE END SYSTOLIC VOLUME APICAL 4 CHAMBER: 77.13 ML
LEFT VENTRICLE MASS INDEX: 81.8 G/M2
LEFT VENTRICLE SYSTOLIC VOLUME INDEX: 10.5 ML/M2
LEFT VENTRICLE SYSTOLIC VOLUME: 23 ML
LEFT VENTRICULAR INTERNAL DIMENSION IN DIASTOLE: 4.4 CM (ref 3.5–6)
LEFT VENTRICULAR MASS: 180 G
LV LATERAL E/E' RATIO: 9.6 M/S
LV SEPTAL E/E' RATIO: 10.7 M/S
LVED V (TEICH): 88.19 ML
LVES V (TEICH): 23.34 ML
LVOT MG: 2.33 MMHG
LVOT MV: 0.71 CM/S
MV PEAK A VEL: 0.54 M/S
MV PEAK E VEL: 0.96 M/S
MV STENOSIS PRESSURE HALF TIME: 59.2 MS
MV VALVE AREA P 1/2 METHOD: 3.72 CM2
OHS CV RV/LV RATIO: 1 CM
PISA AR MAX VEL: 3.54 M/S
PISA TR MAX VEL: 2.9 M/S
RA PRESSURE ESTIMATED: 3 MMHG
RA VOL SYS: 45.13 ML
RIGHT ATRIAL AREA: 17.4 CM2
RIGHT ATRIUM VOLUME AREA LENGTH APICAL 4 CHAMBER: 45.23 ML
RIGHT VENTRICLE DIASTOLIC BASEL DIMENSION: 4.4 CM
RIGHT VENTRICLE DIASTOLIC LENGTH: 6.3 CM
RIGHT VENTRICLE DIASTOLIC MID DIMENSION: 2.2 CM
RIGHT VENTRICULAR END-DIASTOLIC DIMENSION: 4.39 CM
RIGHT VENTRICULAR LENGTH IN DIASTOLE (APICAL 4-CHAMBER VIEW): 6.27 CM
RV MID DIAMA: 2.16 CM
RV TB RVSP: 6 MMHG
RV TISSUE DOPPLER FREE WALL SYSTOLIC VELOCITY 1 (APICAL 4 CHAMBER VIEW): 11.83 CM/S
SINUS: 2.65 CM
STJ: 2.19 CM
TDI LATERAL: 0.1 M/S
TDI SEPTAL: 0.09 M/S
TDI: 0.1 M/S
TR MAX PG: 34 MMHG
TRICUSPID ANNULAR PLANE SYSTOLIC EXCURSION: 1.87 CM
TV REST PULMONARY ARTERY PRESSURE: 37 MMHG
Z-SCORE OF LEFT VENTRICULAR DIMENSION IN END DIASTOLE: -5.35
Z-SCORE OF LEFT VENTRICULAR DIMENSION IN END SYSTOLE: -4.45

## 2025-04-04 PROCEDURE — 93306 TTE W/DOPPLER COMPLETE: CPT | Mod: 26,,, | Performed by: INTERNAL MEDICINE

## 2025-04-04 PROCEDURE — 93306 TTE W/DOPPLER COMPLETE: CPT | Mod: PO

## 2025-04-05 ENCOUNTER — RESULTS FOLLOW-UP (OUTPATIENT)
Dept: CARDIOLOGY | Facility: CLINIC | Age: 70
End: 2025-04-05
Payer: MEDICARE

## 2025-04-21 ENCOUNTER — PATIENT MESSAGE (OUTPATIENT)
Dept: HEMATOLOGY/ONCOLOGY | Facility: CLINIC | Age: 70
End: 2025-04-21
Payer: MEDICARE

## 2025-05-06 ENCOUNTER — TELEPHONE (OUTPATIENT)
Dept: ENDOSCOPY | Facility: HOSPITAL | Age: 70
End: 2025-05-06

## 2025-05-06 ENCOUNTER — CLINICAL SUPPORT (OUTPATIENT)
Dept: ENDOSCOPY | Facility: HOSPITAL | Age: 70
End: 2025-05-06
Attending: STUDENT IN AN ORGANIZED HEALTH CARE EDUCATION/TRAINING PROGRAM
Payer: MEDICARE

## 2025-05-06 ENCOUNTER — TELEPHONE (OUTPATIENT)
Dept: GASTROENTEROLOGY | Facility: CLINIC | Age: 70
End: 2025-05-06
Payer: MEDICARE

## 2025-05-06 DIAGNOSIS — K92.1 HEMATOCHEZIA: Primary | ICD-10-CM

## 2025-05-06 DIAGNOSIS — K92.1 HEMATOCHEZIA: ICD-10-CM

## 2025-05-06 NOTE — PLAN OF CARE
Contacted the patient for PAT appointment to schedule an endoscopy procedure(s) Colonoscopy . Spoke with patient.  Patient states he wants to get procedure done in [place]. Patient informed this office does not schedule for [facility] locations. Patient states he will discuss this with at his appointment with Dr. Kirkland on next week.

## 2025-05-06 NOTE — TELEPHONE ENCOUNTER
Contacted the patient for PAT appointment to schedule an endoscopy procedure(s) Colonoscopy . Spoke with patient.  Patient states he wants to get procedure done in [place]. Patient informed this office does not schedule for [facility] locations. Offered phone number to [place] but patient declined and states he will discuss this with at his appointment with Dr. Kirkland on next week.

## 2025-05-06 NOTE — TELEPHONE ENCOUNTER
Good afternoon,    Please see message. Please arrange for patient to get colonoscopy in [place].    Thank you,    LYNNE House

## 2025-05-06 NOTE — TELEPHONE ENCOUNTER
----- Message from Aleyda Judge MD sent at 5/6/2025  8:52 AM CDT -----  Hi - it looks like he still needs the colonoscopy, would you be able to help arrange? Thanks! Aleyda  ----- Message -----  From: Sandra Narvaez MA  Sent: 5/5/2025   6:35 PM CDT  To: MD Dr Azucena Bajwa completed an e-consult with DR Morales in March. Please let me know if you need another order pended to you if colonoscopy is still needed.ThanksRae

## 2025-05-07 ENCOUNTER — TELEPHONE (OUTPATIENT)
Dept: TRANSPLANT | Facility: CLINIC | Age: 70
End: 2025-05-07
Payer: MEDICARE

## 2025-05-07 DIAGNOSIS — C22.0 HCC (HEPATOCELLULAR CARCINOMA): Primary | ICD-10-CM

## 2025-05-09 NOTE — TELEPHONE ENCOUNTER
"Spoke with patient regarding liver transplant evaluation process and coordinating dates. Patient expressed understanding but declined to schedule, at this time. Expressed uncertainty of moving forward with transplant. Says he's been "cancer free" for 4 months and isn't sure he needs a transplant. Says he's scheduled for imaging and Oncology follow-up next week, and would like to see the results of his scans and speak with his Oncologist and family before making a decision about transplant. Patient agreed to a follow-up call Friday, May 16, 2025.    Referring providers notified.   "

## 2025-05-13 ENCOUNTER — HOSPITAL ENCOUNTER (OUTPATIENT)
Dept: RADIOLOGY | Facility: HOSPITAL | Age: 70
Discharge: HOME | End: 2025-05-13
Attending: STUDENT IN AN ORGANIZED HEALTH CARE EDUCATION/TRAINING PROGRAM
Payer: MEDICARE

## 2025-05-13 ENCOUNTER — TELEPHONE (OUTPATIENT)
Dept: HEMATOLOGY/ONCOLOGY | Facility: CLINIC | Age: 70
End: 2025-05-13
Payer: MEDICARE

## 2025-05-13 DIAGNOSIS — I48.11 LONGSTANDING PERSISTENT ATRIAL FIBRILLATION: ICD-10-CM

## 2025-05-13 DIAGNOSIS — C22.0 HEPATOCELLULAR CARCINOMA: ICD-10-CM

## 2025-05-13 DIAGNOSIS — I10 ESSENTIAL HYPERTENSION: Primary | ICD-10-CM

## 2025-05-13 PROCEDURE — 74183 MRI ABD W/O CNTR FLWD CNTR: CPT | Mod: 26,TXP,, | Performed by: RADIOLOGY

## 2025-05-13 PROCEDURE — 25500020 PHARM REV CODE 255: Mod: PO,TXP | Performed by: STUDENT IN AN ORGANIZED HEALTH CARE EDUCATION/TRAINING PROGRAM

## 2025-05-13 PROCEDURE — A9585 GADOBUTROL INJECTION: HCPCS | Mod: PO,TXP | Performed by: STUDENT IN AN ORGANIZED HEALTH CARE EDUCATION/TRAINING PROGRAM

## 2025-05-13 PROCEDURE — 74183 MRI ABD W/O CNTR FLWD CNTR: CPT | Mod: TC,PO,TXP

## 2025-05-13 PROCEDURE — 71260 CT THORAX DX C+: CPT | Mod: 26,TXP,, | Performed by: STUDENT IN AN ORGANIZED HEALTH CARE EDUCATION/TRAINING PROGRAM

## 2025-05-13 PROCEDURE — 71260 CT THORAX DX C+: CPT | Mod: TC,PO,TXP

## 2025-05-13 RX ORDER — CARVEDILOL 3.12 MG/1
TABLET ORAL
Qty: 270 TABLET | Refills: 3 | Status: SHIPPED | OUTPATIENT
Start: 2025-05-13 | End: 2026-05-13

## 2025-05-13 RX ORDER — GADOBUTROL 604.72 MG/ML
10 INJECTION INTRAVENOUS
Status: COMPLETED | OUTPATIENT
Start: 2025-05-13 | End: 2025-05-13

## 2025-05-13 RX ADMIN — GADOBUTROL 10 ML: 604.72 INJECTION INTRAVENOUS at 11:05

## 2025-05-13 RX ADMIN — IOHEXOL 75 ML: 350 INJECTION, SOLUTION INTRAVENOUS at 09:05

## 2025-05-13 NOTE — TELEPHONE ENCOUNTER
----- Message from Lulu sent at 5/13/2025 12:23 PM CDT -----  Regarding: Appt  Contact: [phone]  Current Appt date:    05/14/25 Type of Appt:   Est Pt  Physician:   Aleyda Judge MDReason for rescheduling: Pt usual stay with son night of appt son daughter Is sick pt don't want to be expose   Caller: Roberto Pt's spouse   Contact Preference:   [phone]

## 2025-05-13 NOTE — TELEPHONE ENCOUNTER
----- Message from Vilma sent at 5/13/2025 12:01 PM CDT -----  Regarding: Regarding medication change  PT would like to speak with someone regarding his recent medication change.  The old prescription allowed him to go have it filled every 60 days, the current one has him refilling it every 20 days.  Please contact pt as soon as possible at [phone].  He's been trying to resolve this issue by trying to reach out to someone for a few days now.

## 2025-05-13 NOTE — TELEPHONE ENCOUNTER
Spoke to Roberto to reschedule appt with Dr. Judge. He requested an appt after this week since the pt usually comes to stay at his house for the appts but his daughter is sick. He agreed to the appt time next week at 9.

## 2025-05-16 ENCOUNTER — TELEPHONE (OUTPATIENT)
Dept: TRANSPLANT | Facility: CLINIC | Age: 70
End: 2025-05-16
Payer: MEDICARE

## 2025-05-16 NOTE — TELEPHONE ENCOUNTER
Follow-up call placed to Mr. Tavares to discuss his decision regarding moving forward with liver transplant evaluation. Patient says he had imaging done and met with his Oncologists earlier this week, but does not want a transplant. Says his daughter wants to speak with his provider at his June 10th appointment, so he will wait until then to make a definitive decision. Advised I will follow up with him after that appointment. Understanding expressed.    Left elbow fracture

## 2025-05-20 ENCOUNTER — OFFICE VISIT (OUTPATIENT)
Dept: HEMATOLOGY/ONCOLOGY | Facility: CLINIC | Age: 70
End: 2025-05-20
Payer: MEDICARE

## 2025-05-20 ENCOUNTER — LAB VISIT (OUTPATIENT)
Dept: LAB | Facility: HOSPITAL | Age: 70
End: 2025-05-20
Attending: STUDENT IN AN ORGANIZED HEALTH CARE EDUCATION/TRAINING PROGRAM
Payer: MEDICARE

## 2025-05-20 VITALS
SYSTOLIC BLOOD PRESSURE: 154 MMHG | TEMPERATURE: 98 F | HEART RATE: 91 BPM | OXYGEN SATURATION: 95 % | BODY MASS INDEX: 32.89 KG/M2 | DIASTOLIC BLOOD PRESSURE: 72 MMHG | HEIGHT: 70 IN | WEIGHT: 229.75 LBS | RESPIRATION RATE: 18 BRPM

## 2025-05-20 DIAGNOSIS — C22.0 HEPATOCELLULAR CARCINOMA: Primary | ICD-10-CM

## 2025-05-20 DIAGNOSIS — L27.0 DRUG RASH: ICD-10-CM

## 2025-05-20 DIAGNOSIS — C22.0 HEPATOCELLULAR CARCINOMA: ICD-10-CM

## 2025-05-20 DIAGNOSIS — L30.9 ECZEMA, UNSPECIFIED TYPE: ICD-10-CM

## 2025-05-20 DIAGNOSIS — Z79.899 DRUG THERAPY: ICD-10-CM

## 2025-05-20 LAB
ABSOLUTE EOSINOPHIL (OHS): 0.23 K/UL
ABSOLUTE MONOCYTE (OHS): 0.35 K/UL (ref 0.3–1)
ABSOLUTE NEUTROPHIL COUNT (OHS): 3.04 K/UL (ref 1.8–7.7)
AFP SERPL-MCNC: 3.1 NG/ML
ALBUMIN SERPL BCP-MCNC: 3.8 G/DL (ref 3.5–5.2)
ALP SERPL-CCNC: 101 UNIT/L (ref 40–150)
ALT SERPL W/O P-5'-P-CCNC: 22 UNIT/L (ref 10–44)
ANION GAP (OHS): 7 MMOL/L (ref 8–16)
AST SERPL-CCNC: 35 UNIT/L (ref 11–45)
BASOPHILS # BLD AUTO: 0.02 K/UL
BASOPHILS NFR BLD AUTO: 0.5 %
BILIRUB SERPL-MCNC: 1.9 MG/DL (ref 0.1–1)
BUN SERPL-MCNC: 11 MG/DL (ref 8–23)
CALCIUM SERPL-MCNC: 9.5 MG/DL (ref 8.7–10.5)
CHLORIDE SERPL-SCNC: 102 MMOL/L (ref 95–110)
CO2 SERPL-SCNC: 26 MMOL/L (ref 23–29)
CREAT SERPL-MCNC: 0.7 MG/DL (ref 0.5–1.4)
ERYTHROCYTE [DISTWIDTH] IN BLOOD BY AUTOMATED COUNT: 14.6 % (ref 11.5–14.5)
GFR SERPLBLD CREATININE-BSD FMLA CKD-EPI: >60 ML/MIN/1.73/M2
GLUCOSE SERPL-MCNC: 161 MG/DL (ref 70–110)
HCT VFR BLD AUTO: 41.3 % (ref 40–54)
HGB BLD-MCNC: 13.6 GM/DL (ref 14–18)
IMM GRANULOCYTES # BLD AUTO: 0.02 K/UL (ref 0–0.04)
IMM GRANULOCYTES NFR BLD AUTO: 0.5 % (ref 0–0.5)
INR PPP: 1.2 (ref 0.8–1.2)
LYMPHOCYTES # BLD AUTO: 0.46 K/UL (ref 1–4.8)
MCH RBC QN AUTO: 31.6 PG (ref 27–31)
MCHC RBC AUTO-ENTMCNC: 32.9 G/DL (ref 32–36)
MCV RBC AUTO: 96 FL (ref 82–98)
NUCLEATED RBC (/100WBC) (OHS): 0 /100 WBC
PLATELET # BLD AUTO: 70 K/UL (ref 150–450)
PMV BLD AUTO: 12.3 FL (ref 9.2–12.9)
POTASSIUM SERPL-SCNC: 4.1 MMOL/L (ref 3.5–5.1)
PROT SERPL-MCNC: 8.2 GM/DL (ref 6–8.4)
PROTHROMBIN TIME: 13 SECONDS (ref 9–12.5)
RBC # BLD AUTO: 4.3 M/UL (ref 4.6–6.2)
RELATIVE EOSINOPHIL (OHS): 5.6 %
RELATIVE LYMPHOCYTE (OHS): 11.2 % (ref 18–48)
RELATIVE MONOCYTE (OHS): 8.5 % (ref 4–15)
RELATIVE NEUTROPHIL (OHS): 73.7 % (ref 38–73)
SODIUM SERPL-SCNC: 135 MMOL/L (ref 136–145)
TSH SERPL-ACNC: 2.37 UIU/ML (ref 0.4–4)
WBC # BLD AUTO: 4.12 K/UL (ref 3.9–12.7)

## 2025-05-20 PROCEDURE — 99215 OFFICE O/P EST HI 40 MIN: CPT | Mod: S$GLB,,, | Performed by: STUDENT IN AN ORGANIZED HEALTH CARE EDUCATION/TRAINING PROGRAM

## 2025-05-20 PROCEDURE — 3008F BODY MASS INDEX DOCD: CPT | Mod: CPTII,S$GLB,, | Performed by: STUDENT IN AN ORGANIZED HEALTH CARE EDUCATION/TRAINING PROGRAM

## 2025-05-20 PROCEDURE — 84443 ASSAY THYROID STIM HORMONE: CPT | Mod: TXP

## 2025-05-20 PROCEDURE — 4010F ACE/ARB THERAPY RXD/TAKEN: CPT | Mod: CPTII,S$GLB,, | Performed by: STUDENT IN AN ORGANIZED HEALTH CARE EDUCATION/TRAINING PROGRAM

## 2025-05-20 PROCEDURE — 99999 PR PBB SHADOW E&M-EST. PATIENT-LVL IV: CPT | Mod: PBBFAC,,, | Performed by: STUDENT IN AN ORGANIZED HEALTH CARE EDUCATION/TRAINING PROGRAM

## 2025-05-20 PROCEDURE — 1101F PT FALLS ASSESS-DOCD LE1/YR: CPT | Mod: CPTII,S$GLB,, | Performed by: STUDENT IN AN ORGANIZED HEALTH CARE EDUCATION/TRAINING PROGRAM

## 2025-05-20 PROCEDURE — 3078F DIAST BP <80 MM HG: CPT | Mod: CPTII,S$GLB,, | Performed by: STUDENT IN AN ORGANIZED HEALTH CARE EDUCATION/TRAINING PROGRAM

## 2025-05-20 PROCEDURE — 1159F MED LIST DOCD IN RCRD: CPT | Mod: CPTII,S$GLB,, | Performed by: STUDENT IN AN ORGANIZED HEALTH CARE EDUCATION/TRAINING PROGRAM

## 2025-05-20 PROCEDURE — 1160F RVW MEDS BY RX/DR IN RCRD: CPT | Mod: CPTII,S$GLB,, | Performed by: STUDENT IN AN ORGANIZED HEALTH CARE EDUCATION/TRAINING PROGRAM

## 2025-05-20 PROCEDURE — 3077F SYST BP >= 140 MM HG: CPT | Mod: CPTII,S$GLB,, | Performed by: STUDENT IN AN ORGANIZED HEALTH CARE EDUCATION/TRAINING PROGRAM

## 2025-05-20 PROCEDURE — G2211 COMPLEX E/M VISIT ADD ON: HCPCS | Mod: S$GLB,,, | Performed by: STUDENT IN AN ORGANIZED HEALTH CARE EDUCATION/TRAINING PROGRAM

## 2025-05-20 PROCEDURE — 85610 PROTHROMBIN TIME: CPT | Mod: TXP

## 2025-05-20 PROCEDURE — 82105 ALPHA-FETOPROTEIN SERUM: CPT | Mod: TXP

## 2025-05-20 PROCEDURE — 36415 COLL VENOUS BLD VENIPUNCTURE: CPT | Mod: TXP

## 2025-05-20 PROCEDURE — 1126F AMNT PAIN NOTED NONE PRSNT: CPT | Mod: CPTII,S$GLB,, | Performed by: STUDENT IN AN ORGANIZED HEALTH CARE EDUCATION/TRAINING PROGRAM

## 2025-05-20 PROCEDURE — 3288F FALL RISK ASSESSMENT DOCD: CPT | Mod: CPTII,S$GLB,, | Performed by: STUDENT IN AN ORGANIZED HEALTH CARE EDUCATION/TRAINING PROGRAM

## 2025-05-20 PROCEDURE — 85025 COMPLETE CBC W/AUTO DIFF WBC: CPT | Mod: TXP

## 2025-05-20 PROCEDURE — 82040 ASSAY OF SERUM ALBUMIN: CPT | Mod: TXP

## 2025-05-20 NOTE — PROGRESS NOTES
[facility]     Reason for visit: Follow Up     Best Contact Phone Number(s): There are no phone numbers on file.     Oncology History   Hepatocellular carcinoma   9/24/2019 Initial Diagnosis    HCC (hepatocellular carcinoma) - treated with microwave ablation     6/26/2024 Relapse    Patient established with hematology for thrombocytopenia, HCC and cirrhosis history prompted further GI work-up    6/26/24: MRI Abdomen shows large lesions in segments 8 and 5 with washout compatible with HCC, measuring 8 x4.2 cm transverse and 10 cm caudal, prominent tumor thrombus in right portal vein, 1 cm lesion in posterior right hepatic lobe concerning for additional focus     7/3/2024 Cancer Staged    Staging form: Liver, AJCC 8th Edition  - Clinical: Stage IIIA (cT3, cN0, cM0)     7/15/2024 Cancer Staged    CT CAP shows stable liver masses up to 7.3 cm not significantly changed from prior     7/16/2024 -  Chemotherapy    Treatment Summary   Plan Name: OP Tremelimumab + Durvalumab x1 followed by DURVALUMAB 1500 MG Q4W  Treatment Goal: Palliative  Status: Active  Start Date: 7/16/2024  End Date: 6/25/2025 (Planned)  Provider: Aleyda Kirkland MD  Chemotherapy: [No matching medication found in this treatment plan]     8/30/2024 Procedure    Y90     10/10/2024 Procedure    Y90     11/5/2024 Cancer Staged    CT Chest showing 2-3 mm nodule in the lung, mildly prominent mediastinal nodes    MRI shows central necrotic changes in treated lesions without suggestion of recurrent / residual disease. Cirrhotic morphology, mild perihepatic ascites     1/25/2025 Adverse Reaction    ER with concern for colitis, treated with oral antibiotics and improved        2/5/2025 Cancer Staged    MRI Abdomen shows post treatment changes of the right hepatic lobe without residual/recurrent disease, no new liver lesion. Cirrhotic liver and sequela of portal venous hypertension including splenomegaly and trace ascites      2/10/2025  Tumor Conference     Liver tumor board, no residual disease or recurrence. Small bland thrombus in the anterior part of the portal vein      Malignant neoplasm of lateral wall of bladder (Resolved)   6/25/2024 Initial Diagnosis    Malignant neoplasm of overlapping sites of bladder     6/25/2024 Cancer Staged    Staging form: Urinary Bladder, AJCC 8th Edition  - Clinical: Stage 0a (cTa, cN0, cM0)       HPI: Hudson Tavares is a 70 y.o. male with HCC who presents for follow-up. Since last visit had staging scans which shows stable response to disease. Since last visit notes significant itching. No new lotions/detergents that is causing symptoms.     prior to C8 Durvalumab. [facility] last visit [place]. No recent dizzy spells, no lightheadedness. No clear cause, maybe beta blocker now improved on coreg. Pruritus resolved.     Since last visit durvaluamb held due to blood in stool. Patient denies diarrhea. Colonoscopy cancelled due to weather. Symptoms have now resolved.     Patient presents with son Roberto. Has daughter, Rubi. Has daughter Mara and granddaughter, Anselmo. ECOG PS is 0.    History has been obtained by chart review and discussion with the patient.    ROS:   A complete 12-point review of systems was reviewed and is negative except as mentioned above.     Past Medical History:   Past Medical History:   Diagnosis Date    Adenomatous colon polyp     Alcohol abuse 07/09/2015    Anticoagulant long-term use     coumadin - discontinued    Atrial fibrillation 07/09/2015    CAD (coronary artery disease), native coronary artery 05/03/2016    Degenerative joint disease 07/09/2015    Encounter for blood transfusion     Enlarged liver     Essential hypertension 07/09/2015    Hep C w/o coma, chronic     treated with Harvoni    Hiatal hernia     patient states hernia has resolved    Hx of bladder infections     finished cipro 4/29/16    S/P CABG (coronary artery bypass graft) 05/17/2016    LIMA  to LAD, SVG to OM, SVG to diagonal, SVG to right PDA, Maze, left atrial appendage resection (5/16)    Spleen enlarged     Thrombocytopenia 05/05/2016    Tobacco abuse 07/09/2015        Past Surgical History:   Past Surgical History:   Procedure Laterality Date    CARDIAC SURGERY  05/04/2016    4 vessel CABG    CERVICAL FUSION  2004    COLONOSCOPY  12/03/2018    Steve Wells    CYSTOSCOPY W/ RETROGRADES N/A 2/27/2024    Procedure: CYSTOSCOPY, WITH RETROGRADE PYELOGRAM;  Surgeon: STORMY Bray MD;  Location: [facility] OR;  Service: Urology;  Laterality: N/A;    CYSTOSCOPY WITH BIOPSY OF BLADDER N/A 2/27/2024    Procedure: CYSTOSCOPY, WITH BLADDER BIOPSY, WITH FULGURATION IF INDICATED;  Surgeon: STORMY Bray MD;  Location: [facility] OR;  Service: Urology;  Laterality: N/A;    CYSTOURETHROSCOPY, WITH THROMBUS REMOVAL N/A 2/27/2024    Procedure: CYSTOURETHROSCOPY, WITH THROMBUS REMOVAL;  Surgeon: STORMY Bray MD;  Location: [facility] OR;  Service: Urology;  Laterality: N/A;    JOINT REPLACEMENT  TOTAL RIGHT KNEE     OPEN ANTERIOR SHOULDER RECONSTRUCTION Left     RADIOFREQUENCY ABLATION N/A 9/24/2019    Procedure: Radiofrequency Ablation;  Surgeon: Anayeli Surgeon;  Location: [facility];  Service: Anesthesiology;  Laterality: N/A;  [facility]    TIBIA FRACTURE SURGERY Right 1971        Family History:   Family History   Problem Relation Name Age of Onset    Heart disease Mother          congestive heart failure    Diabetes Mother      Hypertension Mother      Heart disease Sister          heart murmur    Atrial fibrillation Sister      Cancer Sister          breast    Cancer Father  60        colon    Chronic back pain Brother      Atrial fibrillation Sister      Heart disease Sister      Collagen disease Neg Hx          Social History:   Social History     Tobacco Use    Smoking status: Former     Current packs/day: 0.00     Average packs/day: 0.3 packs/day for 30.0 years (7.5 ttl pk-yrs)     Types: Cigarettes      "Start date: 1986     Quit date: 2016     Years since quittin.0    Smokeless tobacco: Never   Substance Use Topics    Alcohol use: No     Alcohol/week: 0.0 standard drinks of alcohol     Comment: last drink 2015        I have reviewed and updated the patient's past medical, surgical, family and social histories.    Allergies:   Review of patient's allergies indicates:   Allergen Reactions    Cilostazol Other (See Comments)     Colon bleeding    Stadol [butorphanol tartrate] Hallucinations     "I saw pink elephants"    Keflex [cephalexin] Other (See Comments)     Was using 500mg TID oral and had a strong metallic taste in his mouth and could not function.  Can use IV cephalosporin.    Sulfa (sulfonamide antibiotics) Hives, Nausea And Vomiting and Other (See Comments)     Since childhood; does not know reaction      Finasteride Other (See Comments)        Medications:   Current Outpatient Medications   Medication Sig Dispense Refill    ALPRAZolam (XANAX) 0.5 MG tablet Take 0.5 mg by mouth every evening.      carvediloL (COREG) 3.125 MG tablet Take 1 tablet (3.125 mg total) by mouth every morning AND 2 tablets (6.25 mg total) every evening. 270 tablet 3    famotidine (PEPCID) 20 MG tablet Take 20 mg by mouth every morning.      hepatitis A and B vaccine, PF, (TWINRIX) 720 KATINA unit- 20 mcg/mL Syrg suspension Inject 1mL into the skin now, repeat in 1 month,  and 6 months 1 mL 2    losartan (COZAAR) 50 MG tablet Take 50 mg by mouth every evening.      rivaroxaban (XARELTO) 20 mg Tab Take 1 tablet (20 mg total) by mouth daily with dinner or evening meal. 30 tablet 3    spironolactone (ALDACTONE) 25 MG tablet Take 25 mg by mouth as needed.      triamcinolone acetonide 0.1% (KENALOG) 0.1 % cream Apply topically 2 (two) times daily. 454 g 1     Current Facility-Administered Medications   Medication Dose Route Frequency Provider Last Rate Last Admin    alteplase injection 2 mg  2 mg Intra-Catheter PRN Borne, " "Aleyda MEEKS MD        heparin, porcine (PF) 100 unit/mL injection flush 500 Units  500 Units Intravenous PRN Aleyda Judge MD         Facility-Administered Medications Ordered in Other Visits   Medication Dose Route Frequency Provider Last Rate Last Admin    diphenhydrAMINE injection 12.5 mg  12.5 mg Intravenous Q6H PRN Stefano Sherwood MD        electrolyte-S (ISOLYTE)   Intravenous Continuous Stefano Sherwood MD        fentaNYL 50 mcg/mL injection 25 mcg  25 mcg Intravenous Q5 Min PRN Stefano Sherwood MD        HYDROmorphone (PF) injection 0.2 mg  0.2 mg Intravenous Q5 Min PRN Stefano Sherwood MD        lactated ringers infusion   Intravenous Continuous Stefano Sherwood MD 10 mL/hr at 02/27/24 0824 New Bag at 02/27/24 0824    LIDOcaine (PF) 10 mg/ml (1%) injection 10 mg  1 mL Intradermal Once Stefano Sherwood MD        ondansetron injection 4 mg  4 mg Intravenous Daily PRN Stefano Sherwood MD        oxyCODONE immediate release tablet 5 mg  5 mg Oral Q3H PRN Stefano Sherwood MD   5 mg at 02/27/24 1030    triamcinolone acetonide injection 40 mg  40 mg Intra-articular  Jaya Newell PA-C   40 mg at 02/25/25 1500        Physical Exam:   BP (!) 154/72 (BP Location: Right arm, Patient Position: Sitting)   Pulse 91   Temp 97.9 °F (36.6 °C) (Oral)   Resp 18   Ht 5' 10" (1.778 m)   Wt 104.2 kg (229 lb 11.5 oz)   SpO2 95%   BMI 32.96 kg/m²                Physical Exam  Constitutional:       Appearance: Normal appearance.   HENT:      Head: Normocephalic and atraumatic.      Mouth/Throat:      Mouth: Mucous membranes are moist.   Eyes:      Extraocular Movements: Extraocular movements intact.      Pupils: Pupils are equal, round, and reactive to light.   Cardiovascular:      Rate and Rhythm: Normal rate. Rhythm irregular.      Pulses: Normal pulses.      Heart sounds: No murmur heard.  Pulmonary:      Effort: Pulmonary effort is normal. No respiratory distress.      Breath sounds: Normal breath sounds. "   Abdominal:      General: Abdomen is flat. There is no distension.      Palpations: Abdomen is soft.      Tenderness: There is no abdominal tenderness.   Musculoskeletal:         General: No swelling or tenderness. Normal range of motion.      Cervical back: Normal range of motion. No rigidity.   Skin:     General: Skin is warm and dry.      Coloration: Skin is not jaundiced.   Neurological:      General: No focal deficit present.      Mental Status: He is alert and oriented to person, place, and time.   Psychiatric:         Mood and Affect: Mood normal.         Behavior: Behavior normal.           Labs:   Lab Results   Component Value Date    WBC 5.27 03/05/2025    HGB 13.6 (L) 03/05/2025    HCT 41.2 03/05/2025    MCV 97 03/05/2025    PLT 81 (L) 03/05/2025       Lab Results   Component Value Date     (L) 03/05/2025    K 4.4 03/05/2025     03/05/2025    CO2 25 03/05/2025    BUN 14 03/05/2025    CREATININE 0.7 05/13/2025    ALBUMIN 3.6 03/05/2025    BILITOT 1.7 (H) 03/05/2025    ALKPHOS 102 03/05/2025    AST 27 03/05/2025    ALT 20 03/05/2025       Imaging:   MRI Abdomen W WO Contrast  Narrative: EXAMINATION:  MRI ABDOMEN W WO CONTRAST    CLINICAL HISTORY:  Gallbladder/biliary cancer, assess treatment response;  Liver cell carcinoma    TECHNIQUE:  Multisequence, multiplanar MRI of the abdomen performed per liver protocol before and after administration of 10 mL Gadavist intravenous contrast. Additionally 2D and 3D MRCP sequences are performed as well as MIP images.    COMPARISON:  MRI 02/05/2025.  CT 01/25/2025    FINDINGS:  Liver: Cirrhotic liver morphology and heterogeneous background liver parenchyma redemonstrated.  Overall grossly stable post treatment related changes of the right hepatic lobe status post radioembolization.  Extensive areas of irregular peripheral enhancement/central non enhancement within the treated area with areas of arterial enhancement persisting on delayed phase imaging  consistent with scarring.  No discrete residual or recurrent tumor or any new concerning enhancing lesions or areas of discrete washout.  Stable tiny cyst within the right hepatic lobe.  Chronic thrombus involving the anterior branch of the right portal vein.  Portal veins are otherwise patent.    Biliary: Gallbladder is normal.  No intrahepatic or extrahepatic biliary dilatation.    Pancreas: Normal.  No pancreatic ductal dilatation.    Spleen: Enlarged measuring approximately 16.3 cm.  No focal abnormality.    Adrenal glands: Normal.    Kidneys: Stable right renal cysts.  No hydronephrosis.    Miscellaneous: Visualized bowel loops are normal.  No evidence for lymphadenopathy.  Small amount of perihepatic ascites redemonstrated.  Osseous structures demonstrate grossly normal marrow signal.  Impression: 1. Stable appearance of the liver including treatment related changes of the right hepatic lobe without evidence of any residual or recurrent tumor.  No adverse changes or acute process.  2. Cirrhotic liver and splenomegaly as well as small volume perihepatic ascites unchanged.  3. Right renal cysts.    Electronically signed by: Jose Francisco Hutchinson  Date:    05/13/2025  Time:    11:45  CT Chest With Contrast  Narrative: EXAMINATION:  CT CHEST WITH CONTRAST    CLINICAL HISTORY:  Liver cell carcinoma    TECHNIQUE:  Low dose axial images as well as sagittal and coronal reconstructions were performed from the skull base to the clavicles after the administration of 75 cc Omnipaque 350.    COMPARISON:  CT chest 11/05/2024    FINDINGS:  Bilateral gynecomastia.    Thoracic aorta is normal caliber contains prominent atherosclerosis.  Stable cardiomegaly.  Dense calcification of the coronary arteries, aortic valve and annulus.  Postoperative changes of CABG.  No pericardial effusion.    Stable upper limit of normal size subcarinal lymph node on the right measuring 1 cm.  Several upper limit of normal sized right hilar lymph nodes  measuring up to 1 cm, slightly more prominent compared to prior exam.    Grossly stable appearance of the lungs with minimal paraseptal emphysema and peripheral interstitial reticulation.  Few stable micro nodules measuring 2-3 mm in the bilateral lungs (series 4, image 252 and 115).  No new or enlarging nodule.  No new consolidation, pleural effusion, or pneumothorax.    Similar constellation of findings in the upper abdomen with cirrhosis, treatment changes of the liver, splenomegaly, and right renal cysts.  Please see same day MRI abdomen for complete assessment.    Degenerative changes of the osseous structures.  Surgical changes of the cervical spine, left glenoid, and sternum.  No acute or aggressive bony abnormality.  Impression: 1. No evidence of thoracic metastasis.  2. Few stable pulmonary micro nodules.  3. Few upper limit of normal size subcarinal and right hilar lymph nodes, nonspecific and may be reactive.  4. Please see same day MRI abdomen for abdominal assessment.  5. Additional findings as above.    Electronically signed by: David Rodriguez  Date:    05/13/2025  Time:    11:16          Assessment:       No diagnosis found.           Plan:           # HCC, Stage III - Child Faria A. Microwave ablation 9/2019, MRI Abdomen shows recurrent disease. Discussed case with IR, recommend Y90 + immunotherapy for best disease control. We discussed the results of the [place] study and superiority of Durvalumab/Tremlimumab to Sorafenib for improved overall survival. Alternative therapy with be Bevacizumab/Atezolizumab per Impower 150 but would impact timing of Y90. Would recommend either approach over Sorafenib, patient and family understanding. AFP 21 -> 18.     Patient has experienced rash over scalp and trunk since starting immunotherapy. Rash has responded to topical steroids, prednisone 50 mg x2 doses but stopped due to significant insomnia. Overall <10% BSA and symptoms do not interfere with QoL or therapy.  AFP 21 -> 3.3 -> 6.8 -> 4.6    Overall side effects of pruritus is tolerable, lightheadedness likely due to episodes of hypoglycemia.     Patient underwent Y90 8/2024 and 10/2024. MRI Abdomen shows excellent treatment response with no residual disease. Per liver tumor conference, recommend liver transplant but patient declines. Strongly encouraged to consider today.    With no residual disease and adverse immunotherapy toxicity (pruritus, episode of hematochezia), recommend holding Durvalumab for now. Patient would be eligible for further immunotherapy upon rechallenge.     CT Chest and MRI Abdomen 5/2025 without evidence of recurrence. Encourage consideration of liver transplant to treat HCC and cirrhosis. Patient's daughter Mara is familiar with transplant, will accompany patient to upcoming hepatology visit.    MRI Abdomen 8/2025.     # IO dermatitis / Eczema - Continue topical steroids at home, Grade I. Refill kenalog today. Encourage sensitive solutions & unscented products. Dermatology referral placed. Last immunotherapy 1/9/2025.     # Lightheadedness - MRI Brain 11/23/24 wnl, improved with metoprolol -> carvedilol 3.125 AM/ 6.25 PM     # Blurred vision - optometry evaluation, recommend normal saline drops and prescribed glasses     # Pruritus - did not tolerate cholestyramine, check bilirubin today     # Thrombocytopenia - Attributed to cirrhosis, established with hepatology.     # Anemia - Mild iron deficiency, encourage iron in diet    # Non-Muscle Invasive Bladder Cancer, Ta - Follows with Dr. Bray, TURBT 2/2024. Cystoscopy 6/20/24 negative for recurrence, cancelled 9/2024. Cystoscopy 12/2024 negative for recurrence   - due 6/2025 for cystoscopy     # Atrial fibrillation - Coumadin -> Xarelto. HOLD anticoagulation if plts fall <50k     # Hep C Cirrhosis - Treated with SVR, patient declines liver transplant in the past. Established with hepatology for compensated cirrhosis, EGD 7/2/24  - improved  liver enzymes after Y90   - fu with hepatology to consider transplant     # Heartburn - improved, pepcid prn    # Arthritis - Taking Apap 500 mg bid prn    # Tendonitis - follows with orthopedics in [place]    # Diverticulitis-  episode of hematochezia, history of diverticulitis. Currently on anticoagulation. No diarrhea, unlikely to be immune checkpoint mediated.   - will fu with PCP in [place] to get a colonoscopy     Follow up: after repeat scans     The above information has been reviewed with the patient and all questions have been answered to their apparent satisfaction.  They understand that they can call the clinic with any questions.    Aleyda Kirkland MD  Hematology/Oncology  [facility]      Med Onc Chart Routing      Follow up with physician . Borne  end of august after CT/MRI   Follow up with DARLING    Infusion scheduling note    Injection scheduling note    Labs   Scheduling:  Preferred lab:  Lab interval:  Labs today: cbc, cmp, afp, inr & labs prior to scans in 3m: cbc, cmp, afp, inr   Imaging   [place] CT chest & MRI abdomen end in    Pharmacy appointment    Other referrals           Dermatology,

## 2025-05-20 NOTE — Clinical Note
Hi - I'm wondering if we could schedule a new patient visit with Dr. CARR? Patient is 70YOM with history of HCC sp durvalumab (last dose 1/2025) and significant itching skin changes. Wondering if immunotherapy related? Thanks!

## 2025-05-21 ENCOUNTER — TELEPHONE (OUTPATIENT)
Dept: DERMATOLOGY | Facility: CLINIC | Age: 70
End: 2025-05-21
Payer: MEDICARE

## 2025-05-21 NOTE — TELEPHONE ENCOUNTER
----- Message from Leif Rubio MD sent at 5/20/2025  6:47 PM CDT -----  Please schedule on an afternoon 1:45 please.  ----- Message -----  From: Linette Hawthorne  Sent: 5/20/2025   4:47 PM CDT  To: Leif Rubio MD      ----- Message -----  From: Aleyda Judge MD  Sent: 5/20/2025   9:28 AM CDT  To: Joanne Yadav Staff    Hi - I'm wondering if we could schedule a new patient visit with Dr. CARR? Patient is 70YOM with history of HCC sp durvalumab (last dose 1/2025) and significant itching skin changes. Wondering if immunotherapy related? Thanks!

## 2025-05-22 ENCOUNTER — PATIENT MESSAGE (OUTPATIENT)
Dept: OBSTETRICS AND GYNECOLOGY | Facility: CLINIC | Age: 70
End: 2025-05-22
Payer: MEDICARE

## 2025-05-26 DIAGNOSIS — Z29.89 IMMUNOTHERAPY ENCOUNTER: ICD-10-CM

## 2025-05-26 RX ORDER — TRIAMCINOLONE ACETONIDE 1 MG/G
CREAM TOPICAL 2 TIMES DAILY
Qty: 454 G | Refills: 1 | Status: SHIPPED | OUTPATIENT
Start: 2025-05-26

## 2025-05-26 RX ORDER — TRIAMCINOLONE ACETONIDE 1 MG/G
CREAM TOPICAL 2 TIMES DAILY
Qty: 454 G | Refills: 1 | Status: CANCELLED | OUTPATIENT
Start: 2025-05-26

## 2025-05-27 ENCOUNTER — OFFICE VISIT (OUTPATIENT)
Dept: DERMATOLOGY | Facility: CLINIC | Age: 70
End: 2025-05-27
Payer: MEDICARE

## 2025-05-27 DIAGNOSIS — L57.8 FAVRE AND RACOUCHOT SYNDROME: Primary | ICD-10-CM

## 2025-05-27 DIAGNOSIS — L70.0 FAVRE AND RACOUCHOT SYNDROME: Primary | ICD-10-CM

## 2025-05-27 DIAGNOSIS — L82.1 SEBORRHEIC KERATOSES: ICD-10-CM

## 2025-05-27 DIAGNOSIS — L21.9 SEBORRHEIC DERMATITIS: ICD-10-CM

## 2025-05-27 PROCEDURE — 99999 PR PBB SHADOW E&M-EST. PATIENT-LVL I: CPT | Mod: PBBFAC,,, | Performed by: DERMATOLOGY

## 2025-05-27 PROCEDURE — 99203 OFFICE O/P NEW LOW 30 MIN: CPT | Mod: S$GLB,,, | Performed by: DERMATOLOGY

## 2025-05-27 PROCEDURE — 4010F ACE/ARB THERAPY RXD/TAKEN: CPT | Mod: CPTII,S$GLB,, | Performed by: DERMATOLOGY

## 2025-05-27 RX ORDER — CLOBETASOL PROPIONATE 0.5 MG/ML
SOLUTION TOPICAL
Qty: 50 ML | Refills: 11 | Status: SHIPPED | OUTPATIENT
Start: 2025-05-27

## 2025-05-27 RX ORDER — KETOCONAZOLE 20 MG/ML
SHAMPOO, SUSPENSION TOPICAL
Qty: 120 ML | Refills: 11 | Status: SHIPPED | OUTPATIENT
Start: 2025-05-27

## 2025-05-27 NOTE — PROGRESS NOTES
Dermatology Outpatient Clinic Progress Note    Patient Name: Hudson Tavares  Patient : 1955  MRN: [id number]  Date of Service: 2025    Subjective:      CC/HPI: Hudson Tavares is a 70 y.o. year old male with history of no personal or family history of skin cancer,  who presents today for itching to scalp and entire body.  Patient reports that he started immunotherapy for liver cancer and thought this may have caused the itching. Dr Kirkland prescribed triamcinolone acetonide 0.1% (KENALOG) 0.1 % cream twice daily as needed, which does help for a short time. Patient currently uses Dove soap for bathing and Head and Shoulders to wash scalp/hair. He stated that he currently used Tide laundry soap, but has changed soap and fabric softener with no improvement. He does have some intolerance to some medications, but no drug allergies. He has not tried any other treatments other treatment other than the topical cream.     ROS:   Dermatological ROS: positive for skin lesion changes    Objective:   Physical Exam   Head   Head comments: Erythema and scaling consistent with seborrheic dermatitis    Lower extremities         Diagram Legend     Erythematous scaling macule/papule c/w actinic keratosis       Vascular papule c/w angioma      Pigmented verrucoid papule/plaque c/w seborrheic keratosis      Yellow umbilicated papule c/w sebaceous hyperplasia      Irregularly shaped tan macule c/w lentigo     1-2 mm smooth white papules consistent with Milia      Movable subcutaneous cyst with punctum c/w epidermal inclusion cyst      Subcutaneous movable cyst c/w pilar cyst      Firm pink to brown papule c/w dermatofibroma      Pedunculated fleshy papule(s) c/w skin tag(s)      Evenly pigmented macule c/w junctional nevus     Mildly variegated pigmented, slightly irregular-bordered macule c/w mildly atypical nevus      Flesh colored to evenly pigmented papule c/w intradermal nevus       Pink pearly papule/plaque c/w basal  cell carcinoma      Erythematous hyperkeratotic cursted plaque c/w SCC      Surgical scar with no sign of skin cancer recurrence      Open and closed comedones      Inflammatory papules and pustules      Verrucoid papule consistent consistent with wart     Erythematous eczematous patches and plaques     Dystrophic onycholytic nail with subungual debris c/w onychomycosis     Umbilicated papule    Erythematous-base heme-crusted tan verrucoid plaque consistent with inflamed seborrheic keratosis     Erythematous Silvery Scaling Plaque c/w Psoriasis     See annotation      Assessment / Plan:        Favre and Racouchot syndrome  - Benign nature of lesion discussed, monitor for changes    Seborrheic dermatitis  -     clobetasoL (TEMOVATE) 0.05 % external solution; Use on scalp one to two times daily as needed for scaling or itching  Dispense: 50 mL; Refill: 11    Seborrheic keratoses  - Benign nature of lesion discussed, monitor for changes    Other orders  -     ketoconazole (NIZORAL) 2 % shampoo; Wash hair with medicated shampoo at least 3x/week - let sit on scalp at least 5 minutes prior to rinsing  Dispense: 120 mL; Refill: 11    Recent Visits  Date Type Provider Dept   05/27/25 Office Visit Leif Rubio MD [facility] D[facility]   Showing recent visits within past 360 days and meeting all other requirements  Future Appointments  Date Type Provider Dept   06/25/25 Appointment Leif Rubio MD [facility] Dermatology Surgery   Showing future appointments within next 720 days and meeting all other requirements          Leif Rubio MD [facility]

## 2025-05-27 NOTE — PATIENT INSTRUCTIONS
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    Treatment Plan:  - To strengthen your capillaries and reduce further leakage, you will be taking a combination of Vitamin C and rutoside (sometimes called Rutin) (a flavonoid that improves capillary stability).    Medication Instructions:  - Vitamin C (Ascorbic Acid) 500 mg + Rutoside 50 mg  - Dose:1 tablet twice daily (morning and evening).  - If you can't find the above dosing, go with whichever supplement you can find and follow the recommended dosing instructions  - Duration: Continue for 3 months. Improvement is typically noticeable within 4-6 weeks.    Maintenance Therapy:  - If your condition improves but shows signs of recurrence, you may continue taking 1 tablet **once daily** for maintenance for an additional 3-6 months.    Expected Benefits:  - Reduction in purpura and skin discoloration.  - Strengthening of capillary walls to prevent future leakage.    Potential Side Effects:  - Mild stomach upset or diarrhea (rare).  - Notify your provider if you experience any new or unusual symptoms.    Precautions:  - If you have a history of kidney stones, use with caution as high doses of vitamin C may increase oxalate levels.  - Stay hydrated and avoid excessive doses of vitamin C beyond the prescribed amount.    Follow-Up:  - You will be re-evaluated in 3 months to assess your response to treatment.  - Please notify the office if there is no improvement or if symptoms worsen.    Additional Instructions:  - Continue gentle skin care to minimize irritation.  - Avoid excessive scratching or rubbing of affected areas to prevent worsening of purpura.     140s/150s

## 2025-06-10 ENCOUNTER — OFFICE VISIT (OUTPATIENT)
Facility: CLINIC | Age: 70
End: 2025-06-10
Payer: MEDICARE

## 2025-06-10 VITALS — HEIGHT: 70 IN | BODY MASS INDEX: 32.98 KG/M2 | WEIGHT: 230.38 LBS

## 2025-06-10 DIAGNOSIS — K76.6 PORTAL VENOUS HYPERTENSION: ICD-10-CM

## 2025-06-10 DIAGNOSIS — K74.60 HEPATIC CIRRHOSIS, UNSPECIFIED HEPATIC CIRRHOSIS TYPE, UNSPECIFIED WHETHER ASCITES PRESENT: Primary | ICD-10-CM

## 2025-06-10 DIAGNOSIS — C22.0 HCC (HEPATOCELLULAR CARCINOMA): ICD-10-CM

## 2025-06-10 PROCEDURE — 3288F FALL RISK ASSESSMENT DOCD: CPT | Mod: CPTII,S$GLB,TXP, | Performed by: PHYSICIAN ASSISTANT

## 2025-06-10 PROCEDURE — 99999 PR PBB SHADOW E&M-EST. PATIENT-LVL III: CPT | Mod: PBBFAC,TXP,, | Performed by: PHYSICIAN ASSISTANT

## 2025-06-10 PROCEDURE — 4010F ACE/ARB THERAPY RXD/TAKEN: CPT | Mod: CPTII,S$GLB,TXP, | Performed by: PHYSICIAN ASSISTANT

## 2025-06-10 PROCEDURE — 99214 OFFICE O/P EST MOD 30 MIN: CPT | Mod: S$GLB,TXP,, | Performed by: PHYSICIAN ASSISTANT

## 2025-06-10 PROCEDURE — 1101F PT FALLS ASSESS-DOCD LE1/YR: CPT | Mod: CPTII,S$GLB,TXP, | Performed by: PHYSICIAN ASSISTANT

## 2025-06-10 PROCEDURE — 1159F MED LIST DOCD IN RCRD: CPT | Mod: CPTII,S$GLB,TXP, | Performed by: PHYSICIAN ASSISTANT

## 2025-06-10 PROCEDURE — 3008F BODY MASS INDEX DOCD: CPT | Mod: CPTII,S$GLB,TXP, | Performed by: PHYSICIAN ASSISTANT

## 2025-06-10 PROCEDURE — 1160F RVW MEDS BY RX/DR IN RCRD: CPT | Mod: CPTII,S$GLB,TXP, | Performed by: PHYSICIAN ASSISTANT

## 2025-06-10 RX ORDER — ASCORBIC ACID 500 MG
500 TABLET ORAL 2 TIMES DAILY
COMMUNITY

## 2025-06-10 RX ORDER — VIT C/E/ZN/COPPR/LUTEIN/ZEAXAN 250MG-90MG
5000 CAPSULE ORAL WEEKLY
COMMUNITY

## 2025-06-10 RX ORDER — MAGNESIUM GLUCONATE 27.5 (500)
TABLET ORAL 2 TIMES DAILY
COMMUNITY

## 2025-06-10 NOTE — PROGRESS NOTES
"HEPATOLOGY CLINIC VISIT NOTE   CHIEF COMPLAINT: Cirrhosis, HCC  Here w/ daughter    HISTORY       This is a 70 y.o. White male w/ cirrhosis due to HCV (cured) and HCC, here for f/u    Cirrhosis history:  Well compensated  (+) portal HTN: SM 16.3cm, low plt 70s-100s  Varices: ?    Cirrhosis health maintenance:  - Varices screening:  no EGD  - HAV status: immunity documented 1/2025  - HBV status: vaccinated [id number].  Repeat doses 1/2025, 2/2025 due to neg HBsAb    HCC history:  DX: 2019  *declines transplant  9/2019: MWA, then lost to f/u  Referred back 7/2024 w/ recurrence: 8cm liver lesion, tumor in RPV  Started immunotherapy 7/2024. (Sees Dr Kirkland)  8/30/2024: Y90   10/10/2024: Y90   Durvalumab held 1/2025 due to colitis    HCV history:  Likely acquired through blood transfusion in teens,  S/p Harvoni 2018 - cured      Interval history (06/10/2025):  MRI 5/2025: No HCC recurrence; small volume perihepatic ascites   Labs 5/2025: stable. AFP normal. TBili 1.9. Liver panel o/w normal    MELD 3.0: 12 at 5/20/2025  9:45 AM  MELD-Na: 11 at 5/20/2025  9:45 AM  Calculated from:  Serum Creatinine: 0.7 mg/dL (Using min of 1 mg/dL) at 5/20/2025  9:45 AM  Serum Sodium: 135 mmol/L at 5/20/2025  9:45 AM  Total Bilirubin: 1.9 mg/dL at 5/20/2025  9:45 AM  Serum Albumin: 3.8 g/dL (Using max of 3.5 g/dL) at 5/20/2025  9:45 AM  INR(ratio): 1.2 at 5/20/2025  9:45 AM  Age at listing (hypothetical): 70 years  Sex: Male at 5/20/2025  9:45 AM    Metoprolol changed to carvedilol for primary EV prophylaxis  Unable to tolerate 6.25 BID.   Taking 3.125mg AM and 6.25mg PM w/ trouble    HCC:  Durvaluamb held due to blood in stool, c/f colitis  Sx resolved before colonoscopy done  Per Onc notes: Still eligible for further immunotherapy   Next surveillance scans, Onc visit: scheduled 8/2025    Current liver sx:  Ascites - small volume ascites on recent MRI.   EMEKA - intermittent  Diuretic use - PRN lasix, sarah "once a week."   Reports low Na " diet but also drinks liquid IV or gatorade some  Monitors daily weight and reports stable overall  TBili elevation - 1.9  No HE or EV bleed      PMH, PSH, SOCIAL HX, FAMILY HX      Reviewed in Epic  Pertinent findings:  FAMILY HX: neg for liver diease  SOCIAL HX: Resides in [place], [place].  (wife  of cancer 2022)  Alcohol - no  Drugs - no      ROS: as per HPI      PHYSICAL EXAM:  Friendly White male, in no acute distress; alert and oriented to person, place and time  HEENT: Sclerae anicteric.   NECK: Supple  LUNGS: Normal respiratory effort.   ABDOMEN: Flat, soft, nontender.   EXTREMITIES: No edema  SKIN: Warm and dry. No jaundice, No obvious rashes.   NEURO/PSYCH: Normal gate. Memory intact. Thought and speech pattern appropriate. Behavior normal. No depression or anxiety noted.    PERTINENT DIAGNOSTIC RESULTS      Lab Results   Component Value Date    WBC 4.12 2025    HGB 13.6 (L) 2025    PLT 70 (L) 2025     Lab Results   Component Value Date    INR 1.2 2025     Lab Results   Component Value Date    AST 35 2025    ALT 22 2025    BILITOT 1.9 (H) 2025    ALBUMIN 3.8 2025    ALKPHOS 101 2025    CREATININE 0.7 2025    BUN 11 2025     (L) 2025    K 4.1 2025    AFP 3.1 2025     Results for orders placed during the hospital encounter of 25  MRI Abdomen W WO Contrast  CLINICAL HISTORY:  Gallbladder/biliary cancer, assess treatment response;  Liver cell carcinoma    TECHNIQUE:  Multisequence, multiplanar MRI of the abdomen performed per liver protocol before and after administration of 10 mL Gadavist intravenous contrast. Additionally 2D and 3D MRCP sequences are performed as well as MIP images.    COMPARISON:  MRI 2025.  CT 2025    FINDINGS:  Liver: Cirrhotic liver morphology and heterogeneous background liver parenchyma redemonstrated.  Overall grossly stable post treatment related changes of the right  hepatic lobe status post radioembolization.  Extensive areas of irregular peripheral enhancement/central non enhancement within the treated area with areas of arterial enhancement persisting on delayed phase imaging consistent with scarring.  No discrete residual or recurrent tumor or any new concerning enhancing lesions or areas of discrete washout.  Stable tiny cyst within the right hepatic lobe.  Chronic thrombus involving the anterior branch of the right portal vein.  Portal veins are otherwise patent.    Biliary: Gallbladder is normal.  No intrahepatic or extrahepatic biliary dilatation.    Pancreas: Normal.  No pancreatic ductal dilatation.    Spleen: Enlarged measuring approximately 16.3 cm.  No focal abnormality.    Adrenal glands: Normal.    Kidneys: Stable right renal cysts.  No hydronephrosis.    Miscellaneous: Visualized bowel loops are normal.  No evidence for lymphadenopathy.  Small amount of perihepatic ascites redemonstrated.  Osseous structures demonstrate grossly normal marrow signal.    Impression  1. Stable appearance of the liver including treatment related changes of the right hepatic lobe without evidence of any residual or recurrent tumor.  No adverse changes or acute process.  2. Cirrhotic liver and splenomegaly as well as small volume perihepatic ascites unchanged.  3. Right renal cysts.      ASSESSMENT        70 y.o. White male with:  1. Cirrhosis, well compensated  -- MELD score 12    2. HCC, dx 2019 w/ 2024 recurrence w/ RPV tumor thrombus  -- declined liver transplant eval  -- MWA 2019, Y90 8/2024, Y90 10/2024  -- seeing oncology: prior imfinzi, now on hold. Surveillance scans scheduled 8/2025    3. Portal hypertension  -- Varices prophylaxis w/ carvedilol 3.125mg in AM and 6.25mg BID  -- no prior EGD  -- SM  -- low plt 70s-100s    4. History of HCV: cured    5. AFib  -- recently switched from coumadin to xarelto  -- on coreg    PLAN        Proceed w/ 8/2025 MRI, labs, f/u per  oncology.  Continue carvedilol. EGD not needed as long as he's on carvedilol.  Low Na diet. Read all food labels. Avoid gatorade / Liquid IV.  Daily weights: call if up by 5 lb in week or 3 lb in day  Visit every 6 months: due 12/2025    __________________________________________________________________    Duration of encounter: 33min  This includes face-to-face time and non face-to-face time preparing to see the patient (eg, review of tests), obtaining and/or reviewing separately obtained history, documenting clinical information in the electronic or other health record, independently interpreting resultsand communicating results to the patient/family/caregiver, or care coordination.

## 2025-06-12 ENCOUNTER — TELEPHONE (OUTPATIENT)
Dept: TRANSPLANT | Facility: CLINIC | Age: 70
End: 2025-06-12
Payer: MEDICARE

## 2025-06-12 NOTE — TELEPHONE ENCOUNTER
"Call placed to patient to follow-up on decision regarding transplant. Patient states he's feeling well; back to his normal self. Says he was seen by his doctors (cancer and liver) and was told everything is stable. Says he does not want to "rock the boat," so he will not be moving forward with transplant. Patient says he will continue to follow-up with his cancer and liver doctors, and agrees to see IR, as indicated.     Advised his Transplant episode will be closed and future appointments scheduled by the Hepatology staff. Advised, if he should change is mind or there's a change in his status, the transplant option can be reconsidered. We would reassess his tumor burden and have to resubmit to the insurance carrier for financial authorization. Understanding expressed.     [place] encounter resolved.  Hepatology staff notified.   "

## 2025-06-25 ENCOUNTER — OFFICE VISIT (OUTPATIENT)
Dept: DERMATOLOGY | Facility: CLINIC | Age: 70
End: 2025-06-25
Payer: MEDICARE

## 2025-06-25 VITALS — HEIGHT: 70 IN | BODY MASS INDEX: 32.93 KG/M2 | WEIGHT: 230 LBS

## 2025-06-25 DIAGNOSIS — L57.8 FAVRE AND RACOUCHOT SYNDROME: Primary | ICD-10-CM

## 2025-06-25 DIAGNOSIS — L70.0 FAVRE AND RACOUCHOT SYNDROME: Primary | ICD-10-CM

## 2025-06-25 DIAGNOSIS — I48.11 LONGSTANDING PERSISTENT ATRIAL FIBRILLATION: ICD-10-CM

## 2025-06-25 DIAGNOSIS — L21.9 SEBORRHEIC DERMATITIS: ICD-10-CM

## 2025-06-25 PROCEDURE — 3288F FALL RISK ASSESSMENT DOCD: CPT | Mod: CPTII,S$GLB,, | Performed by: DERMATOLOGY

## 2025-06-25 PROCEDURE — 1126F AMNT PAIN NOTED NONE PRSNT: CPT | Mod: CPTII,S$GLB,, | Performed by: DERMATOLOGY

## 2025-06-25 PROCEDURE — 1159F MED LIST DOCD IN RCRD: CPT | Mod: CPTII,S$GLB,, | Performed by: DERMATOLOGY

## 2025-06-25 PROCEDURE — 3008F BODY MASS INDEX DOCD: CPT | Mod: CPTII,S$GLB,, | Performed by: DERMATOLOGY

## 2025-06-25 PROCEDURE — 99999 PR PBB SHADOW E&M-EST. PATIENT-LVL III: CPT | Mod: PBBFAC,,, | Performed by: DERMATOLOGY

## 2025-06-25 PROCEDURE — 99212 OFFICE O/P EST SF 10 MIN: CPT | Mod: S$GLB,,, | Performed by: DERMATOLOGY

## 2025-06-25 PROCEDURE — 4010F ACE/ARB THERAPY RXD/TAKEN: CPT | Mod: CPTII,S$GLB,, | Performed by: DERMATOLOGY

## 2025-06-25 PROCEDURE — 1101F PT FALLS ASSESS-DOCD LE1/YR: CPT | Mod: CPTII,S$GLB,, | Performed by: DERMATOLOGY

## 2025-06-25 RX ORDER — RIVAROXABAN 20 MG/1
20 TABLET, FILM COATED ORAL
Qty: 30 TABLET | Refills: 3 | Status: CANCELLED | OUTPATIENT
Start: 2025-06-25

## 2025-06-25 NOTE — PROGRESS NOTES
Dermatology Outpatient Clinic Progress Note    Patient Name: Hudson Tavares  Patient : 1955  MRN: [id number]  Date of Service: 2025    Subjective:      CC/HPI: Hudson Tavares is a 70 y.o. year old male with history of Favre Racouchot syndrome and Seborrheic dermatitis  who presents today for 1 month follow up.  Patient reports he has been following regimen of using Ketoconazole shampoo, Clobetasol solution. Patient reports itching has completely subsided and now has an improved quality of life with no current issues.     ROS:   Dermatological ROS: positive for rash    Objective:   Physical Exam   Head   Head comments: Areas of erythema and scaling resolved          Diagram Legend     Erythematous scaling macule/papule c/w actinic keratosis       Vascular papule c/w angioma      Pigmented verrucoid papule/plaque c/w seborrheic keratosis      Yellow umbilicated papule c/w sebaceous hyperplasia      Irregularly shaped tan macule c/w lentigo     1-2 mm smooth white papules consistent with Milia      Movable subcutaneous cyst with punctum c/w epidermal inclusion cyst      Subcutaneous movable cyst c/w pilar cyst      Firm pink to brown papule c/w dermatofibroma      Pedunculated fleshy papule(s) c/w skin tag(s)      Evenly pigmented macule c/w junctional nevus     Mildly variegated pigmented, slightly irregular-bordered macule c/w mildly atypical nevus      Flesh colored to evenly pigmented papule c/w intradermal nevus       Pink pearly papule/plaque c/w basal cell carcinoma      Erythematous hyperkeratotic cursted plaque c/w SCC      Surgical scar with no sign of skin cancer recurrence      Open and closed comedones      Inflammatory papules and pustules      Verrucoid papule consistent consistent with wart     Erythematous eczematous patches and plaques     Dystrophic onycholytic nail with subungual debris c/w onychomycosis     Umbilicated papule    Erythematous-base heme-crusted tan verrucoid plaque  consistent with inflamed seborrheic keratosis     Erythematous Silvery Scaling Plaque c/w Psoriasis     See annotation      Assessment / Plan:        Favre and Racouchot syndrome  - Benign nature of lesion discussed, monitor for changes    Seborrheic dermatitis  - Resolved with topicals  - continue topicals for maintenance, follow up as needed    Follow up WILLOW Rubio MD [facility]

## 2025-07-07 ENCOUNTER — PATIENT MESSAGE (OUTPATIENT)
Dept: HEMATOLOGY/ONCOLOGY | Facility: CLINIC | Age: 70
End: 2025-07-07
Payer: MEDICARE

## 2025-07-10 ENCOUNTER — PATIENT MESSAGE (OUTPATIENT)
Facility: CLINIC | Age: 70
End: 2025-07-10
Payer: MEDICARE

## 2025-07-24 PROBLEM — R07.9 CHEST PAIN: Status: ACTIVE | Noted: 2025-07-24

## 2025-07-31 ENCOUNTER — PATIENT MESSAGE (OUTPATIENT)
Dept: CARDIOLOGY | Facility: CLINIC | Age: 70
End: 2025-07-31
Payer: MEDICARE

## 2025-08-21 ENCOUNTER — OFFICE VISIT (OUTPATIENT)
Dept: CARDIOLOGY | Facility: CLINIC | Age: 70
End: 2025-08-21
Payer: MEDICARE

## 2025-08-21 VITALS
WEIGHT: 232.81 LBS | BODY MASS INDEX: 33.33 KG/M2 | SYSTOLIC BLOOD PRESSURE: 162 MMHG | HEIGHT: 70 IN | DIASTOLIC BLOOD PRESSURE: 88 MMHG | HEART RATE: 98 BPM

## 2025-08-21 DIAGNOSIS — Z95.1 S/P CABG X 4: Primary | ICD-10-CM

## 2025-08-21 DIAGNOSIS — I50.32 CHRONIC DIASTOLIC CONGESTIVE HEART FAILURE: ICD-10-CM

## 2025-08-21 DIAGNOSIS — I48.11 LONGSTANDING PERSISTENT ATRIAL FIBRILLATION: ICD-10-CM

## 2025-08-21 DIAGNOSIS — I25.10 ARTERIOSCLEROSIS OF CORONARY ARTERY: ICD-10-CM

## 2025-08-21 PROCEDURE — 3008F BODY MASS INDEX DOCD: CPT | Mod: CPTII,S$GLB,,

## 2025-08-21 PROCEDURE — 3077F SYST BP >= 140 MM HG: CPT | Mod: CPTII,S$GLB,,

## 2025-08-21 PROCEDURE — 3079F DIAST BP 80-89 MM HG: CPT | Mod: CPTII,S$GLB,,

## 2025-08-21 PROCEDURE — 99999 PR PBB SHADOW E&M-EST. PATIENT-LVL III: CPT | Mod: PBBFAC,,,

## 2025-08-21 PROCEDURE — 1159F MED LIST DOCD IN RCRD: CPT | Mod: CPTII,S$GLB,,

## 2025-08-21 PROCEDURE — 1126F AMNT PAIN NOTED NONE PRSNT: CPT | Mod: CPTII,S$GLB,,

## 2025-08-21 PROCEDURE — 1101F PT FALLS ASSESS-DOCD LE1/YR: CPT | Mod: CPTII,S$GLB,,

## 2025-08-21 PROCEDURE — 4010F ACE/ARB THERAPY RXD/TAKEN: CPT | Mod: CPTII,S$GLB,,

## 2025-08-21 PROCEDURE — 99214 OFFICE O/P EST MOD 30 MIN: CPT | Mod: S$GLB,,,

## 2025-08-21 PROCEDURE — 3288F FALL RISK ASSESSMENT DOCD: CPT | Mod: CPTII,S$GLB,,

## 2025-08-21 PROCEDURE — 3044F HG A1C LEVEL LT 7.0%: CPT | Mod: CPTII,S$GLB,,

## 2025-08-25 ENCOUNTER — HOSPITAL ENCOUNTER (OUTPATIENT)
Dept: RADIOLOGY | Facility: HOSPITAL | Age: 70
Discharge: HOME | End: 2025-08-25
Attending: STUDENT IN AN ORGANIZED HEALTH CARE EDUCATION/TRAINING PROGRAM
Payer: MEDICARE

## 2025-08-25 DIAGNOSIS — C22.0 HEPATOCELLULAR CARCINOMA: ICD-10-CM

## 2025-08-25 PROCEDURE — A9585 GADOBUTROL INJECTION: HCPCS | Mod: PO | Performed by: STUDENT IN AN ORGANIZED HEALTH CARE EDUCATION/TRAINING PROGRAM

## 2025-08-25 PROCEDURE — 71260 CT THORAX DX C+: CPT | Mod: 26,,, | Performed by: STUDENT IN AN ORGANIZED HEALTH CARE EDUCATION/TRAINING PROGRAM

## 2025-08-25 PROCEDURE — 71260 CT THORAX DX C+: CPT | Mod: TC,PO

## 2025-08-25 PROCEDURE — 25500020 PHARM REV CODE 255: Mod: PO | Performed by: STUDENT IN AN ORGANIZED HEALTH CARE EDUCATION/TRAINING PROGRAM

## 2025-08-25 PROCEDURE — 74183 MRI ABD W/O CNTR FLWD CNTR: CPT | Mod: 26,,, | Performed by: STUDENT IN AN ORGANIZED HEALTH CARE EDUCATION/TRAINING PROGRAM

## 2025-08-25 PROCEDURE — 74183 MRI ABD W/O CNTR FLWD CNTR: CPT | Mod: TC,PO

## 2025-08-25 RX ORDER — GADOBUTROL 604.72 MG/ML
10 INJECTION INTRAVENOUS
Status: COMPLETED | OUTPATIENT
Start: 2025-08-25 | End: 2025-08-25

## 2025-08-25 RX ADMIN — IOHEXOL 100 ML: 350 INJECTION, SOLUTION INTRAVENOUS at 11:08

## 2025-08-25 RX ADMIN — GADOBUTROL 10 ML: 604.72 INJECTION INTRAVENOUS at 02:08

## 2025-08-27 ENCOUNTER — TELEPHONE (OUTPATIENT)
Dept: HEMATOLOGY/ONCOLOGY | Facility: CLINIC | Age: 70
End: 2025-08-27
Payer: MEDICARE

## 2025-08-27 ENCOUNTER — OFFICE VISIT (OUTPATIENT)
Dept: HEMATOLOGY/ONCOLOGY | Facility: CLINIC | Age: 70
End: 2025-08-27
Payer: MEDICARE

## 2025-08-27 VITALS
OXYGEN SATURATION: 99 % | HEIGHT: 70 IN | DIASTOLIC BLOOD PRESSURE: 63 MMHG | SYSTOLIC BLOOD PRESSURE: 147 MMHG | HEART RATE: 70 BPM | WEIGHT: 228.19 LBS | BODY MASS INDEX: 32.67 KG/M2

## 2025-08-27 DIAGNOSIS — R04.0 EPISTAXIS: ICD-10-CM

## 2025-08-27 DIAGNOSIS — H53.8 BLURRED VISION: ICD-10-CM

## 2025-08-27 DIAGNOSIS — M19.90 ARTHRITIS: ICD-10-CM

## 2025-08-27 DIAGNOSIS — K74.69 COMPENSATED HCV CIRRHOSIS: ICD-10-CM

## 2025-08-27 DIAGNOSIS — L27.0 DRUG RASH: ICD-10-CM

## 2025-08-27 DIAGNOSIS — D53.9 NUTRITIONAL ANEMIA: ICD-10-CM

## 2025-08-27 DIAGNOSIS — L30.9 ECZEMA, UNSPECIFIED TYPE: ICD-10-CM

## 2025-08-27 DIAGNOSIS — C22.0 HEPATOCELLULAR CARCINOMA: Primary | ICD-10-CM

## 2025-08-27 DIAGNOSIS — I48.11 LONGSTANDING PERSISTENT ATRIAL FIBRILLATION: ICD-10-CM

## 2025-08-27 DIAGNOSIS — R93.3 ABNORMAL FINDINGS ON DIAGNOSTIC IMAGING OF OTHER PARTS OF DIGESTIVE TRACT: ICD-10-CM

## 2025-08-27 DIAGNOSIS — R42 LIGHTHEADEDNESS: ICD-10-CM

## 2025-08-27 DIAGNOSIS — Z29.89 IMMUNOTHERAPY ENCOUNTER: ICD-10-CM

## 2025-08-27 DIAGNOSIS — B19.20 COMPENSATED HCV CIRRHOSIS: ICD-10-CM

## 2025-08-27 DIAGNOSIS — Z85.51 HISTORY OF BLADDER CANCER: ICD-10-CM

## 2025-08-27 DIAGNOSIS — K57.92 DIVERTICULITIS: ICD-10-CM

## 2025-08-27 DIAGNOSIS — D69.6 THROMBOCYTOPENIA: ICD-10-CM

## 2025-08-27 PROCEDURE — 3044F HG A1C LEVEL LT 7.0%: CPT | Mod: CPTII,S$GLB,, | Performed by: STUDENT IN AN ORGANIZED HEALTH CARE EDUCATION/TRAINING PROGRAM

## 2025-08-27 PROCEDURE — G2211 COMPLEX E/M VISIT ADD ON: HCPCS | Mod: S$GLB,,, | Performed by: STUDENT IN AN ORGANIZED HEALTH CARE EDUCATION/TRAINING PROGRAM

## 2025-08-27 PROCEDURE — 1101F PT FALLS ASSESS-DOCD LE1/YR: CPT | Mod: CPTII,S$GLB,, | Performed by: STUDENT IN AN ORGANIZED HEALTH CARE EDUCATION/TRAINING PROGRAM

## 2025-08-27 PROCEDURE — 1126F AMNT PAIN NOTED NONE PRSNT: CPT | Mod: CPTII,S$GLB,, | Performed by: STUDENT IN AN ORGANIZED HEALTH CARE EDUCATION/TRAINING PROGRAM

## 2025-08-27 PROCEDURE — 1159F MED LIST DOCD IN RCRD: CPT | Mod: CPTII,S$GLB,, | Performed by: STUDENT IN AN ORGANIZED HEALTH CARE EDUCATION/TRAINING PROGRAM

## 2025-08-27 PROCEDURE — 3078F DIAST BP <80 MM HG: CPT | Mod: CPTII,S$GLB,, | Performed by: STUDENT IN AN ORGANIZED HEALTH CARE EDUCATION/TRAINING PROGRAM

## 2025-08-27 PROCEDURE — 3077F SYST BP >= 140 MM HG: CPT | Mod: CPTII,S$GLB,, | Performed by: STUDENT IN AN ORGANIZED HEALTH CARE EDUCATION/TRAINING PROGRAM

## 2025-08-27 PROCEDURE — 4010F ACE/ARB THERAPY RXD/TAKEN: CPT | Mod: CPTII,S$GLB,, | Performed by: STUDENT IN AN ORGANIZED HEALTH CARE EDUCATION/TRAINING PROGRAM

## 2025-08-27 PROCEDURE — 99215 OFFICE O/P EST HI 40 MIN: CPT | Mod: S$GLB,,, | Performed by: STUDENT IN AN ORGANIZED HEALTH CARE EDUCATION/TRAINING PROGRAM

## 2025-08-27 PROCEDURE — 3288F FALL RISK ASSESSMENT DOCD: CPT | Mod: CPTII,S$GLB,, | Performed by: STUDENT IN AN ORGANIZED HEALTH CARE EDUCATION/TRAINING PROGRAM

## 2025-08-27 PROCEDURE — 3008F BODY MASS INDEX DOCD: CPT | Mod: CPTII,S$GLB,, | Performed by: STUDENT IN AN ORGANIZED HEALTH CARE EDUCATION/TRAINING PROGRAM

## 2025-08-27 PROCEDURE — 99999 PR PBB SHADOW E&M-EST. PATIENT-LVL IV: CPT | Mod: PBBFAC,,, | Performed by: STUDENT IN AN ORGANIZED HEALTH CARE EDUCATION/TRAINING PROGRAM

## 2025-08-28 ENCOUNTER — DOCUMENTATION ONLY (OUTPATIENT)
Dept: HEMATOLOGY/ONCOLOGY | Facility: CLINIC | Age: 70
End: 2025-08-28
Payer: MEDICARE

## 2025-08-28 ENCOUNTER — PATIENT MESSAGE (OUTPATIENT)
Dept: HEMATOLOGY/ONCOLOGY | Facility: CLINIC | Age: 70
End: 2025-08-28
Payer: MEDICARE

## 2025-08-28 ENCOUNTER — TELEPHONE (OUTPATIENT)
Dept: UROLOGY | Facility: CLINIC | Age: 70
End: 2025-08-28
Payer: MEDICARE

## 2025-08-28 DIAGNOSIS — Z85.51 HISTORY OF BLADDER CANCER: Primary | ICD-10-CM

## 2025-08-28 DIAGNOSIS — C22.0 HEPATOCELLULAR CARCINOMA: Primary | ICD-10-CM

## 2025-08-28 DIAGNOSIS — K92.1 HEMATOCHEZIA: ICD-10-CM

## 2025-08-29 ENCOUNTER — TELEPHONE (OUTPATIENT)
Dept: HEMATOLOGY/ONCOLOGY | Facility: CLINIC | Age: 70
End: 2025-08-29
Payer: MEDICARE

## 2025-09-03 ENCOUNTER — TELEPHONE (OUTPATIENT)
Dept: GASTROENTEROLOGY | Facility: CLINIC | Age: 70
End: 2025-09-03
Payer: MEDICARE

## (undated) DEVICE — GOWN SURGICAL X-LARGE

## (undated) DEVICE — PACK CYSTO

## (undated) DEVICE — HANDLE SURG LIGHT NONRIGID

## (undated) DEVICE — SYR 10CC LUER LOCK

## (undated) DEVICE — SOL IRR NACL .9% 3000ML

## (undated) DEVICE — COVER LIGHT HANDLE 80/CA

## (undated) DEVICE — CATH URTRL OPEN END STR TIP 5F

## (undated) DEVICE — DRAPE HALF SURGICAL 40X58IN

## (undated) DEVICE — ELECTRODE REM PLYHSV RETURN 9

## (undated) DEVICE — DRESSING TELFA N ADH 3X8

## (undated) DEVICE — TUBING SUC UNIV W/CONN 12FT

## (undated) DEVICE — SET TUR BLADDER IRRIG Y TYPE

## (undated) DEVICE — GLOVE SENSICARE PI MICRO 7.5

## (undated) DEVICE — TOWEL OR DISP STRL BLUE 4/PK

## (undated) DEVICE — NDL ECLIPSE SAFETY 18GX1-1/2IN

## (undated) DEVICE — STRAP OR TABLE 5IN X 72IN

## (undated) DEVICE — DRAPE C ARM 42 X 120 10/BX

## (undated) DEVICE — SEE MEDLINE ITEM 154981

## (undated) DEVICE — CONTAINER SPECIMEN OR STER 4OZ

## (undated) DEVICE — BOWL STERILE LARGE 32OZ

## (undated) DEVICE — SPONGE GAUZE 16PLY 4X4

## (undated) DEVICE — NEPTUNE 4 PORT MANIFOLD

## (undated) DEVICE — GLOVE BIOGEL ECLIPSE SZ 7.5

## (undated) DEVICE — BAG URO DRAIN